# Patient Record
Sex: FEMALE | Race: WHITE | NOT HISPANIC OR LATINO | Employment: OTHER | URBAN - METROPOLITAN AREA
[De-identification: names, ages, dates, MRNs, and addresses within clinical notes are randomized per-mention and may not be internally consistent; named-entity substitution may affect disease eponyms.]

---

## 2017-02-01 ENCOUNTER — TELEPHONE (OUTPATIENT)
Dept: FAMILY MEDICINE | Facility: CLINIC | Age: 72
End: 2017-02-01

## 2017-02-01 NOTE — TELEPHONE ENCOUNTER
----- Message from [employer] sent at 2/1/2017 11:40 AM CST -----  Regarding: Appointment scheduled from [facility]  Contact: [phone]  Appointment For: WARNER BEAL ([id number])  Visit Type: [facility] OFFICE VISIT SHORT (910)    2/21/2017    3:00 PM  15 mins.  Xochitl Hemphill MD  FAMILY PRACTICE    Patient Comments:  Primary Care  Sever pain in left leg.

## 2017-02-07 ENCOUNTER — MYC MEDICAL ADVICE (OUTPATIENT)
Dept: FAMILY MEDICINE | Facility: CLINIC | Age: 72
End: 2017-02-07

## 2017-02-07 NOTE — TELEPHONE ENCOUNTER
See MyCharts below.  Patient has canceled appointment.  Closing this encounter.  Clara Helton RN

## 2017-02-13 NOTE — TELEPHONE ENCOUNTER
Patient has not returned call or MyChart message to clinic, she also cancelled her appointment on 02/21/2017.    Will close encounter.     Isa Fishman RN

## 2017-03-31 ENCOUNTER — MYC REFILL (OUTPATIENT)
Dept: FAMILY MEDICINE | Facility: CLINIC | Age: 72
End: 2017-03-31

## 2017-03-31 DIAGNOSIS — E78.5 HYPERLIPIDEMIA WITH TARGET LDL LESS THAN 130: ICD-10-CM

## 2017-03-31 DIAGNOSIS — M32.9 SYSTEMIC LUPUS ERYTHEMATOSUS (H): ICD-10-CM

## 2017-03-31 NOTE — TELEPHONE ENCOUNTER
Message from [employer]:  Original authorizing provider: Xochitl Hemphill MD    Capri DEANDRE Wiseman would like a refill of the following medications:  hydroxychloroquine (PLAQUENIL) 200 MG tablet [Xochitl Hemphill MD]  simvastatin (ZOCOR) 10 MG tablet [Xochitl Hemphill MD]    Preferred pharmacy: Other - [facility] Help Desk [phone]    Comment:  Please send these Rx renewals to my new provider [facility]. There [facility] number is [phone]. Thank you for your prompt renewal service. Capri

## 2017-03-31 NOTE — TELEPHONE ENCOUNTER
Zocor     Last Written Prescription Date: 10/03/16  Last Fill Quantity: 90, # refills: 3  Last Office Visit with [facility] [place], [facility] or  Health prescribing provider: 06/03/16       Lab Results   Component Value Date    CHOL 160 10/03/2016     Lab Results   Component Value Date    HDL 69 10/03/2016     Lab Results   Component Value Date    LDL 69 10/03/2016     Lab Results   Component Value Date    TRIG 111 10/03/2016     Lab Results   Component Value Date    CHOLHDLRATIO 1.9 06/17/2015     plaquenil      Last Written Prescription Date: 06/03/16  Last Fill Quantity: 90,  # refills: 3   Last Office Visit with [facility] [place], [facility] or [facility] prescribing provider: 06/03/16

## 2017-04-04 RX ORDER — HYDROXYCHLOROQUINE SULFATE 200 MG/1
200 TABLET, FILM COATED ORAL DAILY
Qty: 90 TABLET | Refills: 1 | Status: SHIPPED | OUTPATIENT
Start: 2017-04-04 | End: 2017-07-03

## 2017-04-04 RX ORDER — SIMVASTATIN 10 MG
10 TABLET ORAL AT BEDTIME
Qty: 90 TABLET | Refills: 1 | Status: SHIPPED | OUTPATIENT
Start: 2017-04-04 | End: 2017-08-14

## 2017-04-06 ENCOUNTER — HOSPITAL ENCOUNTER (OUTPATIENT)
Dept: MAMMOGRAPHY | Facility: CLINIC | Age: 72
Discharge: HOME | End: 2017-04-06
Attending: FAMILY MEDICINE | Admitting: FAMILY MEDICINE
Payer: COMMERCIAL

## 2017-04-06 DIAGNOSIS — Z12.31 VISIT FOR SCREENING MAMMOGRAM: ICD-10-CM

## 2017-04-06 PROCEDURE — G0202 SCR MAMMO BI INCL CAD: HCPCS

## 2017-06-07 ENCOUNTER — RADIANT APPOINTMENT (OUTPATIENT)
Dept: GENERAL RADIOLOGY | Facility: CLINIC | Age: 72
End: 2017-06-07
Attending: ORTHOPAEDIC SURGERY
Payer: COMMERCIAL

## 2017-06-07 ENCOUNTER — OFFICE VISIT (OUTPATIENT)
Dept: ORTHOPEDICS | Facility: CLINIC | Age: 72
End: 2017-06-07
Payer: COMMERCIAL

## 2017-06-07 VITALS — TEMPERATURE: 97 F | WEIGHT: 173 LBS | HEIGHT: 60 IN | BODY MASS INDEX: 33.96 KG/M2

## 2017-06-07 DIAGNOSIS — M25.519 SHOULDER PAIN: ICD-10-CM

## 2017-06-07 DIAGNOSIS — M75.42 IMPINGEMENT SYNDROME OF LEFT SHOULDER: Primary | ICD-10-CM

## 2017-06-07 PROCEDURE — 73030 X-RAY EXAM OF SHOULDER: CPT | Mod: TC

## 2017-06-07 PROCEDURE — 99204 OFFICE O/P NEW MOD 45 MIN: CPT | Mod: 25 | Performed by: ORTHOPAEDIC SURGERY

## 2017-06-07 PROCEDURE — 20610 DRAIN/INJ JOINT/BURSA W/O US: CPT | Mod: LT | Performed by: ORTHOPAEDIC SURGERY

## 2017-06-07 ASSESSMENT — PAIN SCALES - GENERAL: PAINLEVEL: MILD PAIN (2)

## 2017-06-07 NOTE — PROGRESS NOTES
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      She is being seen for   Chief Complaints and History of Present Illnesses   Patient presents with     Consult     left shoulder pain per Xochitl Hemphill MD          History of Present Illness:   Capri Wiseman is a 71 year old female who is seen in consultation at the request of Xochitl Hemphill MD.  History of Present illness:  Capri presents for evaluation of:  1.) left shoulder  Onset:  early and March    Symptoms brought on by reaching .   Location:  left shoulder.    Character:  sharp.    Progression of symptoms:  worse.    Previous similar pain: YES- rt shoulder surgery about 3 yrs ago.   Pain Level:  2/10.   Previous treatments:  heat and ice.  Currently on Blood thinners? asa  Diagnosis of Diabetes? no  Was carrying a vacuum canister  Pain getting worse, sleep difficult, reaching painful      Patient's past medical, surgical, social and family histories reviewed.     Past Medical History:   Diagnosis Date     CKD (chronic kidney disease) stage 3, GFR 30-59 ml/min      Dental disorder     loosing top teeth due to lupus     GERD (gastroesophageal reflux disease)      High blood pressure      Mixed hyperlipidaemia      Osteoarthritis      RA (rheumatoid arthritis) (H)     mild, on plaquenil     SLE (systemic lupus erythematosus) (H)      Vision problem     dry eyes from Lupus       Past Surgical History:   Procedure Laterality Date     APPENDECTOMY  1969     C TOTAL KNEE ARTHROPLASTY  3/10    left     COLONOSCOPY  2009    repeat 5 years     COLONOSCOPY N/A 5/20/2015    Procedure: COLONOSCOPY;  Surgeon: Corbin Dexter MD;  Location:  GI     FOOT SURGERY  2010    bone and screws in 2 toes, hammertoe     HYSTERECTOMY TOTAL ABDOMINAL  2010    due to bleeding, benign     LAPAROTOMY EXPLORATORY      scar tissue removal/repair - abd      SHOULDER SURGERY  05/2013    rotator cuff repair, right       Medications:    Current Outpatient Prescriptions on File Prior to Visit:  hydroxychloroquine (PLAQUENIL) 200 MG tablet Take 1 tablet (200 mg) by mouth daily   simvastatin (ZOCOR) 10 MG tablet Take 1 tablet (10 mg) by mouth At Bedtime   metoprolol (TOPROL-XL) 25 MG 24 hr tablet Take 0.5 tablets (12.5 mg) by mouth 2 times daily   lisinopril (PRINIVIL,ZESTRIL) 10 MG tablet TAKE 1 TABLET EVERY DAY   hydrochlorothiazide (HYDRODIURIL) 25 MG tablet Take 0.5 tablets (12.5 mg) by mouth daily   oxyCODONE-acetaminophen (PERCOCET) 5-325 MG per tablet Take 1-2 tablets by mouth every 6 hours as needed   Omeprazole Magnesium (PRILOSEC OTC PO) Take 20 mg by mouth daily   aspirin 325 MG tablet Take 325 mg by mouth daily   Glucosamine-Chondroit-Vit C-Mn (GLUCOSAMINE CHONDR 1500 COMPLX PO) Take 2 tablets by mouth daily   Multiple Vitamins-Minerals (CENTRUM SILVER) per tablet Take 1 tablet by mouth daily   Ascorbic Acid (VITAMIN C PO) Take 1,000 mg by mouth daily   Cyanocobalamin (VITAMIN B 12 PO) Take 1,000 mcg by mouth daily   calcium citrate-vitamin D (CITRACAL) 315-250 MG-UNIT TABS Take 2 tablets by mouth daily    Omega-3 Fatty Acids (OMEGA-3 FISH OIL PO) Take 1,400 mg by mouth daily   artificial saliva, BIOTENE MT, (BIOTENE MT) SOLN Swish and spit 5-10 mLs in mouth as needed for dry mouth   cycloSPORINE (RESTASIS) 0.05 % ophthalmic emulsion Place 1 drop into both eyes 2 times daily     No current facility-administered medications on file prior to visit.     Allergies   Allergen Reactions     Ciprofloxacin GI Disturbance     Was taken with Flagyl, not sure which one bothered her.      Flagyl [Metronidazole] GI Disturbance     Was taken with Cipro, not sure which one bothered her.        Social History     Occupational History     teacher      Retired     Social History Main Topics     Smoking status: Never Smoker     Smokeless tobacco: Never Used     Alcohol use No      Drug use: No     Sexual activity: No       Family History   Problem Relation Age of Onset     Colon Cancer Mother      CEREBROVASCULAR DISEASE Father      Lupus Mother      Neurologic Disorder Sister      ALS       REVIEW OF SYSTEMS  10 point review systems performed otherwise negative as noted as per history of present illness.    Physical Exam:  Vitals: Temp 97  F (36.1  C)  Ht 1.524 m (5')  Wt 78.5 kg (173 lb)  BMI 33.79 kg/m2  BMI= Body mass index is 33.79 kg/(m^2).    Constitutional: healthy, alert and no acute distress   Psychiatric: mentation appears normal and affect normal/bright  NEURO: no focal deficits  RESP: Normal with easy respirations and no use of accessory muscles to breathe, no audible wheezing or retractions  CV: regular pulse  SKIN: No erythema, rashes, excoriation, or breakdown. No evidence of infection.   JOINT/EXTREMITIES:left shoulder - FROM, positive impingement, good strength in rotator cuff  GAIT: non-antalgic  Lymph: no palpable lymph nodes    Diagnostic Modalities:  left shoulder X-ray: Well preserved glenohumeral articular space. No fracture, dislocation and or lesion.   Independent visualization of the images was performed.      Impression: left Shoulder Subacromial Impingement    Plan:  All of the above pertinent physical exam and imaging modalities findings was reviewed with Capri.                                          CONSERVATIVE CARE:  I recommend conservative care for the patient to include NSAIDs, Tylenol, focused self directed physical therapy, steroid injections, activity modifications. Today I provided or dispensed info andf hep.                                        INJECTION PROCEDURE:  The patient was counseled about an  injection, including discussion of risks (including infection), contents of the injection, rationale for performing the injection, and expected benefits of the injection. The skin was prepped with alcohol and betadine and then utilizing sterile  technique an injection of the left shoulder subacromial space from the anterolateral approach  was performed. The injection consisted 1ml of Kenalog (40mg per 1ml) with 8ml 1% lidocaine plain. The patient tolerated the injection well, and there were no complications. The injection site was covered with a Band-Aid. The injection was performed by Vivi Mcneil M.D.                                                FUTURE PLAN:  On their return if they still have symptoms we will consider physical therapy, MRI, injection of steroids.      Return to clinic 6, week(s), or sooner as needed for changes.  Re-x-ray on return: No    Vivi Mcneil M.D.

## 2017-06-07 NOTE — NURSING NOTE
Chief Complaint   Patient presents with     Consult     left shoulder pain per Xochitl Hemphill MD        Initial Temp 97  F (36.1  C)  Ht 1.524 m (5')  Wt 78.5 kg (173 lb)  BMI 33.79 kg/m2 Estimated body mass index is 33.79 kg/(m^2) as calculated from the following:    Height as of this encounter: 1.524 m (5').    Weight as of this encounter: 78.5 kg (173 lb).  Medication Reconciliation: complete    BP completed using cuff size: NA (Not Taken)    Chelsey Fields MA

## 2017-06-07 NOTE — PATIENT INSTRUCTIONS
What Is Impingement Syndrome?  Shoulder pain when raising your arm may mean you have impingement syndrome. This is pinching within your shoulder. The problem may have been caused by repeating an overhead motion. In some cases, you may feel a nagging pain even when you re not using your shoulder.     A forceful action repeated day after day without rest can cause a repetitive motion injury (RMI). Shoulder impingement is often due to an RMI.      Symptoms of impingement  You may feel pain, pinching, or stiffness in your shoulder. Pain often comes with movement. But you may also feel it when you re not using your shoulder. For example, you may feel pain while trying to sleep.    Causes of impingement  Shoulder impingement is often caused by making repeated overhead movements. Constant shoulder use can irritate the tendons and bursa, leading to swelling. Swollen parts of the shoulder take up more room, making the joint space smaller:    Bursitis is inflammation of the bursa, a sac of fluid that cushions shoulder parts as they move. The bursa fills up with too much fluid, filling and squeezing the joint space.    Tendinitis is inflammation of the tendons, fibrous tissues that connect muscle to bone.    Bone problems can make impingement worse. The acromion is part of the shoulder bone. It may be flat or hooked. If your acromion is hooked, the joint space may be smaller than normal. This makes you more prone to shoulder problems. Bone spurs (growths on the bone) can also narrow the joint space.        7873-7697 [employer]. [address] [place] [address], [place] [address]. All rights reserved. This information is not intended as a substitute for professional medical care. Always follow your healthcare professional's instructions.        Nonsurgical Treatment Options for Shoulder Impingement    Rest is key to healing your shoulder. If an activity hurts, don t do it. Otherwise, you may prevent healing and increase  pain. Your shoulder needs active rest. This means avoiding overhead movements and activities that cause pain. But DO NOT stop using your shoulder completely. This can cause it to stiffen or  freeze.  In addition to rest, impingement can be treated a number of ways. Your healthcare provider can help you find which of these is best for you.     Ice  Ice reduces inflammation and relieves pain. Apply an ice pack for about 15 minutes, 3 times a day. You can also use a bag of frozen peas instead of an ice pack. A pillow placed under your arm may help make you more comfortable.  Note: Don t put the cold item directly on your skin. Place it on top of your shirt, or wrap it in a thin towel or washcloth.     Heat  Heat may soothe aching muscles, but it won t reduce inflammation. Use a heating pad or take a warm shower or bath. Do this for 15 minutes at a time.  Note: Avoid heat when pain is constant. Heat is best when used for warming up before an activity. You can also alternate ice and heat.     Medicine  To relieve pain and inflammation, try over-the-counter pain relievers, such as acetaminophen or ibuprofen. Or, your healthcare provider may prescribe medicines. Ask how and when to take your medicine. Be sure to follow all instructions you re given.     Electrical stimulation  Electrical stimulation can help reduce pain and swelling. Your healthcare provider attaches small pads to your shoulder. A mild electric current then flows into your shoulder. You may feel tingling, but you should not feel pain.     Ultrasound  Ultrasound can help reduce pain. First a slick gel or medicated cream is applied to your shoulder. Then your healthcare provider places a small device over the area. The device uses sound waves to loosen shoulder tightness. This treatment should be pain-free.  A physical therapist can also help you with exercises specific for your condition.     Injection therapy  Injection therapy may be used to help diagnose  your problem. It may also be used to reduce pain and inflammation. The injection typically includes two medicines. One is an anesthetic to numb the shoulder. The other is a steroid, such as cortisone, to help reduce painful swelling. It can take from a few hours to a couple of days before the injection helps. Talk to your healthcare provider about the possible risks and benefits of this therapy.    3250-8109 [employer]. [address] [place] [address]. All rights reserved. This information is not intended as a substitute for professional medical care. Always follow your healthcare professional's instructions.        Shoulder Exercises: External Rotation  Strengthening exercises help make your injured shoulder more stable. To warm up, do flexibility (stretching) exercises first. Your healthcare provider will tell you what size hand weights to use for the strengthening exercise below. If you don t have hand weights, try using cans of soup instead:    Lie on your uninjured side with your head supported by a pillow or your arm. Place a small rolled-up towel under your top elbow.    Grasp a hand weight with your top hand and bend that arm to a right angle, resting your forearm against your stomach.    Keeping your elbow against the towel, slowly lift the weight until your forearm is slightly higher than your elbow. Return to the starting position. Repeat.    Work up to 5 to 15 lifts.    9436-1887 [employer]. [address] [place] [address]. All rights reserved. This information is not intended as a substitute for professional medical care. Always follow your healthcare professional's instructions.        Shoulder Exercises: Internal Rotation    Strengthening exercises help make your injured shoulder more stable. To warm up, do flexibility (stretching) exercises first. Your healthcare provider will tell you what size hand weights to use for the strengthening exercise  below. If you don t have hand weights, try using cans of soup instead:    With knees bent, lie on a firm surface. Using the hand on the same side as your injured shoulder, grasp a weight. Bend that arm to a right angle (90 degrees).    Rest your elbow on the floor.    Keeping your elbow next to your side, lower your forearm toward the floor, away from your body. Do not lower your hand all the way to the floor.    Slowly return your forearm to your side. Repeat.    Work up to 5 to 15 lifts.     Note: Support your head and neck with a pillow.     4521-8484 [employer]. [address] [place] [address], [place] [address]. All rights reserved. This information is not intended as a substitute for professional medical care. Always follow your healthcare professional's instructions.

## 2017-06-07 NOTE — LETTER
6/7/2017       RE: Capri Wiseman  [address] [facility] [address]           Dear Colleague,    Thank you for referring your patient, Capri Wiseman, to the [facility]. Please see a copy of my visit note below.    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      She is being seen for   Chief Complaints and History of Present Illnesses   Patient presents with     Consult     left shoulder pain per Xochitl Hemphill MD          History of Present Illness:   Capri Wiseman is a 71 year old female who is seen in consultation at the request of Xochitl Hemphill MD.  History of Present illness:  Capri presents for evaluation of:  1.) left shoulder  Onset:  early and March    Symptoms brought on by reaching .   Location:  left shoulder.    Character:  sharp.    Progression of symptoms:  worse.    Previous similar pain: YES- rt shoulder surgery about 3 yrs ago.   Pain Level:  2/10.   Previous treatments:  heat and ice.  Currently on Blood thinners? asa  Diagnosis of Diabetes? no  Was carrying a vacuum canister  Pain getting worse, sleep difficult, reaching painful      Patient's past medical, surgical, social and family histories reviewed.     Past Medical History:   Diagnosis Date     CKD (chronic kidney disease) stage 3, GFR 30-59 ml/min      Dental disorder     loosing top teeth due to lupus     GERD (gastroesophageal reflux disease)      High blood pressure      Mixed hyperlipidaemia      Osteoarthritis      RA (rheumatoid arthritis) (H)     mild, on plaquenil     SLE (systemic lupus erythematosus) (H)      Vision problem     dry eyes from Lupus       Past Surgical History:   Procedure Laterality Date     APPENDECTOMY  1969     C TOTAL KNEE ARTHROPLASTY  3/10    left     COLONOSCOPY  2009    repeat 5 years     COLONOSCOPY N/A 5/20/2015    Procedure: COLONOSCOPY;   Surgeon: Corbin Dexter MD;  Location:  GI     FOOT SURGERY  2010    bone and screws in 2 toes, hammertoe     HYSTERECTOMY TOTAL ABDOMINAL  2010    due to bleeding, benign     LAPAROTOMY EXPLORATORY      scar tissue removal/repair - abd     SHOULDER SURGERY  05/2013    rotator cuff repair, right       Medications:    Current Outpatient Prescriptions on File Prior to Visit:  hydroxychloroquine (PLAQUENIL) 200 MG tablet Take 1 tablet (200 mg) by mouth daily   simvastatin (ZOCOR) 10 MG tablet Take 1 tablet (10 mg) by mouth At Bedtime   metoprolol (TOPROL-XL) 25 MG 24 hr tablet Take 0.5 tablets (12.5 mg) by mouth 2 times daily   lisinopril (PRINIVIL,ZESTRIL) 10 MG tablet TAKE 1 TABLET EVERY DAY   hydrochlorothiazide (HYDRODIURIL) 25 MG tablet Take 0.5 tablets (12.5 mg) by mouth daily   oxyCODONE-acetaminophen (PERCOCET) 5-325 MG per tablet Take 1-2 tablets by mouth every 6 hours as needed   Omeprazole Magnesium (PRILOSEC OTC PO) Take 20 mg by mouth daily   aspirin 325 MG tablet Take 325 mg by mouth daily   Glucosamine-Chondroit-Vit C-Mn (GLUCOSAMINE CHONDR 1500 COMPLX PO) Take 2 tablets by mouth daily   Multiple Vitamins-Minerals (CENTRUM SILVER) per tablet Take 1 tablet by mouth daily   Ascorbic Acid (VITAMIN C PO) Take 1,000 mg by mouth daily   Cyanocobalamin (VITAMIN B 12 PO) Take 1,000 mcg by mouth daily   calcium citrate-vitamin D (CITRACAL) 315-250 MG-UNIT TABS Take 2 tablets by mouth daily    Omega-3 Fatty Acids (OMEGA-3 FISH OIL PO) Take 1,400 mg by mouth daily   artificial saliva, BIOTENE MT, (BIOTENE MT) SOLN Swish and spit 5-10 mLs in mouth as needed for dry mouth   cycloSPORINE (RESTASIS) 0.05 % ophthalmic emulsion Place 1 drop into both eyes 2 times daily     No current facility-administered medications on file prior to visit.     Allergies   Allergen Reactions     Ciprofloxacin GI Disturbance     Was taken with Flagyl, not sure which one bothered her.      Flagyl [Metronidazole] GI Disturbance      Was taken with Cipro, not sure which one bothered her.        Social History     Occupational History     teacher      Retired     Social History Main Topics     Smoking status: Never Smoker     Smokeless tobacco: Never Used     Alcohol use No     Drug use: No     Sexual activity: No       Family History   Problem Relation Age of Onset     Colon Cancer Mother      CEREBROVASCULAR DISEASE Father      Lupus Mother      Neurologic Disorder Sister      ALS       REVIEW OF SYSTEMS  10 point review systems performed otherwise negative as noted as per history of present illness.    Physical Exam:  Vitals: Temp 97  F (36.1  C)  Ht 1.524 m (5')  Wt 78.5 kg (173 lb)  BMI 33.79 kg/m2  BMI= Body mass index is 33.79 kg/(m^2).    Constitutional: healthy, alert and no acute distress   Psychiatric: mentation appears normal and affect normal/bright  NEURO: no focal deficits  RESP: Normal with easy respirations and no use of accessory muscles to breathe, no audible wheezing or retractions  CV: regular pulse  SKIN: No erythema, rashes, excoriation, or breakdown. No evidence of infection.   JOINT/EXTREMITIES:left shoulder - FROM, positive impingement, good strength in rotator cuff  GAIT: non-antalgic  Lymph: no palpable lymph nodes    Diagnostic Modalities:  left shoulder X-ray: Well preserved glenohumeral articular space. No fracture, dislocation and or lesion.   Independent visualization of the images was performed.      Impression: left Shoulder Subacromial Impingement    Plan:  All of the above pertinent physical exam and imaging modalities findings was reviewed with Capri.                                          CONSERVATIVE CARE:  I recommend conservative care for the patient to include NSAIDs, Tylenol, focused self directed physical therapy, steroid injections, activity modifications. Today I provided or dispensed info andf hep.                                        INJECTION PROCEDURE:  The patient was counseled about  an  injection, including discussion of risks (including infection), contents of the injection, rationale for performing the injection, and expected benefits of the injection. The skin was prepped with alcohol and betadine and then utilizing sterile technique an injection of the left shoulder subacromial space from the anterolateral approach  was performed. The injection consisted 1ml of Kenalog (40mg per 1ml) with 8ml 1% lidocaine plain. The patient tolerated the injection well, and there were no complications. The injection site was covered with a Band-Aid. The injection was performed by Vivi Mcneil M.D.                                                FUTURE PLAN:  On their return if they still have symptoms we will consider physical therapy, MRI, injection of steroids.      Return to clinic 6, week(s), or sooner as needed for changes.  Re-x-ray on return: No    Vivi Mcneil M.D.    Again, thank you for allowing me to participate in the care of your patient.        Sincerely,              Vivi Mcneil MD

## 2017-06-07 NOTE — MR AVS SNAPSHOT
After Visit Summary   6/7/2017    Capri Wiseman    MRN: [id number]           Patient Information     Date Of Birth          1945        Visit Information        Provider Department      6/7/2017 2:10 PM Vivi Mcneil MD [facility]        Today's Diagnoses     Shoulder pain    -  1      Care Instructions      What Is Impingement Syndrome?  Shoulder pain when raising your arm may mean you have impingement syndrome. This is pinching within your shoulder. The problem may have been caused by repeating an overhead motion. In some cases, you may feel a nagging pain even when you re not using your shoulder.     A forceful action repeated day after day without rest can cause a repetitive motion injury (RMI). Shoulder impingement is often due to an RMI.      Symptoms of impingement  You may feel pain, pinching, or stiffness in your shoulder. Pain often comes with movement. But you may also feel it when you re not using your shoulder. For example, you may feel pain while trying to sleep.    Causes of impingement  Shoulder impingement is often caused by making repeated overhead movements. Constant shoulder use can irritate the tendons and bursa, leading to swelling. Swollen parts of the shoulder take up more room, making the joint space smaller:    Bursitis is inflammation of the bursa, a sac of fluid that cushions shoulder parts as they move. The bursa fills up with too much fluid, filling and squeezing the joint space.    Tendinitis is inflammation of the tendons, fibrous tissues that connect muscle to bone.    Bone problems can make impingement worse. The acromion is part of the shoulder bone. It may be flat or hooked. If your acromion is hooked, the joint space may be smaller than normal. This makes you more prone to shoulder problems. Bone spurs (growths on the bone) can also narrow the joint space.        8656-7573 [employer]. [address] [place] [address], [place]  [address]. All rights reserved. This information is not intended as a substitute for professional medical care. Always follow your healthcare professional's instructions.        Nonsurgical Treatment Options for Shoulder Impingement    Rest is key to healing your shoulder. If an activity hurts, don t do it. Otherwise, you may prevent healing and increase pain. Your shoulder needs active rest. This means avoiding overhead movements and activities that cause pain. But DO NOT stop using your shoulder completely. This can cause it to stiffen or  freeze.  In addition to rest, impingement can be treated a number of ways. Your healthcare provider can help you find which of these is best for you.     Ice  Ice reduces inflammation and relieves pain. Apply an ice pack for about 15 minutes, 3 times a day. You can also use a bag of frozen peas instead of an ice pack. A pillow placed under your arm may help make you more comfortable.  Note: Don t put the cold item directly on your skin. Place it on top of your shirt, or wrap it in a thin towel or washcloth.     Heat  Heat may soothe aching muscles, but it won t reduce inflammation. Use a heating pad or take a warm shower or bath. Do this for 15 minutes at a time.  Note: Avoid heat when pain is constant. Heat is best when used for warming up before an activity. You can also alternate ice and heat.     Medicine  To relieve pain and inflammation, try over-the-counter pain relievers, such as acetaminophen or ibuprofen. Or, your healthcare provider may prescribe medicines. Ask how and when to take your medicine. Be sure to follow all instructions you re given.     Electrical stimulation  Electrical stimulation can help reduce pain and swelling. Your healthcare provider attaches small pads to your shoulder. A mild electric current then flows into your shoulder. You may feel tingling, but you should not feel pain.     Ultrasound  Ultrasound can help reduce pain. First a slick gel or  medicated cream is applied to your shoulder. Then your healthcare provider places a small device over the area. The device uses sound waves to loosen shoulder tightness. This treatment should be pain-free.  A physical therapist can also help you with exercises specific for your condition.     Injection therapy  Injection therapy may be used to help diagnose your problem. It may also be used to reduce pain and inflammation. The injection typically includes two medicines. One is an anesthetic to numb the shoulder. The other is a steroid, such as cortisone, to help reduce painful swelling. It can take from a few hours to a couple of days before the injection helps. Talk to your healthcare provider about the possible risks and benefits of this therapy.    6112-9732 [employer]. [address] [place] [address]. All rights reserved. This information is not intended as a substitute for professional medical care. Always follow your healthcare professional's instructions.        Shoulder Exercises: External Rotation  Strengthening exercises help make your injured shoulder more stable. To warm up, do flexibility (stretching) exercises first. Your healthcare provider will tell you what size hand weights to use for the strengthening exercise below. If you don t have hand weights, try using cans of soup instead:    Lie on your uninjured side with your head supported by a pillow or your arm. Place a small rolled-up towel under your top elbow.    Grasp a hand weight with your top hand and bend that arm to a right angle, resting your forearm against your stomach.    Keeping your elbow against the towel, slowly lift the weight until your forearm is slightly higher than your elbow. Return to the starting position. Repeat.    Work up to 5 to 15 lifts.    9950-5826 [employer]. [address] [place] [address]. All rights reserved. This information is not intended as a substitute for  professional medical care. Always follow your healthcare professional's instructions.        Shoulder Exercises: Internal Rotation    Strengthening exercises help make your injured shoulder more stable. To warm up, do flexibility (stretching) exercises first. Your healthcare provider will tell you what size hand weights to use for the strengthening exercise below. If you don t have hand weights, try using cans of soup instead:    With knees bent, lie on a firm surface. Using the hand on the same side as your injured shoulder, grasp a weight. Bend that arm to a right angle (90 degrees).    Rest your elbow on the floor.    Keeping your elbow next to your side, lower your forearm toward the floor, away from your body. Do not lower your hand all the way to the floor.    Slowly return your forearm to your side. Repeat.    Work up to 5 to 15 lifts.     Note: Support your head and neck with a pillow.     3601-3946 [employer]. [address] [place] [address]. All rights reserved. This information is not intended as a substitute for professional medical care. Always follow your healthcare professional's instructions.                Follow-ups after your visit        Your next 10 appointments already scheduled     Jul 03, 2017  8:00 AM CDT   Return Visit with Jose M Loredo MD   [facility]    [address] [place] [address] [phone]            Aug 14, 2017  3:00 PM CDT   MyChart Long with Xochitl Hemphill MD   [facility]    [address] [place] [address] [phone]              Who to contact     If you have questions or need follow up information about today's clinic visit or your schedule please contact [facility] directly at [phone].  Normal or non-critical lab and imaging results will be communicated to you by MyChart, letter or  phone within 4 business days after the clinic has received the results. If you do not hear from us within 7 days, please contact the clinic through [email or web address] or phone. If you have a critical or abnormal lab result, we will notify you by phone as soon as possible.  Submit refill requests through [email or web address] or call your pharmacy and they will forward the refill request to us. Please allow 3 business days for your refill to be completed.          Additional Information About Your Visit        [email or web address] Information     [email or web address] gives you secure access to your electronic health record. If you see a primary care provider, you can also send messages to your care team and make appointments. If you have questions, please call your primary care clinic.  If you do not have a primary care provider, please call [phone] and they will assist you.        Care EveryWhere ID     This is your Care EveryWhere ID. This could be used by other organizations to access your [place] medical records  [id number]        Your Vitals Were     Temperature Height BMI (Body Mass Index)             97  F (36.1  C) 1.524 m (5') 33.79 kg/m2          Blood Pressure from Last 3 Encounters:   07/15/16 120/76   06/03/16 130/74   02/15/16 118/66    Weight from Last 3 Encounters:   06/07/17 78.5 kg (173 lb)   07/15/16 78.2 kg (172 lb 6.4 oz)   06/03/16 78.7 kg (173 lb 6.4 oz)               Primary Care Provider Office Phone # Fax #    Xochitl Suad Hemphill [phone]       [facility] [address] [place] [address]        Thank you!     Thank you for choosing [facility]  for your care. Our goal is always to provide you with excellent care. Hearing back from our patients is one way we can continue to improve our services. Please take a few minutes to complete the written survey that you may receive in the mail after your visit with us. Thank you!             Your Updated Medication List - Protect others  around you: Learn how to safely use, store and throw away your medicines at www.disposemymeds.org.          This list is accurate as of: 6/7/17  2:35 PM.  Always use your most recent med list.                   Brand Name Dispense Instructions for use    artificial saliva Soln solution     44.3 mL    Swish and spit 5-10 mLs in mouth as needed for dry mouth       aspirin 325 MG tablet      Take 325 mg by mouth daily       calcium citrate-vitamin D 315-250 MG-UNIT Tabs per tablet    CITRACAL     Take 2 tablets by mouth daily       CENTRUM SILVER per tablet      Take 1 tablet by mouth daily       cycloSPORINE 0.05 % ophthalmic emulsion    RESTASIS    180 each    Place 1 drop into both eyes 2 times daily       GLUCOSAMINE CHONDR 1500 COMPLX PO      Take 2 tablets by mouth daily       hydrochlorothiazide 25 MG tablet    HYDRODIURIL    45 tablet    Take 0.5 tablets (12.5 mg) by mouth daily       hydroxychloroquine 200 MG tablet    PLAQUENIL    90 tablet    Take 1 tablet (200 mg) by mouth daily       lisinopril 10 MG tablet    PRINIVIL/ZESTRIL    90 tablet    TAKE 1 TABLET EVERY DAY       metoprolol 25 MG 24 hr tablet    TOPROL-XL    90 tablet    Take 0.5 tablets (12.5 mg) by mouth 2 times daily       OMEGA-3 FISH OIL PO      Take 1,400 mg by mouth daily       oxyCODONE-acetaminophen 5-325 MG per tablet    PERCOCET    20 tablet    Take 1-2 tablets by mouth every 6 hours as needed       PRILOSEC OTC PO      Take 20 mg by mouth daily       simvastatin 10 MG tablet    ZOCOR    90 tablet    Take 1 tablet (10 mg) by mouth At Bedtime       VITAMIN B 12 PO      Take 1,000 mcg by mouth daily       VITAMIN C PO      Take 1,000 mg by mouth daily

## 2017-06-09 ENCOUNTER — TELEPHONE (OUTPATIENT)
Dept: ORTHOPEDICS | Facility: CLINIC | Age: 72
End: 2017-06-09

## 2017-06-09 DIAGNOSIS — Z96.619: Primary | ICD-10-CM

## 2017-06-09 RX ORDER — AMOXICILLIN 500 MG/1
2000 CAPSULE ORAL ONCE
Qty: 4 CAPSULE | Refills: 0 | Status: SHIPPED | OUTPATIENT
Start: 2017-06-09 | End: 2017-06-09

## 2017-06-09 NOTE — TELEPHONE ENCOUNTER
I discussed this patient with Dr. 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 When I talked to her on the phone it was unclear that she had a joint replacement. I did make a call back and discuss this in more detail with her. She is having dental work done currently and soon she states she is getting a plate put in. She also moved from [place] and so her primary care doctor is not established as well here. I ordered 2 g amoxicillin to be given one hour prior to dental work. She understands and will  the prescription.

## 2017-06-09 NOTE — TELEPHONE ENCOUNTER
Reason for Call:  Other call back    Detailed comments: Dr. Mcneil patient, had left shoulder surgery - she is having dental procedures, her dentist will not call in an anti-biotic, patient states she was told to call Dr. Mcneil to call it in if her dentist wouldn't. [facility] [employer] -     Phone Number Patient can be reached at: Cell number on file:    Telephone Information:   Mobile [phone]       Best Time: today please     Can we leave a detailed message on this number? YES    Call taken on 6/9/2017 at 9:56 AM by Peggy Fields

## 2017-06-09 NOTE — TELEPHONE ENCOUNTER
I called this patient to discuss her message below.  I was confused because I did not see that she had a total joint. When I called her she was also confused because she thought that she had an infection in her shoulder. I reviewed Dr. Mcneil's note from 2 days ago and there is no mention of infection plus there is no elevated labs that I can see in her chart. I reassured her of this today. She understands, she may have been confused about a tooth infection but today she denied that she had a tooth infection other than that she had some dental surgery recently. I have told her to follow up with her dentist if she needs anything for her teeth but her shoulder is okay.

## 2017-07-03 ENCOUNTER — OFFICE VISIT (OUTPATIENT)
Dept: RHEUMATOLOGY | Facility: CLINIC | Age: 72
End: 2017-07-03
Payer: COMMERCIAL

## 2017-07-03 VITALS
WEIGHT: 171 LBS | DIASTOLIC BLOOD PRESSURE: 82 MMHG | BODY MASS INDEX: 33.4 KG/M2 | SYSTOLIC BLOOD PRESSURE: 130 MMHG | TEMPERATURE: 97.7 F

## 2017-07-03 DIAGNOSIS — M32.19 SYSTEMIC LUPUS ERYTHEMATOSUS WITH OTHER ORGAN INVOLVEMENT, UNSPECIFIED SLE TYPE (H): Primary | ICD-10-CM

## 2017-07-03 LAB
ALBUMIN UR-MCNC: NEGATIVE MG/DL
ALT SERPL W P-5'-P-CCNC: 19 U/L (ref 0–50)
APPEARANCE UR: CLEAR
AST SERPL W P-5'-P-CCNC: 16 U/L (ref 0–45)
BACTERIA #/AREA URNS HPF: ABNORMAL /HPF
BILIRUB UR QL STRIP: NEGATIVE
C3 SERPL-MCNC: 96 MG/DL (ref 76–169)
C4 SERPL-MCNC: 22 MG/DL (ref 15–50)
COLOR UR AUTO: YELLOW
CREAT SERPL-MCNC: 1.08 MG/DL (ref 0.52–1.04)
CRP SERPL-MCNC: 3.4 MG/L (ref 0–8)
ERYTHROCYTE [DISTWIDTH] IN BLOOD BY AUTOMATED COUNT: 13.1 % (ref 10–15)
GFR SERPL CREATININE-BSD FRML MDRD: 50 ML/MIN/1.7M2
GLUCOSE UR STRIP-MCNC: NEGATIVE MG/DL
HCT VFR BLD AUTO: 33.9 % (ref 35–47)
HGB BLD-MCNC: 11.2 G/DL (ref 11.7–15.7)
HGB UR QL STRIP: NEGATIVE
KETONES UR STRIP-MCNC: NEGATIVE MG/DL
LEUKOCYTE ESTERASE UR QL STRIP: ABNORMAL
MCH RBC QN AUTO: 34.4 PG (ref 26.5–33)
MCHC RBC AUTO-ENTMCNC: 33 G/DL (ref 31.5–36.5)
MCV RBC AUTO: 104 FL (ref 78–100)
NITRATE UR QL: NEGATIVE
NON-SQ EPI CELLS #/AREA URNS LPF: ABNORMAL /LPF
PH UR STRIP: 5 PH (ref 5–7)
PLATELET # BLD AUTO: 193 10E9/L (ref 150–450)
RBC # BLD AUTO: 3.26 10E12/L (ref 3.8–5.2)
RBC #/AREA URNS AUTO: ABNORMAL /HPF (ref 0–2)
SP GR UR STRIP: 1.03 (ref 1–1.03)
URN SPEC COLLECT METH UR: ABNORMAL
UROBILINOGEN UR STRIP-MCNC: NORMAL MG/DL (ref 0–2)
WBC # BLD AUTO: 5 10E9/L (ref 4–11)
WBC #/AREA URNS AUTO: ABNORMAL /HPF (ref 0–2)

## 2017-07-03 PROCEDURE — 81001 URINALYSIS AUTO W/SCOPE: CPT | Performed by: INTERNAL MEDICINE

## 2017-07-03 PROCEDURE — 84460 ALANINE AMINO (ALT) (SGPT): CPT | Performed by: INTERNAL MEDICINE

## 2017-07-03 PROCEDURE — 86225 DNA ANTIBODY NATIVE: CPT | Performed by: INTERNAL MEDICINE

## 2017-07-03 PROCEDURE — 99214 OFFICE O/P EST MOD 30 MIN: CPT | Performed by: INTERNAL MEDICINE

## 2017-07-03 PROCEDURE — 82565 ASSAY OF CREATININE: CPT | Performed by: INTERNAL MEDICINE

## 2017-07-03 PROCEDURE — 86160 COMPLEMENT ANTIGEN: CPT | Performed by: INTERNAL MEDICINE

## 2017-07-03 PROCEDURE — 86140 C-REACTIVE PROTEIN: CPT | Performed by: INTERNAL MEDICINE

## 2017-07-03 PROCEDURE — 36415 COLL VENOUS BLD VENIPUNCTURE: CPT | Performed by: INTERNAL MEDICINE

## 2017-07-03 PROCEDURE — 84450 TRANSFERASE (AST) (SGOT): CPT | Performed by: INTERNAL MEDICINE

## 2017-07-03 PROCEDURE — 85027 COMPLETE CBC AUTOMATED: CPT | Performed by: INTERNAL MEDICINE

## 2017-07-03 RX ORDER — HYDROXYCHLOROQUINE SULFATE 200 MG/1
200 TABLET, FILM COATED ORAL DAILY
Qty: 90 TABLET | Refills: 3 | Status: SHIPPED | OUTPATIENT
Start: 2017-07-03 | End: 2018-07-16

## 2017-07-03 ASSESSMENT — PAIN SCALES - GENERAL: PAINLEVEL: NO PAIN (0)

## 2017-07-03 NOTE — MR AVS SNAPSHOT
After Visit Summary   7/3/2017    Capri Wiseman    MRN: [id number]           Patient Information     Date Of Birth          1945        Visit Information        Provider Department      7/3/2017 8:00 AM Jose M Loredo MD [facility]        Today's Diagnoses     Systemic lupus erythematosus with other organ involvement, unspecified SLE type (H)    -  1       Follow-ups after your visit        Follow-up notes from your care team     Return in about 1 year (around 7/3/2018).      Your next 10 appointments already scheduled     Aug 14, 2017  3:00 PM CDT   MyChart Long with Xochitl Hemphill MD   [facility] ([facility])    [address] [id number] [phone]            Jul 02, 2018  8:00 AM CDT   Return Visit with Jose M Loredo MD   [facility] ([facility])    [address] [place] [address] [phone]              Future tests that were ordered for you today     Open Future Orders        Priority Expected Expires Ordered    ALT Routine  7/3/2018 7/3/2017    AST Routine  7/3/2018 7/3/2017    CBC with platelets Routine  7/3/2018 7/3/2017    Complement C3 Routine  7/3/2018 7/3/2017    Complement C4 Routine  7/3/2018 7/3/2017    Creatinine Routine  7/3/2018 7/3/2017    CRP inflammation Routine  7/3/2018 7/3/2017    DNA double stranded antibodies Routine  7/3/2018 7/3/2017    Routine UA with microscopic - No culture ([facility]) Routine  7/3/2018 7/3/2017            Who to contact     If you have questions or need follow up information about today's clinic visit or your schedule please contact [facility] directly at [phone].  Normal or non-critical lab and imaging results will be communicated to you by MyChart, letter or phone within 4 business days after the clinic has received the results. If you do not hear from us within 7 days, please  contact the clinic through [employer] or phone. If you have a critical or abnormal lab result, we will notify you by phone as soon as possible.  Submit refill requests through [employer] or call your pharmacy and they will forward the refill request to us. Please allow 3 business days for your refill to be completed.          Additional Information About Your Visit        [employer] Information     [employer] gives you secure access to your electronic health record. If you see a primary care provider, you can also send messages to your care team and make appointments. If you have questions, please call your primary care clinic.  If you do not have a primary care provider, please call [phone] and they will assist you.      [employer] is an electronic gateway that provides easy, online access to your medical records. With [employer], you can request a clinic appointment, read your test results, renew a prescription or communicate with your care team.     To access your existing account, please contact your [facility] or call [phone] for assistance.        Care EveryWhere ID     This is your Care EveryWhere ID. This could be used by other organizations to access your [place] medical records  [id number]        Your Vitals Were     Temperature BMI (Body Mass Index)                97.7  F (36.5  C) 33.4 kg/m2           Blood Pressure from Last 3 Encounters:   07/03/17 130/82   07/15/16 120/76   06/03/16 130/74    Weight from Last 3 Encounters:   07/03/17 77.6 kg (171 lb)   06/07/17 78.5 kg (173 lb)   07/15/16 78.2 kg (172 lb 6.4 oz)                 Where to get your medicines      Some of these will need a paper prescription and others can be bought over the counter.  Ask your nurse if you have questions.     Bring a paper prescription for each of these medications     hydroxychloroquine 200 MG tablet          Primary Care Provider Office Phone # Fax #    Xochitl Hemphill MD  [phone]       [facility] [address] [place] [address]        Equal Access to Services     CHRYSTAL SCHMIDT : Hadii aad ku hadchandu Owens, wacjda lurajan, kd kamillerda bacilio, laurel crzainab noemi. So [facility] [phone].    ATENCIÓN: Si habla español, tiene a go disposición servicios gratuitos de asistencia lingüística. Llame al [phone].    We comply with applicable federal civil rights laws and [place] laws. We do not discriminate on the basis of race, color, national origin, age, disability sex, sexual orientation or gender identity.            Thank you!     Thank you for choosing [facility]  for your care. Our goal is always to provide you with excellent care. Hearing back from our patients is one way we can continue to improve our services. Please take a few minutes to complete the written survey that you may receive in the mail after your visit with us. Thank you!             Your Updated Medication List - Protect others around you: Learn how to safely use, store and throw away your medicines at www.disposemymeds.org.          This list is accurate as of: 7/3/17  8:26 AM.  Always use your most recent med list.                   Brand Name Dispense Instructions for use Diagnosis    artificial saliva Soln solution     44.3 mL    Swish and spit 5-10 mLs in mouth as needed for dry mouth    Dry mouth, RA (rheumatoid arthritis) (H)       aspirin 325 MG tablet      Take 325 mg by mouth daily        calcium citrate-vitamin D 315-250 MG-UNIT Tabs per tablet    CITRACAL     Take 2 tablets by mouth daily        CENTRUM SILVER per tablet      Take 1 tablet by mouth daily        cycloSPORINE 0.05 % ophthalmic emulsion    RESTASIS    180 each    Place 1 drop into both eyes 2 times daily    CKD (chronic kidney disease) stage 3, GFR 30-59 ml/min       GLUCOSAMINE CHONDR 1500 COMPLX PO      Take 2 tablets by mouth daily         hydrochlorothiazide 25 MG tablet    HYDRODIURIL    45 tablet    Take 0.5 tablets (12.5 mg) by mouth daily    Edema, unspecified type       hydroxychloroquine 200 MG tablet    PLAQUENIL    90 tablet    Take 1 tablet (200 mg) by mouth daily    Systemic lupus erythematosus with other organ involvement, unspecified SLE type (H)       lisinopril 10 MG tablet    PRINIVIL/ZESTRIL    90 tablet    TAKE 1 TABLET EVERY DAY    CKD (chronic kidney disease) stage 3, GFR 30-59 ml/min, Essential hypertension with goal blood pressure less than 140/90       metoprolol 25 MG 24 hr tablet    TOPROL-XL    90 tablet    Take 0.5 tablets (12.5 mg) by mouth 2 times daily    Essential hypertension with goal blood pressure less than 140/90       OMEGA-3 FISH OIL PO      Take 1,400 mg by mouth daily        PRILOSEC OTC PO      Take 20 mg by mouth daily        simvastatin 10 MG tablet    ZOCOR    90 tablet    Take 1 tablet (10 mg) by mouth At Bedtime    Hyperlipidemia with target LDL less than 130       VITAMIN B 12 PO      Take 1,000 mcg by mouth daily        VITAMIN C PO      Take 1,000 mg by mouth daily

## 2017-07-03 NOTE — NURSING NOTE
Capri Wiseman's goals for this visit include:   Chief Complaint   Patient presents with     RECHECK     one year follow up        She requests these members of her care team be copied on today's visit information: yes     PCP: Xochitl Hemphill    Referring Provider:  No referring provider defined for this encounter.    Chief Complaint   Patient presents with     RECHECK     one year follow up        Initial /82  Temp 97.7  F (36.5  C)  Wt 77.6 kg (171 lb)  BMI 33.4 kg/m2 Estimated body mass index is 33.4 kg/(m^2) as calculated from the following:    Height as of 6/7/17: 1.524 m (5').    Weight as of this encounter: 77.6 kg (171 lb).  Medication Reconciliation: complete    Do you need any medication refills at today's visit?

## 2017-07-03 NOTE — LETTER
7/3/2017      RE: Capri Wiseman  [address] [facility] [address]       Rheumatology Visit     Capri Wiseman MRN# [id number]   YOB: 1945 Age: 71 year old     Date of Visit: July 3, 2017  Primary care provider: Xochitl Hemphill          Assessment and Plan:   71-year-old woman with a 12-year history of SLE characterized by arthralgias, sicca symptoms, alopecia, and GERD. +AMY in 2013. Has been in remission for 6+ years, on maintenance therapy with  mg qd. Possible history of RA; likely a component of secondary Sjogren's impacting dental health currently being addressed at [facility]     2.  Other medical problems as per EPIC     PLAN: Discussed in detail with the patient     1. Continue Plaquenil - Rx renewed. Eye exam yearly - she is overdue and has referral to have done   2. For her dental issues due to sicca symptoms, continue with [facility]  3. Lab today and write  4. Return in 1 year  5. Flu shot this fall.     Jose M Loredo MD          History of Present Illness:   71-year-old woman who presents to [facility] to followup fo diagnosis of SLE. I saw her in March 2015 after she recently moved to [place] from [place], and last in July 2016. EPIC reviewed.     HISTORY CARRIED FORWARD:     She first began experiencing symptoms SLE about 12 years ago, when she was hospitalized for ACS-like chest pain. Cardiovascular workup was negative for coronary disease. About one year later, she was again hospitalized for chest pain and underwent a cardiac catheterization, which showed clean coronaries. Her chest pain was ultimately diagnosed as GERD and additional blood work (specifics unknown) suggested this was secondary to SLE. She had no other symptoms of SLE at the time.     Over the years she has experienced additional symptoms, including arthralgias (primarily in knees, ankles, hands, wrists), alopecia, sicca symptoms, and Raynaud s. She was  started on prednisone sometime early in her disease course but became very  swollen  and was never given corticosteroids again after this, per her recollection. Currently taking  mg qd and has been on this dose for years. Last eye exam was in 10/2013. Any additional treatment history is unknown.     Believes she also has RA but history of diagnosis is entirely unknown. Over the past 2-3 years she has been losing her upper row of teeth 2/2 dry mouth and has been told she needs dental surgery to support her palate. Has never used Evoxac. Used Restasis for dry eyes in the past but now too expensive and she uses OTC eye drops instead.      In July 2014, while she was still living in [place], she had an abnormal UA that suggested possible kidney disease 2/2 SLE. There were plans to obtain kidney ultrasound and/or biopsy but this was not done as Ms. Wiseman was in the process of relocating to [place]. She has already established care with a new PCP here, with plans to pursue kidney US. She does already carry a diagnosis of CKDIII though the history of this is quite unclear.     INTERVAL HISTORY:     Today, she overall feels well and states her SLE has now been in remission for about 6 years. No exacerbations since seen. No arthralgias.  She and her  are doing apple vinegar in AM and lemon in hot water in PM and feel great. They have lost weight.  She no longer has leg aches.     She remains on Plaquenil one daily without side effects.      She is on Restasis and artificial saliva. She has continued to have some dental deterioration and is having full upper mouth extractions at the  Dental Clinic.     She had interval FU regarding kidney issue with cysts seen on imaging that will be followed.      Complement levels and dsDNA normal in March 2015.   She has not had recent lab so will perform today.     Other ongoing issues include mild alopecia and Raynaud s, both stable. GERD well controlled on Prilosec. Has  never had any major infections       Review of Systems:   Review Of Systems  Skin: negative  Eyes: negative  Ears/Nose/Throat: see HPI  Respiratory: No shortness of breath, dyspnea on exertion, cough, or hemoptysis  Cardiovascular: negative  Gastrointestinal: negative  Genitourinary: see HPI  Musculoskeletal: negative  Neurologic: negative  Psychiatric: negative  Hematologic/Lymphatic/Immunologic: negative  Endocrine: negative          Past Medical History:     Past Medical History:   Diagnosis Date     CKD (chronic kidney disease) stage 3, GFR 30-59 ml/min      Dental disorder     loosing top teeth due to lupus     GERD (gastroesophageal reflux disease)      High blood pressure      Mixed hyperlipidaemia      Osteoarthritis      RA (rheumatoid arthritis) (H)     mild, on plaquenil     SLE (systemic lupus erythematosus) (H)      Vision problem     dry eyes from Lupus       Patient Active Problem List    Diagnosis Date Noted     Systemic lupus erythematosus (H) 08/31/2015     Priority: Medium     CKD (chronic kidney disease) stage 3, GFR 30-59 ml/min 03/03/2015     Priority: Medium     Hypertension 03/03/2015     Priority: Medium     Esophageal reflux 03/03/2015     Priority: Medium     Osteoarthritis 03/03/2015     Priority: Medium     Hyperlipidemia with target LDL less than 130 03/03/2015     Priority: Medium     Diagnosis updated by automated process. Provider to review and confirm.       RA (rheumatoid arthritis) (H) 03/03/2015     Priority: Medium             Past Surgical History:     Past Surgical History:   Procedure Laterality Date     APPENDECTOMY  1969     C TOTAL KNEE ARTHROPLASTY  3/10    left     COLONOSCOPY  2009    repeat 5 years     COLONOSCOPY N/A 5/20/2015    Procedure: COLONOSCOPY;  Surgeon: Corbin Dexter MD;  Location:  GI     FOOT SURGERY  2010    bone and screws in 2 toes, hammertoe     HYSTERECTOMY TOTAL ABDOMINAL  2010    due to bleeding, benign     LAPAROTOMY EXPLORATORY       scar tissue removal/repair - abd     SHOULDER SURGERY  05/2013    rotator cuff repair, right             Social History:     Social History   Substance Use Topics     Smoking status: Never Smoker     Smokeless tobacco: Never Used     Alcohol use No             Family History:     Family History   Problem Relation Age of Onset     Colon Cancer Mother      CEREBROVASCULAR DISEASE Father      Lupus Mother      Neurologic Disorder Sister      ALS            Allergies:     Allergies   Allergen Reactions     Ciprofloxacin GI Disturbance     Was taken with Flagyl, not sure which one bothered her.      Flagyl [Metronidazole] GI Disturbance     Was taken with Cipro, not sure which one bothered her.              Medications:     Current Outpatient Prescriptions   Medication Sig Dispense Refill     hydroxychloroquine (PLAQUENIL) 200 MG tablet Take 1 tablet (200 mg) by mouth daily 90 tablet 1     simvastatin (ZOCOR) 10 MG tablet Take 1 tablet (10 mg) by mouth At Bedtime 90 tablet 1     metoprolol (TOPROL-XL) 25 MG 24 hr tablet Take 0.5 tablets (12.5 mg) by mouth 2 times daily 90 tablet 3     lisinopril (PRINIVIL,ZESTRIL) 10 MG tablet TAKE 1 TABLET EVERY DAY 90 tablet 3     hydrochlorothiazide (HYDRODIURIL) 25 MG tablet Take 0.5 tablets (12.5 mg) by mouth daily 45 tablet 3     Omeprazole Magnesium (PRILOSEC OTC PO) Take 20 mg by mouth daily       aspirin 325 MG tablet Take 325 mg by mouth daily       Glucosamine-Chondroit-Vit C-Mn (GLUCOSAMINE CHONDR 1500 COMPLX PO) Take 2 tablets by mouth daily       Ascorbic Acid (VITAMIN C PO) Take 1,000 mg by mouth daily       Cyanocobalamin (VITAMIN B 12 PO) Take 1,000 mcg by mouth daily       calcium citrate-vitamin D (CITRACAL) 315-250 MG-UNIT TABS Take 2 tablets by mouth daily        artificial saliva, BIOTENE MT, (BIOTENE MT) SOLN Swish and spit 5-10 mLs in mouth as needed for dry mouth 44.3 mL 3     cycloSPORINE (RESTASIS) 0.05 % ophthalmic emulsion Place 1 drop into both eyes 2 times  daily 180 each 3     Multiple Vitamins-Minerals (CENTRUM SILVER) per tablet Take 1 tablet by mouth daily       Omega-3 Fatty Acids (OMEGA-3 FISH OIL PO) Take 1,400 mg by mouth daily              Physical Exam:   Blood pressure 130/82, temperature 97.7  F (36.5  C), weight 77.6 kg (171 lb), not currently breastfeeding.  Constitutional: WD-WN-WG cooperative  Eyes: nl EOM, conjunctiva, sclera  ENT: nl external ears, nose, lips. In process of upper extractions   No mucous membrane lesions  GI: no ABD mass or tenderness, no HSM  Lymph: no cervical or supraclavicular nodes  MS: All TMJ, neck, shoulder, elbow, wrist, MCP/PIP/DIP, spine, hip, knee, ankle, and foot MTP/IP joints were examined. No active synovitis. Full ROM. Normal  strength. No dactylitis, tenosynovitis, enthesopathy. Minimal hallux valgus and prominence of 1st MTP in R foot.  Skin: no nail pitting, alopecia, rash, nodules or lesions  Neuro: intact CN, strength  Psych: nl affect         Data:     Lab Results   Component Value Date    WBC 6.2 06/03/2016    WBC 9.4 07/31/2015    WBC 9.4 07/15/2015    HGB 11.9 06/03/2016    HGB 11.2 (L) 07/31/2015    HGB 12.2 07/15/2015    HCT 37.1 06/03/2016    HCT 33.6 (L) 07/31/2015    HCT 35.5 07/15/2015     (H) 06/03/2016     (H) 07/31/2015     07/15/2015     06/03/2016     07/31/2015     07/15/2015     Lab Results   Component Value Date    BUN 34 (H) 10/03/2016    BUN 37 (H) 06/03/2016    BUN 29 01/26/2016     No components found for: SEDRATE  Lab Results   Component Value Date    TSH 1.600 07/08/2013     Lab Results   Component Value Date    AST 8 07/31/2015    AST 10 07/15/2015    AST 15 03/03/2015    ALT 19 06/03/2016    ALT 15 07/31/2015    ALT 20 07/15/2015    ALKPHOS 45 07/31/2015    ALKPHOS 42 07/15/2015    ALKPHOS 41 03/03/2015     Reviewed Rheumatology lab flowsheet    Jose M Loredo MD

## 2017-07-03 NOTE — PROGRESS NOTES
Rheumatology Visit     Capri Wiseman MRN# [id number]   YOB: 1945 Age: 71 year old     Date of Visit: July 3, 2017  Primary care provider: Xochitl Hemphill          Assessment and Plan:   71-year-old woman with a 12-year history of SLE characterized by arthralgias, sicca symptoms, alopecia, and GERD. +AMY in 2013. Has been in remission for 6+ years, on maintenance therapy with  mg qd. Possible history of RA; likely a component of secondary Sjogren's impacting dental health currently being addressed at [facility]     2.  Other medical problems as per EPIC     PLAN: Discussed in detail with the patient     1. Continue Plaquenil - Rx renewed. Eye exam yearly - she is overdue and has referral to have done   2. For her dental issues due to sicca symptoms, continue with [facility]  3. Lab today and write  4. Return in 1 year  5. Flu shot this fall.     Jose M Loredo MD          History of Present Illness:   71-year-old woman who presents to [facility] to followup fo diagnosis of SLE. I saw her in March 2015 after she recently moved to [place] from [place], and last in July 2016. [facility] reviewed.     HISTORY CARRIED FORWARD:     She first began experiencing symptoms SLE about 12 years ago, when she was hospitalized for ACS-like chest pain. Cardiovascular workup was negative for coronary disease. About one year later, she was again hospitalized for chest pain and underwent a cardiac catheterization, which showed clean coronaries. Her chest pain was ultimately diagnosed as GERD and additional blood work (specifics unknown) suggested this was secondary to SLE. She had no other symptoms of SLE at the time.     Over the years she has experienced additional symptoms, including arthralgias (primarily in knees, ankles, hands, wrists), alopecia, sicca symptoms, and Raynaud s. She was started on prednisone sometime early in her disease course but became very  swollen   and was never given corticosteroids again after this, per her recollection. Currently taking  mg qd and has been on this dose for years. Last eye exam was in 10/2013. Any additional treatment history is unknown.     Believes she also has RA but history of diagnosis is entirely unknown. Over the past 2-3 years she has been losing her upper row of teeth 2/2 dry mouth and has been told she needs dental surgery to support her palate. Has never used Evoxac. Used Restasis for dry eyes in the past but now too expensive and she uses OTC eye drops instead.      In July 2014, while she was still living in [place], she had an abnormal UA that suggested possible kidney disease 2/2 SLE. There were plans to obtain kidney ultrasound and/or biopsy but this was not done as Ms. Wiseman was in the process of relocating to [place]. She has already established care with a new PCP here, with plans to pursue kidney US. She does already carry a diagnosis of CKDIII though the history of this is quite unclear.     INTERVAL HISTORY:     Today, she overall feels well and states her SLE has now been in remission for about 6 years. No exacerbations since seen. No arthralgias.  She and her  are doing apple vinegar in AM and lemon in hot water in PM and feel great. They have lost weight.  She no longer has leg aches.     She remains on Plaquenil one daily without side effects.      She is on Restasis and artificial saliva. She has continued to have some dental deterioration and is having full upper mouth extractions at the  Dental Clinic.     She had interval FU regarding kidney issue with cysts seen on imaging that will be followed.      Complement levels and dsDNA normal in March 2015.  She has not had recent lab so will perform today.     Other ongoing issues include mild alopecia and Raynaud s, both stable. GERD well controlled on Prilosec. Has never had any major infections       Review of Systems:   Review Of Systems  Skin:  negative  Eyes: negative  Ears/Nose/Throat: see HPI  Respiratory: No shortness of breath, dyspnea on exertion, cough, or hemoptysis  Cardiovascular: negative  Gastrointestinal: negative  Genitourinary: see HPI  Musculoskeletal: negative  Neurologic: negative  Psychiatric: negative  Hematologic/Lymphatic/Immunologic: negative  Endocrine: negative          Past Medical History:     Past Medical History:   Diagnosis Date     CKD (chronic kidney disease) stage 3, GFR 30-59 ml/min      Dental disorder     loosing top teeth due to lupus     GERD (gastroesophageal reflux disease)      High blood pressure      Mixed hyperlipidaemia      Osteoarthritis      RA (rheumatoid arthritis) (H)     mild, on plaquenil     SLE (systemic lupus erythematosus) (H)      Vision problem     dry eyes from Lupus       Patient Active Problem List    Diagnosis Date Noted     Systemic lupus erythematosus (H) 08/31/2015     Priority: Medium     CKD (chronic kidney disease) stage 3, GFR 30-59 ml/min 03/03/2015     Priority: Medium     Hypertension 03/03/2015     Priority: Medium     Esophageal reflux 03/03/2015     Priority: Medium     Osteoarthritis 03/03/2015     Priority: Medium     Hyperlipidemia with target LDL less than 130 03/03/2015     Priority: Medium     Diagnosis updated by automated process. Provider to review and confirm.       RA (rheumatoid arthritis) (H) 03/03/2015     Priority: Medium             Past Surgical History:     Past Surgical History:   Procedure Laterality Date     APPENDECTOMY  1969     C TOTAL KNEE ARTHROPLASTY  3/10    left     COLONOSCOPY  2009    repeat 5 years     COLONOSCOPY N/A 5/20/2015    Procedure: COLONOSCOPY;  Surgeon: Corbin Dexter MD;  Location:  GI     FOOT SURGERY  2010    bone and screws in 2 toes, hammertoe     HYSTERECTOMY TOTAL ABDOMINAL  2010    due to bleeding, benign     LAPAROTOMY EXPLORATORY      scar tissue removal/repair - abd     SHOULDER SURGERY  05/2013    rotator cuff  repair, right             Social History:     Social History   Substance Use Topics     Smoking status: Never Smoker     Smokeless tobacco: Never Used     Alcohol use No             Family History:     Family History   Problem Relation Age of Onset     Colon Cancer Mother      CEREBROVASCULAR DISEASE Father      Lupus Mother      Neurologic Disorder Sister      ALS            Allergies:     Allergies   Allergen Reactions     Ciprofloxacin GI Disturbance     Was taken with Flagyl, not sure which one bothered her.      Flagyl [Metronidazole] GI Disturbance     Was taken with Cipro, not sure which one bothered her.              Medications:     Current Outpatient Prescriptions   Medication Sig Dispense Refill     hydroxychloroquine (PLAQUENIL) 200 MG tablet Take 1 tablet (200 mg) by mouth daily 90 tablet 1     simvastatin (ZOCOR) 10 MG tablet Take 1 tablet (10 mg) by mouth At Bedtime 90 tablet 1     metoprolol (TOPROL-XL) 25 MG 24 hr tablet Take 0.5 tablets (12.5 mg) by mouth 2 times daily 90 tablet 3     lisinopril (PRINIVIL,ZESTRIL) 10 MG tablet TAKE 1 TABLET EVERY DAY 90 tablet 3     hydrochlorothiazide (HYDRODIURIL) 25 MG tablet Take 0.5 tablets (12.5 mg) by mouth daily 45 tablet 3     Omeprazole Magnesium (PRILOSEC OTC PO) Take 20 mg by mouth daily       aspirin 325 MG tablet Take 325 mg by mouth daily       Glucosamine-Chondroit-Vit C-Mn (GLUCOSAMINE CHONDR 1500 COMPLX PO) Take 2 tablets by mouth daily       Ascorbic Acid (VITAMIN C PO) Take 1,000 mg by mouth daily       Cyanocobalamin (VITAMIN B 12 PO) Take 1,000 mcg by mouth daily       calcium citrate-vitamin D (CITRACAL) 315-250 MG-UNIT TABS Take 2 tablets by mouth daily        artificial saliva, BIOTENE MT, (BIOTENE MT) SOLN Swish and spit 5-10 mLs in mouth as needed for dry mouth 44.3 mL 3     cycloSPORINE (RESTASIS) 0.05 % ophthalmic emulsion Place 1 drop into both eyes 2 times daily 180 each 3     Multiple Vitamins-Minerals (CENTRUM SILVER) per tablet  Take 1 tablet by mouth daily       Omega-3 Fatty Acids (OMEGA-3 FISH OIL PO) Take 1,400 mg by mouth daily              Physical Exam:   Blood pressure 130/82, temperature 97.7  F (36.5  C), weight 77.6 kg (171 lb), not currently breastfeeding.  Constitutional: WD-WN-WG cooperative  Eyes: nl EOM, conjunctiva, sclera  ENT: nl external ears, nose, lips. In process of upper extractions   No mucous membrane lesions  GI: no ABD mass or tenderness, no HSM  Lymph: no cervical or supraclavicular nodes  MS: All TMJ, neck, shoulder, elbow, wrist, MCP/PIP/DIP, spine, hip, knee, ankle, and foot MTP/IP joints were examined. No active synovitis. Full ROM. Normal  strength. No dactylitis, tenosynovitis, enthesopathy. Minimal hallux valgus and prominence of 1st MTP in R foot.  Skin: no nail pitting, alopecia, rash, nodules or lesions  Neuro: intact CN, strength  Psych: nl affect         Data:     Lab Results   Component Value Date    WBC 6.2 06/03/2016    WBC 9.4 07/31/2015    WBC 9.4 07/15/2015    HGB 11.9 06/03/2016    HGB 11.2 (L) 07/31/2015    HGB 12.2 07/15/2015    HCT 37.1 06/03/2016    HCT 33.6 (L) 07/31/2015    HCT 35.5 07/15/2015     (H) 06/03/2016     (H) 07/31/2015     07/15/2015     06/03/2016     07/31/2015     07/15/2015     Lab Results   Component Value Date    BUN 34 (H) 10/03/2016    BUN 37 (H) 06/03/2016    BUN 29 01/26/2016     No components found for: SEDRATE  Lab Results   Component Value Date    TSH 1.600 07/08/2013     Lab Results   Component Value Date    AST 8 07/31/2015    AST 10 07/15/2015    AST 15 03/03/2015    ALT 19 06/03/2016    ALT 15 07/31/2015    ALT 20 07/15/2015    ALKPHOS 45 07/31/2015    ALKPHOS 42 07/15/2015    ALKPHOS 41 03/03/2015     Reviewed Rheumatology lab flowsheet    Jose M Loredo

## 2017-07-03 NOTE — NURSING NOTE
Faxed Rx for Hydroxychloroquine to [facility] [phone] and received confirmation that fax was sent successfully.     Dhaval Yeager,CMA

## 2017-07-07 LAB — DSDNA AB SER-ACNC: NORMAL IU/ML

## 2017-08-07 ENCOUNTER — OFFICE VISIT (OUTPATIENT)
Dept: ORTHOPEDICS | Facility: CLINIC | Age: 72
End: 2017-08-07
Payer: COMMERCIAL

## 2017-08-07 VITALS — HEIGHT: 60 IN | WEIGHT: 171 LBS | BODY MASS INDEX: 33.57 KG/M2 | TEMPERATURE: 97 F

## 2017-08-07 DIAGNOSIS — M75.42 SUBACROMIAL IMPINGEMENT, LEFT: Primary | ICD-10-CM

## 2017-08-07 DIAGNOSIS — M75.22 BICEPS TENDINITIS OF LEFT SHOULDER: ICD-10-CM

## 2017-08-07 PROCEDURE — 20610 DRAIN/INJ JOINT/BURSA W/O US: CPT | Mod: LT | Performed by: ORTHOPAEDIC SURGERY

## 2017-08-07 ASSESSMENT — PAIN SCALES - GENERAL: PAINLEVEL: EXTREME PAIN (8)

## 2017-08-07 NOTE — LETTER
8/7/2017         RE: Capri Wiseman  [address] [facility] [address]        Dear Colleague,    Thank you for referring your patient, Capri Wiseman, to the [facility]. Please see a copy of my visit note below.    Office Visit-Follow up    Chief Complaint: Capri Wiseman is a 71 year old female who is being seen for No chief complaint on file.      History of Present Illness:   Patient is seen for recheck of left shoulder pain.  She had a steroid injection at her last office visit on 6/7/2017, which has provided relief up until recently.  Pain feels similar to previous visit.  Pain is located superior shoulder.  Pain is increased with forward elevation and better with rest.  The pain is described as sharp.  She likes to sew and quilt; the pain interferes with these activities.  She uses Tylenol occasionally for the pain.  She has also tried heat and ice.  She does not use anti-inflammatory medications due to chronic kidney disease.      REVIEW OF SYSTEMS  General: negative for, night sweats, dizziness, fatigue  Resp: No shortness of breath and no cough  CV: negative for chest pain, syncope or near-syncope  GI: negative for nausea, vomiting and diarrhea  : negative for dysuria and hematuria  Musculoskeletal: as above  Neurologic: negative for syncope   Hematologic: negative for bleeding disorder    Physical Exam:  Vitals: Temp 97  F (36.1  C)  Ht 1.524 m (5')  Wt 77.6 kg (171 lb)  BMI 33.4 kg/m2  BMI= Body mass index is 33.4 kg/(m^2).  Constitutional: healthy, alert and no acute distress   Psychiatric: mentation appears normal and affect normal/bright  NEURO: no focal deficits  SKIN: no excoriation or erythema. No signs of infection.  JOINT/EXTREMITIES:left   SHOULDER Exam-Left   Inspection: no swelling, no bruising, no discoloration, no obvious deformity, no asymmetry, no glenohumeral joint anterior bulge, no distal clavicle elevation, no muscle atrophy, no scapular winging    Tenderness of: SC joint- no, clavicle(prox-mid)- no, clavicle-(mid-distal)- no, AC joint- yes, acromion- no, anterior capsule- yes, prox bicep tendon- yes, greater tuberosity- yes, prox humerus- no, supraspinatous- no, infraspinatous- no, superior trapezious- no, rhomboids- no   Range of Motion: Active- forward flexion- 165 degrees, abduction- 160 degrees, external rotation- normal, internal rotation- L1  Range of Motion: Passive- similar to active due to pain   Strength: forward flexion- 5/5, abduction- 5/5, internal rotation- 5/5, external rotation- 5/5 and bicep- full   Special tests: Neers- POSITIVE, Meneses(supraspinatous)- POSITIVE, cross arm adduction- POSITIVE, Speeds- negative and Yergasons- POSITIVE   Distal neurovascular intact.        GAIT:not tested         Diagnostic Modalities:  None today.  Independent visualization of the images was performed.      Impression: left shoulder subacromial impingement  Left shoulder bicep tendonitis  Left shoulder AC joint pain    Plan:  All of the above pertinent physical exam and imaging modalities findings was reviewed with Capri.    She had excellent relief with the subacromial steroid injection at previous office visit up until recently.  She would like a repeat injection today.  We discussed she may have up to 3-4 injections per year.                                          INJECTION PROCEDURE:  The patient was counseled about an  injection, including discussion of risks (including infection), contents of the injection, rationale for performing the injection, and expected benefits of the injection. The skin was prepped with alcohol and betadine and then utilizing sterile technique an injection of the left shoulder subacromial space from the posterolateral approach  was performed. The injection consisted 1ml of Kenalog (40mg per 1ml) with 8ml 1% lidocaine plain. The patient tolerated the injection well, and there were no complications. The injection site was  covered with a Band-Aid. The injection was performed by ISMAEL Gonzalez.     Return to clinic 6, week(s), or sooner as needed for changes.  Re-x-ray on return: No    Scribed by  Flavia Cheek PA-C  8/7/2017  3:04 PM    I attest I have seen and evaluated the patient. I agree with above impression and plan.    Joanne Mcneil MD              Again, thank you for allowing me to participate in the care of your patient.        Sincerely,        Vivi Mcneil MD

## 2017-08-07 NOTE — PROGRESS NOTES
Office Visit-Follow up    Chief Complaint: Capri Wiseman is a 71 year old female who is being seen for No chief complaint on file.      History of Present Illness:   Patient is seen for recheck of left shoulder pain.  She had a steroid injection at her last office visit on 6/7/2017, which has provided relief up until recently.  Pain feels similar to previous visit.  Pain is located superior shoulder.  Pain is increased with forward elevation and better with rest.  The pain is described as sharp.  She likes to sew and quilt; the pain interferes with these activities.  She uses Tylenol occasionally for the pain.  She has also tried heat and ice.  She does not use anti-inflammatory medications due to chronic kidney disease.      REVIEW OF SYSTEMS  General: negative for, night sweats, dizziness, fatigue  Resp: No shortness of breath and no cough  CV: negative for chest pain, syncope or near-syncope  GI: negative for nausea, vomiting and diarrhea  : negative for dysuria and hematuria  Musculoskeletal: as above  Neurologic: negative for syncope   Hematologic: negative for bleeding disorder    Physical Exam:  Vitals: Temp 97  F (36.1  C)  Ht 1.524 m (5')  Wt 77.6 kg (171 lb)  BMI 33.4 kg/m2  BMI= Body mass index is 33.4 kg/(m^2).  Constitutional: healthy, alert and no acute distress   Psychiatric: mentation appears normal and affect normal/bright  NEURO: no focal deficits  SKIN: no excoriation or erythema. No signs of infection.  JOINT/EXTREMITIES:left   SHOULDER Exam-Left   Inspection: no swelling, no bruising, no discoloration, no obvious deformity, no asymmetry, no glenohumeral joint anterior bulge, no distal clavicle elevation, no muscle atrophy, no scapular winging   Tenderness of: SC joint- no, clavicle(prox-mid)- no, clavicle-(mid-distal)- no, AC joint- yes, acromion- no, anterior capsule- yes, prox bicep tendon- yes, greater tuberosity- yes, prox humerus- no, supraspinatous- no, infraspinatous- no,  superior trapezious- no, rhomboids- no   Range of Motion: Active- forward flexion- 165 degrees, abduction- 160 degrees, external rotation- normal, internal rotation- L1  Range of Motion: Passive- similar to active due to pain   Strength: forward flexion- 5/5, abduction- 5/5, internal rotation- 5/5, external rotation- 5/5 and bicep- full   Special tests: Neers- POSITIVE, Meneses(supraspinatous)- POSITIVE, cross arm adduction- POSITIVE, Speeds- negative and Yergasons- POSITIVE   Distal neurovascular intact.        GAIT:not tested         Diagnostic Modalities:  None today.  Independent visualization of the images was performed.      Impression: left shoulder subacromial impingement  Left shoulder bicep tendonitis  Left shoulder AC joint pain    Plan:  All of the above pertinent physical exam and imaging modalities findings was reviewed with Capri.    She had excellent relief with the subacromial steroid injection at previous office visit up until recently.  She would like a repeat injection today.  We discussed she may have up to 3-4 injections per year.                                          INJECTION PROCEDURE:  The patient was counseled about an  injection, including discussion of risks (including infection), contents of the injection, rationale for performing the injection, and expected benefits of the injection. The skin was prepped with alcohol and betadine and then utilizing sterile technique an injection of the left shoulder subacromial space from the posterolateral approach  was performed. The injection consisted 1ml of Kenalog (40mg per 1ml) with 8ml 1% lidocaine plain. The patient tolerated the injection well, and there were no complications. The injection site was covered with a Band-Aid. The injection was performed by ISMAEL Gonzalez.     Return to clinic 6, week(s), or sooner as needed for changes.  Re-x-ray on return: No    Scribed by  Flavia Cheek PA-C  8/7/2017  3:04 PM    I attest I have  seen and evaluated the patient. I agree with above impression and plan.    Joanne Mcneil MD

## 2017-08-07 NOTE — MR AVS SNAPSHOT
After Visit Summary   8/7/2017    Capri Wiseman    MRN: [id number]           Patient Information     Date Of Birth          1945        Visit Information        Provider Department      8/7/2017 2:50 PM Vivi Mcneil MD [facility]        Today's Diagnoses     Subacromial impingement, left    -  1    Biceps tendinitis of left shoulder           Follow-ups after your visit        Your next 10 appointments already scheduled     Aug 14, 2017  3:00 PM CDT   MyChart Long with Xochitl Hemphill MD   [facility] ([facility])    [address] [place] [id number]   [phone]            Sep 18, 2017  1:00 PM CDT   Return Visit with Vivi Mcneil MD   [facility] ([facility])    [address] [place] [id number] [phone]            Jul 02, 2018  8:00 AM CDT   Return Visit with Jose M Loredo MD   [facility] ([facility])    [address] [place] [id number] [phone]              Who to contact     If you have questions or need follow up information about today's clinic visit or your schedule please contact [facility] directly at [phone].  Normal or non-critical lab and imaging results will be communicated to you by MyChart, letter or phone within 4 business days after the clinic has received the results. If you do not hear from us within 7 days, please contact the clinic through [email or web address] or phone. If you have a critical or abnormal lab result, we will notify you by phone as soon as possible.  Submit refill requests through [facility] or call your pharmacy and they will forward the refill request to us. Please allow 3 business days for your refill to be completed.          Additional Information About Your Visit        [email or web address] Information     [employer] gives you secure access to your electronic health  record. If you see a primary care provider, you can also send messages to your care team and make appointments. If you have questions, please call your primary care clinic.  If you do not have a primary care provider, please call [phone] and they will assist you.        Care EveryWhere ID     This is your Care EveryWhere ID. This could be used by other organizations to access your [facility] medical records  [id number]        Your Vitals Were     Temperature Height BMI (Body Mass Index)             97  F (36.1  C) 1.524 m (5') 33.4 kg/m2          Blood Pressure from Last 3 Encounters:   07/03/17 130/82   07/15/16 120/76   06/03/16 130/74    Weight from Last 3 Encounters:   08/07/17 77.6 kg (171 lb)   07/03/17 77.6 kg (171 lb)   06/07/17 78.5 kg (173 lb)              We Performed the Following     Kenalog 40 MG  []     Large Joint/Bursa injection and/or drainage (Shoulder, Knee)        Primary Care Provider Office Phone # Fax #    Xochitl Suad Hemphill [phone]       [facility] [address] [place] [address]        Equal Access to Services     CHRYSTAL SCHMIDT AH: Hadii aad ku hadasho Soomaali, waaxda luqadaha, qaybta kaalmada adeegyada, waxay prafulin hayjewelln precious franklin. So [facility] [phone].    ATENCIÓN: Si habla español, tiene a go disposición servicios gratuitos de asistencia lingüística. Llame al [phone].    We comply with applicable federal civil rights laws and [place] laws. We do not discriminate on the basis of race, color, national origin, age, disability sex, sexual orientation or gender identity.            Thank you!     Thank you for choosing [facility]  for your care. Our goal is always to provide you with excellent care. Hearing back from our patients is one way we can continue to improve our services. Please take a few minutes to complete the written survey that you may receive in the mail after your visit with us. Thank  you!             Your Updated Medication List - Protect others around you: Learn how to safely use, store and throw away your medicines at www.disposemymeds.org.          This list is accurate as of: 8/7/17  3:42 PM.  Always use your most recent med list.                   Brand Name Dispense Instructions for use Diagnosis    artificial saliva Soln solution     44.3 mL    Swish and spit 5-10 mLs in mouth as needed for dry mouth    Dry mouth, RA (rheumatoid arthritis) (H)       aspirin 325 MG tablet      Take 325 mg by mouth daily        calcium citrate-vitamin D 315-250 MG-UNIT Tabs per tablet    CITRACAL     Take 2 tablets by mouth daily        CENTRUM SILVER per tablet      Take 1 tablet by mouth daily        cycloSPORINE 0.05 % ophthalmic emulsion    RESTASIS    180 each    Place 1 drop into both eyes 2 times daily    CKD (chronic kidney disease) stage 3, GFR 30-59 ml/min       GLUCOSAMINE CHONDR 1500 COMPLX PO      Take 2 tablets by mouth daily        hydrochlorothiazide 25 MG tablet    HYDRODIURIL    45 tablet    Take 0.5 tablets (12.5 mg) by mouth daily    Edema, unspecified type       hydroxychloroquine 200 MG tablet    PLAQUENIL    90 tablet    Take 1 tablet (200 mg) by mouth daily    Systemic lupus erythematosus with other organ involvement, unspecified SLE type (H)       lisinopril 10 MG tablet    PRINIVIL/ZESTRIL    90 tablet    TAKE 1 TABLET EVERY DAY    CKD (chronic kidney disease) stage 3, GFR 30-59 ml/min, Essential hypertension with goal blood pressure less than 140/90       metoprolol 25 MG 24 hr tablet    TOPROL-XL    90 tablet    Take 0.5 tablets (12.5 mg) by mouth 2 times daily    Essential hypertension with goal blood pressure less than 140/90       OMEGA-3 FISH OIL PO      Take 1,400 mg by mouth daily        PRILOSEC OTC PO      Take 20 mg by mouth daily        simvastatin 10 MG tablet    ZOCOR    90 tablet    Take 1 tablet (10 mg) by mouth At Bedtime    Hyperlipidemia with target LDL less than  130       VITAMIN B 12 PO      Take 1,000 mcg by mouth daily        VITAMIN C PO      Take 1,000 mg by mouth daily

## 2017-08-07 NOTE — NURSING NOTE
No chief complaint on file.      Initial Temp 97  F (36.1  C)  Ht 1.524 m (5')  Wt 77.6 kg (171 lb)  BMI 33.4 kg/m2 Estimated body mass index is 33.4 kg/(m^2) as calculated from the following:    Height as of this encounter: 1.524 m (5').    Weight as of this encounter: 77.6 kg (171 lb).  Medication Reconciliation: complete    BP completed using cuff size: NA (Not Taken)    Chelsey Fields MA

## 2017-08-14 ENCOUNTER — OFFICE VISIT (OUTPATIENT)
Dept: FAMILY MEDICINE | Facility: CLINIC | Age: 72
End: 2017-08-14
Payer: COMMERCIAL

## 2017-08-14 VITALS
SYSTOLIC BLOOD PRESSURE: 116 MMHG | WEIGHT: 171.8 LBS | HEART RATE: 88 BPM | BODY MASS INDEX: 33.55 KG/M2 | DIASTOLIC BLOOD PRESSURE: 62 MMHG | TEMPERATURE: 97 F | OXYGEN SATURATION: 98 %

## 2017-08-14 DIAGNOSIS — N18.30 CKD (CHRONIC KIDNEY DISEASE) STAGE 3, GFR 30-59 ML/MIN (H): Primary | ICD-10-CM

## 2017-08-14 DIAGNOSIS — M32.9 SYSTEMIC LUPUS ERYTHEMATOSUS, UNSPECIFIED SLE TYPE, UNSPECIFIED ORGAN INVOLVEMENT STATUS (H): ICD-10-CM

## 2017-08-14 DIAGNOSIS — I10 ESSENTIAL HYPERTENSION WITH GOAL BLOOD PRESSURE LESS THAN 140/90: ICD-10-CM

## 2017-08-14 DIAGNOSIS — N18.30 ANEMIA IN STAGE 3 CHRONIC KIDNEY DISEASE (H): ICD-10-CM

## 2017-08-14 DIAGNOSIS — D63.1 ANEMIA IN STAGE 3 CHRONIC KIDNEY DISEASE (H): ICD-10-CM

## 2017-08-14 DIAGNOSIS — R60.9 EDEMA, UNSPECIFIED TYPE: ICD-10-CM

## 2017-08-14 DIAGNOSIS — E78.5 HYPERLIPIDEMIA WITH TARGET LDL LESS THAN 130: ICD-10-CM

## 2017-08-14 PROCEDURE — 99214 OFFICE O/P EST MOD 30 MIN: CPT | Performed by: FAMILY MEDICINE

## 2017-08-14 RX ORDER — METOPROLOL SUCCINATE 25 MG/1
12.5 TABLET, EXTENDED RELEASE ORAL 2 TIMES DAILY
Qty: 90 TABLET | Refills: 3 | Status: SHIPPED | OUTPATIENT
Start: 2017-08-14 | End: 2017-08-29

## 2017-08-14 RX ORDER — LISINOPRIL 10 MG/1
TABLET ORAL
Qty: 90 TABLET | Refills: 3 | Status: SHIPPED | OUTPATIENT
Start: 2017-08-14 | End: 2017-08-29

## 2017-08-14 RX ORDER — HYDROCHLOROTHIAZIDE 25 MG/1
12.5 TABLET ORAL DAILY
Qty: 45 TABLET | Refills: 3 | Status: SHIPPED | OUTPATIENT
Start: 2017-08-14 | End: 2017-08-29

## 2017-08-14 RX ORDER — SIMVASTATIN 10 MG
10 TABLET ORAL AT BEDTIME
Qty: 90 TABLET | Refills: 0 | Status: SHIPPED | OUTPATIENT
Start: 2017-08-14 | End: 2017-08-29

## 2017-08-14 ASSESSMENT — PAIN SCALES - GENERAL: PAINLEVEL: NO PAIN (0)

## 2017-08-14 NOTE — NURSING NOTE
Chief Complaint   Patient presents with     Recheck Medication     Results     discuss recent results       Initial /62  Pulse 88  Temp 97  F (36.1  C) (Temporal)  Wt 171 lb 12.8 oz (77.9 kg)  SpO2 98%  BMI 33.55 kg/m2 Estimated body mass index is 33.55 kg/(m^2) as calculated from the following:    Height as of 8/7/17: 5' (1.524 m).    Weight as of this encounter: 171 lb 12.8 oz (77.9 kg).  Medication Reconciliation: complete   Shakira Reece, CMA

## 2017-08-14 NOTE — PROGRESS NOTES
SUBJECTIVE:                                                    Capri Wiseman is a 72 year old female who presents to clinic today to review some recent labs. She was recently seen by the rheumatologist for her SLE, and had several labs drawn. When she asked for an explanation of results, they told her to follow up with me.  She is concerned about the following issues:    (N18.3) CKD (chronic kidney disease) stage 3, GFR 30-59 ml/min    Comment: Patient has a history of CKD Stage III. She had kidney labs collected on 7/3, at which time her Creatinine was elevated at 1.08 and her GFR estimate was 50. She says that she has been drinking enough water.  Looking back in her chart, she has had fluctuations in her creatinine as high as 1.32 over the years, so relatively stable.     (M32.9) Systemic lupus erythematosus, unspecified SLE type, unspecified organ involvement status (H)  Comment: Patient has a history of SLE and possible RA treated with Plaquenil 200 mg qd. She follows this with her Rheumatologist, Dr. Loredo. She last followed up with Dr. Loredo on 7/3 as noted above, at which time she had labs done including LFTs, CBC, Complement C3 and C4, Creatinine, CRP, DNA Double Stranded, and UA. She would like to review these labs today. Patient was continued on Plaquenil without change at that time. Patient says that she is consuming raw Apple Cider Vinegar and lemon juice every morning, which she feels has given her an improvement. Patient mentions that she has been experiencing ankle pain, but she wears a brace with any repetitive activity such as long walking.     (N18.3,  D63.1) Anemia in stage 3 chronic kidney disease  Comment: Patient has a history of Anemia. Her last HGB was low at 11.2, though looking back through her chart this is relatively stable. Patient denies any shortness of breath, fatigue, or other associated symptoms. She has no obvious blood loss. Her MCV is elevated. Last colonoscopy was in  "2015 and normal.     (E78.5) Hyperlipidemia with target LDL less than 130  Comment: Patient has a history of Hyperlipidemia treated with Simvastatin 10 mg qd. She has been following a fair low fat/ low cholesterol diet. Patient does not have any concerns at this time.     (I10) Essential hypertension with goal blood pressure less than 140/90  Comment: Patient has a history of Hypertension treated with Lisinopril 10 mg qd, Metoprolol 12.5 mg qd, and HCTZ 25 mg qd. She has been monitoring her blood pressure and it has been mostly within normal limits. It was elevated at 150 systolic after walking 9 flights of stairs at her last appt. The patient has been following a low salt/ no added salt diet.     (R60.9) Edema, unspecified type  Comment: Patient denies any recent edema. See \"(I10) Essential hypertension with goal blood pressure less than 140/90\" comment above. The rheumatologist told her that her left ankle will swell at time from her SLE if she is too active, which is why she wears the brace.       Problem list and histories reviewed & adjusted, as indicated.  Additional history: as documented    Patient Active Problem List   Diagnosis     CKD (chronic kidney disease) stage 3, GFR 30-59 ml/min     Hypertension     Esophageal reflux     Osteoarthritis     Hyperlipidemia with target LDL less than 130     RA (rheumatoid arthritis) (H)     Systemic lupus erythematosus, unspecified SLE type, unspecified organ involvement status (H)     Past Surgical History:   Procedure Laterality Date     APPENDECTOMY  1969     C TOTAL KNEE ARTHROPLASTY  3/10    left     COLONOSCOPY  2009    repeat 5 years     COLONOSCOPY N/A 5/20/2015    Procedure: COLONOSCOPY;  Surgeon: Corbin Dexter MD;  Location:  GI     FOOT SURGERY  2010    bone and screws in 2 toes, hammertoe     HYSTERECTOMY TOTAL ABDOMINAL  2010    due to bleeding, benign     LAPAROTOMY EXPLORATORY      scar tissue removal/repair - abd     SHOULDER SURGERY  " 05/2013    rotator cuff repair, right       Social History   Substance Use Topics     Smoking status: Never Smoker     Smokeless tobacco: Never Used     Alcohol use No     Family History   Problem Relation Age of Onset     Colon Cancer Mother      CEREBROVASCULAR DISEASE Father      Lupus Mother      Neurologic Disorder Sister      ALS         Current Outpatient Prescriptions   Medication Sig Dispense Refill     simvastatin (ZOCOR) 10 MG tablet Take 1 tablet (10 mg) by mouth At Bedtime 90 tablet 0     metoprolol (TOPROL-XL) 25 MG 24 hr tablet Take 0.5 tablets (12.5 mg) by mouth 2 times daily 90 tablet 3     lisinopril (PRINIVIL/ZESTRIL) 10 MG tablet TAKE 1 TABLET EVERY DAY 90 tablet 3     hydrochlorothiazide (HYDRODIURIL) 25 MG tablet Take 0.5 tablets (12.5 mg) by mouth daily 45 tablet 3     hydroxychloroquine (PLAQUENIL) 200 MG tablet Take 1 tablet (200 mg) by mouth daily 90 tablet 3     Omeprazole Magnesium (PRILOSEC OTC PO) Take 20 mg by mouth daily       aspirin 325 MG tablet Take 325 mg by mouth daily       Glucosamine-Chondroit-Vit C-Mn (GLUCOSAMINE CHONDR 1500 COMPLX PO) Take 2 tablets by mouth daily       Multiple Vitamins-Minerals (CENTRUM SILVER) per tablet Take 1 tablet by mouth daily       Ascorbic Acid (VITAMIN C PO) Take 1,000 mg by mouth daily       Cyanocobalamin (VITAMIN B 12 PO) Take 1,000 mcg by mouth daily       calcium citrate-vitamin D (CITRACAL) 315-250 MG-UNIT TABS Take 2 tablets by mouth daily        Omega-3 Fatty Acids (OMEGA-3 FISH OIL PO) Take 1,400 mg by mouth daily       artificial saliva, BIOTENE MT, (BIOTENE MT) SOLN Swish and spit 5-10 mLs in mouth as needed for dry mouth 44.3 mL 3     cycloSPORINE (RESTASIS) 0.05 % ophthalmic emulsion Place 1 drop into both eyes 2 times daily 180 each 3     [DISCONTINUED] simvastatin (ZOCOR) 10 MG tablet Take 1 tablet (10 mg) by mouth At Bedtime 90 tablet 1     [DISCONTINUED] metoprolol (TOPROL-XL) 25 MG 24 hr tablet Take 0.5 tablets (12.5 mg) by  mouth 2 times daily 90 tablet 3     [DISCONTINUED] lisinopril (PRINIVIL,ZESTRIL) 10 MG tablet TAKE 1 TABLET EVERY DAY 90 tablet 3     [DISCONTINUED] hydrochlorothiazide (HYDRODIURIL) 25 MG tablet Take 0.5 tablets (12.5 mg) by mouth daily 45 tablet 3     Allergies   Allergen Reactions     Ciprofloxacin GI Disturbance     Was taken with Flagyl, not sure which one bothered her.      Flagyl [Metronidazole] GI Disturbance     Was taken with Cipro, not sure which one bothered her.      BP Readings from Last 3 Encounters:   08/14/17 116/62   07/03/17 130/82   07/15/16 120/76    Wt Readings from Last 3 Encounters:   08/14/17 77.9 kg (171 lb 12.8 oz)   08/07/17 77.6 kg (171 lb)   07/03/17 77.6 kg (171 lb)               Reviewed and updated as needed this visit by clinical staffTobacco  Allergies  Meds  Med Hx  Surg Hx  Fam Hx  Soc Hx      Reviewed and updated as needed this visit by Provider         ROS:  All other ROS reviewed and are negative or non-contributory except as stated in HPI.      OBJECTIVE:     /62  Pulse 88  Temp 97  F (36.1  C) (Temporal)  Wt 77.9 kg (171 lb 12.8 oz)  SpO2 98%  BMI 33.55 kg/m2  Body mass index is 33.55 kg/(m^2).   Vitals noted.  Patient alert, oriented, and in no acute distress.  CV:  RRR without murmur.  Respiratory:  Lungs clear to auscultation bilaterally.  Extremities warm and dry without cyanosis, clubbing or edema.     Diagnostic Test Results:  Orders Placed This Encounter   Procedures     Lipid panel reflex to direct LDL         ASSESSMENT:       ICD-10-CM    1. CKD (chronic kidney disease) stage 3, GFR 30-59 ml/min N18.3 lisinopril (PRINIVIL/ZESTRIL) 10 MG tablet   2. Systemic lupus erythematosus, unspecified SLE type, unspecified organ involvement status (H) M32.9    3. Anemia in stage 3 chronic kidney disease N18.3     D63.1    4. Hyperlipidemia with target LDL less than 130 E78.5 Lipid panel reflex to direct LDL     simvastatin (ZOCOR) 10 MG tablet   5. Essential  hypertension with goal blood pressure less than 140/90 I10 metoprolol (TOPROL-XL) 25 MG 24 hr tablet     lisinopril (PRINIVIL/ZESTRIL) 10 MG tablet   6. Edema, unspecified type R60.9 hydrochlorothiazide (HYDRODIURIL) 25 MG tablet         PLAN:       (N18.3) CKD (chronic kidney disease) stage 3, GFR 30-59 ml/min   Plan: Discussed patient's kidney labs. Her kidney levels including Creatinine and GFR had been fluctuating for several years. Discussed that I am not too concerned about this, as overall things are stable. Encouraged her to work on staying hydrated, discussed risk factor reduction including BP control, which at this time is good. Will continue to check renal function annually at minimum.      lisinopril (PRINIVIL/ZESTRIL) 10 MG tablet    (M32.9) Systemic lupus erythematosus, unspecified SLE type, unspecified organ involvement status (H)  Plan: I reviewed patient's Rheumatology labs with her in detail. Discussed that she still appears to be in remission from her labs and from his notes. Reassured her that her labs look good. Encouraged her to continue wearing a brace with activity. Encouraged her to continue following up with her eye doctor yearly at minimum. Repeat her labs and follow up with rheumatology as instructed.     (N18.3,  D63.1) Anemia in stage 3 chronic kidney disease  Plan: Discussed patient's Anemia. Despite her HGB being low, her breathing and energy levels are good. She does not need anything to treat her anemia at this time, but will follow prn. Encouraged her to continue following up for routine labs as well.     (E78.5) Hyperlipidemia with target LDL less than 130  Plan: The patient will continue on her Simvastatin and following a low fat/ low cholesterol diet. Encouraged her to return in October for a lipid panel.      Lipid panel reflex to direct LDL,     simvastatin (ZOCOR) 10 MG tablet    (I10) Essential hypertension with goal blood pressure less than 140/90  Plan: Patient's blood  "pressure is at goal. Her labs are up to date as above.  Refilled her Metoprolol, Lisinopril, and HCTZ. Encouraged the patient to continue following a low salt/ no added salt diet, staying hydrated, and monitor her blood pressure periodically.     metoprolol (TOPROL-XL) 25 MG 24 hr tablet,     lisinopril (PRINIVIL/ZESTRIL) 10 MG tablet    (R60.9) Edema, unspecified type  Plan: Refilled HCTZ, see orders. See \"(I10) Essential hypertension with goal blood pressure less than 140/90\" plan above.     hydrochlorothiazide (HYDRODIURIL) 25 MG tablet      This document serves as a record of services personally performed by Xochitl Hemphill MD. It was created on their behalf by Marisol Mcgarry, a trained medical scribe. The creation of this record is based on the provider's personal observations and the statements of the patient. This document has been checked and approved by the attending provider.    Xochitl Hemphill MD  [facility]  "

## 2017-08-14 NOTE — MR AVS SNAPSHOT
After Visit Summary   8/14/2017    Capri Wiseman    MRN: [id number]           Patient Information     Date Of Birth          1945        Visit Information        Provider Department      8/14/2017 3:00 PM Xochitl Hemphill MD [facility]        Today's Diagnoses     CKD (chronic kidney disease) stage 3, GFR 30-59 ml/min    -  1    Systemic lupus erythematosus, unspecified SLE type, unspecified organ involvement status (H)        Anemia in stage 3 chronic kidney disease        Hyperlipidemia with target LDL less than 130        Essential hypertension with goal blood pressure less than 140/90        Edema, unspecified type           Follow-ups after your visit        Your next 10 appointments already scheduled     Sep 18, 2017  1:00 PM CDT   Return Visit with Vivi Mcneil MD   [facility] ([facility])    [address] [place] [phone] [phone]            Jul 02, 2018  8:00 AM CDT   Return Visit with Jose M Loredo MD   [facility] ([facility])    [address] [place] [id number] [phone]              Future tests that were ordered for you today     Open Future Orders        Priority Expected Expires Ordered    Lipid panel reflex to direct LDL Routine 10/14/2017 12/14/2017 8/14/2017            Who to contact     If you have questions or need follow up information about today's clinic visit or your schedule please contact [facility] directly at [phone].  Normal or non-critical lab and imaging results will be communicated to you by MyChart, letter or phone within 4 business days after the clinic has received the results. If you do not hear from us within 7 days, please contact the clinic through MyChart or phone. If you have a critical or abnormal lab result, we will notify you by phone as soon as possible.  Submit refill requests through  [employer] or call your pharmacy and they will forward the refill request to us. Please allow 3 business days for your refill to be completed.          Additional Information About Your Visit        [employer] Information     [employer] gives you secure access to your electronic health record. If you see a primary care provider, you can also send messages to your care team and make appointments. If you have questions, please call your primary care clinic.  If you do not have a primary care provider, please call [phone] and they will assist you.        Care EveryWhere ID     This is your Care EveryWhere ID. This could be used by other organizations to access your [place] medical records  [id number]        Your Vitals Were     Pulse Temperature Pulse Oximetry BMI (Body Mass Index)          88 97  F (36.1  C) (Temporal) 98% 33.55 kg/m2         Blood Pressure from Last 3 Encounters:   08/14/17 116/62   07/03/17 130/82   07/15/16 120/76    Weight from Last 3 Encounters:   08/14/17 171 lb 12.8 oz (77.9 kg)   08/07/17 171 lb (77.6 kg)   07/03/17 171 lb (77.6 kg)                 Where to get your medicines      Some of these will need a paper prescription and others can be bought over the counter.  Ask your nurse if you have questions.     Bring a paper prescription for each of these medications     hydrochlorothiazide 25 MG tablet    lisinopril 10 MG tablet    metoprolol 25 MG 24 hr tablet    simvastatin 10 MG tablet          Primary Care Provider Office Phone # Fax #    Xochitl Suad Hemphill [phone]       [address] [place] [address]        Equal Access to Services     [facility]: Hadii jb brantley hadchandu Sokan, waaxda luqadaha, qaybta kaalmalaurel bustamante . So [facility] [phone].    ATENCIÓN: Si habla español, tiene a go disposición servicios gratuitos de asistencia lingüística. Llame al [phone].    We comply with applicable federal civil  rights laws and [place] laws. We do not discriminate on the basis of race, color, national origin, age, disability sex, sexual orientation or gender identity.            Thank you!     Thank you for choosing [facility]  for your care. Our goal is always to provide you with excellent care. Hearing back from our patients is one way we can continue to improve our services. Please take a few minutes to complete the written survey that you may receive in the mail after your visit with us. Thank you!             Your Updated Medication List - Protect others around you: Learn how to safely use, store and throw away your medicines at www.disposemymeds.org.          This list is accurate as of: 8/14/17  4:09 PM.  Always use your most recent med list.                   Brand Name Dispense Instructions for use Diagnosis    artificial saliva Soln solution     44.3 mL    Swish and spit 5-10 mLs in mouth as needed for dry mouth    Dry mouth, RA (rheumatoid arthritis) (H)       aspirin 325 MG tablet      Take 325 mg by mouth daily        calcium citrate-vitamin D 315-250 MG-UNIT Tabs per tablet    CITRACAL     Take 2 tablets by mouth daily        CENTRUM SILVER per tablet      Take 1 tablet by mouth daily        cycloSPORINE 0.05 % ophthalmic emulsion    RESTASIS    180 each    Place 1 drop into both eyes 2 times daily    CKD (chronic kidney disease) stage 3, GFR 30-59 ml/min       GLUCOSAMINE CHONDR 1500 COMPLX PO      Take 2 tablets by mouth daily        hydrochlorothiazide 25 MG tablet    HYDRODIURIL    45 tablet    Take 0.5 tablets (12.5 mg) by mouth daily    Edema, unspecified type       hydroxychloroquine 200 MG tablet    PLAQUENIL    90 tablet    Take 1 tablet (200 mg) by mouth daily    Systemic lupus erythematosus with other organ involvement, unspecified SLE type (H)       lisinopril 10 MG tablet    PRINIVIL/ZESTRIL    90 tablet    TAKE 1 TABLET EVERY DAY    CKD (chronic kidney disease) stage 3, GFR  30-59 ml/min, Essential hypertension with goal blood pressure less than 140/90       metoprolol 25 MG 24 hr tablet    TOPROL-XL    90 tablet    Take 0.5 tablets (12.5 mg) by mouth 2 times daily    Essential hypertension with goal blood pressure less than 140/90       OMEGA-3 FISH OIL PO      Take 1,400 mg by mouth daily        PRILOSEC OTC PO      Take 20 mg by mouth daily        simvastatin 10 MG tablet    ZOCOR    90 tablet    Take 1 tablet (10 mg) by mouth At Bedtime    Hyperlipidemia with target LDL less than 130       VITAMIN B 12 PO      Take 1,000 mcg by mouth daily        VITAMIN C PO      Take 1,000 mg by mouth daily

## 2017-08-20 ENCOUNTER — MYC REFILL (OUTPATIENT)
Dept: FAMILY MEDICINE | Facility: CLINIC | Age: 72
End: 2017-08-20

## 2017-08-20 DIAGNOSIS — R60.9 EDEMA, UNSPECIFIED TYPE: ICD-10-CM

## 2017-08-20 DIAGNOSIS — I10 ESSENTIAL HYPERTENSION WITH GOAL BLOOD PRESSURE LESS THAN 140/90: ICD-10-CM

## 2017-08-20 RX ORDER — HYDROCHLOROTHIAZIDE 25 MG/1
12.5 TABLET ORAL DAILY
Qty: 45 TABLET | Refills: 3 | Status: CANCELLED | OUTPATIENT
Start: 2017-08-20

## 2017-08-20 RX ORDER — METOPROLOL SUCCINATE 25 MG/1
12.5 TABLET, EXTENDED RELEASE ORAL 2 TIMES DAILY
Qty: 90 TABLET | Refills: 3 | Status: CANCELLED | OUTPATIENT
Start: 2017-08-20

## 2017-08-29 DIAGNOSIS — E78.5 HYPERLIPIDEMIA WITH TARGET LDL LESS THAN 130: ICD-10-CM

## 2017-08-29 DIAGNOSIS — N18.30 CKD (CHRONIC KIDNEY DISEASE) STAGE 3, GFR 30-59 ML/MIN (H): ICD-10-CM

## 2017-08-29 DIAGNOSIS — I10 ESSENTIAL HYPERTENSION WITH GOAL BLOOD PRESSURE LESS THAN 140/90: ICD-10-CM

## 2017-08-29 DIAGNOSIS — R60.9 EDEMA, UNSPECIFIED TYPE: ICD-10-CM

## 2017-08-29 RX ORDER — METOPROLOL SUCCINATE 25 MG/1
12.5 TABLET, EXTENDED RELEASE ORAL 2 TIMES DAILY
Qty: 90 TABLET | Refills: 3 | Status: SHIPPED | OUTPATIENT
Start: 2017-08-29 | End: 2018-09-16

## 2017-08-29 RX ORDER — HYDROCHLOROTHIAZIDE 25 MG/1
12.5 TABLET ORAL DAILY
Qty: 45 TABLET | Refills: 3 | Status: SHIPPED | OUTPATIENT
Start: 2017-08-29 | End: 2018-10-21

## 2017-08-29 RX ORDER — LISINOPRIL 10 MG/1
TABLET ORAL
Qty: 90 TABLET | Refills: 3 | Status: SHIPPED | OUTPATIENT
Start: 2017-08-29 | End: 2018-09-16

## 2017-08-29 RX ORDER — SIMVASTATIN 10 MG
10 TABLET ORAL AT BEDTIME
Qty: 90 TABLET | Refills: 0 | Status: SHIPPED | OUTPATIENT
Start: 2017-08-29 | End: 2017-11-22

## 2017-08-29 NOTE — TELEPHONE ENCOUNTER
The original scripts were brought to  pharmacy and not faxed to mail order.  We need to send new script for mail order for patient     Anna MIDDLETON

## 2017-09-18 ENCOUNTER — OFFICE VISIT (OUTPATIENT)
Dept: ORTHOPEDICS | Facility: CLINIC | Age: 72
End: 2017-09-18
Payer: COMMERCIAL

## 2017-09-18 VITALS — WEIGHT: 172 LBS | TEMPERATURE: 96 F | BODY MASS INDEX: 33.77 KG/M2 | HEIGHT: 60 IN

## 2017-09-18 DIAGNOSIS — E78.5 HYPERLIPIDEMIA WITH TARGET LDL LESS THAN 130: ICD-10-CM

## 2017-09-18 DIAGNOSIS — M75.42 IMPINGEMENT SYNDROME OF LEFT SHOULDER: Primary | ICD-10-CM

## 2017-09-18 LAB
CHOLEST SERPL-MCNC: 150 MG/DL
HDLC SERPL-MCNC: 75 MG/DL
LDLC SERPL CALC-MCNC: 54 MG/DL
NONHDLC SERPL-MCNC: 75 MG/DL
TRIGL SERPL-MCNC: 107 MG/DL

## 2017-09-18 PROCEDURE — 80061 LIPID PANEL: CPT | Performed by: FAMILY MEDICINE

## 2017-09-18 PROCEDURE — 20610 DRAIN/INJ JOINT/BURSA W/O US: CPT | Mod: LT | Performed by: ORTHOPAEDIC SURGERY

## 2017-09-18 PROCEDURE — 36415 COLL VENOUS BLD VENIPUNCTURE: CPT | Performed by: FAMILY MEDICINE

## 2017-09-18 ASSESSMENT — PAIN SCALES - GENERAL: PAINLEVEL: EXTREME PAIN (9)

## 2017-09-18 NOTE — MR AVS SNAPSHOT
After Visit Summary   9/18/2017    Capri Wiseman    MRN: [id number]           Patient Information     Date Of Birth          1945        Visit Information        Provider Department      9/18/2017 9:50 AM Vivi Mcneil MD [facility]        Today's Diagnoses     Impingement syndrome of left shoulder    -  1      Care Instructions    Encounter Diagnosis   Name Primary?     Impingement syndrome of left shoulder Yes     Rest, ice and elevate above heart level as needed for pain control  Its too bad that the last cortisone injection on 8/7 did not work as well for you.  The 6/7 injection worked great.  We decided to do another today.  We will do a total of 4 in a years time.  We would give you a prescription but we know your kidneys are not so good.  Continue your exercises.  Follow up with Vivi Mcneil MD and/or Hank Monson PA-C in 2 months.  We will do a cortisone injection if needed then.  Cortisone Instructions:     1. You received an injection of cortisone into your L shoulder today.  2. The joint(s) may be more painful for the first 1-2 days.  3. We ask you to continue to rest the joint(s) for a few more days before resuming regular activities.  4. Pain Medications you can take (as long as your primary care provider allows these meds and you do not have kidney or liver conditions):  Tylenol  Take 1000 mg by mouth every 6 hours as needed; maximum dose 4000 mg a day  Ibuprofen  600 mg every 6 hours as needed; maximum 2400 mg a day  (OK to take tylenol and ibuprofen at the same time)  5. Rest, ice and elevate as needed for pain control  6. Watch for these signs of infection: redness, swelling, drainage, warmth to touch, increased pain, or fever. Call the clinic or make an appointment to be seen if you think you have an infection.  7. If you are diabetic, make sure you keep a close eye on your blood sugars, they can get elevated with cortisone injections.   8.  Sometimes it can take 1-2 weeks for it to reach its full effect.    Cortisone Injections  Cortisone is a type of steroid. It can greatly reduce swelling, redness, and irritation (inflammation) and pain. Being injected with cortisone is simple and doesn t take long. Your doctor may ask you questions about your health. Certain health conditions, such as diabetes, can be affected by cortisone.     Your pain may be relieved by a cortisone injection.   Why have a cortisone injection?  Injecting cortisone can relieve pain for anything from a sports injury to arthritis. Your doctor may suggest an injection if rest, splints, or oral medicine doesn t relieve your pain. Injecting cortisone is simpler than having surgery. And cortisone may provide the lasting pain relief that can help you get out and enjoy life again.  Getting the injection  Your doctor will start by cleaning and occasionally numbing your skin at the injection site. Next you ll be injected with local anesthetics (for short-term pain relief) and cortisone. The injection may last a few moments. A small bandage will be put over the injection site. You ll then be ready to go home.  After your injection  After being injected, make sure you don t injure the treated region. But stay active. Enjoy a walk or some other mild activity. Just be careful not to strain the region that gave you trouble.  The next day  Some people feel more pain after being injected. This is normal, and it will go away soon. Applying ice for 20 minutes at a time to your injury may reduce the increased pain. Rest for the first day or two. You don t need to stay in bed. But avoid tasks that may strain the injured region.  If you have diabetes  Cortisone injections can cause blood sugar to be increased for several days after the injection. If you have diabetes, you should follow your blood  sugar closely during this time. Follow your regular plan for what to do when your blood sugar is elevated.      2676-7637 [employer]. [address] [place] [address], [place] [address]. All rights reserved. This information is not intended as a substitute for professional medical care. Always follow your healthcare professional's instructions.     [facility] and [facility] may offer reliable information regarding your diagnosis and treatment plan.    THANK YOU for coming in today. If you receive a survey via [employer] or mail please let us know if there was anything you especially appreciated today or if there is any way we can improve our clinic. We appreciate your input.    GENERAL INFORMATION:  Our hours are:  Monday :     Clinic 7:30 AM-430 PM ([facility])  Tuesday:      Operating Room All Day ([facility])  Wednesday: Clinic 7:30 AM - 11:15 AM ([facility])             Clinic 1:00 PM - 4:00PM ([facility])  Thursday:     Administrative Day  Friday:          Clinic 7:30 AM - 11:15 AM ([facility])            Clinic 1:00 PM - 4:00 PM ([facility])      Bone and Joint Service Line for any issues or concerns: [phone]      We are not in the office Thursdays. Therefore non- urgent calls and medical messages received on Thursday will be addressed when we are back in the office on Wednesday. Urgent matters will be reviewed and addressed by one of our partners in the office as needed.    If lab work was done today as part of your evaluation you will generally be contacted via [employer], mail, or phone with the results within 1-5 days. If there is an alarming result we will contact you by phone. Lab results come back at varying times, I generally wait until all labs are resulted before making comments on results. Please note labs are automatically released to [employer] (if you have signed up for it) once available-at times you may see these prior to my having a chance to review them as well.    If  you need refills please contact your pharmacist. They will send a refill request to me to review. Please allow 3 business days for us to process all refill requests. All narcotic refills should be handled in the clinic at the time of your visit.           Follow-ups after your visit        Your next 10 appointments already scheduled     Jul 02, 2018  8:00 AM CDT   Return Visit with Jose M Loredo MD   [facility] ([facility])    [address] [place] [id number] [phone]              Who to contact     If you have questions or need follow up information about today's clinic visit or your schedule please contact [facility] directly at [phone].  Normal or non-critical lab and imaging results will be communicated to you by MyChart, letter or phone within 4 business days after the clinic has received the results. If you do not hear from us within 7 days, please contact the clinic through MyChart or phone. If you have a critical or abnormal lab result, we will notify you by phone as soon as possible.  Submit refill requests through "[employer] or call your pharmacy and they will forward the refill request to us. Please allow 3 business days for your refill to be completed.          Additional Information About Your Visit        The [employer] Information     "[employer] gives you secure access to your electronic health record. If you see a primary care provider, you can also send messages to your care team and make appointments. If you have questions, please call your primary care clinic.  If you do not have a primary care provider, please call [phone] and they will assist you.        Care EveryWhere ID     This is your Care EveryWhere ID. This could be used by other organizations to access your [place] medical records  [id number]        Your Vitals Were     Temperature Height BMI (Body Mass Index)             96  F (35.6  C) 1.524 m (5') 33.59 kg/m2           Blood Pressure from Last 3 Encounters:   08/14/17 116/62   07/03/17 130/82   07/15/16 120/76    Weight from Last 3 Encounters:   09/18/17 78 kg (172 lb)   08/14/17 77.9 kg (171 lb 12.8 oz)   08/07/17 77.6 kg (171 lb)              Today, you had the following     No orders found for display       Primary Care Provider Office Phone # Fax #    Xochitl Suad Hemphill [phone] [address] [place] [address]        Equal Access to Services     [facility]: Hadii bj brantley hadasho Soomaali, waaxda luqadaha, qaybta kaalmada adedarielyatash, laurel wright . So [facility] [phone].    ATENCIÓN: Si habla español, tiene a go disposición servicios gratuitos de asistencia lingüística. [facility] [phone].    We comply with applicable federal civil rights laws and [place] laws. We do not discriminate on the basis of race, color, national origin, age, disability sex, sexual orientation or gender identity.            Thank you!     Thank you for choosing [facility]  for your care. Our goal is always to provide you with excellent care. Hearing back from our patients is one way we can continue to improve our services. Please take a few minutes to complete the written survey that you may receive in the mail after your visit with us. Thank you!             Your Updated Medication List - Protect others around you: Learn how to safely use, store and throw away your medicines at www.disposemymeds.org.          This list is accurate as of: 9/18/17 10:20 AM.  Always use your most recent med list.                   Brand Name Dispense Instructions for use Diagnosis    artificial saliva Soln solution     44.3 mL    Swish and spit 5-10 mLs in mouth as needed for dry mouth    Dry mouth, RA (rheumatoid arthritis) (H)       aspirin 325 MG tablet      Take 325 mg by mouth daily        calcium citrate-vitamin D 315-250 MG-UNIT Tabs per tablet    CITRACAL     Take 2  tablets by mouth daily        CENTRUM SILVER per tablet      Take 1 tablet by mouth daily        cycloSPORINE 0.05 % ophthalmic emulsion    RESTASIS    180 each    Place 1 drop into both eyes 2 times daily    CKD (chronic kidney disease) stage 3, GFR 30-59 ml/min       GLUCOSAMINE CHONDR 1500 COMPLX PO      Take 2 tablets by mouth daily        hydrochlorothiazide 25 MG tablet    HYDRODIURIL    45 tablet    Take 0.5 tablets (12.5 mg) by mouth daily    Edema, unspecified type       hydroxychloroquine 200 MG tablet    PLAQUENIL    90 tablet    Take 1 tablet (200 mg) by mouth daily    Systemic lupus erythematosus with other organ involvement, unspecified SLE type (H)       lisinopril 10 MG tablet    PRINIVIL/ZESTRIL    90 tablet    TAKE 1 TABLET EVERY DAY    CKD (chronic kidney disease) stage 3, GFR 30-59 ml/min, Essential hypertension with goal blood pressure less than 140/90       metoprolol 25 MG 24 hr tablet    TOPROL-XL    90 tablet    Take 0.5 tablets (12.5 mg) by mouth 2 times daily    Essential hypertension with goal blood pressure less than 140/90       OMEGA-3 FISH OIL PO      Take 1,400 mg by mouth daily        PRILOSEC OTC PO      Take 20 mg by mouth daily        simvastatin 10 MG tablet    ZOCOR    90 tablet    Take 1 tablet (10 mg) by mouth At Bedtime    Hyperlipidemia with target LDL less than 130       VITAMIN B 12 PO      Take 1,000 mcg by mouth daily        VITAMIN C PO      Take 1,000 mg by mouth daily

## 2017-09-18 NOTE — PATIENT INSTRUCTIONS
Encounter Diagnosis   Name Primary?     Impingement syndrome of left shoulder Yes     Rest, ice and elevate above heart level as needed for pain control  Its too bad that the last cortisone injection on 8/7 did not work as well for you.  The 6/7 injection worked great.  We decided to do another today.  We will do a total of 4 in a years time.  We would give you a prescription but we know your kidneys are not so good.  Continue your exercises.  Follow up with Vivi Mcneil MD and/or Hank Monson PA-C in 2 months.  We will do a cortisone injection if needed then.  Cortisone Instructions:     1. You received an injection of cortisone into your L shoulder today.  2. The joint(s) may be more painful for the first 1-2 days.  3. We ask you to continue to rest the joint(s) for a few more days before resuming regular activities.  4. Pain Medications you can take (as long as your primary care provider allows these meds and you do not have kidney or liver conditions):  Tylenol  Take 1000 mg by mouth every 6 hours as needed; maximum dose 4000 mg a day  Ibuprofen  600 mg every 6 hours as needed; maximum 2400 mg a day  (OK to take tylenol and ibuprofen at the same time)  5. Rest, ice and elevate as needed for pain control  6. Watch for these signs of infection: redness, swelling, drainage, warmth to touch, increased pain, or fever. Call the clinic or make an appointment to be seen if you think you have an infection.  7. If you are diabetic, make sure you keep a close eye on your blood sugars, they can get elevated with cortisone injections.   8. Sometimes it can take 1-2 weeks for it to reach its full effect.    Cortisone Injections  Cortisone is a type of steroid. It can greatly reduce swelling, redness, and irritation (inflammation) and pain. Being injected with cortisone is simple and doesn t take long. Your doctor may ask you questions about your health. Certain health conditions, such as diabetes, can be affected by  cortisone.     Your pain may be relieved by a cortisone injection.   Why have a cortisone injection?  Injecting cortisone can relieve pain for anything from a sports injury to arthritis. Your doctor may suggest an injection if rest, splints, or oral medicine doesn t relieve your pain. Injecting cortisone is simpler than having surgery. And cortisone may provide the lasting pain relief that can help you get out and enjoy life again.  Getting the injection  Your doctor will start by cleaning and occasionally numbing your skin at the injection site. Next you ll be injected with local anesthetics (for short-term pain relief) and cortisone. The injection may last a few moments. A small bandage will be put over the injection site. You ll then be ready to go home.  After your injection  After being injected, make sure you don t injure the treated region. But stay active. Enjoy a walk or some other mild activity. Just be careful not to strain the region that gave you trouble.  The next day  Some people feel more pain after being injected. This is normal, and it will go away soon. Applying ice for 20 minutes at a time to your injury may reduce the increased pain. Rest for the first day or two. You don t need to stay in bed. But avoid tasks that may strain the injured region.  If you have diabetes  Cortisone injections can cause blood sugar to be increased for several days after the injection. If you have diabetes, you should follow your blood  sugar closely during this time. Follow your regular plan for what to do when your blood sugar is elevated.     4278-0427 [employer]. [address] [place] [address], [place] [id number]. All rights reserved. This information is not intended as a substitute for professional medical care. Always follow your healthcare professional's instructions.     [employer] and [employer] may offer reliable information regarding your diagnosis and treatment plan.    THANK YOU for coming in today.  If you receive a survey via [employer] or mail please let us know if there was anything you especially appreciated today or if there is any way we can improve our clinic. We appreciate your input.    GENERAL INFORMATION:  Our hours are:  Monday :     Clinic 7:30 AM-430 PM ([facility])  Tuesday:      Operating Room All Day ([facility])  Wednesday: Clinic 7:30 AM - 11:15 AM ([facility])             Clinic 1:00 PM - 4:00PM ([facility])  Thursday:     Administrative Day  Friday:          Clinic 7:30 AM - 11:15 AM ([facility])            Clinic 1:00 PM - 4:00 PM ([facility])      Bone and Joint Service Line for any issues or concerns: [phone]      We are not in the office Thursdays. Therefore non- urgent calls and medical messages received on Thursday will be addressed when we are back in the office on Wednesday. Urgent matters will be reviewed and addressed by one of our partners in the office as needed.    If lab work was done today as part of your evaluation you will generally be contacted via [employer], mail, or phone with the results within 1-5 days. If there is an alarming result we will contact you by phone. Lab results come back at varying times, I generally wait until all labs are resulted before making comments on results. Please note labs are automatically released to [employer] (if you have signed up for it) once available-at times you may see these prior to my having a chance to review them as well.    If you need refills please contact your pharmacist. They will send a refill request to me to review. Please allow 3 business days for us to process all refill requests. All narcotic refills should be handled in the clinic at the time of your visit.

## 2017-09-18 NOTE — LETTER
9/18/2017         RE: Capri Wiseman  [address] [facility] [address]        Dear Colleague,    Thank you for referring your patient, Capri Wiseman, to the [facility]. Please see a copy of my visit note below.    Office Visit-Follow up    Chief Complaint: Capri Wiseman is a 72 year old female who is being seen for   Chief Complaint   Patient presents with     RECHECK     left shoulder pain lov 8/7/17 injection given        History of Present Illness:   Mechanism of Injury: no specific recent injury  Location: left shoulder superior and anterior  Rating of Pain: 9 out of 10 today  Pain Quality: ache  Pain is better with: rest  Pain is worse with: activity and when weather changes.  Treatment so far consists of: cortisone injection into the subacromial space done on 6/7/2017 and also 8/7/2017. The 1st injection worked very well in the 2nd injection did not help at all. She takes Tylenol which does not help much in her home exercises given on 6/7/2017 have helped.   Associated Features: none  Pain is Limiting: some activity  Here to: discussed possibly getting another injection  Additional History: denies numbness or tingling or any recent injury    REVIEW OF SYSTEMS  General: negative for, night sweats, dizziness, fatigue  Resp: No shortness of breath and no cough  CV: negative for chest pain, syncope or near-syncope  GI: negative for nausea, vomiting and diarrhea  : negative for dysuria and hematuria  Musculoskeletal: as above  Neurologic: negative for syncope   Hematologic: negative for bleeding disorder    Physical Exam:  Vitals: Temp 96  F (35.6  C)  Ht 1.524 m (5')  Wt 78 kg (172 lb)  BMI 33.59 kg/m2  BMI= Body mass index is 33.59 kg/(m^2).  Constitutional: healthy, alert and no acute distress   Psychiatric: mentation appears normal and affect normal/bright  NEURO: no focal deficits, CMS intact left upper extremity  RESP: Normal with easy respirations and no use of  accessory muscles to breathe, no audible wheezing or retractions  CV: +2 radial pulse and her hand is warm to plapation.   SKIN: No erythema, rashes, excoriation, or breakdown. No evidence of infection.   MUSCULOSKELETAL:    INSPECTION of left shoulder: No gross deformities, erythema, edema, ecchymosis, atrophy or fasciculations.     PALPATION: No tenderness to palpation of the AC joint, proximal biceps tendon, clavicle, lateral shoulder, posterior shoulder, trapezius area. No increased warmth noted.     ROM: Forward flexion to approximately 180 , abduction to approximately 180 , external rotation to approximately 90 , internal rotation to lumbar spine.  All range of motion is without catching, locking or pain.      STRENGTH: 5 out of 5 , deltoid as well as internal and external rotation     SPECIAL TEST: Patient has a negative Neer test, negative Meneses sign, negative cross over test, negative belly press and negative liftoff test, negative empty can and full can test, negative external rotator lag sign, negative drop test   GAIT: non-antalgic  Lymph: no palpable lymph nodes      Diagnostic Modalities:  None today.        Impression: 1. Left shoulder impingement syndrome    Plan:  All of the above pertinent physical exam and imaging modalities findings was reviewed with Capri.                                          INJECTION PROCEDURE:  The patient was counseled about an  injection, including discussion of risks (including infection), contents of the injection, rationale for performing the injection, and expected benefits of the injection. The skin was prepped with alcohol and betadine and then utilizing sterile technique an injection of the left shoulder subacromial space from the posterolateral approach  was performed. I used allen chloride spray prior to doing the injection. The injection consisted 1ml of Kenalog (40mg per 1ml) with 8ml 1% lidocaine plain. The patient tolerated the injection well, and  there were no complications. The injection site was covered with a Band-Aid. The injection was performed by Pritesh Monson PA-C     Patient Instructions:   Its too bad that the last cortisone injection on 8/7 did not work as well for you.  The 6/7 injection worked great.  We decided to do another today.  We will do a total of 4 in a years time.  We would give you a prescription but we know your kidneys are not so good.  Continue your exercises.  Follow up with Vivi Mcneil MD and/or Hank Monson PA-C in 2 months.  We will do a cortisone injection if needed then.  Re-x-ray on return: No    Scribed by Hank Monson PA-C on 9/18/2017 at 10:25 AM, based on Dr. Vivi Mcneil's statements to me.    This note was dictated with [employer].    ELZA Foster MD          Again, thank you for allowing me to participate in the care of your patient.        Sincerely,        Vivi Mcneil MD

## 2017-09-18 NOTE — NURSING NOTE
Chief Complaint   Patient presents with     RECHECK     left shoulder pain lov 8/7/17 injection given        Initial Temp 96  F (35.6  C)  Ht 1.524 m (5')  Wt 78 kg (172 lb)  BMI 33.59 kg/m2 Estimated body mass index is 33.59 kg/(m^2) as calculated from the following:    Height as of this encounter: 1.524 m (5').    Weight as of this encounter: 78 kg (172 lb).  Medication Reconciliation: complete    BP completed using cuff size: NA (Not Taken)    Chelsey Fields MA

## 2017-09-18 NOTE — PROGRESS NOTES
Office Visit-Follow up    Chief Complaint: Capri Wiseman is a 72 year old female who is being seen for   Chief Complaint   Patient presents with     RECHECK     left shoulder pain lov 8/7/17 injection given        History of Present Illness:   Mechanism of Injury: no specific recent injury  Location: left shoulder superior and anterior  Rating of Pain: 9 out of 10 today  Pain Quality: ache  Pain is better with: rest  Pain is worse with: activity and when weather changes.  Treatment so far consists of: cortisone injection into the subacromial space done on 6/7/2017 and also 8/7/2017. The 1st injection worked very well in the 2nd injection did not help at all. She takes Tylenol which does not help much in her home exercises given on 6/7/2017 have helped.   Associated Features: none  Pain is Limiting: some activity  Here to: discussed possibly getting another injection  Additional History: denies numbness or tingling or any recent injury    REVIEW OF SYSTEMS  General: negative for, night sweats, dizziness, fatigue  Resp: No shortness of breath and no cough  CV: negative for chest pain, syncope or near-syncope  GI: negative for nausea, vomiting and diarrhea  : negative for dysuria and hematuria  Musculoskeletal: as above  Neurologic: negative for syncope   Hematologic: negative for bleeding disorder    Physical Exam:  Vitals: Temp 96  F (35.6  C)  Ht 1.524 m (5')  Wt 78 kg (172 lb)  BMI 33.59 kg/m2  BMI= Body mass index is 33.59 kg/(m^2).  Constitutional: healthy, alert and no acute distress   Psychiatric: mentation appears normal and affect normal/bright  NEURO: no focal deficits, CMS intact left upper extremity  RESP: Normal with easy respirations and no use of accessory muscles to breathe, no audible wheezing or retractions  CV: +2 radial pulse and her hand is warm to plapation.   SKIN: No erythema, rashes, excoriation, or breakdown. No evidence of infection.   MUSCULOSKELETAL:    INSPECTION of left  shoulder: No gross deformities, erythema, edema, ecchymosis, atrophy or fasciculations.     PALPATION: No tenderness to palpation of the AC joint, proximal biceps tendon, clavicle, lateral shoulder, posterior shoulder, trapezius area. No increased warmth noted.     ROM: Forward flexion to approximately 180 , abduction to approximately 180 , external rotation to approximately 90 , internal rotation to lumbar spine.  All range of motion is without catching, locking or pain.      STRENGTH: 5 out of 5 , deltoid as well as internal and external rotation     SPECIAL TEST: Patient has a negative Neer test, negative Meneses sign, negative cross over test, negative belly press and negative liftoff test, negative empty can and full can test, negative external rotator lag sign, negative drop test   GAIT: non-antalgic  Lymph: no palpable lymph nodes      Diagnostic Modalities:  None today.        Impression: 1. Left shoulder impingement syndrome    Plan:  All of the above pertinent physical exam and imaging modalities findings was reviewed with Capri.                                          INJECTION PROCEDURE:  The patient was counseled about an  injection, including discussion of risks (including infection), contents of the injection, rationale for performing the injection, and expected benefits of the injection. The skin was prepped with alcohol and betadine and then utilizing sterile technique an injection of the left shoulder subacromial space from the posterolateral approach  was performed. I used allen chloride spray prior to doing the injection. The injection consisted 1ml of Kenalog (40mg per 1ml) with 8ml 1% lidocaine plain. The patient tolerated the injection well, and there were no complications. The injection site was covered with a Band-Aid. The injection was performed by Pritesh Monson PA-C     Patient Instructions:   Its too bad that the last cortisone injection on 8/7 did not work as well for you.  The 6/7  injection worked great.  We decided to do another today.  We will do a total of 4 in a years time.  We would give you a prescription but we know your kidneys are not so good.  Continue your exercises.  Follow up with Vivi Mcneil MD and/or Hank Monson PA-C in 2 months.  We will do a cortisone injection if needed then.  Re-x-ray on return: No    Scribed by Hank Monson PA-C on 9/18/2017 at 10:25 AM, based on Dr. Vivi Mcneil's statements to me.    This note was dictated with [employer].    ELZA Foster MD

## 2017-09-25 ENCOUNTER — MYC MEDICAL ADVICE (OUTPATIENT)
Dept: FAMILY MEDICINE | Facility: CLINIC | Age: 72
End: 2017-09-25

## 2017-11-22 DIAGNOSIS — E78.5 HYPERLIPIDEMIA WITH TARGET LDL LESS THAN 130: ICD-10-CM

## 2017-11-24 RX ORDER — SIMVASTATIN 10 MG
10 TABLET ORAL AT BEDTIME
Qty: 90 TABLET | Refills: 2 | Status: SHIPPED | OUTPATIENT
Start: 2017-11-24 | End: 2018-10-21

## 2017-11-24 NOTE — TELEPHONE ENCOUNTER
Prescription approved per [facility] Refill Protocol.    Isa Fishman RN     Requested Prescriptions   Pending Prescriptions Disp Refills     simvastatin (ZOCOR) 10 MG tablet [Pharmacy Med Name: SIMVASTATIN TABS 10MG] 90 tablet 0     Sig: TAKE 1 TABLET AT BEDTIME    Statins Protocol Passed    11/22/2017 10:54 AM       Passed - LDL on file in past 12 months    Recent Labs   Lab Test  09/18/17   0925   LDL  54            Passed - No abnormal creatine kinase in past 12 months    No lab results found.         Passed - Recent or future visit with authorizing provider    Patient had office visit in the last year or has a visit in the next 30 days with authorizing provider.  See chart review.              Passed - Patient is age 18 or older       Passed - No active pregnancy on record       Passed - No positive pregnancy test in past 12 months

## 2018-02-19 ENCOUNTER — OFFICE VISIT (OUTPATIENT)
Dept: ORTHOPEDICS | Facility: CLINIC | Age: 73
End: 2018-02-19
Payer: COMMERCIAL

## 2018-02-19 VITALS — WEIGHT: 168 LBS | TEMPERATURE: 97 F | HEIGHT: 60 IN | BODY MASS INDEX: 32.98 KG/M2

## 2018-02-19 DIAGNOSIS — M75.42 IMPINGEMENT SYNDROME OF LEFT SHOULDER: Primary | ICD-10-CM

## 2018-02-19 PROCEDURE — 20610 DRAIN/INJ JOINT/BURSA W/O US: CPT | Mod: LT | Performed by: ORTHOPAEDIC SURGERY

## 2018-02-19 ASSESSMENT — PAIN SCALES - GENERAL: PAINLEVEL: WORST PAIN (10)

## 2018-02-19 NOTE — PROGRESS NOTES
Office Visit-Follow up    Chief Complaint: Capri Wiseman is a 72 year old female who is being seen for   Chief Complaint   Patient presents with     RECHECK     left shoulder pain lov 9/18/17 injection given       History of Present Illness:   Injection in 9/2017 helped a lot   undergoing chemo and very weak, has been helping him get up causing a lot of pain in her shoulder      REVIEW OF SYSTEMS  General: negative for, night sweats, dizziness, fatigue  Resp: No shortness of breath and no cough  CV: negative for chest pain, syncope or near-syncope  GI: negative for nausea, vomiting and diarrhea  : negative for dysuria and hematuria  Musculoskeletal: as above  Neurologic: negative for syncope   Hematologic: negative for bleeding disorder    Physical Exam:  Vitals: Temp 97  F (36.1  C)  Ht 1.524 m (5')  Wt 76.2 kg (168 lb)  BMI 32.81 kg/m2  BMI= Body mass index is 32.81 kg/(m^2).  Constitutional: healthy, alert and no acute distress   Psychiatric: mentation appears normal and affect normal/bright  NEURO: no focal deficits  RESP: Normal with easy respirations and no use of accessory muscles to breathe, no audible wheezing or retractions  CV: No peripheral edema  SKIN: No erythema, rashes, excoriation, or breakdown. No evidence of infection.   JOINT/EXTREMITIES:left shoulder - FROM, positive impingement, good strength in rotator cuff  GAIT: non-antalgic            Diagnostic Modalities:  None today.        Impression: left Shoulder Subacromial Impingement    Plan:  All of the above pertinent physical exam and imaging modalities findings was reviewed with Capri.                                          CONSERVATIVE CARE:  I recommend conservative care for the patient to include NSAIDs, Tylenol, focused self directed physical therapy, steroid injections, activity modifications.                                         INJECTION PROCEDURE:  The patient was counseled about an  injection, including discussion  of risks (including infection), contents of the injection, rationale for performing the injection, and expected benefits of the injection. The skin was prepped with alcohol and betadine and then utilizing sterile technique an injection of the left shoulder subacromial space from the anterolateral approach  was performed. The injection consisted 1ml of Kenalog (40mg per 1ml) with 8ml 1% lidocaine plain. The patient tolerated the injection well, and there were no complications. The injection site was covered with a Band-Aid. The injection was performed by Vivi Mcneil M.D.                                                FUTURE PLAN:  On their return if they still have symptoms we will consider physical therapy, NSAID's, injection of steroids.      Return to clinic 6, week(s), PRN, or sooner as needed for changes.  Re-x-ray on return: No    Vivi Mcneil M.D.

## 2018-02-19 NOTE — NURSING NOTE
Chief Complaint   Patient presents with     RECHECK     left shoulder pain lov 9/18/17 injection given       Initial Temp 97  F (36.1  C)  Ht 1.524 m (5')  Wt 76.2 kg (168 lb)  BMI 32.81 kg/m2 Estimated body mass index is 32.81 kg/(m^2) as calculated from the following:    Height as of this encounter: 1.524 m (5').    Weight as of this encounter: 76.2 kg (168 lb).  Medication Reconciliation: complete    BP completed using cuff size: NA (Not Taken)    Chelsey Fields MA

## 2018-02-19 NOTE — MR AVS SNAPSHOT
After Visit Summary   2/19/2018    Capri Wiseman    MRN: [id number]           Patient Information     Date Of Birth          1945        Visit Information        Provider Department      2/19/2018 11:00 AM Vivi Mcneil MD [facility]        Today's Diagnoses     Impingement syndrome of left shoulder    -  1       Follow-ups after your visit        Your next 10 appointments already scheduled     Jul 16, 2018  8:00 AM CDT   Return Visit with Jose M Loredo MD   [facility]    [address] [place] [id number] [phone]              Who to contact     If you have questions or need follow up information about today's clinic visit or your schedule please contact [facility] directly at [phone].  Normal or non-critical lab and imaging results will be communicated to you by MyChart, letter or phone within 4 business days after the clinic has received the results. If you do not hear from us within 7 days, please contact the clinic through MyChart or phone. If you have a critical or abnormal lab result, we will notify you by phone as soon as possible.  Submit refill requests through [email or web address] or call your pharmacy and they will forward the refill request to us. Please allow 3 business days for your refill to be completed.          Additional Information About Your Visit        MyChart Information     [email or web address] gives you secure access to your electronic health record. If you see a primary care provider, you can also send messages to your care team and make appointments. If you have questions, please call your primary care clinic.  If you do not have a primary care provider, please call [phone] and they will assist you.        Care EveryWhere ID     This is your Care EveryWhere ID. This could be used by other organizations to access your [place] medical records  [id number]        Your Vitals  Were     Temperature Height BMI (Body Mass Index)             97  F (36.1  C) 1.524 m (5') 32.81 kg/m2          Blood Pressure from Last 3 Encounters:   08/14/17 116/62   07/03/17 130/82   07/15/16 120/76    Weight from Last 3 Encounters:   02/19/18 76.2 kg (168 lb)   09/18/17 78 kg (172 lb)   08/14/17 77.9 kg (171 lb 12.8 oz)              We Performed the Following     Kenalog 40 MG  []     Large Joint/Bursa injection and/or drainage (Shoulder, Knee)        Primary Care Provider Office Phone # Fax #    Xochitl Suad Hemphill [phone] [address] [place] [address]        Equal Access to Services     [facility] : Hadii jb brantley hadasho Soomaali, waaxda luqadaha, qaybta kaalmada adeegyada, laurel mckeon haytereso wright . So [facility] [phone].    ATENCIÓN: Si habla español, tiene a go disposición servicios gratuitos de asistencia lingüística. Llame al [phone].    We comply with applicable federal civil rights laws and [place] laws. We do not discriminate on the basis of race, color, national origin, age, disability, sex, sexual orientation, or gender identity.            Thank you!     Thank you for choosing [facility]  for your care. Our goal is always to provide you with excellent care. Hearing back from our patients is one way we can continue to improve our services. Please take a few minutes to complete the written survey that you may receive in the mail after your visit with us. Thank you!             Your Updated Medication List - Protect others around you: Learn how to safely use, store and throw away your medicines at www.disposemymeds.org.          This list is accurate as of 2/19/18  3:33 PM.  Always use your most recent med list.                   Brand Name Dispense Instructions for use Diagnosis    artificial saliva Soln solution     44.3 mL    Swish and spit 5-10 mLs in mouth as needed for dry mouth    Dry mouth, RA (rheumatoid  arthritis) (H)       aspirin 325 MG tablet      Take 325 mg by mouth daily        calcium citrate-vitamin D 315-250 MG-UNIT Tabs per tablet    CITRACAL     Take 2 tablets by mouth daily        CENTRUM SILVER per tablet      Take 1 tablet by mouth daily        cycloSPORINE 0.05 % ophthalmic emulsion    RESTASIS    180 each    Place 1 drop into both eyes 2 times daily    CKD (chronic kidney disease) stage 3, GFR 30-59 ml/min       GLUCOSAMINE CHONDR 1500 COMPLX PO      Take 2 tablets by mouth daily        hydrochlorothiazide 25 MG tablet    HYDRODIURIL    45 tablet    Take 0.5 tablets (12.5 mg) by mouth daily    Edema, unspecified type       hydroxychloroquine 200 MG tablet    PLAQUENIL    90 tablet    Take 1 tablet (200 mg) by mouth daily    Systemic lupus erythematosus with other organ involvement, unspecified SLE type (H)       lisinopril 10 MG tablet    PRINIVIL/ZESTRIL    90 tablet    TAKE 1 TABLET EVERY DAY    CKD (chronic kidney disease) stage 3, GFR 30-59 ml/min, Essential hypertension with goal blood pressure less than 140/90       metoprolol succinate 25 MG 24 hr tablet    TOPROL-XL    90 tablet    Take 0.5 tablets (12.5 mg) by mouth 2 times daily    Essential hypertension with goal blood pressure less than 140/90       OMEGA-3 FISH OIL PO      Take 1,400 mg by mouth daily        PRILOSEC OTC PO      Take 20 mg by mouth daily        simvastatin 10 MG tablet    ZOCOR    90 tablet    Take 1 tablet (10 mg) by mouth At Bedtime    Hyperlipidemia with target LDL less than 130       VITAMIN B 12 PO      Take 1,000 mcg by mouth daily        VITAMIN C PO      Take 1,000 mg by mouth daily

## 2018-02-19 NOTE — LETTER
2/19/2018         RE: Capri Wiseman  [address] [facility] [address]        Dear Colleague,    Thank you for referring your patient, Capri Wiseman, to the [facility]. Please see a copy of my visit note below.    Office Visit-Follow up    Chief Complaint: Capri Wiseman is a 72 year old female who is being seen for   Chief Complaint   Patient presents with     RECHECK     left shoulder pain lov 9/18/17 injection given       History of Present Illness:   Injection in 9/2017 helped a lot   undergoing chemo and very weak, has been helping him get up causing a lot of pain in her shoulder      REVIEW OF SYSTEMS  General: negative for, night sweats, dizziness, fatigue  Resp: No shortness of breath and no cough  CV: negative for chest pain, syncope or near-syncope  GI: negative for nausea, vomiting and diarrhea  : negative for dysuria and hematuria  Musculoskeletal: as above  Neurologic: negative for syncope   Hematologic: negative for bleeding disorder    Physical Exam:  Vitals: Temp 97  F (36.1  C)  Ht 1.524 m (5')  Wt 76.2 kg (168 lb)  BMI 32.81 kg/m2  BMI= Body mass index is 32.81 kg/(m^2).  Constitutional: healthy, alert and no acute distress   Psychiatric: mentation appears normal and affect normal/bright  NEURO: no focal deficits  RESP: Normal with easy respirations and no use of accessory muscles to breathe, no audible wheezing or retractions  CV: No peripheral edema  SKIN: No erythema, rashes, excoriation, or breakdown. No evidence of infection.   JOINT/EXTREMITIES:left shoulder - FROM, positive impingement, good strength in rotator cuff  GAIT: non-antalgic            Diagnostic Modalities:  None today.        Impression: left Shoulder Subacromial Impingement    Plan:  All of the above pertinent physical exam and imaging modalities findings was reviewed with Capri.                                          CONSERVATIVE CARE:  I recommend conservative care for the  patient to include NSAIDs, Tylenol, focused self directed physical therapy, steroid injections, activity modifications.                                         INJECTION PROCEDURE:  The patient was counseled about an  injection, including discussion of risks (including infection), contents of the injection, rationale for performing the injection, and expected benefits of the injection. The skin was prepped with alcohol and betadine and then utilizing sterile technique an injection of the left shoulder subacromial space from the anterolateral approach  was performed. The injection consisted 1ml of Kenalog (40mg per 1ml) with 8ml 1% lidocaine plain. The patient tolerated the injection well, and there were no complications. The injection site was covered with a Band-Aid. The injection was performed by Vivi Mcneil M.D.                                                FUTURE PLAN:  On their return if they still have symptoms we will consider physical therapy, NSAID's, injection of steroids.      Return to clinic 6, week(s), PRN, or sooner as needed for changes.  Re-x-ray on return: No    Vivi Mcneil M.D.          Again, thank you for allowing me to participate in the care of your patient.        Sincerely,        Vivi Mcneil MD

## 2018-04-17 ENCOUNTER — TRANSFERRED RECORDS (OUTPATIENT)
Dept: HEALTH INFORMATION MANAGEMENT | Facility: CLINIC | Age: 73
End: 2018-04-17

## 2018-07-16 ENCOUNTER — OFFICE VISIT (OUTPATIENT)
Dept: RHEUMATOLOGY | Facility: CLINIC | Age: 73
End: 2018-07-16
Payer: COMMERCIAL

## 2018-07-16 VITALS
DIASTOLIC BLOOD PRESSURE: 78 MMHG | HEART RATE: 76 BPM | WEIGHT: 158.5 LBS | BODY MASS INDEX: 31.12 KG/M2 | SYSTOLIC BLOOD PRESSURE: 125 MMHG | OXYGEN SATURATION: 98 % | HEIGHT: 60 IN

## 2018-07-16 DIAGNOSIS — M32.19 SYSTEMIC LUPUS ERYTHEMATOSUS WITH OTHER ORGAN INVOLVEMENT, UNSPECIFIED SLE TYPE (H): Primary | ICD-10-CM

## 2018-07-16 DIAGNOSIS — Z79.899 LONG-TERM USE OF PLAQUENIL: ICD-10-CM

## 2018-07-16 PROCEDURE — 99214 OFFICE O/P EST MOD 30 MIN: CPT | Performed by: INTERNAL MEDICINE

## 2018-07-16 RX ORDER — HYDROXYCHLOROQUINE SULFATE 200 MG/1
200 TABLET, FILM COATED ORAL DAILY
Qty: 90 TABLET | Refills: 3 | Status: SHIPPED | OUTPATIENT
Start: 2018-07-16 | End: 2019-07-10

## 2018-07-16 ASSESSMENT — PAIN SCALES - GENERAL: PAINLEVEL: NO PAIN (0)

## 2018-07-16 NOTE — PROGRESS NOTES
Rheumatology Visit     Capri Wiseman MRN# [id number]   YOB: 1945 Age: 72 year old     Date of Visit: July 16, 2018  Primary care provider: Xochitl Hemphill          Assessment and Plan:   72-year-old woman with a 13-year history of SLE characterized by arthralgias, sicca symptoms, alopecia, and GERD. +AMY in 2013. Has been in remission for 7+ years, on maintenance therapy with  mg qd. Possible history of RA; likely a component of secondary Sjogren's impacting dental health currently being followed at [facility]      2.  Other medical problems as per EPIC      PLAN: Discussed in detail with the patient      1. Continue Plaquenil - Rx renewed. Eye exam yearly  2. For her dental issues due to sicca symptoms, continue with [facility]  3. Lab is current  4. Return in 1 year  5. Flu shot this fall.      Jsoe M Loredo MD                History of Present Illness:   72-year-old woman who presents to [facility] to followup fo diagnosis of SLE. I saw her in March 2015 after she recently moved to [place] from [place], and last in July 2017. [facility] reviewed.      HISTORY CARRIED FORWARD:      She first began experiencing symptoms SLE about 12 years ago, when she was hospitalized for ACS-like chest pain. Cardiovascular workup was negative for coronary disease. About one year later, she was again hospitalized for chest pain and underwent a cardiac catheterization, which showed clean coronaries. Her chest pain was ultimately diagnosed as GERD and additional blood work (specifics unknown) suggested this was secondary to SLE. She had no other symptoms of SLE at the time.      Over the years she has experienced additional symptoms, including arthralgias (primarily in knees, ankles, hands, wrists), alopecia, sicca symptoms, and Raynaud s. She was started on prednisone sometime early in her disease course but became very  swollen  and was never given corticosteroids  again after this, per her recollection. Currently taking  mg qd and has been on this dose for years. Last eye exam was in 10/2013. Any additional treatment history is unknown.      Believes she also has RA but history of diagnosis is entirely unknown. Over the past 2-3 years she has been losing her upper row of teeth 2/2 dry mouth and has been told she needs dental surgery to support her palate. Has never used Evoxac. Used Restasis for dry eyes in the past but now too expensive and she uses OTC eye drops instead.       In July 2014, while she was still living in [place], she had an abnormal UA that suggested possible kidney disease 2/2 SLE. There were plans to obtain kidney ultrasound and/or biopsy but this was not done as Ms. Wiseman was in the process of relocating to [place]. She has already established care with a new PCP here, with plans to pursue kidney US. She does already carry a diagnosis of CKDIII though the history of this is quite unclear.      INTERVAL HISTORY:      Today, she overall feels well and states her SLE has now been in remission for about 7 years. No exacerbations since she was last seen. No arthralgias.  She continues on apple vinegar in AM and lemon in hot water in PM and feels great. She has [facility] with this and [facility].  She no longer has leg aches.      She remains on Plaquenil one daily without side effects.  She saw her eye doctor last month and exam stable.      She is on Restasis and artificial saliva. She had continued to have some dental deterioration and had full upper mouth extractions at the  [facility] Clinic and a bridge and this is stable.      Complement levels and dsDNA normal in July 2017.    Unfortunately her  has terminal [facility] and [facility]. We discussed this at length.  She seems to be coping ok.  Her whole family has been visiting.    Other ongoing issues include mild alopecia and Raynaud s, both stable. GERD well controlled on  Prilosec. Has never had any major infections       Review of Systems:   Review Of Systems  Skin: negative  Eyes: see HPI  Ears/Nose/Throat: sicca unchanged  Respiratory: No shortness of breath, dyspnea on exertion, cough, or hemoptysis  Cardiovascular: negative  Gastrointestinal: negative  Genitourinary: negative  Musculoskeletal: see HPI  Neurologic: negative  Psychiatric: negative  Hematologic/Lymphatic/Immunologic: negative  Endocrine: negative          Past Medical History:     Past Medical History:   Diagnosis Date     CKD (chronic kidney disease) stage 3, GFR 30-59 ml/min      Dental disorder     loosing top teeth due to lupus     GERD (gastroesophageal reflux disease)      High blood pressure      Mixed hyperlipidaemia      Osteoarthritis      RA (rheumatoid arthritis) (H)     mild, on plaquenil     SLE (systemic lupus erythematosus) (H)      Vision problem     dry eyes from Lupus       Patient Active Problem List    Diagnosis Date Noted     Systemic lupus erythematosus, unspecified SLE type, unspecified organ involvement status (H) 08/14/2017     Priority: Medium     CKD (chronic kidney disease) stage 3, GFR 30-59 ml/min 03/03/2015     Priority: Medium     Hypertension 03/03/2015     Priority: Medium     Esophageal reflux 03/03/2015     Priority: Medium     Osteoarthritis 03/03/2015     Priority: Medium     Hyperlipidemia with target LDL less than 130 03/03/2015     Priority: Medium     Diagnosis updated by automated process. Provider to review and confirm.       RA (rheumatoid arthritis) (H) 03/03/2015     Priority: Medium             Past Surgical History:     Past Surgical History:   Procedure Laterality Date     APPENDECTOMY  1969     C TOTAL KNEE ARTHROPLASTY  3/10    left     COLONOSCOPY  2009    repeat 5 years     COLONOSCOPY N/A 5/20/2015    Procedure: COLONOSCOPY;  Surgeon: Corbin Dexter MD;  Location:  GI     FOOT SURGERY  2010    bone and screws in 2 toes, hammertoe     HYSTERECTOMY  TOTAL ABDOMINAL  2010    due to bleeding, benign     LAPAROTOMY EXPLORATORY      scar tissue removal/repair - abd     SHOULDER SURGERY  05/2013    rotator cuff repair, right             Social History:     Social History   Substance Use Topics     Smoking status: Never Smoker     Smokeless tobacco: Never Used     Alcohol use No             Family History:     Family History   Problem Relation Age of Onset     Colon Cancer Mother      Cerebrovascular Disease Father      Lupus Mother      Neurologic Disorder Sister      ALS            Allergies:     Allergies   Allergen Reactions     Ciprofloxacin GI Disturbance     Was taken with Flagyl, not sure which one bothered her.      Flagyl [Metronidazole] GI Disturbance     Was taken with Cipro, not sure which one bothered her.              Medications:     Current Outpatient Prescriptions   Medication Sig Dispense Refill     artificial saliva, BIOTENE MT, (BIOTENE MT) SOLN Swish and spit 5-10 mLs in mouth as needed for dry mouth 44.3 mL 3     Ascorbic Acid (VITAMIN C PO) Take 1,000 mg by mouth daily       aspirin 325 MG tablet Take 325 mg by mouth daily       calcium citrate-vitamin D (CITRACAL) 315-250 MG-UNIT TABS Take 2 tablets by mouth daily        Cyanocobalamin (VITAMIN B 12 PO) Take 1,000 mcg by mouth daily       cycloSPORINE (RESTASIS) 0.05 % ophthalmic emulsion Place 1 drop into both eyes 2 times daily 180 each 3     Glucosamine-Chondroit-Vit C-Mn (GLUCOSAMINE CHONDR 1500 COMPLX PO) Take 2 tablets by mouth daily       hydrochlorothiazide (HYDRODIURIL) 25 MG tablet Take 0.5 tablets (12.5 mg) by mouth daily 45 tablet 3     hydroxychloroquine (PLAQUENIL) 200 MG tablet Take 1 tablet (200 mg) by mouth daily 90 tablet 3     lisinopril (PRINIVIL/ZESTRIL) 10 MG tablet TAKE 1 TABLET EVERY DAY 90 tablet 3     metoprolol (TOPROL-XL) 25 MG 24 hr tablet Take 0.5 tablets (12.5 mg) by mouth 2 times daily 90 tablet 3     Multiple Vitamins-Minerals (CENTRUM SILVER) per tablet Take  1 tablet by mouth daily       Omega-3 Fatty Acids (OMEGA-3 FISH OIL PO) Take 1,400 mg by mouth daily       Omeprazole Magnesium (PRILOSEC OTC PO) Take 20 mg by mouth daily       simvastatin (ZOCOR) 10 MG tablet Take 1 tablet (10 mg) by mouth At Bedtime 90 tablet 2            Physical Exam:   Blood pressure 125/78, pulse 76, height 1.524 m (5'), weight 71.9 kg (158 lb 8 oz), SpO2 98 %, not currently breastfeeding.  Constitutional: WD-WN-WG cooperative  Eyes: nl EOM, conjunctiva, sclera  ENT: nl external ears, nose, lips. In process of upper extractions   No mucous membrane lesions  GI: no ABD mass or tenderness, no HSM  Lymph: no cervical or supraclavicular nodes  MS: All TMJ, neck, shoulder, elbow, wrist, MCP/PIP/DIP, spine, hip, knee, ankle, and foot MTP/IP joints were examined. No active synovitis. Full ROM. Normal  strength. No dactylitis, tenosynovitis, enthesopathy. Minimal hallux valgus and prominence of 1st MTP in R foot.  Skin: no nail pitting, alopecia, rash, nodules or lesions  Neuro: intact CN, strength  Psych: nl affect         Data:     Lab Results   Component Value Date    WBC 5.0 07/03/2017    WBC 6.2 06/03/2016    WBC 9.4 07/31/2015    HGB 11.2 (L) 07/03/2017    HGB 11.9 06/03/2016    HGB 11.2 (L) 07/31/2015    HCT 33.9 (L) 07/03/2017    HCT 37.1 06/03/2016    HCT 33.6 (L) 07/31/2015     (H) 07/03/2017     (H) 06/03/2016     (H) 07/31/2015     07/03/2017     06/03/2016     07/31/2015     Lab Results   Component Value Date    BUN 34 (H) 10/03/2016    BUN 37 (H) 06/03/2016    BUN 29 01/26/2016     No components found for: SEDRATE  Lab Results   Component Value Date    TSH 1.600 07/08/2013     Lab Results   Component Value Date    AST 16 07/03/2017    AST 8 07/31/2015    AST 10 07/15/2015    ALT 19 07/03/2017    ALT 19 06/03/2016    ALT 15 07/31/2015    ALKPHOS 45 07/31/2015    ALKPHOS 42 07/15/2015    ALKPHOS 41 03/03/2015     Reviewed Rheumatology lab  flowsheet    Jose M Loredo

## 2018-07-16 NOTE — NURSING NOTE
Capri Wiseman's goals for this visit include:   She requests these members of her care team be copied on today's visit information:     PCP: Xochitl Hemphill    Referring Provider:  No referring provider defined for this encounter.    /78  Pulse 76  Ht 1.524 m (5')  Wt 71.9 kg (158 lb 8 oz)  SpO2 98%  BMI 30.95 kg/m2

## 2018-07-16 NOTE — MR AVS SNAPSHOT
After Visit Summary   7/16/2018    Capri Wiseman    MRN: [id number]           Patient Information     Date Of Birth          1945        Visit Information        Provider Department      7/16/2018 8:00 AM Jose M Loredo MD [facility]        Today's Diagnoses     Systemic lupus erythematosus with other organ involvement, unspecified SLE type (H)    -  1    Long-term use of Plaquenil           Follow-ups after your visit        Follow-up notes from your care team     Return in about 1 year (around 7/16/2019).      Who to contact     If you have questions or need follow up information about today's clinic visit or your schedule please contact [facility] directly at [phone].  Normal or non-critical lab and imaging results will be communicated to you by [employer], letter or phone within 4 business days after the clinic has received the results. If you do not hear from us within 7 days, please contact the clinic through [employer] or phone. If you have a critical or abnormal lab result, we will notify you by phone as soon as possible.  Submit refill requests through [employer] or call your pharmacy and they will forward the refill request to us. Please allow 3 business days for your refill to be completed.          Additional Information About Your Visit        MyChart Information     [employer] gives you secure access to your electronic health record. If you see a primary care provider, you can also send messages to your care team and make appointments. If you have questions, please call your primary care clinic.  If you do not have a primary care provider, please call [phone] and they will assist you.      [employer] is an electronic gateway that provides easy, online access to your medical records. With [employer], you can request a clinic appointment, read your test results, renew a prescription or communicate with your care team.     To access your existing  account, please contact your [facility] or call [phone] for assistance.        Care EveryWhere ID     This is your Care EveryWhere ID. This could be used by other organizations to access your [facility] medical records  [id number]        Your Vitals Were     Pulse Height Pulse Oximetry BMI (Body Mass Index)          76 1.524 m (5') 98% 30.95 kg/m2         Blood Pressure from Last 3 Encounters:   07/16/18 125/78   08/14/17 116/62   07/03/17 130/82    Weight from Last 3 Encounters:   07/16/18 71.9 kg (158 lb 8 oz)   02/19/18 76.2 kg (168 lb)   09/18/17 78 kg (172 lb)              Today, you had the following     No orders found for display         Where to get your medicines      These medications were sent to [facility] - [address] [address]     Phone:  [phone]     hydroxychloroquine 200 MG tablet          Primary Care Provider Office Phone # Fax #    Xochitl Suad Hemphill [phone] [address] [facility] [address] [facility]   [facility] [address]        Equal Access to Services     CHRYSTAL SCHMIDT AH: Hadii jb brantley hadasho Soomaali, waaxda luqadaha, qaybta kaalmada adeegyada, laurel franklin. So [facility] [phone].    ATENCIÓN: Si habla español, tiene a go disposición servicios gratuitos de asistencia lingüística. Jacki al [phone].    We comply with applicable federal civil rights laws and [place] laws. We do not discriminate on the basis of race, color, national origin, age, disability, sex, sexual orientation, or gender identity.            Thank you!     Thank you for choosing [facility]  for your care. Our goal is always to provide you with excellent care. Hearing back from our patients is one way we can continue to improve our services. Please take a few minutes to complete the written survey that you may receive in the mail after your visit  with us. Thank you!             Your Updated Medication List - Protect others around you: Learn how to safely use, store and throw away your medicines at www.disposemymeds.org.          This list is accurate as of 7/16/18  8:28 AM.  Always use your most recent med list.                   Brand Name Dispense Instructions for use Diagnosis    artificial saliva Soln solution     44.3 mL    Swish and spit 5-10 mLs in mouth as needed for dry mouth    Dry mouth, RA (rheumatoid arthritis) (H)       aspirin 325 MG tablet      Take 325 mg by mouth daily        calcium citrate-vitamin D 315-250 MG-UNIT Tabs per tablet    CITRACAL     Take 2 tablets by mouth daily        CENTRUM SILVER per tablet      Take 1 tablet by mouth daily        cycloSPORINE 0.05 % ophthalmic emulsion    RESTASIS    180 each    Place 1 drop into both eyes 2 times daily    CKD (chronic kidney disease) stage 3, GFR 30-59 ml/min       GLUCOSAMINE CHONDR 1500 COMPLX PO      Take 2 tablets by mouth daily        hydrochlorothiazide 25 MG tablet    HYDRODIURIL    45 tablet    Take 0.5 tablets (12.5 mg) by mouth daily    Edema, unspecified type       hydroxychloroquine 200 MG tablet    PLAQUENIL    90 tablet    Take 1 tablet (200 mg) by mouth daily    Systemic lupus erythematosus with other organ involvement, unspecified SLE type (H)       lisinopril 10 MG tablet    PRINIVIL/ZESTRIL    90 tablet    TAKE 1 TABLET EVERY DAY    CKD (chronic kidney disease) stage 3, GFR 30-59 ml/min, Essential hypertension with goal blood pressure less than 140/90       metoprolol succinate 25 MG 24 hr tablet    TOPROL-XL    90 tablet    Take 0.5 tablets (12.5 mg) by mouth 2 times daily    Essential hypertension with goal blood pressure less than 140/90       OMEGA-3 FISH OIL PO      Take 1,400 mg by mouth daily        PRILOSEC OTC PO      Take 20 mg by mouth daily        simvastatin 10 MG tablet    ZOCOR    90 tablet    Take 1 tablet (10 mg) by mouth At Bedtime    Hyperlipidemia  with target LDL less than 130       VITAMIN B 12 PO      Take 1,000 mcg by mouth daily        VITAMIN C PO      Take 1,000 mg by mouth daily

## 2018-09-16 ENCOUNTER — TELEPHONE (OUTPATIENT)
Dept: FAMILY MEDICINE | Facility: CLINIC | Age: 73
End: 2018-09-16

## 2018-09-16 DIAGNOSIS — I10 ESSENTIAL HYPERTENSION WITH GOAL BLOOD PRESSURE LESS THAN 140/90: ICD-10-CM

## 2018-09-16 DIAGNOSIS — N18.30 CKD (CHRONIC KIDNEY DISEASE) STAGE 3, GFR 30-59 ML/MIN (H): ICD-10-CM

## 2018-09-18 RX ORDER — METOPROLOL SUCCINATE 25 MG/1
TABLET, EXTENDED RELEASE ORAL
Qty: 30 TABLET | Refills: 0 | Status: SHIPPED | OUTPATIENT
Start: 2018-09-18 | End: 2018-10-21

## 2018-09-18 RX ORDER — LISINOPRIL 10 MG/1
TABLET ORAL
Qty: 30 TABLET | Refills: 0 | Status: SHIPPED | OUTPATIENT
Start: 2018-09-18 | End: 2018-10-21

## 2018-09-18 NOTE — TELEPHONE ENCOUNTER
Ayleen refill given per RN protocol.   Please contact patient to have them schedule the following: annual exam and labs (due after 8/14/18)    Mindi Arroyo, RN, BSN

## 2018-09-18 NOTE — TELEPHONE ENCOUNTER
"Requested Prescriptions   Pending Prescriptions Disp Refills     lisinopril (PRINIVIL/ZESTRIL) 10 MG tablet [Pharmacy Med Name: LISINOPRIL TABS 10MG] 90 tablet 3     Sig: TAKE 1 TABLET DAILY    ACE Inhibitors (Including Combos) Protocol Failed    9/16/2018  6:31 AM       Failed - Recent (12 mo) or future (30 days) visit within the authorizing provider's specialty    Patient had office visit in the last 12 months or has a visit in the next 30 days with authorizing provider or within the authorizing provider's specialty.  See \"Patient Info\" tab in inbasket, or \"Choose Columns\" in Meds & Orders section of the refill encounter.           Failed - Normal serum creatinine on file in past 12 months    Recent Labs   Lab Test  07/03/17   0829   CR  1.08*            Failed - Normal serum potassium on file in past 12 months    Recent Labs   Lab Test  10/03/16   0806   POTASSIUM  4.4            Passed - Blood pressure under 140/90 in past 12 months    BP Readings from Last 3 Encounters:   07/16/18 125/78   08/14/17 116/62   07/03/17 130/82                Passed - Patient is age 18 or older       Passed - No active pregnancy on record       Passed - No positive pregnancy test in past 12 months   Last Written Prescription Date:  8/29/17  Last Fill Quantity: 90,  # refills: 3   Last office visit: 8/14/2017 with prescribing provider:  Kanchan   Future Office Visit:         metoprolol succinate (TOPROL-XL) 25 MG 24 hr tablet [Pharmacy Med Name: METOPROLOL SUCC ER TAB 25MG] 90 tablet 3     Sig: TAKE ONE-HALF (1/2) TABLET 2 TIMES DAILY    Beta-Blockers Protocol Failed    9/16/2018  6:31 AM       Failed - Recent (12 mo) or future (30 days) visit within the authorizing provider's specialty    Patient had office visit in the last 12 months or has a visit in the next 30 days with authorizing provider or within the authorizing provider's specialty.  See \"Patient Info\" tab in inbasket, or \"Choose Columns\" in Meds & Orders section of the " refill encounter.           Passed - Blood pressure under 140/90 in past 12 months    BP Readings from Last 3 Encounters:   07/16/18 125/78   08/14/17 116/62   07/03/17 130/82                Passed - Patient is age 6 or older      Last Written Prescription Date:  8/29/17  Last Fill Quantity: 90,  # refills: 3   Last office visit: 8/14/2017 with prescribing provider:  Kanchan   Future Office Visit:

## 2018-09-19 NOTE — TELEPHONE ENCOUNTER
Called patient, patient states  just passed away and will call back to schedule an appt when things settle down for her.  Thank you,  Gely Rosario  Patient Representative

## 2018-10-21 DIAGNOSIS — E78.5 HYPERLIPIDEMIA WITH TARGET LDL LESS THAN 130: ICD-10-CM

## 2018-10-21 DIAGNOSIS — R60.9 EDEMA, UNSPECIFIED TYPE: ICD-10-CM

## 2018-10-21 DIAGNOSIS — N18.30 CKD (CHRONIC KIDNEY DISEASE) STAGE 3, GFR 30-59 ML/MIN (H): ICD-10-CM

## 2018-10-21 DIAGNOSIS — I10 ESSENTIAL HYPERTENSION WITH GOAL BLOOD PRESSURE LESS THAN 140/90: ICD-10-CM

## 2018-10-23 RX ORDER — SIMVASTATIN 10 MG
TABLET ORAL
Qty: 93 TABLET | Refills: 0 | Status: SHIPPED | OUTPATIENT
Start: 2018-10-23 | End: 2019-01-13

## 2018-10-23 RX ORDER — LISINOPRIL 10 MG/1
TABLET ORAL
Qty: 93 TABLET | Refills: 0 | Status: SHIPPED | OUTPATIENT
Start: 2018-10-23 | End: 2019-01-13

## 2018-10-23 RX ORDER — HYDROCHLOROTHIAZIDE 25 MG/1
TABLET ORAL
Qty: 45 TABLET | Refills: 0 | Status: SHIPPED | OUTPATIENT
Start: 2018-10-23 | End: 2019-01-13

## 2018-10-23 RX ORDER — METOPROLOL SUCCINATE 25 MG/1
TABLET, EXTENDED RELEASE ORAL
Qty: 93 TABLET | Refills: 0 | Status: SHIPPED | OUTPATIENT
Start: 2018-10-23 | End: 2019-01-13

## 2018-10-23 NOTE — TELEPHONE ENCOUNTER
ZOCOR  Last Written Prescription Date:  11/24/17  Last Fill Quantity: 90,  # refills: 2   Last office visit: 8/14/2017 with prescribing provider:     Future Office Visit:   Next 5 appointments (look out 90 days)     Jan 04, 2019  9:15 AM CST   PHYSICAL with Xochitl Hemphill MD   [facility] ([address] [address]   [phone]                 Lisinopril  Last Written Prescription Date:  9/18/18  Last Fill Quantity: 30,  # refills: 0   Last office visit: 8/14/2017 with prescribing provider:     Future Office Visit:   Next 5 appointments (look out 90 days)     Jan 04, 2019  9:15 AM CST   PHYSICAL with Xochitl Hemphill MD   [facility] ([address] [address]   [phone]               Hydrochlorothiazide  Last Written Prescription Date:  8/29/18  Last Fill Quantity: 45 (1/2 tab daily) ,  # refills:3  Last office visit: 8/14/2017 with prescribing provider:     Future Office Visit:   Next 5 appointments (look out 90 days)     Jan 04, 2019  9:15 AM CST   PHYSICAL with Xochitl Hemphill MD   [facility] ([address] [address]   [phone]                 Metoprolol  Last Written Prescription Date:  9/18/18  Last Fill Quantity: 30,  # refills: 0   Last office visit: 8/14/2017 with prescribing provider:     Future Office Visit:   Next 5 appointments (look out 90 days)     Jan 04, 2019  9:15 AM CST   PHYSICAL with Xochitl Hemphill MD   [facility] ([address] [address]   [phone]                 Requested Prescriptions   Pending Prescriptions Disp Refills     metoprolol succinate (TOPROL-XL) 25 MG 24 hr tablet [Pharmacy Med Name: METOPROLOL SUCC ER TAB 25MG] 30 tablet 0     Sig: TAKE ONE-HALF (1/2)  "TABLET TWICE A DAY (DUE FOR ANNUAL EXAM AND LABS)    Beta-Blockers Protocol Failed    10/21/2018 12:17 PM       Failed - Recent (12 mo) or future (30 days) visit within the authorizing provider's specialty    Patient had office visit in the last 12 months or has a visit in the next 30 days with authorizing provider or within the authorizing provider's specialty.  See \"Patient Info\" tab in inbasket, or \"Choose Columns\" in Meds & Orders section of the refill encounter.             Passed - Blood pressure under 140/90 in past 12 months    BP Readings from Last 3 Encounters:   07/16/18 125/78   08/14/17 116/62   07/03/17 130/82                Passed - Patient is age 6 or older        hydrochlorothiazide (HYDRODIURIL) 25 MG tablet [Pharmacy Med Name: HYDROCHLOROTHIAZIDE TAB 25MG] 45 tablet 3     Sig: TAKE ONE-HALF (1/2) TABLET DAILY    Diuretics (Including Combos) Protocol Failed    10/21/2018 12:17 PM       Failed - Recent (12 mo) or future (30 days) visit within the authorizing provider's specialty    Patient had office visit in the last 12 months or has a visit in the next 30 days with authorizing provider or within the authorizing provider's specialty.  See \"Patient Info\" tab in inbasket, or \"Choose Columns\" in Meds & Orders section of the refill encounter.             Failed - Normal serum creatinine on file in past 12 months    Recent Labs   Lab Test  07/03/17   0829   CR  1.08*             Failed - Normal serum potassium on file in past 12 months    Recent Labs   Lab Test  10/03/16   0806   POTASSIUM  4.4                   Failed - Normal serum sodium on file in past 12 months    Recent Labs   Lab Test  10/03/16   0806   NA  143             Passed - Blood pressure under 140/90 in past 12 months    BP Readings from Last 3 Encounters:   07/16/18 125/78   08/14/17 116/62   07/03/17 130/82                Passed - Patient is age 18 or older       Passed - No active pregancy on record       Passed - No positive " "pregnancy test in past 12 months        lisinopril (PRINIVIL/ZESTRIL) 10 MG tablet [Pharmacy Med Name: LISINOPRIL TABS 10MG] 30 tablet 0     Sig: TAKE 1 TABLET DAILY (DUE FOR ANNUAL EXAM AND LABS)    ACE Inhibitors (Including Combos) Protocol Failed    10/21/2018 12:17 PM       Failed - Recent (12 mo) or future (30 days) visit within the authorizing provider's specialty    Patient had office visit in the last 12 months or has a visit in the next 30 days with authorizing provider or within the authorizing provider's specialty.  See \"Patient Info\" tab in inbasket, or \"Choose Columns\" in Meds & Orders section of the refill encounter.             Failed - Normal serum creatinine on file in past 12 months    Recent Labs   Lab Test  07/03/17   0829   CR  1.08*            Failed - Normal serum potassium on file in past 12 months    Recent Labs   Lab Test  10/03/16   0806   POTASSIUM  4.4            Passed - Blood pressure under 140/90 in past 12 months    BP Readings from Last 3 Encounters:   07/16/18 125/78   08/14/17 116/62   07/03/17 130/82                Passed - Patient is age 18 or older       Passed - No active pregnancy on record       Passed - No positive pregnancy test in past 12 months        simvastatin (ZOCOR) 10 MG tablet [Pharmacy Med Name: SIMVASTATIN TABS 10MG] 90 tablet 2     Sig: TAKE 1 TABLET AT BEDTIME    Statins Protocol Failed    10/21/2018 12:17 PM       Failed - LDL on file in past 12 months    Recent Labs   Lab Test  09/18/17   0925   LDL  54            Failed - Recent (12 mo) or future (30 days) visit within the authorizing provider's specialty    Patient had office visit in the last 12 months or has a visit in the next 30 days with authorizing provider or within the authorizing provider's specialty.  See \"Patient Info\" tab in inbasket, or \"Choose Columns\" in Meds & Orders section of the refill encounter.             Passed - No abnormal creatine kinase in past 12 months    No lab results found. "            Passed - Patient is age 18 or older       Passed - No active pregnancy on record       Passed - No positive pregnancy test in past 12 months        Routing refill request to provider for review/approval because:  Labs out of range:    Component      Latest Ref Rng & Units 7/31/2015 1/26/2016 6/3/2016 10/3/2016   Creatinine      0.52 - 1.04 mg/dL 1.02 1.02 1.06 (H) 0.98   GFR Estimate      >60 mL/min/1.7m2 53 (L) 53 (L) 51 (L) 55 (L)   GFR Estimate If Black      >60 mL/min/1.7m2 64 64 62 67     Component      Latest Ref Rng & Units 7/3/2017   Creatinine      0.52 - 1.04 mg/dL 1.08 (H)   GFR Estimate      >60 mL/min/1.7m2 50 (L)   GFR Estimate If Black      >60 mL/min/1.7m2 60 (L)     Labs not current:    A break in medication  Patient needs to be seen because it has been more than 1 year since last office visit.  MARTHA Larsen

## 2018-11-20 ENCOUNTER — TELEPHONE (OUTPATIENT)
Dept: FAMILY MEDICINE | Facility: CLINIC | Age: 73
End: 2018-11-20

## 2018-11-20 NOTE — TELEPHONE ENCOUNTER
Appt needed to discuss? If so, can pt be worked in? Pt has not seen Dr. Hemphill since 8/14/17. Alecia Jade, CMA

## 2018-11-20 NOTE — TELEPHONE ENCOUNTER
Reason for Call: Request for an order or referral:    Order or referral being requested: small portable oxygen that she can have with her on a airplane     Date needed: as soon as possible    Has the patient been seen by the PCP for this problem?      Additional comments: Will be flying to her Son's for [place] and is requesting portable oxygen that she can have with her on the plane. States she has passed out several times while on a flight. The airline has recommended that she carry  this when she is flying.     Phone number Patient can be reached at:  Home number on file [phone] (home)    Best Time:    Can we leave a detailed message on this number?  YES    Call taken on 11/20/2018 at 9:42 AM by Nina Veronica

## 2018-11-29 ENCOUNTER — TELEPHONE (OUTPATIENT)
Dept: FAMILY MEDICINE | Facility: CLINIC | Age: 73
End: 2018-11-29

## 2018-11-29 DIAGNOSIS — R42 LIGHTHEADEDNESS: Primary | ICD-10-CM

## 2018-11-29 NOTE — TELEPHONE ENCOUNTER
Reason for Call:  Other prescription    Detailed comments: Pt states she is needing an Rx for a small oxygen tank to bring with her on a flight to [place]. She will need another one as well that she can fill at the local hospital for the flight back. Pt will  the hard copy at the . Patient is aware that PCP is not in the office and is ok with waiting for their return before hearing anything back.     Phone Number Patient can be reached at: Home number on file [phone] (home)    Best Time: any    Can we leave a detailed message on this number? YES    Call taken on 11/29/2018 at 1:48 PM by Vivi Haas

## 2018-11-30 ENCOUNTER — HOSPITAL ENCOUNTER (OUTPATIENT)
Dept: MAMMOGRAPHY | Facility: CLINIC | Age: 73
Discharge: HOME | End: 2018-11-30
Attending: FAMILY MEDICINE | Admitting: FAMILY MEDICINE
Payer: COMMERCIAL

## 2018-11-30 DIAGNOSIS — Z12.31 VISIT FOR SCREENING MAMMOGRAM: ICD-10-CM

## 2018-11-30 PROCEDURE — 77063 BREAST TOMOSYNTHESIS BI: CPT

## 2018-11-30 NOTE — TELEPHONE ENCOUNTER
Rx printed and left at  for patient to .  LM for patient to call [phone] to advise.  Shakira Reece, CMA

## 2018-11-30 NOTE — TELEPHONE ENCOUNTER
Spoke with patient in clinic. She states she needs a prescription for oxygen to use on the plane because she faints during flights. The prescription needs to state how much oxygen she should be on during the flight. See pending order.  Shakira Reece, CMA

## 2018-11-30 NOTE — TELEPHONE ENCOUNTER
Patient returned call and advised on notes. She will be here in about 1 hour to .  Shakira Reece CMA

## 2018-12-18 ENCOUNTER — APPOINTMENT (OUTPATIENT)
Dept: SLEEP MEDICINE | Facility: CLINIC | Age: 73
End: 2018-12-18
Payer: COMMERCIAL

## 2019-01-13 DIAGNOSIS — R60.9 EDEMA, UNSPECIFIED TYPE: ICD-10-CM

## 2019-01-13 DIAGNOSIS — I10 ESSENTIAL HYPERTENSION WITH GOAL BLOOD PRESSURE LESS THAN 140/90: ICD-10-CM

## 2019-01-13 DIAGNOSIS — N18.30 CKD (CHRONIC KIDNEY DISEASE) STAGE 3, GFR 30-59 ML/MIN (H): ICD-10-CM

## 2019-01-13 DIAGNOSIS — E78.5 HYPERLIPIDEMIA WITH TARGET LDL LESS THAN 130: ICD-10-CM

## 2019-01-15 RX ORDER — METOPROLOL SUCCINATE 25 MG/1
TABLET, EXTENDED RELEASE ORAL
Qty: 90 TABLET | Refills: 0 | Status: SHIPPED | OUTPATIENT
Start: 2019-01-15 | End: 2019-04-08

## 2019-01-15 RX ORDER — LISINOPRIL 10 MG/1
TABLET ORAL
Qty: 90 TABLET | Refills: 0 | Status: SHIPPED | OUTPATIENT
Start: 2019-01-15 | End: 2019-04-08

## 2019-01-15 RX ORDER — HYDROCHLOROTHIAZIDE 25 MG/1
TABLET ORAL
Qty: 45 TABLET | Refills: 0 | Status: SHIPPED | OUTPATIENT
Start: 2019-01-15 | End: 2019-04-08

## 2019-01-15 RX ORDER — SIMVASTATIN 10 MG
TABLET ORAL
Qty: 90 TABLET | Refills: 0 | Status: SHIPPED | OUTPATIENT
Start: 2019-01-15 | End: 2019-04-08

## 2019-01-15 NOTE — TELEPHONE ENCOUNTER
"Requested Prescriptions   Pending Prescriptions Disp Refills     lisinopril (PRINIVIL/ZESTRIL) 10 MG tablet [Pharmacy Med Name: LISINOPRIL TABS 10MG] 93 tablet 0    Last Written Prescription Date:  10/23/18  Last Fill Quantity: 93,  # refills: 0   Last office visit: 8/14/2017 with prescribing provider:     Future Office Visit:   Next 5 appointments (look out 90 days)    Feb 15, 2019  8:30 AM CST  PHYSICAL with [facility]ochitl [facility]ill [facility]  [facility] [address] [place] [id number] [phone]          Sig: TAKE 1 TABLET DAILY (DUE FOR ANNUAL EXAM AND LABS)    ACE Inhibitors (Including Combos) Protocol Failed - 1/13/2019  7:24 PM       Failed - Recent (12 mo) or future (30 days) visit within the authorizing provider's specialty    Patient had office visit in the last 12 months or has a visit in the next 30 days with authorizing provider or within the authorizing provider's specialty.  See \"Patient Info\" tab in inbasket, or \"Choose Columns\" in Meds & Orders section of the refill encounter.           Failed - Normal serum creatinine on file in past 12 months    Recent Labs   Lab Test 07/03/17  0829   CR 1.08*          Failed - Normal serum potassium on file in past 12 months    Recent Labs   Lab Test 10/03/16  0806   POTASSIUM 4.4            Passed - Blood pressure under 140/90 in past 12 months    BP Readings from Last 3 Encounters:   07/16/18 125/78   08/14/17 116/62   07/03/17 130/82          Passed - Medication is active on med list       Passed - Patient is age 18 or older       Passed - No active pregnancy on record       Passed - No positive pregnancy test within past 12 months   Routing refill request to provider for review/approval because:  Labs out of range:  CR  Labs not current:  Potassium, CR  T'd up 1 month for provider review.  Upcoming office visit           metoprolol succinate ER (TOPROL-XL) 25 MG 24 hr tablet [Pharmacy Med Name: " "METOPROLOL SUCC ER TAB 25MG] 93 tablet 0    Last Written Prescription Date:  10/23/18  Last Fill Quantity: 93,  # refills: 0   Last office visit: 8/14/2017 with prescribing provider:     Future Office Visit:   Next 5 appointments (look out 90 days)    Feb 15, 2019  8:30 AM CST  PHYSICAL with Xochitl Hemphill MD  [address] [id number] [phone]          Sig: TAKE ONE-HALF (1/2) TABLET TWICE A DAY (DUE FOR ANNUAL EXAM AND LABS)    Beta-Blockers Protocol Failed - 1/13/2019  7:24 PM       Failed - Recent (12 mo) or future (30 days) visit within the authorizing provider's specialty    Patient had office visit in the last 12 months or has a visit in the next 30 days with authorizing provider or within the authorizing provider's specialty.  See \"Patient Info\" tab in inbasket, or \"Choose Columns\" in Meds & Orders section of the refill encounter.           Passed - Blood pressure under 140/90 in past 12 months    BP Readings from Last 3 Encounters:   07/16/18 125/78   08/14/17 116/62   07/03/17 130/82          Passed - Patient is age 6 or older       Passed - Medication is active on med list   Routing refill request to provider for review/approval because:  Patient needs to be seen because it has been more than 1 year since last office visit.  T'd up 1 month for provider review.  Upcoming office visit scheduled         simvastatin (ZOCOR) 10 MG tablet [Pharmacy Med Name: SIMVASTATIN TABS 10MG] 93 tablet 0    Last Written Prescription Date:  10/23/18  Last Fill Quantity: 93,  # refills: 0   Last office visit: 8/14/2017 with prescribing provider:     Future Office Visit:   Next 5 appointments (look out 90 days)    Feb 15, 2019  8:30 AM CST  PHYSICAL with Xochitl Hemphill MD  [facility]) [address] [place] [id number] [phone]          Sig: TAKE 1 TABLET AT " "BEDTIME    Statins Protocol Failed - 1/13/2019  7:24 PM       Failed - LDL on file in past 12 months    Recent Labs   Lab Test 09/18/17  0925   LDL 54          Failed - Recent (12 mo) or future (30 days) visit within the authorizing provider's specialty    Patient had office visit in the last 12 months or has a visit in the next 30 days with authorizing provider or within the authorizing provider's specialty.  See \"Patient Info\" tab in inbasket, or \"Choose Columns\" in Meds & Orders section of the refill encounter.           Passed - No abnormal creatine kinase in past 12 months    No lab results found.            Passed - Medication is active on med list       Passed - Patient is age 18 or older       Passed - No active pregnancy on record       Passed - No positive pregnancy test in past 12 months   Routing refill request to provider for review/approval because:  Patient needs to be seen because it has been more than 1 year since last office visit.  T'd up 1 month for provider review.  Upcoming scheduled office visit           hydrochlorothiazide (HYDRODIURIL) 25 MG tablet [Pharmacy Med Name: HYDROCHLOROTHIAZIDE TAB 25MG] 45 tablet 0    Last Written Prescription Date:  10/23/18  Last Fill Quantity: 45,  # refills: 0   Last office visit: 8/14/2017 with prescribing provider:     Future Office Visit:   Next 5 appointments (look out 90 days)    Feb 15, 2019  8:30 AM CST  PHYSICAL with Xochitl Hemphill MD  [facility] ([facility]) [address] [place] [id number] [phone]          Sig: TAKE ONE-HALF (1/2) TABLET DAILY    Diuretics (Including Combos) Protocol Failed - 1/13/2019  7:24 PM       Failed - Recent (12 mo) or future (30 days) visit within the authorizing provider's specialty    Patient had office visit in the last 12 months or has a visit in the next 30 days with authorizing provider or within the authorizing provider's specialty.  See \"Patient Info\" " "tab in inbasket, or \"Choose Columns\" in Meds & Orders section of the refill encounter.           Failed - Normal serum creatinine on file in past 12 months    Recent Labs   Lab Test 07/03/17  0829   CR 1.08*          Failed - Normal serum potassium on file in past 12 months    Recent Labs   Lab Test 10/03/16  0806   POTASSIUM 4.4           Failed - Normal serum sodium on file in past 12 months    Recent Labs   Lab Test 10/03/16  0806              Passed - Blood pressure under 140/90 in past 12 months    BP Readings from Last 3 Encounters:   07/16/18 125/78   08/14/17 116/62   07/03/17 130/82          Passed - Medication is active on med list       Passed - Patient is age 18 or older       Passed - No active pregancy on record       Passed - No positive pregnancy test in past 12 months      Routing refill request to provider for review/approval because:  Labs out of range:  CR  Patient needs to be seen because it has been more than 1 year since last office visit.  T'd up 1 month for provider review.  Upcoming visit scheduled    Jackelyn Priest RN        "

## 2019-02-15 ENCOUNTER — OFFICE VISIT (OUTPATIENT)
Dept: FAMILY MEDICINE | Facility: CLINIC | Age: 74
End: 2019-02-15
Payer: COMMERCIAL

## 2019-02-15 VITALS
HEIGHT: 60 IN | RESPIRATION RATE: 16 BRPM | SYSTOLIC BLOOD PRESSURE: 126 MMHG | WEIGHT: 159.2 LBS | DIASTOLIC BLOOD PRESSURE: 62 MMHG | BODY MASS INDEX: 31.25 KG/M2 | HEART RATE: 98 BPM | TEMPERATURE: 97.5 F | OXYGEN SATURATION: 97 %

## 2019-02-15 DIAGNOSIS — Z00.00 ENCOUNTER FOR WELLNESS EXAMINATION: Primary | ICD-10-CM

## 2019-02-15 DIAGNOSIS — M06.9 RHEUMATOID ARTHRITIS INVOLVING MULTIPLE SITES, UNSPECIFIED RHEUMATOID FACTOR PRESENCE: ICD-10-CM

## 2019-02-15 DIAGNOSIS — M94.9 DISORDER OF BONE AND CARTILAGE: ICD-10-CM

## 2019-02-15 DIAGNOSIS — M32.9 SYSTEMIC LUPUS ERYTHEMATOSUS, UNSPECIFIED SLE TYPE, UNSPECIFIED ORGAN INVOLVEMENT STATUS (H): ICD-10-CM

## 2019-02-15 DIAGNOSIS — Z23 ENCOUNTER FOR IMMUNIZATION: ICD-10-CM

## 2019-02-15 DIAGNOSIS — R42 LIGHTHEADEDNESS: ICD-10-CM

## 2019-02-15 DIAGNOSIS — K21.9 GASTROESOPHAGEAL REFLUX DISEASE, ESOPHAGITIS PRESENCE NOT SPECIFIED: ICD-10-CM

## 2019-02-15 DIAGNOSIS — I10 ESSENTIAL HYPERTENSION: ICD-10-CM

## 2019-02-15 DIAGNOSIS — N18.30 CKD (CHRONIC KIDNEY DISEASE) STAGE 3, GFR 30-59 ML/MIN (H): ICD-10-CM

## 2019-02-15 DIAGNOSIS — M15.0 PRIMARY OSTEOARTHRITIS INVOLVING MULTIPLE JOINTS: ICD-10-CM

## 2019-02-15 DIAGNOSIS — Z79.899 LONG-TERM USE OF PLAQUENIL: ICD-10-CM

## 2019-02-15 DIAGNOSIS — M89.9 DISORDER OF BONE AND CARTILAGE: ICD-10-CM

## 2019-02-15 DIAGNOSIS — E78.5 HYPERLIPIDEMIA WITH TARGET LDL LESS THAN 130: ICD-10-CM

## 2019-02-15 DIAGNOSIS — Z13.820 SCREENING FOR OSTEOPOROSIS: ICD-10-CM

## 2019-02-15 LAB
ALBUMIN SERPL-MCNC: 4.1 G/DL (ref 3.4–5)
ALP SERPL-CCNC: 50 U/L (ref 40–150)
ALT SERPL W P-5'-P-CCNC: 22 U/L (ref 0–50)
ANION GAP SERPL CALCULATED.3IONS-SCNC: 7 MMOL/L (ref 3–14)
AST SERPL W P-5'-P-CCNC: 17 U/L (ref 0–45)
BILIRUB SERPL-MCNC: 0.3 MG/DL (ref 0.2–1.3)
BUN SERPL-MCNC: 25 MG/DL (ref 7–30)
CALCIUM SERPL-MCNC: 8.8 MG/DL (ref 8.5–10.1)
CHLORIDE SERPL-SCNC: 106 MMOL/L (ref 94–109)
CHOLEST SERPL-MCNC: 162 MG/DL
CO2 SERPL-SCNC: 29 MMOL/L (ref 20–32)
CREAT SERPL-MCNC: 0.97 MG/DL (ref 0.52–1.04)
GFR SERPL CREATININE-BSD FRML MDRD: 57 ML/MIN/{1.73_M2}
GLUCOSE SERPL-MCNC: 104 MG/DL (ref 70–99)
HDLC SERPL-MCNC: 79 MG/DL
LDLC SERPL CALC-MCNC: 59 MG/DL
NONHDLC SERPL-MCNC: 83 MG/DL
POTASSIUM SERPL-SCNC: 4.3 MMOL/L (ref 3.4–5.3)
PROT SERPL-MCNC: 7.2 G/DL (ref 6.8–8.8)
SODIUM SERPL-SCNC: 142 MMOL/L (ref 133–144)
TRIGL SERPL-MCNC: 120 MG/DL
VIT B12 SERPL-MCNC: 3084 PG/ML (ref 193–986)

## 2019-02-15 PROCEDURE — 80061 LIPID PANEL: CPT | Performed by: FAMILY MEDICINE

## 2019-02-15 PROCEDURE — G0009 ADMIN PNEUMOCOCCAL VACCINE: HCPCS | Performed by: FAMILY MEDICINE

## 2019-02-15 PROCEDURE — 82607 VITAMIN B-12: CPT | Performed by: FAMILY MEDICINE

## 2019-02-15 PROCEDURE — 90732 PPSV23 VACC 2 YRS+ SUBQ/IM: CPT | Performed by: FAMILY MEDICINE

## 2019-02-15 PROCEDURE — 36415 COLL VENOUS BLD VENIPUNCTURE: CPT | Performed by: FAMILY MEDICINE

## 2019-02-15 PROCEDURE — G0439 PPPS, SUBSEQ VISIT: HCPCS | Performed by: FAMILY MEDICINE

## 2019-02-15 PROCEDURE — 80053 COMPREHEN METABOLIC PANEL: CPT | Performed by: FAMILY MEDICINE

## 2019-02-15 RX ORDER — OMEPRAZOLE 20 MG/1
20 TABLET, DELAYED RELEASE ORAL DAILY PRN
COMMUNITY
Start: 2019-02-15 | End: 2020-02-15

## 2019-02-15 ASSESSMENT — MIFFLIN-ST. JEOR: SCORE: 1139.25

## 2019-02-15 ASSESSMENT — ACTIVITIES OF DAILY LIVING (ADL): CURRENT_FUNCTION: NO ASSISTANCE NEEDED

## 2019-02-15 ASSESSMENT — PAIN SCALES - GENERAL: PAINLEVEL: NO PAIN (0)

## 2019-02-15 NOTE — PATIENT INSTRUCTIONS
Preventive Health Recommendations    See your health care provider every year to    Review health changes.     Discuss preventive care.      Review your medicines if your doctor has prescribed any.      You no longer need a yearly Pap test unless you've had an abnormal Pap test in the past 10 years. If you have vaginal symptoms, such as bleeding or discharge, be sure to talk with your provider about a Pap test.      Every 1 to 2 years, have a mammogram.  If you are over 69, talk with your health care provider about whether or not you want to continue having screening mammograms.      Every 10 years, have a colonoscopy. Or, have a yearly FIT test (stool test). These exams will check for colon cancer.       Have a cholesterol test every 5 years, or more often if your doctor advises it.       Have a diabetes test (fasting glucose) every three years. If you are at risk for diabetes, you should have this test more often.       At age 65, have a bone density scan (DEXA) to check for osteoporosis (brittle bone disease).    Shots:    Get a flu shot each year.    Get a tetanus shot every 10 years.    Talk to your doctor about your pneumonia vaccines. There are now two you should receive - Pneumovax (PPSV 23) and Prevnar (PCV 13).    Talk to your pharmacist about the shingles vaccine.    Talk to your doctor about the hepatitis B vaccine.    Nutrition:     Eat at least 5 servings of fruits and vegetables each day.      Eat whole-grain bread, whole-wheat pasta and brown rice instead of white grains and rice.      Get adequate Calcium and Vitamin D.     Lifestyle    Exercise at least 150 minutes a week (30 minutes a day, 5 days a week). This will help you control your weight and prevent disease.      Limit alcohol to one drink per day.      No smoking.       Wear sunscreen to prevent skin cancer.       See your dentist twice a year for an exam and cleaning.      See your eye doctor every 1 to 2 years to screen for conditions  such as glaucoma, macular degeneration and cataracts.    Personalized Prevention Plan  You are due for the preventive services outlined below.  Your care team is available to assist you in scheduling these services.  If you have already completed any of these items, please share that information with your care team to update in your medical record.  Health Maintenance Due   Topic Date Due     Zoster (Shingles) Vaccine (1 of 2) 08/09/1993     Discuss Advance Directive Planning  08/09/1998     Bone Density Screening (Dexa)  08/09/2008     Pneumococcal Vaccine (1 of 2 - PCV13) 08/09/2008     Eye Exam - yearly  07/28/2017     FALL RISK ASSESSMENT  08/14/2018     Depression Assessment 2 - yearly  08/14/2018     Flu Vaccine (1) 09/01/2018

## 2019-02-15 NOTE — RESULT ENCOUNTER NOTE
Capri, your vitamin B12 level IS on higher than it should be.  I recommend you stop taking your B12 supplements.  You don't need them. Your other results all look ok. We know your kidneys are not working perfectly, but they're better than the last test 1 year ago.  Your sugar is borderline elevated today, so we'll keep a close eye on that, as well. Otherwise everything else looks really good.   Xochitl Hemphill MD

## 2019-02-15 NOTE — PROGRESS NOTES
"SUBJECTIVE:   Capri Wiseman is a 73 year old female who presents for Preventive Visit.    Are you in the first 12 months of your Medicare coverage?  No    Annual Wellness Visit     In general, how would you rate your overall health?  Good    Frequency of exercise:  None (physically active)    Do you usually eat at least 4 servings of fruit and vegetables a day, include whole grains    & fiber and avoid regularly eating high fat or \"junk\" foods?  Yes    Taking medications regularly:  Yes    Medication side effects:  None    Ability to successfully perform activities of daily living:  No assistance needed    Home Safety:  No safety concerns identified    Hearing Impairment:  No hearing concerns    In the past 6 months, have you been bothered by leaking of urine?  No   In general, how would you rate your overall mental or emotional health?  Good    PHQ-2 Total Score:    Additional concerns today:  Yes    Do you feel safe in your environment? Sometimes-lives alone and locks her doors    Do you have a Health Care Directive? No: Advance care planning reviewed with patient; information given to patient to review.      Fall risk  Fallen 2 or more times in the past year?: No  Any fall with injury in the past year?: No    Cognitive Screening   1) Repeat 3 items (Leader, Season, Table)    2) Clock draw: ABNORMAL time incorrect  3) 3 item recall: Recalls 2 objects   Results: ABNORMAL clock, 1-2 items recalled: PROBABLE COGNITIVE IMPAIRMENT, **INFORM PROVIDER**    Mini-CogTM Copyright AMARIS Palafox. Licensed by the author for use in [facility]; reprinted with permission (moris@.[facility]). All rights reserved.      Do you have sleep apnea, excessive snoring or daytime drowsiness?: no    Reviewed and updated as needed this visit by clinical staff  Tobacco  Allergies  Meds  Problems  Med Hx  Surg Hx  Fam Hx  Soc Hx          Reviewed and updated as needed this visit by Provider  Tobacco  Allergies  Meds  Problems "  Med Hx  Surg Hx  Fam Hx        Social History     Tobacco Use     Smoking status: Never Smoker     Smokeless tobacco: Never Used   Substance Use Topics     Alcohol use: No     Alcohol/week: 0.0 oz       No flowsheet data found.No flowsheet data found.      Current providers sharing in care for this patient include:   Patient Care Team:  Xochitl Hemphill MD as PCP - General (Family Practice)  Xochitl Hemphill MD as PCP - Assigned PCP    The following health maintenance items are reviewed in Epic and correct as of today:  Health Maintenance   Topic Date Due     ZOSTER IMMUNIZATION (1 of 2) 08/09/1993     ADVANCE DIRECTIVE PLANNING Q5 YRS  08/09/1998     DEXA SCAN SCREENING (SYSTEM ASSIGNED)  08/09/2008     PNEUMOCOCCAL IMMUNIZATION 65+ LOW/MEDIUM RISK (2 of 2 - PPSV23) 10/30/2016     EYE EXAM Q1 YEAR  07/28/2017     FALL RISK ASSESSMENT  08/14/2018     PHQ-2 Q1 YR  08/14/2018     INFLUENZA VACCINE (1) 09/01/2018     COLONOSCOPY Q5 YR  05/20/2020     MAMMO SCREEN Q2 YR (SYSTEM ASSIGNED)  11/30/2020     DTAP/TDAP/TD IMMUNIZATION (3 - Td) 11/22/2021     LIPID SCREEN Q5 YR FEMALE (SYSTEM ASSIGNED)  09/18/2022     IPV IMMUNIZATION  Aged Out     MENINGITIS IMMUNIZATION  Aged Out       Pneumonia Vaccine:Adults age 65+ who received Pneumovax (PPSV23) at 65 years or older: Should be given PCV13 > 1 year after their most recent PPSV23  Mammogram Screening: Patient over age 75, has elected to continue with mammography screening. just had one in November.     Review of Systems  CONSTITUTIONAL: NEGATIVE for fever, chills, unexplained change in weight  INTEGUMENTARY/SKIN: NEGATIVE for worrisome rashes, moles or lesions  EYES: NEGATIVE for vision changes or irritation  ENT/MOUTH: NEGATIVE for ear, mouth and throat problems  RESP: NEGATIVE for significant cough or SOB but would like to get portable Oxygen tank for travel.  She gets lightheaded on flights and this helps, uses 2 lpm.   BREAST: NEGATIVE  "for masses, tenderness or discharge  CV: NEGATIVE for chest pain, palpitations or peripheral edema, takes fish oil just prn   GI: NEGATIVE for nausea, abdominal pain, or change in bowel habits, does have GERD, takes Omeprazole prn.    : NEGATIVE for frequency, dysuria, or hematuria  MUSCULOSKELETAL: positive for right knee pain, it's getting pretty bad. She is considering surgery. Also follows with rheumatology for her RA and lupus, is on Plaquenil, has follow up appt in July.   NEURO: NEGATIVE for weakness, dizziness or paresthesias  ENDOCRINE: NEGATIVE for temperature intolerance, skin/hair changes  HEME: NEGATIVE for bleeding problems  PSYCHIATRIC: NEGATIVE for changes in mood or affect.  Her  passed away in September.  She is doing well overall. She attends test company study, is very active.     OBJECTIVE:   /62   Pulse 98   Temp 97.5  F (36.4  C) (Temporal)   Resp 16   Ht 1.525 m (5' 0.04\")   Wt 72.2 kg (159 lb 3.2 oz)   LMP  (LMP Unknown)   SpO2 97%   Breastfeeding? No   BMI 31.05 kg/m   Estimated body mass index is 30.95 kg/m  as calculated from the following:    Height as of 7/16/18: 1.524 m (5').    Weight as of 7/16/18: 71.9 kg (158 lb 8 oz).  Physical Exam  GENERAL APPEARANCE: healthy, alert and no distress  EYES: Eyes grossly normal to inspection, PERRL and conjunctivae and sclerae normal  HENT: ear canals and TM's normal, nose and mouth without ulcers or lesions, oropharynx clear and oral mucous membranes moist  NECK: no adenopathy, no asymmetry, masses, or scars and thyroid normal to palpation, no bruits.   RESP: lungs clear to auscultation - no rales, rhonchi or wheezes  BREAST: declined exam today.   CV: regular rate and rhythm, normal S1 S2, no S3 or S4, no murmur, click or rub, no peripheral edema and peripheral pulses strong  ABDOMEN: soft, nontender, no hepatosplenomegaly, no masses and bowel sounds normal  MS: no musculoskeletal defects are noted and gait is age appropriate " without ataxia  Pelvic exam deferred.   SKIN: no suspicious lesions or rashes  NEURO: Normal strength and tone, sensory exam grossly normal, mentation intact and speech normal  PSYCH: mentation appears normal and affect normal/bright    Diagnostic Test Results:  Orders Placed This Encounter   Procedures     DX Hip/Pelvis/Spine     PPSV23, IM/SUBQ (2+ YRS) - [id number]     ADMIN 1st VACCINE     Lipid panel reflex to direct LDL Fasting     Vitamin B12     Comprehensive metabolic panel (BMP + Alb, Alk Phos, ALT, AST, Total. Bili, TP)        ASSESSMENT / PLAN:       ICD-10-CM    1. Encounter for wellness examination Z00.00 Vitamin B12   2. Gastroesophageal reflux disease, esophagitis presence not specified K21.9 omeprazole (PRILOSEC OTC) 20 MG EC tablet     Vitamin B12   3. CKD (chronic kidney disease) stage 3, GFR 30-59 ml/min (H) N18.3 Comprehensive metabolic panel (BMP + Alb, Alk Phos, ALT, AST, Total. Bili, TP)   4. Hyperlipidemia with target LDL less than 130 E78.5 Lipid panel reflex to direct LDL Fasting     Comprehensive metabolic panel (BMP + Alb, Alk Phos, ALT, AST, Total. Bili, TP)   5. Essential hypertension I10 Comprehensive metabolic panel (BMP + Alb, Alk Phos, ALT, AST, Total. Bili, TP)   6. Rheumatoid arthritis involving multiple sites, unspecified rheumatoid factor presence (H) M06.9    7. Systemic lupus erythematosus, unspecified SLE type, unspecified organ involvement status (H) M32.9    8. Long-term use of Plaquenil Z79.899    9. Primary osteoarthritis involving multiple joints M15.0    10. Screening for osteoporosis Z13.820 DX Hip/Pelvis/Spine   11. Disorder of bone and cartilage M89.9 DX Hip/Pelvis/Spine    M94.9    12. Encounter for immunization Z23 ADMIN 1st VACCINE   13. Lightheadedness R42 order for DME     Will notify her with lab results from today.   Will refill her medications or adjust if needed.     End of Life Planning:  Patient currently has an advanced directive: No.  I have verified  the patient's ablity to prepare an advanced directive/make health care decisions.  Literature was provided to assist patient in preparing an advanced directive.    COUNSELING:  Reviewed preventive health counseling, as reflected in patient instructions  Special attention given to:       Regular exercise       Healthy diet/nutrition       Vision screening       Hearing screening       Fall risk prevention       Immunizations    Vaccinated for: Pneumococcal     Discussed Shingrix, will check insurance coverage.          Osteoporosis Prevention/Bone Health - will schedule DEXA scan       Colon cancer screening is up to date       Advanced Planning     BP Readings from Last 1 Encounters:   07/16/18 125/78     Estimated body mass index is 30.95 kg/m  as calculated from the following:    Height as of 7/16/18: 1.524 m (5').    Weight as of 7/16/18: 71.9 kg (158 lb 8 oz).      Weight management plan: Discussed healthy diet and exercise guidelines     reports that  has never smoked. she has never used smokeless tobacco.      Appropriate preventive services were discussed with this patient, including applicable screening as appropriate for cardiovascular disease, diabetes, osteopenia/osteoporosis, and glaucoma.  As appropriate for age/gender, discussed screening for colorectal cancer, prostate cancer, breast cancer, and cervical cancer. Checklist reviewing preventive services available has been given to the patient.    Reviewed patients plan of care and provided an AVS. The Basic Care Plan (routine screening as documented in Health Maintenance) for Capri meets the Care Plan requirement. This Care Plan has been established and reviewed with the Patient.    Counseling Resources:  ATP IV Guidelines  Pooled Cohorts Equation Calculator  Breast Cancer Risk Calculator  FRAX Risk Assessment  ICSI Preventive Guidelines  Dietary Guidelines for Americans, 2010  USDA's MyPlate  ASA Prophylaxis  Lung CA Screening    Xochitl Borjas  MD Kanchan  [facility]

## 2019-02-19 ENCOUNTER — HOSPITAL ENCOUNTER (OUTPATIENT)
Dept: BONE DENSITY | Facility: CLINIC | Age: 74
Discharge: HOME | End: 2019-02-19
Attending: FAMILY MEDICINE | Admitting: FAMILY MEDICINE
Payer: COMMERCIAL

## 2019-02-19 DIAGNOSIS — M94.9 DISORDER OF BONE AND CARTILAGE: ICD-10-CM

## 2019-02-19 DIAGNOSIS — Z13.820 SCREENING FOR OSTEOPOROSIS: ICD-10-CM

## 2019-02-19 DIAGNOSIS — M89.9 DISORDER OF BONE AND CARTILAGE: ICD-10-CM

## 2019-02-19 PROCEDURE — 77080 DXA BONE DENSITY AXIAL: CPT

## 2019-03-18 NOTE — RESULT ENCOUNTER NOTE
Capri, your bone density test was normal. You have NO thinning of your bones.    We can consider repeating this test in 3-5 years if necessary, but very happy with these results at this time.   Xochitl Hemphill MD

## 2019-04-08 DIAGNOSIS — N18.30 CKD (CHRONIC KIDNEY DISEASE) STAGE 3, GFR 30-59 ML/MIN (H): ICD-10-CM

## 2019-04-08 DIAGNOSIS — E78.5 HYPERLIPIDEMIA WITH TARGET LDL LESS THAN 130: ICD-10-CM

## 2019-04-08 DIAGNOSIS — R60.9 EDEMA, UNSPECIFIED TYPE: ICD-10-CM

## 2019-04-08 DIAGNOSIS — I10 ESSENTIAL HYPERTENSION WITH GOAL BLOOD PRESSURE LESS THAN 140/90: ICD-10-CM

## 2019-04-10 RX ORDER — SIMVASTATIN 10 MG
TABLET ORAL
Qty: 90 TABLET | Refills: 3 | Status: SHIPPED | OUTPATIENT
Start: 2019-04-10 | End: 2020-03-26

## 2019-04-10 RX ORDER — METOPROLOL SUCCINATE 25 MG/1
TABLET, EXTENDED RELEASE ORAL
Qty: 90 TABLET | Refills: 3 | Status: SHIPPED | OUTPATIENT
Start: 2019-04-10 | End: 2020-03-26

## 2019-04-10 RX ORDER — LISINOPRIL 10 MG/1
TABLET ORAL
Qty: 90 TABLET | Refills: 3 | Status: SHIPPED | OUTPATIENT
Start: 2019-04-10 | End: 2020-03-26

## 2019-04-10 RX ORDER — HYDROCHLOROTHIAZIDE 25 MG/1
TABLET ORAL
Qty: 45 TABLET | Refills: 3 | Status: SHIPPED | OUTPATIENT
Start: 2019-04-10 | End: 2020-03-26

## 2019-04-10 NOTE — TELEPHONE ENCOUNTER
"Requested Prescriptions   Pending Prescriptions Disp Refills     metoprolol succinate ER (TOPROL-XL) 25 MG 24 hr tablet [Pharmacy Med Name: METOPROLOL SUCC ER TAB 25MG] 90 tablet 0     Sig: TAKE ONE-HALF (1/2) TABLET TWICE A DAY (DUE FOR ANNUAL EXAM AND LABS)   Last Written Prescription Date:  1/15/19  Last Fill Quantity: 90,  # refills: 0   Last office visit: 2/15/2019 with prescribing provider:     Future Office Visit:        Beta-Blockers Protocol Passed - 4/8/2019  5:00 PM        Passed - Blood pressure under 140/90 in past 12 months     BP Readings from Last 3 Encounters:   02/15/19 126/62   07/16/18 125/78   08/14/17 116/62                 Passed - Patient is age 6 or older        Passed - Recent (12 mo) or future (30 days) visit within the authorizing provider's specialty     Patient had office visit in the last 12 months or has a visit in the next 30 days with authorizing provider or within the authorizing provider's specialty.  See \"Patient Info\" tab in inbasket, or \"Choose Columns\" in Meds & Orders section of the refill encounter.              Passed - Medication is active on med list       Rx refilled per RN protocol.         lisinopril (PRINIVIL/ZESTRIL) 10 MG tablet [Pharmacy Med Name: LISINOPRIL TABS 10MG] 90 tablet 0     Sig: TAKE 1 TABLET DAILY (DUE FOR ANNUAL EXAM AND LABS)   Last Written Prescription Date:  1/15/19  Last Fill Quantity: 90,  # refills: 0   Last office visit: 2/15/2019 with prescribing provider:     Future Office Visit:        ACE Inhibitors (Including Combos) Protocol Passed - 4/8/2019  5:00 PM        Passed - Blood pressure under 140/90 in past 12 months     BP Readings from Last 3 Encounters:   02/15/19 126/62   07/16/18 125/78   08/14/17 116/62                 Passed - Recent (12 mo) or future (30 days) visit within the authorizing provider's specialty     Patient had office visit in the last 12 months or has a visit in the next 30 days with authorizing provider or within the " "authorizing provider's specialty.  See \"Patient Info\" tab in inbasket, or \"Choose Columns\" in Meds & Orders section of the refill encounter.              Passed - Medication is active on med list        Passed - Patient is age 18 or older        Passed - No active pregnancy on record        Passed - Normal serum creatinine on file in past 12 months     Recent Labs   Lab Test 02/15/19  0929   CR 0.97             Passed - Normal serum potassium on file in past 12 months     Recent Labs   Lab Test 02/15/19  0929   POTASSIUM 4.3             Passed - No positive pregnancy test within past 12 months       Rx refilled per RN protocol.         hydrochlorothiazide (HYDRODIURIL) 25 MG tablet [Pharmacy Med Name: HYDROCHLOROTHIAZIDE TAB 25MG] 45 tablet 0     Sig: TAKE ONE-HALF (1/2) TABLET DAILY   Last Written Prescription Date:  1/15/19  Last Fill Quantity: 45,  # refills: 0   Last office visit: 2/15/2019 with prescribing provider:     Future Office Visit:        Diuretics (Including Combos) Protocol Passed - 4/8/2019  5:00 PM        Passed - Blood pressure under 140/90 in past 12 months     BP Readings from Last 3 Encounters:   02/15/19 126/62   07/16/18 125/78   08/14/17 116/62                 Passed - Recent (12 mo) or future (30 days) visit within the authorizing provider's specialty     Patient had office visit in the last 12 months or has a visit in the next 30 days with authorizing provider or within the authorizing provider's specialty.  See \"Patient Info\" tab in inbasket, or \"Choose Columns\" in Meds & Orders section of the refill encounter.              Passed - Medication is active on med list        Passed - Patient is age 18 or older        Passed - No active pregancy on record        Passed - Normal serum creatinine on file in past 12 months     Recent Labs   Lab Test 02/15/19  0929   CR 0.97              Passed - Normal serum potassium on file in past 12 months     Recent Labs   Lab Test 02/15/19  0929 " "  POTASSIUM 4.3                    Passed - Normal serum sodium on file in past 12 months     Recent Labs   Lab Test 02/15/19  0929                 Passed - No positive pregnancy test in past 12 months       Rx refilled per RN protocol.           simvastatin (ZOCOR) 10 MG tablet [Pharmacy Med Name: SIMVASTATIN TABS 10MG] 90 tablet 0     Sig: TAKE 1 TABLET AT BEDTIME   Last Written Prescription Date:  1/15/19  Last Fill Quantity: 90,  # refills: 0   Last office visit: 2/15/2019 with prescribing provider:     Future Office Visit:        Statins Protocol Passed - 4/8/2019  5:00 PM        Passed - LDL on file in past 12 months     Recent Labs   Lab Test 02/15/19  0929   LDL 59             Passed - No abnormal creatine kinase in past 12 months     No lab results found.             Passed - Recent (12 mo) or future (30 days) visit within the authorizing provider's specialty     Patient had office visit in the last 12 months or has a visit in the next 30 days with authorizing provider or within the authorizing provider's specialty.  See \"Patient Info\" tab in inbasket, or \"Choose Columns\" in Meds & Orders section of the refill encounter.              Passed - Medication is active on med list        Passed - Patient is age 18 or older        Passed - No active pregnancy on record        Passed - No positive pregnancy test in past 12 months        Rx refilled per RN protocol.  Clara Helton, EUNICEN, RN    "

## 2019-06-10 ENCOUNTER — ANCILLARY PROCEDURE (OUTPATIENT)
Dept: GENERAL RADIOLOGY | Facility: CLINIC | Age: 74
End: 2019-06-10
Attending: PHYSICIAN ASSISTANT
Payer: COMMERCIAL

## 2019-06-10 ENCOUNTER — OFFICE VISIT (OUTPATIENT)
Dept: ORTHOPEDICS | Facility: CLINIC | Age: 74
End: 2019-06-10
Payer: COMMERCIAL

## 2019-06-10 VITALS
WEIGHT: 163 LBS | BODY MASS INDEX: 32 KG/M2 | HEIGHT: 60 IN | DIASTOLIC BLOOD PRESSURE: 76 MMHG | SYSTOLIC BLOOD PRESSURE: 138 MMHG

## 2019-06-10 DIAGNOSIS — M17.11 PRIMARY OSTEOARTHRITIS OF RIGHT KNEE: Primary | ICD-10-CM

## 2019-06-10 DIAGNOSIS — M25.561 KNEE PAIN, RIGHT: ICD-10-CM

## 2019-06-10 PROCEDURE — 99213 OFFICE O/P EST LOW 20 MIN: CPT | Mod: 25 | Performed by: PHYSICIAN ASSISTANT

## 2019-06-10 PROCEDURE — 20610 DRAIN/INJ JOINT/BURSA W/O US: CPT | Mod: RT | Performed by: PHYSICIAN ASSISTANT

## 2019-06-10 PROCEDURE — 73562 X-RAY EXAM OF KNEE 3: CPT | Mod: TC

## 2019-06-10 RX ORDER — TRIAMCINOLONE ACETONIDE 40 MG/ML
40 INJECTION, SUSPENSION INTRA-ARTICULAR; INTRAMUSCULAR ONCE
Status: COMPLETED | OUTPATIENT
Start: 2019-06-10 | End: 2019-06-10

## 2019-06-10 RX ADMIN — TRIAMCINOLONE ACETONIDE 40 MG: 40 INJECTION, SUSPENSION INTRA-ARTICULAR; INTRAMUSCULAR at 14:51

## 2019-06-10 ASSESSMENT — PAIN SCALES - GENERAL: PAINLEVEL: SEVERE PAIN (7)

## 2019-06-10 ASSESSMENT — MIFFLIN-ST. JEOR: SCORE: 1162.21

## 2019-06-10 NOTE — LETTER
6/10/2019         RE: Capri Wiseman  [address] [facility] [address]        Dear Colleague,    Thank you for referring your patient, Capri Wiseman, to the [facility]. Please see a copy of my visit note below.                Prior to injection, verified patient identity using patient's name and date of birth.  Due to injection administration, patient instructed to remain in clinic for 15 minutes  afterwards, and to report any adverse reaction to me immediately.    Joint injection was performed.      Drug Amount Wasted:  None.  Vial/Syringe: Single dose vial  Expiration Date:  2/2021  Lot [id number]  Rt knee.  Hank Monson PA-C             ORTHOPEDIC CONSULT      Chief Complaint: Capri Wiseman is a 73 year old female who is retired but used to work for school.  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      She is being seen for   Chief Complaints and History of Present Illnesses   Patient presents with     RECHECK     rt knee pain          History of Present Illness:   Mechanism of Injury: No new injury fall or trauma.  Location: Right medial knee  Duration of Pain: For the last few weeks  Rating of Pain: Moderate  Pain Quality: Achy  Pain is better with: Cortisone injections in the past.  Patient had a cortisone injection when she was in [place] about 5 years ago and it lasted for 2 years.   Pain is worse with: Activity and ambulation with the knee.  Also worse with stairs.  Treatment so far consists of: None.   Associated Features: Patient denies any major swelling of the knee or any catching or locking of the knee.  She denies any numbness or tingling.  Prior history of related problems: No previous problems with the right knee however the patient did have a left total knee arthroplasty that was done in [facility] about 6 years ago.  Pain is Limiting: Activity with the right knee and ambulation  Here to: Orthopedic consultation  The  Pain Has: Gotten worse  Additional History: Patient has chronic kidney failure stage III so she is unable to take NSAIDs.      Patient's past medical, surgical, social and family histories reviewed.     Past Medical History:   Diagnosis Date     CKD (chronic kidney disease) stage 3, GFR 30-59 ml/min (H)      Dental disorder     loosing top teeth due to lupus     GERD (gastroesophageal reflux disease)      High blood pressure      Mixed hyperlipidaemia      Osteoarthritis      RA (rheumatoid arthritis) (H)     mild, on plaquenil     SLE (systemic lupus erythematosus) (H)      Vision problem     dry eyes from Lupus        Past Surgical History:   Procedure Laterality Date     APPENDECTOMY  1969     C TOTAL KNEE ARTHROPLASTY  3/10    left     COLONOSCOPY  2009    repeat 5 years     COLONOSCOPY N/A 5/20/2015    normal, repeat 5 years due to family history     FOOT SURGERY  2010    bone and screws in 2 toes, hammertoe     HYSTERECTOMY TOTAL ABDOMINAL  2010    due to bleeding, benign     LAPAROTOMY EXPLORATORY      scar tissue removal/repair - abd     SHOULDER SURGERY  05/2013    rotator cuff repair, right       Medications:    Current Outpatient Medications on File Prior to Visit:  artificial saliva, BIOTENE MT, (BIOTENE MT) SOLN Swish and spit 5-10 mLs in mouth as needed for dry mouth   Ascorbic Acid (VITAMIN C PO) Take 1,000 mg by mouth daily   aspirin 325 MG tablet Take 325 mg by mouth daily   calcium citrate-vitamin D (CITRACAL) 315-250 MG-UNIT TABS Take 2 tablets by mouth daily    Cyanocobalamin (VITAMIN B 12 PO) Take 1,000 mcg by mouth daily   cycloSPORINE (RESTASIS) 0.05 % ophthalmic emulsion Place 1 drop into both eyes 2 times daily   Glucosamine-Chondroit-Vit C-Mn (GLUCOSAMINE CHONDR 1500 COMPLX PO) Take 2 tablets by mouth daily   hydrochlorothiazide (HYDRODIURIL) 25 MG tablet TAKE ONE-HALF (1/2) TABLET DAILY   hydroxychloroquine (PLAQUENIL) 200 MG tablet Take 1 tablet (200 mg) by mouth daily   lisinopril  "(PRINIVIL/ZESTRIL) 10 MG tablet TAKE 1 TABLET DAILY (DUE FOR ANNUAL EXAM AND LABS)   metoprolol succinate ER (TOPROL-XL) 25 MG 24 hr tablet TAKE ONE-HALF (1/2) TABLET TWICE A DAY (DUE FOR ANNUAL EXAM AND LABS)   Multiple Vitamins-Minerals (CENTRUM SILVER) per tablet Take 1 tablet by mouth daily   Omega-3 Fatty Acids (OMEGA-3 FISH OIL PO) Take 1,400 mg by mouth daily   omeprazole (PRILOSEC OTC) 20 MG EC tablet Take 1 tablet (20 mg) by mouth daily as needed   order for DME Equipment being ordered: Oxygen, portable for travel, 2 lpm while on flights   simvastatin (ZOCOR) 10 MG tablet TAKE 1 TABLET AT BEDTIME     No current facility-administered medications on file prior to visit.     Allergies   Allergen Reactions     Ciprofloxacin GI Disturbance     Was taken with Flagyl, not sure which one bothered her.      Flagyl [Metronidazole] GI Disturbance     Was taken with Cipro, not sure which one bothered her.        Social History     Occupational History     Occupation: teacher     Comment: Retired   Tobacco Use     Smoking status: Never Smoker     Smokeless tobacco: Never Used   Substance and Sexual Activity     Alcohol use: No     Alcohol/week: 0.0 oz     Drug use: No     Sexual activity: Never       Family History   Problem Relation Age of Onset     Colon Cancer Mother      Lupus Mother      Cerebrovascular Disease Father      Neurologic Disorder Sister         ALS        REVIEW OF SYSTEMS  10 point review systems performed otherwise negative as noted as per history of present illness.    Physical Exam:  Vitals: /76   Ht 1.534 m (5' 0.4\")   Wt 73.9 kg (163 lb)   LMP  (LMP Unknown)   BMI 31.41 kg/m     BMI= Body mass index is 31.41 kg/m .    Constitutional: healthy, alert and no acute distress   Psychiatric: mentation appears normal and affect normal/bright  NEURO: no focal deficits, CMS intact right lower extremity   RESP: Normal with easy respirations and no use of accessory muscles to breathe, no audible " wheezing or retractions  CV: Calf soft and nontender to palpation, leg warm   SKIN: No erythema, rashes, excoriation, or breakdown. No evidence of infection.   MUSCULOSKELETAL:    INSPECTION of right knee: No gross deformities, erythema, edema, ecchymosis, atrophy or fasciculations.     PALPATION: No tenderness on palpation of the medial, lateral, anterior and posterior portion of the knee. No specific joint line tenderness. No increased warmth.  No effusion.     ROM: Extension full, flexion to approximately 125 . All range of motion without catching, locking or pain.       STRENGTH: 5 out of 5 quad and hamstring strength.     SPECIAL TEST: Patient has a negative Lachman's negative drawer sign. Patient's knee is stable to varus and valgus stress at 30  of flexion. Patient has a negative Danny's.   GAIT: Slight right-sided antalgic gait.  Lymph: no palpable lymph nodes    Diagnostic Modalities:  No results found for this or any previous visit (from the past 744 hour(s)).  X-rays that were done today were 3 views of the right knee showing no fracture no dislocation no tumor however we are able to see the left total knee arthroplasty that is well preserved and no signs of loosening or failure.  Good alignment and good sizing.  On the right knee we do see some degenerative joint disease under the kneecap and on the medial side of the right knee.    Independent visualization of the images was performed.    Impression: 1.  Right knee degenerative joint disease medial and patellofemoral's mild.  2.  Approximately 6 years status post left total knee arthroplasty done in [facility]    Plan:  All of the above pertinent physical exam and imaging modalities findings was reviewed with Capri.                                          INJECTION PROCEDURE:  The patient was counseled about an  injection, including discussion of risks (including infection), contents of the injection, rationale for performing the injection,  and expected benefits of the injection. The skin was prepped with alcohol and betadine and then utilizing sterile technique an injection of the right knee joint from the anterolateral approach in the seated position was performed. I used allen chloride spray prior to doing the injection. The injection consisted 1ml of Kenalog (40mg per 1ml) with 8ml 1% lidocaine plain. The patient tolerated the injection well, and there were no complications. The injection site was covered with a Band-Aid. The injection was performed by Pritesh Monson PA-C    Patient Instructions:  1.  X-ray: X-ray that your inside part of your joint has collapse and is bone-on-bone.  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  He also has some arthritis under the kneecap of your right knee but that is not too bad at all.  2.  Exam: On exam I can tell the your knee is doing well but the arthritis seems to be the main problem.  3. Injections: You have had a cortisone injection in the past approximately 5 years ago which lasted about 2 years ago.  The cortisone injection was done in [place].  We decided to do another cortisone injection today.  4. Medications: Because your kidney function is not the best and you have chronic kidney disease stage III and a GFR of 57 we would hold back on doing any Mobic or meloxicam or any NSAIDs.  You will have to continue with just Tylenol or Tylenol arthritis or medication.  5.  gel injections: We did talk about gel injections that could be utilized in the future to help your knee.  We will hold off for now.  6.  Offloading brace: This is something we could also try in the future as your kneecap does not have as much arthritis.  This would push your weight onto the area where you have better cartilage.  We will hold for now.  7. Therapy: I also have a home exercise program with stretching and strengthening exercises.   8.  Information: Below I have a lot of  information about knee arthritis and even some natural remedies.  9.   Stay as strong, mobile and active as possible.   10.  Listen to your body and let the pain guide your activity, as in slow down if you are having a lot of pain, but increase activity gradually as the pain decreases.   11.  Avoid impact activity as much as possible.  Biking elliptical or swimming are the best activities.  12.  We can do 4 cortisone injections in a years time but no more than that. We can do the first 3 injections within 6 weeks of each other but the fourth injection we usually wait 2 months. This helps protect the cartilage in your joint.  13.  We usually want to wait on a total joint replacement until you are not able to enjoy her life and the other treatments are not working.  13. You can followup with Vivi Mcneil MD and Hank Monson PA-C in 6 weeks, if you are having pain at that time we can do a cortisone injection.  You can always cancel the appointment if you are doing really well and follow-up as needed.   Re-x-ray on return: No    BP Readings from Last 1 Encounters:   06/10/19 138/76       BP noted to be well controlled today in office.      Patient does not use Tobacco products.    This note was dictated with [employer].    Hank Monson PA-C          Again, thank you for allowing me to participate in the care of your patient.        Sincerely,        Hank Monson PA-C

## 2019-06-10 NOTE — PROGRESS NOTES
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      She is being seen for   Chief Complaints and History of Present Illnesses   Patient presents with     RECHECK     rt knee pain          History of Present Illness:   Mechanism of Injury: No new injury fall or trauma.  Location: Right medial knee  Duration of Pain: For the last few weeks  Rating of Pain: Moderate  Pain Quality: Achy  Pain is better with: Cortisone injections in the past.  Patient had a cortisone injection when she was in [place] about 5 years ago and it lasted for 2 years.   Pain is worse with: Activity and ambulation with the knee.  Also worse with stairs.  Treatment so far consists of: None.   Associated Features: Patient denies any major swelling of the knee or any catching or locking of the knee.  She denies any numbness or tingling.  Prior history of related problems: No previous problems with the right knee however the patient did have a left total knee arthroplasty that was done in [facility] about 6 years ago.  Pain is Limiting: Activity with the right knee and ambulation  Here to: Orthopedic consultation  The Pain Has: Gotten worse  Additional History: Patient has chronic kidney failure stage III so she is unable to take NSAIDs.      Patient's past medical, surgical, social and family histories reviewed.     Past Medical History:   Diagnosis Date     CKD (chronic kidney disease) stage 3, GFR 30-59 ml/min (H)      Dental disorder     loosing top teeth due to lupus     GERD (gastroesophageal reflux disease)      High blood pressure      Mixed hyperlipidaemia      Osteoarthritis      RA (rheumatoid arthritis) (H)     mild, on plaquenil     SLE (systemic lupus erythematosus) (H)      Vision problem     dry eyes from Lupus        Past Surgical History:   Procedure Laterality Date      APPENDECTOMY  1969     C TOTAL KNEE ARTHROPLASTY  3/10    left     COLONOSCOPY  2009    repeat 5 years     COLONOSCOPY N/A 5/20/2015    normal, repeat 5 years due to family history     FOOT SURGERY  2010    bone and screws in 2 toes, hammertoe     HYSTERECTOMY TOTAL ABDOMINAL  2010    due to bleeding, benign     LAPAROTOMY EXPLORATORY      scar tissue removal/repair - abd     SHOULDER SURGERY  05/2013    rotator cuff repair, right       Medications:    Current Outpatient Medications on File Prior to Visit:  artificial saliva, BIOTENE MT, (BIOTENE MT) SOLN Swish and spit 5-10 mLs in mouth as needed for dry mouth   Ascorbic Acid (VITAMIN C PO) Take 1,000 mg by mouth daily   aspirin 325 MG tablet Take 325 mg by mouth daily   calcium citrate-vitamin D (CITRACAL) 315-250 MG-UNIT TABS Take 2 tablets by mouth daily    Cyanocobalamin (VITAMIN B 12 PO) Take 1,000 mcg by mouth daily   cycloSPORINE (RESTASIS) 0.05 % ophthalmic emulsion Place 1 drop into both eyes 2 times daily   Glucosamine-Chondroit-Vit C-Mn (GLUCOSAMINE CHONDR 1500 COMPLX PO) Take 2 tablets by mouth daily   hydrochlorothiazide (HYDRODIURIL) 25 MG tablet TAKE ONE-HALF (1/2) TABLET DAILY   hydroxychloroquine (PLAQUENIL) 200 MG tablet Take 1 tablet (200 mg) by mouth daily   lisinopril (PRINIVIL/ZESTRIL) 10 MG tablet TAKE 1 TABLET DAILY (DUE FOR ANNUAL EXAM AND LABS)   metoprolol succinate ER (TOPROL-XL) 25 MG 24 hr tablet TAKE ONE-HALF (1/2) TABLET TWICE A DAY (DUE FOR ANNUAL EXAM AND LABS)   Multiple Vitamins-Minerals (CENTRUM SILVER) per tablet Take 1 tablet by mouth daily   Omega-3 Fatty Acids (OMEGA-3 FISH OIL PO) Take 1,400 mg by mouth daily   omeprazole (PRILOSEC OTC) 20 MG EC tablet Take 1 tablet (20 mg) by mouth daily as needed   order for DME Equipment being ordered: Oxygen, portable for travel, 2 lpm while on flights   simvastatin (ZOCOR) 10 MG tablet TAKE 1 TABLET AT BEDTIME     No current facility-administered medications on file prior to visit.  "    Allergies   Allergen Reactions     Ciprofloxacin GI Disturbance     Was taken with Flagyl, not sure which one bothered her.      Flagyl [Metronidazole] GI Disturbance     Was taken with Cipro, not sure which one bothered her.        Social History     Occupational History     Occupation: teacher     Comment: Retired   Tobacco Use     Smoking status: Never Smoker     Smokeless tobacco: Never Used   Substance and Sexual Activity     Alcohol use: No     Alcohol/week: 0.0 oz     Drug use: No     Sexual activity: Never       Family History   Problem Relation Age of Onset     Colon Cancer Mother      Lupus Mother      Cerebrovascular Disease Father      Neurologic Disorder Sister         ALS        REVIEW OF SYSTEMS  10 point review systems performed otherwise negative as noted as per history of present illness.    Physical Exam:  Vitals: /76   Ht 1.534 m (5' 0.4\")   Wt 73.9 kg (163 lb)   LMP  (LMP Unknown)   BMI 31.41 kg/m    BMI= Body mass index is 31.41 kg/m .    Constitutional: healthy, alert and no acute distress   Psychiatric: mentation appears normal and affect normal/bright  NEURO: no focal deficits, CMS intact right lower extremity   RESP: Normal with easy respirations and no use of accessory muscles to breathe, no audible wheezing or retractions  CV: Calf soft and nontender to palpation, leg warm   SKIN: No erythema, rashes, excoriation, or breakdown. No evidence of infection.   MUSCULOSKELETAL:    INSPECTION of right knee: No gross deformities, erythema, edema, ecchymosis, atrophy or fasciculations.     PALPATION: No tenderness on palpation of the medial, lateral, anterior and posterior portion of the knee. No specific joint line tenderness. No increased warmth.  No effusion.     ROM: Extension full, flexion to approximately 125 . All range of motion without catching, locking or pain.       STRENGTH: 5 out of 5 quad and hamstring strength.     SPECIAL TEST: Patient has a negative Lachman's " negative drawer sign. Patient's knee is stable to varus and valgus stress at 30  of flexion. Patient has a negative Danny's.   GAIT: Slight right-sided antalgic gait.  Lymph: no palpable lymph nodes    Diagnostic Modalities:  No results found for this or any previous visit (from the past 744 hour(s)).  X-rays that were done today were 3 views of the right knee showing no fracture no dislocation no tumor however we are able to see the left total knee arthroplasty that is well preserved and no signs of loosening or failure.  Good alignment and good sizing.  On the right knee we do see some degenerative joint disease under the kneecap and on the medial side of the right knee.    Independent visualization of the images was performed.    Impression: 1.  Right knee degenerative joint disease medial and patellofemoral's mild.  2.  Approximately 6 years status post left total knee arthroplasty done in [facility]    Plan:  All of the above pertinent physical exam and imaging modalities findings was reviewed with Capri.                                          INJECTION PROCEDURE:  The patient was counseled about an  injection, including discussion of risks (including infection), contents of the injection, rationale for performing the injection, and expected benefits of the injection. The skin was prepped with alcohol and betadine and then utilizing sterile technique an injection of the right knee joint from the anterolateral approach in the seated position was performed. I used allen chloride spray prior to doing the injection. The injection consisted 1ml of Kenalog (40mg per 1ml) with 8ml 1% lidocaine plain. The patient tolerated the injection well, and there were no complications. The injection site was covered with a Band-Aid. The injection was performed by Pritesh Monson PA-C    Patient Instructions:  1.  X-ray: X-ray that your inside part of your joint has collapse and is bone-on-bone.  We can also see that your  left knee has had a knee replacement in it that is holding up very well.  He also has some arthritis under the kneecap of your right knee but that is not too bad at all.  2.  Exam: On exam I can tell the your knee is doing well but the arthritis seems to be the main problem.  3. Injections: You have had a cortisone injection in the past approximately 5 years ago which lasted about 2 years ago.  The cortisone injection was done in [place].  We decided to do another cortisone injection today.  4. Medications: Because your kidney function is not the best and you have chronic kidney disease stage III and a GFR of 57 we would hold back on doing any Mobic or meloxicam or any NSAIDs.  You will have to continue with just Tylenol or Tylenol arthritis or medication.  5.  gel injections: We did talk about gel injections that could be utilized in the future to help your knee.  We will hold off for now.  6.  Offloading brace: This is something we could also try in the future as your kneecap does not have as much arthritis.  This would push your weight onto the area where you have better cartilage.  We will hold for now.  7. Therapy: I also have a home exercise program with stretching and strengthening exercises.   8.  Information: Below I have a lot of information about knee arthritis and even some natural remedies.  9.   Stay as strong, mobile and active as possible.   10.  Listen to your body and let the pain guide your activity, as in slow down if you are having a lot of pain, but increase activity gradually as the pain decreases.   11.  Avoid impact activity as much as possible.  Biking elliptical or swimming are the best activities.  12.  We can do 4 cortisone injections in a years time but no more than that. We can do the first 3 injections within 6 weeks of each other but the fourth injection we usually wait 2 months. This helps protect the cartilage in your joint.  13.  We usually want to wait on a total joint  replacement until you are not able to enjoy her life and the other treatments are not working.  13. You can followup with Vivi Mcneil MD and Hank Monson PA-C in 6 weeks, if you are having pain at that time we can do a cortisone injection.  You can always cancel the appointment if you are doing really well and follow-up as needed.   Re-x-ray on return: No    BP Readings from Last 1 Encounters:   06/10/19 138/76       BP noted to be well controlled today in office.      Patient does not use Tobacco products.    This note was dictated with [employer].    Hank Monson PA-C

## 2019-06-10 NOTE — PROGRESS NOTES
Prior to injection, verified patient identity using patient's name and date of birth.  Due to injection administration, patient instructed to remain in clinic for 15 minutes  afterwards, and to report any adverse reaction to me immediately.    Joint injection was performed.      Drug Amount Wasted:  None.  Vial/Syringe: Single dose vial  Expiration Date:  2/2021  Lot [id number]  Rt knee.  Hank Monson PA-C

## 2019-06-10 NOTE — PATIENT INSTRUCTIONS
Encounter Diagnosis   Name Primary?     Primary osteoarthritis of right knee Yes     Rest, ice and elevate above heart level as needed for pain control  1.  X-ray: X-ray that your inside part of your joint has collapse and is bone-on-bone.  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  He also has some arthritis under the kneecap of your right knee but that is not too bad at all.  2.  Exam: On exam I can tell the your knee is doing well but the arthritis seems to be the main problem.  3. Injections: You have had a cortisone injection in the past approximately 5 years ago which lasted about 2 years ago.  The cortisone injection was done in [place].  We decided to do another cortisone injection today.  4. Medications: Because your kidney function is not the best and you have chronic kidney disease stage III and a GFR of 57 we would hold back on doing any Mobic or meloxicam or any NSAIDs.  You will have to continue with just Tylenol or Tylenol arthritis or medication.  5.  gel injections: We did talk about gel injections that could be utilized in the future to help your knee.  We will hold off for now.  6.  Offloading brace: This is something we could also try in the future as your kneecap does not have as much arthritis.  This would push your weight onto the area where you have better cartilage.  We will hold for now.  7. Therapy: I also have a home exercise program with stretching and strengthening exercises.   8.  Information: Below I have a lot of information about knee arthritis and even some natural remedies.  9.   Stay as strong, mobile and active as possible.   10.  Listen to your body and let the pain guide your activity, as in slow down if you are having a lot of pain, but increase activity gradually as the pain decreases.   11.  Avoid impact activity as much as possible.  Biking elliptical or swimming are the best activities.  12.  We can do 4 cortisone  injections in a years time but no more than that. We can do the first 3 injections within 6 weeks of each other but the fourth injection we usually wait 2 months. This helps protect the cartilage in your joint.  13.  We usually want to wait on a total joint replacement until you are not able to enjoy her life and the other treatments are not working.  13. You can followup with Vivi Mcneil MD and Hank Monson PA-C in 6 weeks, if you are having pain at that time we can do a cortisone injection.  You can always cancel the appointment if you are doing really well and follow-up as needed.       Understanding Osteoarthritis of the Knee    A joint is a place where two bones meet. The knee is called a hinge joint. This joint is formed where the thighbone (femur) meets the shinbone (tibia). A healthy knee joint bends freely. Knee osteoarthritis is a condition where parts of the knee joint wear out. This can lead to pain, stiffness, and limited movement.   What is osteoarthritis?  Every joint contains a smooth tissue called cartilage. Cartilage cushions the ends of bones and helps bones in a joint glide smoothly against each other. Knee osteoarthritis occurs when cartilage in the knee joint begins to break down and wear away. Bones may become exposed and rub together. The cartilage may become irritated and rough. This prevents smooth movement of the joint and can lead to pain.  Causes of osteoarthritis of the knee  Causes can include:    Wear and tear from normal use over time    Overuse of the knee during sports or work activities    Being overweight. This increases stress on the knee joint.    Misalignment of the knee joint    Injury to the knee  Symptoms of osteoarthritis of the knee  Common symptoms include:    Pain and swelling around the joint. The pain and swelling get worse with activity and better with rest.    Grinding sound when moving the knee    Reduced knee movement    Knee stiffness. This is often  worse first thing in the morning.  Treating osteoarthritis of the knee  Osteoarthritis is a long-term condition. Treatment usually focuses on managing symptoms. Treatment may include:    Over-the-counter or prescription medicines taken by mouth to help relieve pain and swelling    Injections of medicine into the joint to help relieve symptoms for a time    A weight-loss plan for people who are overweight    A plan of physical therapy and exercises to improve the strength and flexibility of the muscles around the knee    Heat or cold therapy to help relieve pain and stiffness    Assistive devices that help with movement, such as a cane or a walker    Assistive devices that make activities of daily life easier, such as raised toilet seats or shower bars  If other treatments don t do enough to relieve symptoms, you may need surgery to replace the joint. During this surgery, the damaged joint is removed. An artificial knee joint is then put into place. This can help relieve pain and stiffness and restore movement of the knee.      When to call your healthcare provider  Call your healthcare provider right away if you have any of these:    Fever of 100.4 F (38 C) or higher, or as directed    Symptoms that don t get better with prescribed medicines or get worse    New symptoms   Date Last Reviewed: 3/10/2016    5701-4477 [employer]. [address] [place] [address]. All rights reserved. This information is not intended as a substitute for professional medical care. Always follow your healthcare professional's instructions.      Osteoarthritis: Natural and Alternative Treatments  The treatment for osteoarthritis includes lifestyle changes like weight loss and exercise. Medications and surgery may also be part of the treatment. There are also many natural and alternative treatments. These treatments may also help relieve pain and stiffness caused by osteoarthritis.  Heat and cold  Using heat and cold  treatments are simple ways to lessen arthritis symptoms.    Heat soothes stiff joints and tired muscles. Heat works well before exercise, for example. Heat treatments include:    A warm shower or baths, or soak (for example, fill the sink with warm water and move your fingers, hands, and wrists around in the water)    A moist heating pad    A warm, moist wash cloth    An electric blanket or throw    Cold treatments help to numb painful areas and decrease swelling. Cold treatments include the followinig wrapped in a thin towel:    An ice pack or bag of ice    A gel-filled cold pack    A bag of frozen vegetables, like peas or corn  Be careful when using heat or cold. You can injure your skin. Each treatment should only last for 10 to 20 minutes. Your health care provider or therapist can give you specific instructions.    Meditation and relaxation  Meditation and relaxation can help you deal with arthritis pain. There are many different methods available including deep breathing exercises, meditation, and yoga. Look for information and programs on the Internet or in your community. Or try this simple deep breathing technique sometimes called belly breathin. Sit in a comfortable chair or lie on your back.   2. Put one hand on your chest and the other hand on your abdomen.  3. Take a breath in through your nose. The hand on your stomach should rise. The hand on your chest should move very little.  4. Breathe out through your mouth, pushing out as much air as you can. The hand on your stomach should move in as you breathe out, but the hand on your chest should move very little.You should feel the muscles of your abdomen tighten.   5. Continue to breathe in through your nose and out through your mouth. You should feel your abdomen rise and fall. Count slowly each time you breathe out.  Acupuncture  Acupuncture is a -year old practice. Practitioners insert thin needles in specific parts of the body. Research shows  that it can help to relieve the pain of arthritis.   For more information or to find a practitioner in your area, contact the American Academy of Medical Acupuncture. Their website is: http://www.medicalacupuncture.org/.  Massage    Therapeutic massage has many benefits. It may:    Help you and your muscles relax    Improve blood flow to muscles and joints    Help joints stay more flexible.  Look for a certified massage therapist. Many are trained to treat sore muscles and joint pain and stiffness.  Vitamins, supplements, and herbs  People with arthritis, or other long-term conditions that cause pain, often look for alternative ways to lessen pain. Vitamins, supplements, and herbs may or may not help you to feel better. Before you try any vitamin, supplement, or herb, make sure you ask your health care provider or pharmacist. Although they can lessen pain and stiffness, they may:    Not actually do what they claim    Have serious side effects    Cause other medical problems to worsen    Interact with other medications     Delay your getting proven medical treatment    Cost a lot of money  Get the facts about supplements on the Arthritis Foundation website. It is: http://www.arthritis.org.  Psychological treatments  Research shows that many psychological therapies or those that deal with thinking and emotions, help people cope with arthritis pain. Therapies include: cognitive-behavioral therapy (CBT), pain coping skills training, biofeedback, stress management, and hypnosis. Ask your health care provider for more information about these therapies.  For more information about many of these methods contact the National Center for Complementary and Alternative Medicine (NCCAM). Website: http://www.nccam.nih.gov.    3963-2090 [employer]. [address] [place] [address], [place] [address]. All rights reserved. This information is not intended as a substitute for professional medical care. Always follow your  healthcare professional's instructions.         Hamstring Stretch (Flexibility)    1. Sit on the floor with your right leg straight in front of you. Bend your left leg so the sole of your foot rests against the inside of your right thigh.  2. Reach toward your ankle. Keep your knee, neck, and back straight. Feel the stretch in the back of your thigh.  3. Hold for 30 to 60 seconds. Repeat 2 times, or as instructed.  4. Switch legs and repeat.  5. Repeat this exercise 3 times per day, or as instructed.      Tip: Don t bounce while you re stretching.   Date Last Reviewed: 3/10/2016    7407-3512 [employer]. [address] [place] [address]. All rights reserved. This information is not intended as a substitute for professional medical care. Always follow your healthcare professional's instructions.       Quadriceps Stretch (Flexibility)    6. Stand up straight and hold onto a wall, sturdy chair, railing, or table with your right hand.  7. Bend your left leg at the knee behind you, and grab your ankle with your left hand. Pull your left heel toward your buttocks. Don t arch your back.  8. Hold for 30 to 60 seconds. Repeat 2 times.  9. Switch legs and repeat.  Date Last Reviewed: 5/1/2016 2000-2017 [employer]. [address] [place] [address]. All rights reserved. This information is not intended as a substitute for professional medical care. Always follow your healthcare professional's instructions.      Wall Squats for ACL Healing    After you regain muscle control, it s time to build strength. This helps you put full weight on your leg. For best results, warm up and stretch before starting. If your injury is recent, wait until swelling and pain decrease before doing this exercise:    Lean against a wall with your feet hip-width apart. Your feet should be about 18 inches from the wall.    Slowly slide down to a near-sitting position. Don t let your knees go past 90  degrees.    Hold for 10 seconds, then slide back up.    Repeat 5 times.     CAUTION: Do this exercise only if your healthcare provider says it s OK.     8041-6058 [employer]. [address] [place] [address]. All rights reserved. This information is not intended as a substitute for professional medical care. Always follow your healthcare professional's instructions.        Leg and Knee Exercises: Leg Raise    This exercise is designed to stretch and strengthen your knee. Before beginning, read through all the instructions. While exercising, breathe normally and use smooth movements. If you feel any pain, stop the exercise. If pain persists, call your healthcare provider.  Caution    Don t arch your back.    Don t hunch your shoulders.     Sit on the floor with your_________ leg straight, the other bent.    Tighten the thigh muscles on the top of your straight leg. You should feel the muscles contract. Raise that leg 6-8 inches. Then lower it slowly and steadily to the floor. Relax.    Repeat ______ times.  Do ______ sets a day.    5668-0576 [employer]. [address] [place] [address]. All rights reserved. This information is not intended as a substitute for professional medical care. Always follow your healthcare professional's instructions.        Quad Set for Leg and Knee    This exercise is designed to stretch and strengthen your knee. Before beginning, read through all the instructions. While exercising, breathe normally and use smooth movements. If you feel any pain, stop the exercise. If pain persists, call your healthcare provider.  1.  Sit on the floor with one leg straight, the other bent.  2.  Flex the foot of your straight leg by pointing your toes toward you. Press the back of your knee into the floor while tightening the muscle on the top of your thigh. Hold for ______ seconds. Then relax.  3.  Repeat ______ times. Do ______ sets a day.  Caution    Don t  arch your back.    Don t hunch your shoulders.    1088-3510 [employer]. [address] [place] [address], [place] [address]. All rights reserved. This information is not intended as a substitute for professional medical care. Always follow your healthcare professional's instructions.      "[employer] and [employer] may offer reliable information regarding your diagnosis and treatment plan.    THANK YOU for coming in today. If you receive a survey via [employer] or mail please let us know if there was anything you especially appreciated today or if there is any way we can improve our clinic. We appreciate your input.    GENERAL INFORMATION:  Our hours are:  Monday :     Clinic 7:30 AM-430 PM ([facility])  Tuesday:      Operating Room All Day ([facility])  Wednesday: Clinic 7:30 AM - 11:15 AM ([facility])             Clinic 1:00 PM - 4:00PM ([facility])  Thursday:     Administrative Day  Friday:          Clinic 7:30 AM - 11:15 AM ([facility])            Clinic 1:00 PM - 4:00 PM ([facility])      [facility] for any issues or concerns: [phone]        We are not in the office Thursdays. Therefore non- urgent calls and medical messages received on Thursday will be addressed when we are back in the office on Wednesday. Urgent matters will be reviewed and addressed by one of our partners in the office as needed.    If lab work was done today as part of your evaluation you will generally be contacted via [employer], mail, or phone with the results within 1-5 days. If there is an alarming result we will contact you by phone. Lab results come back at varying times, I generally wait until all labs are resulted before making comments on results. Please note labs are automatically released to [employer] (if you have signed up for it) once available-at times you may see these prior to my having a chance to  review them as well.    If you need refills please contact your pharmacist. They will send a refill request to me to review. Please allow 3 business days for us to process all refill requests. All narcotic refills should be handled in the clinic at the time of your visit.

## 2019-07-08 ENCOUNTER — TELEPHONE (OUTPATIENT)
Dept: FAMILY MEDICINE | Facility: CLINIC | Age: 74
End: 2019-07-08

## 2019-07-08 NOTE — TELEPHONE ENCOUNTER
Notified patient that Forms can take up too 72 hours, and patient verbalized understanding.   Routing to PCP.   Ilsa Fox CMA ([facility])    OB/GYN

## 2019-07-08 NOTE — TELEPHONE ENCOUNTER
[place] calls stating that Dr Hemphill has a letter already on file in her chart in regards to her oxygen. That Dr Hemphill told her just to let her know if she needs it.    She is going to [place] and will need a copy of this.    She asks for this to be printed, sighed and placed at the registration desk in [place] for her to  around 3:00.

## 2019-07-09 NOTE — TELEPHONE ENCOUNTER
Called and left a message to call clinic back. Letter for her oxygen will be at  for her to .  Vivi Cordero, CMA

## 2019-07-10 DIAGNOSIS — M32.19 SYSTEMIC LUPUS ERYTHEMATOSUS WITH OTHER ORGAN INVOLVEMENT, UNSPECIFIED SLE TYPE (H): ICD-10-CM

## 2019-07-10 NOTE — TELEPHONE ENCOUNTER
hydroxychloroquine (PLAQUENIL) 200 MG tablet    Last Written Prescription Date:  07/16/2018  Last Fill Quantity: 90,   # refills: 3  Last Office Visit:7/16/2018  Future Office visit:    Next 5 appointments (look out 90 days)    Jul 15, 2019  8:40 AM CDT  [facility] Visit with Hank Monson PA-C  [facility] [address] [facility] [id number]  [phone]   Aug 05, 2019  8:00 AM CDT  Return Visit with Jose M Loredo MD  [facility] ([facility]) [address] [place] [id number] [phone]           Last Eye exam: 8/5/16  According to [facility] Rheumatology refill protocol:        Called patient she has not had eye exam done recently. Stated she recently lost her  and she hasn't been able to get it done.     Will route to provider.     Samanta Smiley, BSN, RN   Rheumatology Care Coordinator   [facility]

## 2019-07-11 RX ORDER — HYDROXYCHLOROQUINE SULFATE 200 MG/1
TABLET, FILM COATED ORAL
Qty: 90 TABLET | Refills: 3 | Status: SHIPPED | OUTPATIENT
Start: 2019-07-11 | End: 2019-10-10

## 2019-07-15 ENCOUNTER — OFFICE VISIT (OUTPATIENT)
Dept: ORTHOPEDICS | Facility: CLINIC | Age: 74
End: 2019-07-15
Payer: COMMERCIAL

## 2019-07-15 VITALS
SYSTOLIC BLOOD PRESSURE: 127 MMHG | WEIGHT: 163 LBS | HEIGHT: 60 IN | BODY MASS INDEX: 32 KG/M2 | DIASTOLIC BLOOD PRESSURE: 81 MMHG

## 2019-07-15 DIAGNOSIS — M17.11 PRIMARY OSTEOARTHRITIS OF RIGHT KNEE: Primary | ICD-10-CM

## 2019-07-15 PROCEDURE — 20610 DRAIN/INJ JOINT/BURSA W/O US: CPT | Mod: RT | Performed by: PHYSICIAN ASSISTANT

## 2019-07-15 ASSESSMENT — PAIN SCALES - GENERAL: PAINLEVEL: WORST PAIN (10)

## 2019-07-15 ASSESSMENT — MIFFLIN-ST. JEOR: SCORE: 1162.21

## 2019-07-15 NOTE — PATIENT INSTRUCTIONS
Right knee degenerative joint disease    Rest, ice and elevate above heart level as needed for pain control    1.  Cortisone injection: This worked only for about a day.  We talked about how this most likely is because you had such severe arthritis on the inside part of that knee.    2.  Gel injection: You wanted to try a gel injection however I did pretty warn you that it is possible that since the cortisone injection did not work very well that the gel injection might not.  We are okay trying this to see if that will help.    3.  Offloading brace: We gave you an orthotics referral to get a brace that pushes your leg to put most of the pressure on where you have cartilage.  This will help with your pain hopefully.  We do have to be consistent in wearing it.  It is possible that there might be a charge out-of-pocket for this brace so just call your insurance ahead of time so you are not surprised.    4.  Right total knee replacement: I do believe that you might be heading down this direction but we will try the gel injection in the offloading brace to see if that helps.    5.  Medication: Unfortunately our hands are a bit tight with your medication because you cannot take NSAIDs as her kidney doctor has let you know.  You can take Tylenol though as that is processed in your liver.  Arthritis Tylenol is a good idea.  You have also tried some of the rub medications I would recommend continuing neck and also doing ice.    6. Follow up with Hank Monson PA-C on an as needed basis.  It is possible that we could do a cortisone injection in 2 weeks to help the gel injection worked better however since the cortisone injection did not work before it might not help all that much.  But this is an option if you would like.    Gel Injection Instructions:     1. You received your injection(s) of gel into your right knee today.  2. The joint(s) may be more painful for the first 1-2 days.  3. We ask you to continue to rest the  joint(s) for a few more days before resuming regular activities.  4.  Pain Medications you can take (as long as your primary care provider allows these meds and you do not have kidney or liver conditions):  5. Rest, ice and elevate as needed for pain control  6. Watch for these signs of infection: redness, swelling, drainage, warmth to touch, increased pain, or fever. Call the clinic or make an appointment to be seen if you think you have an infection.  7. Sometimes it can take 6 weeks from the last injection for it to reach its full effect.      Sodium Hyaluronate intra-articular injection  What is this medicine?  SODIUM HYALURONATE (SHANE carmen um jocelyn al yoor ON ate) is used to treat pain in the knee due to osteoarthritis.  How should I use this medicine?  This medicine is for injection into the knee joint. It is given by a health care professional in a hospital or clinic setting.  Talk to your pediatrician regarding the use of this medicine in children. Special care may be needed.  What side effects may I notice from receiving this medicine?  Side effects that you should report to your doctor or health care professional as soon as possible:    allergic reactions like skin rash, itching or hives, swelling of the face, lips, or tongue    dizziness    facial flushing    pain, tingling, numbness in the hands or feet    vision changes if received this medicine during eye surgery  Side effects that usually do not require medical attention (Report these to your doctor or health care professional if they continue or are bothersome.):    back pain    bruising at site where injected    chills    diarrhea    fever    headache    joint pain    joint stiffness    joint swelling    muscle cramps    muscle pain    nausea, vomiting    pain, redness, or irritation at site where injected    weak or tired  What may interact with this medicine?  Interactions are not expected.  What if I miss a dose?  This does not apply.  Where should I  keep my medicine?  This drug is given in a hospital or clinic and will not be stored at home.  What should I tell my health care provider before I take this medicine?  They need to know if you have any of these conditions:    bleeding disorders    glaucoma    infection in the knee joint    skin conditions or sensitivity    skin infection    an unusual allergic reaction to sodium hyaluronate, other medicines, foods, dyes, or preservatives. Different brands of sodium hyaluronate contain different allergens. Some may contain egg. Talk to your doctor about your allergies to make sure that you get the right product.    pregnant or trying to get pregnant    breast-feeding  What should I watch for while using this medicine?  Tell your doctor or healthcare professional if your symptoms do not start to get better or if they get worse.  If receiving this medicine for osteoarthritis, limit your activity after you receive your injection. Avoid physical activity for 48 hours following your injection to keep your knee from swelling. Do not stand on your feet for more than 1 hour at a time during the first 48 hours following your injection. Ask your doctor or healthcare professional about when you can begin major physical activity again.  NOTE:This sheet is a summary. It may not cover all possible information. If you have questions about this medicine, talk to your doctor, pharmacist, or health care provider. Copyright  2017 [employer]       [employer] and [employer] may offer reliable information regarding your diagnosis and treatment plan.    THANK YOU for coming in today. If you receive a survey via [employer] or mail please let us know if there was anything you especially appreciated today or if there is any way we can improve our clinic. We appreciate your input.    GENERAL INFORMATION:  Our hours are:  Monday :     Clinic 7:30 AM-430 PM ([facility])  Tuesday:      Operating Room All Day ([facility])  Wednesday: Clinic 7:30 AM - 11:15 AM ([facility])             Clinic 1:00 PM - 4:00PM ([facility])  Thursday:     Administrative Day  Friday:          Clinic 7:30 AM - 11:15 AM ([facility])            Clinic 1:00 PM - 4:00 PM ([facility])      [facility] for any issues or concerns: [phone]      We are not in the office Thursdays. Therefore non- urgent calls and medical messages received on Thursday will be addressed when we are back in the office on Wednesday. Urgent matters will be reviewed and addressed by one of our partners in the office as needed.    If lab work was done today as part of your evaluation you will generally be contacted via [employer], mail, or phone with the results within 1-5 days. If there is an alarming result we will contact you by phone. Lab results come back at varying times, I generally wait until all labs are resulted before making comments on results. Please note labs are automatically released to [employer] (if you have signed up for it) once available-at times you may see these prior to my having a chance to review them as well.    If you need refills please contact your pharmacist. They will send a refill request to me to review. Please allow 3 business days for us to process all refill requests. All narcotic refills should be handled in the clinic at the time of your visit.

## 2019-07-15 NOTE — LETTER
7/15/2019         RE: Capri Wiseman  [address] [facility] [address]        Dear Colleague,    Thank you for referring your patient, Capri Wiseman, to the [facility]. Please see a copy of my visit note below.      Prior to injection, verified patient identity using patient's name and date of birth.  Due to injection administration, patient instructed to remain in clinic for 15 minutes  afterwards, and to report any adverse reaction to me immediately.    Joint injection was performed.      Drug Amount Wasted:  None.  Vial/Syringe: Single dose vial  : [employer]  EXPIRATION DATE: 5/31/2100    Lot   [id number]    Rt knee   Hank Monson PA-C       Office Visit-Follow up    Chief Complaint: Capri Wiseman is a 73 year old female who is being seen for   Chief Complaint   Patient presents with     RECHECK     rt knee gel injection        History of Present Illness:   Mechanism of Injury: No new injury fall or trauma  Location: Right knee medial side  Rating of Pain: Moderate to severe  Pain Quality: Achy  Pain is better with: Rest and sometimes ice and external rubs and the cortisone injection done on 6/10/2019 did work for a day.  Pain is worse with: Ambulation  Treatment so far consists of: Patient cannot take NSAIDs however she does take Tylenol and arthritis Tylenol and she does exercises and she tries some external rubs she has been trying to  ice and decrease use..   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  Here to: Possibly get another cortisone injection or a gel injection.  Additional History: Patient cannot take NSAIDs because her kidney doctor told her not to.  She is taking Tylenol.  She is interested in an offloading brace to try.    REVIEW OF SYSTEMS  General: negative for, night sweats, dizziness, fatigue  Resp: No shortness of  "breath and no cough  CV: negative for chest pain, syncope or near-syncope  GI: negative for nausea, vomiting and diarrhea  : negative for dysuria and hematuria  Musculoskeletal: as above  Neurologic: negative for syncope   Hematologic: negative for bleeding disorder    Physical Exam:  Vitals: /81   Ht 1.534 m (5' 0.4\")   Wt 73.9 kg (163 lb)   LMP  (LMP Unknown)   BMI 31.41 kg/m     BMI= Body mass index is 31.41 kg/m .  Constitutional: healthy, alert and no acute distress   Psychiatric: mentation appears normal and affect normal/bright  NEURO: no focal deficits, CMS intact right lower extremity  RESP: Normal with easy respirations and no use of accessory muscles to breathe, no audible wheezing or retractions  CV: Calf soft and nontender to palpation, leg warm   SKIN: No erythema, rashes, excoriation, or breakdown. No evidence of infection.   MUSCULOSKELETAL:    INSPECTION of right knee: No gross deformities, erythema, edema, ecchymosis, atrophy or fasciculations.     PALPATION: Medial knee tenderness.  No tenderness on palpation of the lateral, anterior and posterior portion of the knee. No specific joint line tenderness. No increased warmth.  No effusion.     ROM: Extension full, flexion to approximately 125 . All range of motion without catching, locking or pain.      STRENGTH: 5 out of 5 quad and hamstring strength.     SPECIAL TEST: Patient has a negative Lachman's negative drawer sign. Patient's knee is stable to varus and valgus stress at 30  of flexion. Patient has a negative Danny's.   GAIT:  slight antalgic gait on the right, patient is ambulating with a  cane  Lymph: no palpable lymph nodes      Diagnostic Modalities:  No new radiographs necessary today.    I did review the x-rays 6/10/2019.  This shows collapse of the medial side and bone-on-bone arthritis there.  Mild degenerative joint disease in the patellofemoral area.  Also a total knee replacement that was done on the left knee without " any loosening or hardware failure.    Independent visualization of the images was performed.      Impression: 1.  Right knee degenerative joint disease, severe medial.    Plan:  All of the above pertinent physical exam and imaging modalities findings was reviewed with Capri.                                          CONSERVATIVE CARE:    Patient Instructions:   1.  Cortisone injection: This worked only for about a day.  We talked about how this most likely is because you had such severe arthritis on the inside part of that knee.    2.  Gel injection: You wanted to try a gel injection however I did pretty warn you that it is possible that since the cortisone injection did not work very well that the gel injection might not.  We are okay trying this to see if that will help.    3.  Offloading brace: We gave you an orthotics referral to get a brace that pushes your leg to put most of the pressure on where you have cartilage.  This will help with your pain hopefully.  We do have to be consistent in wearing it.  It is possible that there might be a charge out-of-pocket for this brace so just call your insurance ahead of time so you are not surprised.    4.  Right total knee replacement: I do believe that you might be heading down this direction but we will try the gel injection in the offloading brace to see if that helps.    5.  Medication: Unfortunately our hands are a bit tight with your medication because you cannot take NSAIDs as her kidney doctor has let you know.  You can take Tylenol though as that is processed in your liver.  Arthritis Tylenol is a good idea.  You have also tried some of the rub medications I would recommend continuing neck and also doing ice.    6. Follow up with Hank Monson PA-C on an as needed basis.  It is possible that we could do a cortisone injection in 2 weeks to help the gel injection worked better however since the cortisone injection did not work before it might not help all that  much.  But this is an option if you would like.    Re-x-ray on return: No    BP Readings from Last 1 Encounters:   07/15/19 127/81       BP noted to be well controlled today in office.      Patient does not use Tobacco products.    This note was dictated with [employer].    Hank Monson PA-C                Again, thank you for allowing me to participate in the care of your patient.        Sincerely,        Hank Monson PA-C

## 2019-07-15 NOTE — PROGRESS NOTES
"Office Visit-Follow up    Chief Complaint: Capri Wiseman is a 73 year old female who is being seen for   Chief Complaint   Patient presents with     RECHECK     rt knee gel injection        History of Present Illness:   Mechanism of Injury: No new injury fall or trauma  Location: Right knee medial side  Rating of Pain: Moderate to severe  Pain Quality: Achy  Pain is better with: Rest and sometimes ice and external rubs and the cortisone injection done on 6/10/2019 did work for a day.  Pain is worse with: Ambulation  Treatment so far consists of: Patient cannot take NSAIDs however she does take Tylenol and arthritis Tylenol and she does exercises and she tries some external rubs she has been trying to  ice and decrease use..   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  Here to: Possibly get another cortisone injection or a gel injection.  Additional History: Patient cannot take NSAIDs because her kidney doctor told her not to.  She is taking Tylenol.  She is interested in an offloading brace to try.    REVIEW OF SYSTEMS  General: negative for, night sweats, dizziness, fatigue  Resp: No shortness of breath and no cough  CV: negative for chest pain, syncope or near-syncope  GI: negative for nausea, vomiting and diarrhea  : negative for dysuria and hematuria  Musculoskeletal: as above  Neurologic: negative for syncope   Hematologic: negative for bleeding disorder    Physical Exam:  Vitals: /81   Ht 1.534 m (5' 0.4\")   Wt 73.9 kg (163 lb)   LMP  (LMP Unknown)   BMI 31.41 kg/m    BMI= Body mass index is 31.41 kg/m .  Constitutional: healthy, alert and no acute distress   Psychiatric: mentation appears normal and affect normal/bright  NEURO: no focal deficits, CMS intact right lower extremity  RESP: Normal with easy respirations and no use of accessory muscles to breathe, no " audible wheezing or retractions  CV: Calf soft and nontender to palpation, leg warm   SKIN: No erythema, rashes, excoriation, or breakdown. No evidence of infection.   MUSCULOSKELETAL:    INSPECTION of right knee: No gross deformities, erythema, edema, ecchymosis, atrophy or fasciculations.     PALPATION: Medial knee tenderness.  No tenderness on palpation of the lateral, anterior and posterior portion of the knee. No specific joint line tenderness. No increased warmth.  No effusion.     ROM: Extension full, flexion to approximately 125 . All range of motion without catching, locking or pain.      STRENGTH: 5 out of 5 quad and hamstring strength.     SPECIAL TEST: Patient has a negative Lachman's negative drawer sign. Patient's knee is stable to varus and valgus stress at 30  of flexion. Patient has a negative Danny's.   GAIT:  slight antalgic gait on the right, patient is ambulating with a  cane  Lymph: no palpable lymph nodes      Diagnostic Modalities:  No new radiographs necessary today.    I did review the x-rays 6/10/2019.  This shows collapse of the medial side and bone-on-bone arthritis there.  Mild degenerative joint disease in the patellofemoral area.  Also a total knee replacement that was done on the left knee without any loosening or hardware failure.    Independent visualization of the images was performed.      Impression: 1.  Right knee degenerative joint disease, severe medial.                                          INJECTION PROCEDURE:  The patient was counseled about an  injection, including discussion of risks (including infection), contents of the injection, rationale for performing the injection, and expected benefits of the injection. The skin was prepped with alcohol and betadine and then utilizing sterile technique an injection of the right knee joint from the anterolateral approach in the seated position was performed. The injection consisted Synvisc-One (6cc). The patient tolerated  the injection well, and there were no complications. The injection site was covered with a Band-Aid. The injection was performed by Pritesh Monson PA-C     Plan:  All of the above pertinent physical exam and imaging modalities findings was reviewed with Capri.                                          CONSERVATIVE CARE:    Patient Instructions:   1.  Cortisone injection: This worked only for about a day.  We talked about how this most likely is because you had such severe arthritis on the inside part of that knee.    2.  Gel injection: You wanted to try a gel injection however I did pretty warn you that it is possible that since the cortisone injection did not work very well that the gel injection might not.  We are okay trying this to see if that will help.    3.  Offloading brace: We gave you an orthotics referral to get a brace that pushes your leg to put most of the pressure on where you have cartilage.  This will help with your pain hopefully.  We do have to be consistent in wearing it.  It is possible that there might be a charge out-of-pocket for this brace so just call your insurance ahead of time so you are not surprised.    4.  Right total knee replacement: I do believe that you might be heading down this direction but we will try the gel injection in the offloading brace to see if that helps.    5.  Medication: Unfortunately our hands are a bit tight with your medication because you cannot take NSAIDs as her kidney doctor has let you know.  You can take Tylenol though as that is processed in your liver.  Arthritis Tylenol is a good idea.  You have also tried some of the rub medications I would recommend continuing neck and also doing ice.    6. Follow up with Hank Monson PA-C on an as needed basis.  It is possible that we could do a cortisone injection in 2 weeks to help the gel injection worked better however since the cortisone injection did not work before it might not help all that much.  But this is an  option if you would like.    Re-x-ray on return: No    BP Readings from Last 1 Encounters:   07/15/19 127/81       BP noted to be well controlled today in office.      Patient does not use Tobacco products.    This note was dictated with [employer].    Hank Monson PA-C

## 2019-07-15 NOTE — PROGRESS NOTES
Prior to injection, verified patient identity using patient's name and date of birth.  Due to injection administration, patient instructed to remain in clinic for 15 minutes  afterwards, and to report any adverse reaction to me immediately.    Joint injection was performed.      Drug Amount Wasted:  None.  Vial/Syringe: Single dose vial  : [employer]  EXPIRATION DATE: 5/31/2100    Lot   [id number]    Rt knee   Hank Monson PA-C

## 2019-07-22 ENCOUNTER — TRANSFERRED RECORDS (OUTPATIENT)
Dept: HEALTH INFORMATION MANAGEMENT | Facility: CLINIC | Age: 74
End: 2019-07-22

## 2019-07-30 ENCOUNTER — TELEPHONE (OUTPATIENT)
Dept: FAMILY MEDICINE | Facility: CLINIC | Age: 74
End: 2019-07-30

## 2019-07-30 DIAGNOSIS — K57.32 DIVERTICULITIS OF COLON: Primary | ICD-10-CM

## 2019-07-30 RX ORDER — METRONIDAZOLE 500 MG/1
500 TABLET ORAL 2 TIMES DAILY
Qty: 14 TABLET | Refills: 0 | Status: CANCELLED | OUTPATIENT
Start: 2019-07-30 | End: 2019-08-06

## 2019-07-30 NOTE — TELEPHONE ENCOUNTER
Yes I will but she should still get in.  Do any providers have openings?  This IS an acute issue, should be seen in an acute spot.   Xochitl Hemphill MD

## 2019-07-30 NOTE — TELEPHONE ENCOUNTER
An appointment has been scheduled for tomorrow with Dr. Recinos.   Thank you,  Jenny Canas   for [facility]

## 2019-07-30 NOTE — TELEPHONE ENCOUNTER
Reason for Call:  Other prescription    Detailed comments: patient calls to schedule an appointment, there are only acute spots available. So unable to schedule. She is having a flare up of diverticulitis.  Sx's started on Sunday.  Cramping and pain. IS asking if Dr Hemphill will order medication for her. Please advise. She would use [facility] in [place].     Phone Number Patient can be reached at: Home number on file [phone] (home)    Best Time: any time    Can we leave a detailed message on this number? YES    Call taken on 7/30/2019 at 9:11 AM by Sonia Azul

## 2019-07-31 ENCOUNTER — OFFICE VISIT (OUTPATIENT)
Dept: FAMILY MEDICINE | Facility: CLINIC | Age: 74
End: 2019-07-31
Payer: COMMERCIAL

## 2019-07-31 ENCOUNTER — HOSPITAL ENCOUNTER (OUTPATIENT)
Dept: CT IMAGING | Facility: CLINIC | Age: 74
Discharge: HOME | End: 2019-07-31
Attending: FAMILY MEDICINE | Admitting: FAMILY MEDICINE
Payer: COMMERCIAL

## 2019-07-31 VITALS
BODY MASS INDEX: 31.22 KG/M2 | DIASTOLIC BLOOD PRESSURE: 82 MMHG | OXYGEN SATURATION: 99 % | WEIGHT: 162 LBS | HEART RATE: 101 BPM | TEMPERATURE: 96.6 F | RESPIRATION RATE: 18 BRPM | SYSTOLIC BLOOD PRESSURE: 122 MMHG

## 2019-07-31 DIAGNOSIS — K57.32 DIVERTICULITIS OF COLON: Primary | ICD-10-CM

## 2019-07-31 DIAGNOSIS — R10.32 LLQ ABDOMINAL PAIN: ICD-10-CM

## 2019-07-31 LAB
BASOPHILS # BLD AUTO: 0 10E9/L (ref 0–0.2)
BASOPHILS NFR BLD AUTO: 0.3 %
CREAT BLD-MCNC: 1.3 MG/DL (ref 0.52–1.04)
CRP SERPL-MCNC: 46.4 MG/L (ref 0–8)
DIFFERENTIAL METHOD BLD: ABNORMAL
EOSINOPHIL NFR BLD AUTO: 0.7 %
ERYTHROCYTE [DISTWIDTH] IN BLOOD BY AUTOMATED COUNT: 11.7 % (ref 10–15)
GFR SERPL CREATININE-BSD FRML MDRD: 40 ML/MIN/{1.73_M2}
HCT VFR BLD AUTO: 33.8 % (ref 35–47)
HGB BLD-MCNC: 11.3 G/DL (ref 11.7–15.7)
IMM GRANULOCYTES # BLD: 0 10E9/L (ref 0–0.4)
IMM GRANULOCYTES NFR BLD: 0.1 %
LYMPHOCYTES # BLD AUTO: 1.5 10E9/L (ref 0.8–5.3)
LYMPHOCYTES NFR BLD AUTO: 20.7 %
MCH RBC QN AUTO: 35.1 PG (ref 26.5–33)
MCHC RBC AUTO-ENTMCNC: 33.4 G/DL (ref 31.5–36.5)
MCV RBC AUTO: 105 FL (ref 78–100)
MONOCYTES # BLD AUTO: 0.5 10E9/L (ref 0–1.3)
MONOCYTES NFR BLD AUTO: 7 %
NEUTROPHILS # BLD AUTO: 5 10E9/L (ref 1.6–8.3)
NEUTROPHILS NFR BLD AUTO: 71.2 %
NRBC # BLD AUTO: 0 10*3/UL
NRBC BLD AUTO-RTO: 0 /100
PLATELET # BLD AUTO: 240 10E9/L (ref 150–450)
RBC # BLD AUTO: 3.22 10E12/L (ref 3.8–5.2)
WBC # BLD AUTO: 7 10E9/L (ref 4–11)

## 2019-07-31 PROCEDURE — 99214 OFFICE O/P EST MOD 30 MIN: CPT | Performed by: FAMILY MEDICINE

## 2019-07-31 PROCEDURE — 85025 COMPLETE CBC W/AUTO DIFF WBC: CPT | Performed by: FAMILY MEDICINE

## 2019-07-31 PROCEDURE — 36415 COLL VENOUS BLD VENIPUNCTURE: CPT | Performed by: FAMILY MEDICINE

## 2019-07-31 PROCEDURE — 25000125 ZZHC RX 250: Performed by: FAMILY MEDICINE

## 2019-07-31 PROCEDURE — 82565 ASSAY OF CREATININE: CPT

## 2019-07-31 PROCEDURE — 86140 C-REACTIVE PROTEIN: CPT | Performed by: FAMILY MEDICINE

## 2019-07-31 PROCEDURE — 74176 CT ABD & PELVIS W/O CONTRAST: CPT

## 2019-07-31 PROCEDURE — 25000128 H RX IP 250 OP 636: Performed by: FAMILY MEDICINE

## 2019-07-31 RX ORDER — IOPAMIDOL 755 MG/ML
500 INJECTION, SOLUTION INTRAVASCULAR ONCE
Status: DISCONTINUED | OUTPATIENT
Start: 2019-07-31 | End: 2019-08-01 | Stop reason: HOSPADM

## 2019-07-31 RX ORDER — METRONIDAZOLE 500 MG/1
500 TABLET ORAL 3 TIMES DAILY
Qty: 42 TABLET | Refills: 0 | Status: SHIPPED | OUTPATIENT
Start: 2019-07-31 | End: 2019-08-04 | Stop reason: SINTOL

## 2019-07-31 RX ORDER — CIPROFLOXACIN 500 MG/1
500 TABLET, FILM COATED ORAL 2 TIMES DAILY
Qty: 28 TABLET | Refills: 0 | Status: SHIPPED | OUTPATIENT
Start: 2019-07-31 | End: 2019-08-04 | Stop reason: SINTOL

## 2019-07-31 ASSESSMENT — PAIN SCALES - GENERAL: PAINLEVEL: WORST PAIN (10)

## 2019-07-31 NOTE — PROGRESS NOTES
Subjective      Diverticulitis flare up         aCpri is a 73 year old female who comes to clinic to discuss with 5 days of left lower quadrant pain.  This started suddenly and she developed a fever of 101 and nausea with a single episode of vomiting.  Those symptoms and subsided but she is been left with increasing left lower quadrant pain.  Constant.  Feels better when she is in a ball, worse when lying flat.  Worse with more activity.  Feels just like her last attack of diverticulitis which was in 2015.  She had a colonoscopy just prior to that which showed diverticulosis but no masses.    Review of Systems   ROS COMP: Constitutional, HEENT, cardiovascular, pulmonary, gi and gu systems are negative, except as otherwise noted.      Objective    /82   Pulse 101   Temp 96.6  F (35.9  C) (Temporal)   Resp 18   Wt 73.5 kg (162 lb)   LMP  (LMP Unknown)   SpO2 99%   BMI 31.22 kg/m       Wt Readings from Last 2 Encounters:   07/31/19 73.5 kg (162 lb)   07/15/19 73.9 kg (163 lb)       Physical Exam   Well-appearing.  Alert and oriented.  Heart regular.  Lungs clear and unlabored.  Abdomen shows some guarding, no rigidity.  No distention.  Left lower quadrant tenderness greater than right.  Extremities warm well perfused no edema.  Normal perfusion.    CT scan shows mild diverticulitis of the sigmoid colon.  Bowel wall thickening concerning for mass.        Assessment & Plan       ICD-10-CM    1. LLQ abdominal pain R10.32 CBC with platelets and differential     CRP, inflammation     ciprofloxacin (CIPRO) 500 MG tablet     metroNIDAZOLE (FLAGYL) 500 MG tablet     acetaminophen-codeine (TYLENOL #3) 300-30 MG tablet     GASTROENTEROLOGY ADULT REF PROCEDURE ONLY [facility] [phone] ([facility]); [place] Provider ([facility])     CANCELED: CT Abdomen Pelvis w Contrast       Given her reassuring labs and CT scan, can treat with oral antibiotics at this point and placed her on a low residual diet until  symptoms resolve.  If worsening she will call back.  Also set up for follow-up colonoscopy given the CT findings and recurrent nature to make sure there is not an obstructive process.  This should be done in 6 to 8 weeks.    Con Recinos MD  [facility]

## 2019-07-31 NOTE — Clinical Note
Saw Capri today. Mild diverticulitis. I set her up for follow up colonoscopy as well, let me know if you feel otherwise, thanks

## 2019-08-04 ENCOUNTER — HOSPITAL ENCOUNTER (EMERGENCY)
Facility: CLINIC | Age: 74
Discharge: HOME | End: 2019-08-04
Attending: EMERGENCY MEDICINE | Admitting: EMERGENCY MEDICINE
Payer: COMMERCIAL

## 2019-08-04 VITALS
HEART RATE: 88 BPM | DIASTOLIC BLOOD PRESSURE: 71 MMHG | SYSTOLIC BLOOD PRESSURE: 140 MMHG | WEIGHT: 158.5 LBS | OXYGEN SATURATION: 96 % | BODY MASS INDEX: 30.55 KG/M2 | RESPIRATION RATE: 16 BRPM | TEMPERATURE: 97.3 F

## 2019-08-04 DIAGNOSIS — R19.7 NAUSEA VOMITING AND DIARRHEA: ICD-10-CM

## 2019-08-04 DIAGNOSIS — R11.2 NAUSEA VOMITING AND DIARRHEA: ICD-10-CM

## 2019-08-04 DIAGNOSIS — K57.32 DIVERTICULITIS OF COLON: ICD-10-CM

## 2019-08-04 DIAGNOSIS — T88.7XXA MEDICATION SIDE EFFECTS: ICD-10-CM

## 2019-08-04 LAB
ALBUMIN SERPL-MCNC: 4 G/DL (ref 3.4–5)
ALP SERPL-CCNC: 42 U/L (ref 40–150)
ALT SERPL W P-5'-P-CCNC: 13 U/L (ref 0–50)
ANION GAP SERPL CALCULATED.3IONS-SCNC: 11 MMOL/L (ref 3–14)
AST SERPL W P-5'-P-CCNC: 13 U/L (ref 0–45)
BASOPHILS # BLD AUTO: 0 10E9/L (ref 0–0.2)
BASOPHILS NFR BLD AUTO: 0.1 %
BILIRUB SERPL-MCNC: 0.4 MG/DL (ref 0.2–1.3)
BUN SERPL-MCNC: 25 MG/DL (ref 7–30)
CALCIUM SERPL-MCNC: 9.5 MG/DL (ref 8.5–10.1)
CHLORIDE SERPL-SCNC: 99 MMOL/L (ref 94–109)
CO2 SERPL-SCNC: 23 MMOL/L (ref 20–32)
CREAT SERPL-MCNC: 1.12 MG/DL (ref 0.52–1.04)
DIFFERENTIAL METHOD BLD: ABNORMAL
EOSINOPHIL NFR BLD AUTO: 0.3 %
ERYTHROCYTE [DISTWIDTH] IN BLOOD BY AUTOMATED COUNT: 11.2 % (ref 10–15)
GFR SERPL CREATININE-BSD FRML MDRD: 48 ML/MIN/{1.73_M2}
GLUCOSE SERPL-MCNC: 106 MG/DL (ref 70–99)
HCT VFR BLD AUTO: 34.5 % (ref 35–47)
HGB BLD-MCNC: 11.9 G/DL (ref 11.7–15.7)
IMM GRANULOCYTES # BLD: 0 10E9/L (ref 0–0.4)
IMM GRANULOCYTES NFR BLD: 0.3 %
LYMPHOCYTES # BLD AUTO: 1 10E9/L (ref 0.8–5.3)
LYMPHOCYTES NFR BLD AUTO: 13.3 %
MAGNESIUM SERPL-MCNC: 1.7 MG/DL (ref 1.6–2.3)
MCH RBC QN AUTO: 34.8 PG (ref 26.5–33)
MCHC RBC AUTO-ENTMCNC: 34.5 G/DL (ref 31.5–36.5)
MCV RBC AUTO: 101 FL (ref 78–100)
MONOCYTES # BLD AUTO: 0.6 10E9/L (ref 0–1.3)
MONOCYTES NFR BLD AUTO: 7.7 %
NEUTROPHILS # BLD AUTO: 5.6 10E9/L (ref 1.6–8.3)
NEUTROPHILS NFR BLD AUTO: 78.3 %
NRBC # BLD AUTO: 0 10*3/UL
NRBC BLD AUTO-RTO: 0 /100
PLATELET # BLD AUTO: 271 10E9/L (ref 150–450)
POTASSIUM SERPL-SCNC: 3.7 MMOL/L (ref 3.4–5.3)
PROT SERPL-MCNC: 7.5 G/DL (ref 6.8–8.8)
RBC # BLD AUTO: 3.42 10E12/L (ref 3.8–5.2)
SODIUM SERPL-SCNC: 133 MMOL/L (ref 133–144)
WBC # BLD AUTO: 7.2 10E9/L (ref 4–11)

## 2019-08-04 PROCEDURE — 80053 COMPREHEN METABOLIC PANEL: CPT | Performed by: EMERGENCY MEDICINE

## 2019-08-04 PROCEDURE — 25000128 H RX IP 250 OP 636: Performed by: EMERGENCY MEDICINE

## 2019-08-04 PROCEDURE — 99285 EMERGENCY DEPT VISIT HI MDM: CPT | Mod: 25 | Performed by: EMERGENCY MEDICINE

## 2019-08-04 PROCEDURE — 83735 ASSAY OF MAGNESIUM: CPT | Performed by: EMERGENCY MEDICINE

## 2019-08-04 PROCEDURE — 85025 COMPLETE CBC W/AUTO DIFF WBC: CPT | Performed by: EMERGENCY MEDICINE

## 2019-08-04 PROCEDURE — 99285 EMERGENCY DEPT VISIT HI MDM: CPT | Mod: Z6 | Performed by: EMERGENCY MEDICINE

## 2019-08-04 PROCEDURE — 96365 THER/PROPH/DIAG IV INF INIT: CPT | Performed by: EMERGENCY MEDICINE

## 2019-08-04 PROCEDURE — 96375 TX/PRO/DX INJ NEW DRUG ADDON: CPT | Performed by: EMERGENCY MEDICINE

## 2019-08-04 RX ORDER — OXYCODONE AND ACETAMINOPHEN 5; 325 MG/1; MG/1
1 TABLET ORAL EVERY 6 HOURS PRN
Qty: 12 TABLET | Refills: 0 | Status: SHIPPED | OUTPATIENT
Start: 2019-08-04 | End: 2019-08-13

## 2019-08-04 RX ORDER — ONDANSETRON 4 MG/1
4 TABLET, ORALLY DISINTEGRATING ORAL EVERY 6 HOURS PRN
Qty: 10 TABLET | Refills: 0 | Status: SHIPPED | OUTPATIENT
Start: 2019-08-04 | End: 2019-08-08

## 2019-08-04 RX ORDER — ONDANSETRON 2 MG/ML
4 INJECTION INTRAMUSCULAR; INTRAVENOUS EVERY 30 MIN PRN
Status: DISCONTINUED | OUTPATIENT
Start: 2019-08-04 | End: 2019-08-04 | Stop reason: HOSPADM

## 2019-08-04 RX ORDER — HYDROMORPHONE HYDROCHLORIDE 1 MG/ML
0.5 INJECTION, SOLUTION INTRAMUSCULAR; INTRAVENOUS; SUBCUTANEOUS
Status: DISCONTINUED | OUTPATIENT
Start: 2019-08-04 | End: 2019-08-04 | Stop reason: HOSPADM

## 2019-08-04 RX ORDER — SODIUM CHLORIDE 9 MG/ML
1000 INJECTION, SOLUTION INTRAVENOUS CONTINUOUS
Status: DISCONTINUED | OUTPATIENT
Start: 2019-08-04 | End: 2019-08-04 | Stop reason: HOSPADM

## 2019-08-04 RX ADMIN — ONDANSETRON 4 MG: 2 INJECTION INTRAMUSCULAR; INTRAVENOUS at 09:39

## 2019-08-04 RX ADMIN — TAZOBACTAM SODIUM AND PIPERACILLIN SODIUM 3.38 G: 375; 3 INJECTION, SOLUTION INTRAVENOUS at 09:47

## 2019-08-04 RX ADMIN — SODIUM CHLORIDE 1000 ML: 9 INJECTION, SOLUTION INTRAVENOUS at 09:36

## 2019-08-04 RX ADMIN — HYDROMORPHONE HYDROCHLORIDE 0.5 MG: 1 INJECTION, SOLUTION INTRAMUSCULAR; INTRAVENOUS; SUBCUTANEOUS at 09:40

## 2019-08-04 NOTE — ED AVS SNAPSHOT
[facility] Emergency Department  [address] [place] [address]  Phone:  [phone]  Fax:  [phone]                                    Capri Wiseman   MRN: [id number]    Department:  [facility] Emergency Department   Date of Visit:  8/4/2019           After Visit Summary Signature Page    I have received my discharge instructions, and my questions have been answered. I have discussed any challenges I see with this plan with the nurse or doctor.    ..........................................................................................................................................  Patient/Patient Representative Signature      ..........................................................................................................................................  Patient Representative Print Name and Relationship to Patient    ..................................................               ................................................  Date                                   Time    ..........................................................................................................................................  Reviewed by Signature/Title    ...................................................              ..............................................  Date                                               Time          22EPIC Rev 08/18

## 2019-08-04 NOTE — ED TRIAGE NOTES
Pt comes in with complaints of diverticulitis. Pt was seen in the clinic on Wednesday. Pt states she is nauseous and has been vomiting.

## 2019-08-04 NOTE — ED PROVIDER NOTES
History     Chief Complaint   Patient presents with     Abdominal Pain     Vomiting     HPI  History per patient and medical records    This is a 73-year-old female with history of lupus, chronic kidney disease, hypertension, hyperlipidemia, RA presenting with abdominal pain, nausea and vomiting.  Patient was seen in clinic on 7/31 with left lower quadrant pain.  She was diagnosed with likely early diverticulitis via CT scan and started on Cipro and Flagyl.  She noted feeling nausea and upset stomach as soon as she started taking it but over the last day or so, she has had continuous nausea with vomiting and diarrhea.  She has been unable to keep down fluids, foods or her medicines.  She continues to have the left lower quadrant pain.  She does note that she had similar GI issues when she was on Cipro and Flagyl a number of years ago for diverticulitis.  She denies any fevers or chills.  She is feeling weak and dizzy.  No chest pain or shortness of breath.    7/31/2019 CT of the abdomen and pelvis:  IMPRESSION:  1. Thickening of the wall of the sigmoid colon with adjacent  diverticula and mild inflammatory changes are concerning for mild  acute sigmoid diverticulitis but could also represent colitis.  Neoplastic infiltration in the colon is difficult to exclude, and  follow-up fluoroscopy is recommended after resolution of current  symptoms.  2. Nonobstructive left intrarenal calculus is new since the prior  study.  3. Small nodular densities inferior lingula are minimally increased in  size since the prior CT abdomen and pelvis from 2015 are highly likely  benign.  4. Low-attenuation lesion posterior aspect of the body/tail of the  pancreas is slightly increased in size since the prior study but is  still likely benign given the relative lack of change since the prior  study. Contrast-enhanced study may be helpful.  5. Small fat-containing left inguinal hernia is again noted.    Allergies:  Allergies   Allergen  Reactions     Ciprofloxacin GI Disturbance     Was taken with Flagyl, not sure which one bothered her.      Flagyl [Metronidazole] GI Disturbance     Was taken with Cipro, not sure which one bothered her.        Problem List:    Patient Active Problem List    Diagnosis Date Noted     Long-term use of Plaquenil 07/16/2018     Priority: Medium     Systemic lupus erythematosus, unspecified SLE type, unspecified organ involvement status (H) 08/14/2017     Priority: Medium     CKD (chronic kidney disease) stage 3, GFR 30-59 ml/min (H) 03/03/2015     Priority: Medium     Hypertension 03/03/2015     Priority: Medium     Esophageal reflux 03/03/2015     Priority: Medium     Osteoarthritis 03/03/2015     Priority: Medium     Hyperlipidemia with target LDL less than 130 03/03/2015     Priority: Medium     Diagnosis updated by automated process. Provider to review and confirm.       RA (rheumatoid arthritis) (H) 03/03/2015     Priority: Medium        Past Medical History:    Past Medical History:   Diagnosis Date     CKD (chronic kidney disease) stage 3, GFR 30-59 ml/min (H)      Dental disorder      GERD (gastroesophageal reflux disease)      High blood pressure      Mixed hyperlipidaemia      Osteoarthritis      RA (rheumatoid arthritis) (H)      SLE (systemic lupus erythematosus) (H)      Vision problem        Past Surgical History:    Past Surgical History:   Procedure Laterality Date     APPENDECTOMY  1969     C TOTAL KNEE ARTHROPLASTY  3/10    left     COLONOSCOPY  2009    repeat 5 years     COLONOSCOPY N/A 5/20/2015    normal, repeat 5 years due to family history     FOOT SURGERY  2010    bone and screws in 2 toes, hammertoe     HYSTERECTOMY TOTAL ABDOMINAL  2010    due to bleeding, benign     LAPAROTOMY EXPLORATORY      scar tissue removal/repair - abd     SHOULDER SURGERY  05/2013    rotator cuff repair, right       Family History:    Family History   Problem Relation Age of Onset     Colon Cancer Mother      Lupus  Mother      Cerebrovascular Disease Father      Neurologic Disorder Sister         ALS       Social History:  Marital Status:   [5]  Social History     Tobacco Use     Smoking status: Never Smoker     Smokeless tobacco: Never Used   Substance Use Topics     Alcohol use: No     Alcohol/week: 0.0 oz     Drug use: No        Medications:      amoxicillin-clavulanate (AUGMENTIN) 875-125 MG tablet   ondansetron (ZOFRAN ODT) 4 MG ODT tab   oxyCODONE-acetaminophen (PERCOCET) 5-325 MG tablet   artificial saliva, BIOTENE MT, (BIOTENE MT) SOLN   Ascorbic Acid (VITAMIN C PO)   aspirin 325 MG tablet   calcium citrate-vitamin D (CITRACAL) 315-250 MG-UNIT TABS   Cyanocobalamin (VITAMIN B 12 PO)   cycloSPORINE (RESTASIS) 0.05 % ophthalmic emulsion   Glucosamine-Chondroit-Vit C-Mn (GLUCOSAMINE CHONDR 1500 COMPLX PO)   hydrochlorothiazide (HYDRODIURIL) 25 MG tablet   hydroxychloroquine (PLAQUENIL) 200 MG tablet   lisinopril (PRINIVIL/ZESTRIL) 10 MG tablet   metoprolol succinate ER (TOPROL-XL) 25 MG 24 hr tablet   Multiple Vitamins-Minerals (CENTRUM SILVER) per tablet   Omega-3 Fatty Acids (OMEGA-3 FISH OIL PO)   omeprazole (PRILOSEC OTC) 20 MG EC tablet   order for DME   simvastatin (ZOCOR) 10 MG tablet         Review of Systems  All other ROS reviewed and are negative or non-contributory except as stated in HPI.    Physical Exam   BP: (!) 146/83  Pulse: 103  Heart Rate: 100  Temp: 97.3  F (36.3  C)  Resp: 16  Weight: 71.9 kg (158 lb 8 oz)  SpO2: 99 %      Physical Exam   Constitutional: She is oriented to person, place, and time. She appears well-developed and well-nourished. No distress.   Somewhat ill-appearing older female lying in the bed   HENT:   Head: Normocephalic and atraumatic.   Dry mucous membrane   Eyes: Pupils are equal, round, and reactive to light. EOM are normal. No scleral icterus.   Cardiovascular: Regular rhythm and normal heart sounds.   Tachycardia   Pulmonary/Chest: Effort normal and breath sounds  normal.   Abdominal: Normal appearance. She exhibits no mass. Bowel sounds are increased. There is tenderness in the left lower quadrant. There is no rigidity and no rebound.   Neurological: She is alert and oriented to person, place, and time.   Skin: Skin is warm and dry. No pallor.   Psychiatric: She has a normal mood and affect. Her behavior is normal.   Vitals reviewed.      ED Course (with Medical Decision Making)    Pt seen and examined by me.  RN and EPIC notes reviewed.      Patient with recent diagnosis of diverticulitis, now presenting with nausea, vomiting, diarrhea associated with taking her medications.  She is on Cipro and Flagyl and has had similar symptoms in the past on these medications.  She looks dehydrated and is tachycardic.  Plan IV fluids, labs, IV Zosyn, nausea and pain medications.    Patient noted improvement in her symptoms after getting IV fluids, Zofran, Zosyn and Dilaudid.  Since she has normal labs and is afebrile and her previous CT did not show any significant evidence for a complicated diverticulitis we talked about options.  The first would be to admit her observation status for IV antibiotics.  The second would be to change her previous antibiotics to Augmentin and discharge her to home.  She would like to return home.  I am going to give her an Rx for Zofran if needed for nausea.  She was given a small amount of pain medications also.  She knows to return to the ED promptly at anytime for worsening, changes or concerns.        Procedures    Results for orders placed or performed during the hospital encounter of 08/04/19 (from the past 24 hour(s))   CBC with platelets differential   Result Value Ref Range    WBC 7.2 4.0 - 11.0 10e9/L    RBC Count 3.42 (L) 3.8 - 5.2 10e12/L    Hemoglobin 11.9 11.7 - 15.7 g/dL    Hematocrit 34.5 (L) 35.0 - 47.0 %     (H) 78 - 100 fl    MCH 34.8 (H) 26.5 - 33.0 pg    MCHC 34.5 31.5 - 36.5 g/dL    RDW 11.2 10.0 - 15.0 %    Platelet Count 271  150 - 450 10e9/L    Diff Method Automated Method     % Neutrophils 78.3 %    % Lymphocytes 13.3 %    % Monocytes 7.7 %    % Eosinophils 0.3 %    % Basophils 0.1 %    % Immature Granulocytes 0.3 %    Nucleated RBCs 0 0 /100    Absolute Neutrophil 5.6 1.6 - 8.3 10e9/L    Absolute Lymphocytes 1.0 0.8 - 5.3 10e9/L    Absolute Monocytes 0.6 0.0 - 1.3 10e9/L    Absolute Basophils 0.0 0.0 - 0.2 10e9/L    Abs Immature Granulocytes 0.0 0 - 0.4 10e9/L    Absolute Nucleated RBC 0.0    Comprehensive metabolic panel   Result Value Ref Range    Sodium 133 133 - 144 mmol/L    Potassium 3.7 3.4 - 5.3 mmol/L    Chloride 99 94 - 109 mmol/L    Carbon Dioxide 23 20 - 32 mmol/L    Anion Gap 11 3 - 14 mmol/L    Glucose 106 (H) 70 - 99 mg/dL    Urea Nitrogen 25 7 - 30 mg/dL    Creatinine 1.12 (H) 0.52 - 1.04 mg/dL    GFR Estimate 48 (L) >60 mL/min/[1.73_m2]    GFR Estimate If Black 55 (L) >60 mL/min/[1.73_m2]    Calcium 9.5 8.5 - 10.1 mg/dL    Bilirubin Total 0.4 0.2 - 1.3 mg/dL    Albumin 4.0 3.4 - 5.0 g/dL    Protein Total 7.5 6.8 - 8.8 g/dL    Alkaline Phosphatase 42 40 - 150 U/L    ALT 13 0 - 50 U/L    AST 13 0 - 45 U/L   Magnesium   Result Value Ref Range    Magnesium 1.7 1.6 - 2.3 mg/dL       Medications   0.9% sodium chloride BOLUS (0 mLs Intravenous Stopped 8/4/19 1057)   piperacillin-tazobactam (ZOSYN) infusion 3.375 g (0 g Intravenous Stopped 8/4/19 1033)       Assessments & Plan     I have reviewed the findings, diagnosis, plan and need for follow up with the patient.       Medication List      Started    amoxicillin-clavulanate 875-125 MG tablet  Commonly known as:  AUGMENTIN  1 tablet, Oral, 2 TIMES DAILY     ondansetron 4 MG ODT tab  Commonly known as:  ZOFRAN ODT  4 mg, Oral, EVERY 6 HOURS PRN     oxyCODONE-acetaminophen 5-325 MG tablet  Commonly known as:  PERCOCET  1 tablet, Oral, EVERY 6 HOURS PRN        Discontinued    ciprofloxacin 500 MG tablet  Commonly known as:  CIPRO     metroNIDAZOLE 500 MG tablet  Commonly known  as:  FLAGYL        ASK your doctor about these medications    acetaminophen-codeine 300-30 MG tablet  Commonly known as:  TYLENOL #3  1-2 tablets, Oral, EVERY 4 HOURS PRN  Ask about: Should I take this medication?            Final diagnoses:   Nausea vomiting and diarrhea   Diverticulitis of colon   Medication side effects     Disposition: Patient discharged home in stable condition.  Plan as above.  Return for concerns.     Note: Chart documentation done in part with Dragon Voice Recognition software. Although reviewed after completion, some word and grammatical errors may remain.     8/4/2019   [facility] EMERGENCY DEPARTMENT     Astrid Waters MD  08/04/19 6827

## 2019-08-04 NOTE — DISCHARGE INSTRUCTIONS
Start antibiotics as prescribed.  First dose tonight.  If you are able to take probiotics such as Culturelle or eat yogurt while you are on this medication, you may help prevent or decrease diarrhea symptoms.    Zofran if needed for nausea.    Percocet for severe pain.    Follow-up in clinic for recheck in 1 week.  You need to have a follow-up colonoscopy.  Return for worsening, changes or concerns.    I hope you feel much better quickly!

## 2019-08-05 DIAGNOSIS — R10.32 ABDOMINAL PAIN, LEFT LOWER QUADRANT: Primary | ICD-10-CM

## 2019-08-05 NOTE — LETTER
[address] [address]  [phone]        September 23, 2019    Capri Wiseman  [address] [facility] [address]

## 2019-08-05 NOTE — TELEPHONE ENCOUNTER
Left message for patient to return call to schedule colonoscopy or EGD. If Tyra or Melina are unavailable, please transfer to the surgery center.

## 2019-08-05 NOTE — TELEPHONE ENCOUNTER
Patient states she has diverticulitis and needs to get better first.  She would like a call to schedule end of Sept. I will set a reminder for us.

## 2019-08-05 NOTE — LETTER
COLONOSCOPY INSTRUCTIONS   For patients with CHF, cardiomyopathy,   kidney and/or renal disease or over the age of 75    (with GoLytely/NuLytely/CoLyte)    Patient Name   Capri Wiseman   Procedure Date   10/7/19 Arrival Time   11:00am Procedure Time   12:00pm   Physician   Dr. Recinos   [facility]   Other Instructions       Review the preparation schedule below for the five days preceding your procedure.     If you have any questions, please call [phone].  YOU WILL NEED TO PURCHASE   - Bisacodyl/Dulcolax tablets  5 mg (4 ORAL tablets) (No prescription needed)  - Fill your prescription for GoLytely/NuLytely/Colyte  - A packet of non-red or non-purple Crystal Light (Optional--to improve taste of prep solution)   BEFORE THE PROCEDURE   - You will receive a phone call 1 to 2 days prior to your procedure. Information will be collected to pre-admit you, which will assist us in giving you the best care possible or you can call [phone].  - If you are diabetic, consult your physician for additional instruction.  - Check with your insurance carrier about coverage (phone number on the back of your insurance card).  - You will need to make arrangements to have someone drive you home. You will not be able to drive the day of your examination. You can expect to be here approximately 2 hours; your  needs to be at [facility] 2 hours after your arrival time.  - You will not be able to return to work the day of your procedure.  - If using iron supplements, stop taking those five days before your procedure.  - Three days before your procedure, begin a low-fiber diet. Avoid raw fruits, vegetables, whole wheat, nuts, popcorn, Metamucil, Fibercon, bran or bulking agents. Meats and cooked vegetables are fine.  - Two days before your procedure, increase your water intake (8 glasses of water is recommended).   DAY BEFORE PROCEDURE   - IF YOU ARE SCHEDULED TO ARRIVE AT 11 AM OR BEFORE  FOLLOW THE INSTRUCTIONS BELOW  - You will need to be on a clear liquid diet the entire day. No solid foods.  - In the morning, mix the GoLytely/NuLytely/Colyte powder with water until completely dissolved and then refrigerate.  - At 9 am take four tablets of Bisacodyl.  -  At 3 pm start drinking the prep solution. You will need to have access to the bathroom once you start drinking the prep solution. Some people prefer to drink the solution at room temperature. You may take it out of the refrigerator 1-2 hours prior to drinking it. You may also add a packet of non-red or non-purple Crystal Light to improve the taste. It is best to drink an 8 oz glassful every 15 minutes.    - It will take about 4-6 hours to drink the solution.  Continue drinking clear liquids after you have finished the prep solution.   - If you experience bloating, cramping, nausea, or vomiting, take a 15-30 minute break and then start drinking prep solution again.     IF YOU ARE SCHEDULED TO ARRIVE 11:00 AM OR LATER:  DAY BEFORE PROCEDURE   - You will need to be on a clear liquid diet the entire day (SEE BELOW). No solid foods.  - In the morning, mix the GoLytely/NuLytely/Colyte powder with water until completely dissolved and then refrigerate.  - At 9 am take four tablets of Bisacodyl.  - At 3 pm you will start drinking your prep solution. You will only be drinking half of the solution today, it should take you about 2 - 3 hours to drink half. Some people prefer to drink the solution at room temperature. You may take it out of the refrigerator 1-2 hours prior to drinking it. You may also add a packet of non-red or non-purple Crystal Light to improve the taste. It is best to drink an 8 oz glassful every 15 minutes.    -  Put the remainder of the prep solution back in the refrigerator for procedure morning.   - If you experience bloating, cramping, nausea, or vomiting, take a 15-30 minute break and then start drinking the prep solution again.    PROCEDURE DAY    - THIS STEP ONLY FOR THOSE WHO DRANK HALF OF PREP DAY BEFORE  - Start drinking your last half of prep solution at 6 am.  Drink an 8 oz glass every 15 minutes until prep solution is gone.  It will take about 2 to 3 hours to finish solution.   - No eating or drinking 3 hours prior to your procedure.  - If you experience bloating, cramping, nausea, or vomiting take a 15 to 30 minute break and then start drinking prep solution again.   Dress comfortably. Short sleeves are allowed under your patient gown. You may have clear liquids until three hours before your procedure. Please do not wear any perfume or cologne.    Please check with your provider regarding holding any medications. Please bring a list of your current medications with you. If you have any questions regarding these instructions, please call us at [phone].        CLEAR LIQUID DIET  The clear liquid diet are foods that are free of fat and fiber. They will liquefy to clear liquid at room temperature. No red or purple colored juices or Jell-O s, dairy products, or alcoholic beverages.  TYPE OF FOOD USE ONLY THESE FOODS   Beverages Coffee      Tea      Decaffeinated coffee  Carbonated beverages (pop)  Water  Sport drinks (except red or purple)   Desserts Jell-O (except red or purple)  Fruit ice  Popsicle   Fruit and Fruit Juices Citrus juices, strained  Fruit nectars, strained  Apple juice  Fruit drinks (except red or purple)   Soups Broth  Bouillon  Consommé  Meat stock, strained  Vegetable broth, strained   Sweets Hard candy, non-red or non-purple  Sugar   Miscellaneous Flavorings  Salt           Directions to [facility]    From the North:   Take the [address]nd [address] exit off of [address] (on the south side of [place]).  Turn left on [address]  Turn left at the first stoplight (at [facility]).  The hospital and clinic is the third building on the left.     From the South:  Follow [address] north to the  first [place] exit.  Exit on [address] following it to the right.  Turn left at the first stoplight.  The hospital and clinic is the third building on the left.    From the East:     Following [address] west, turn left at the stoplight onto [address] Follow [address] through [place].  Take a right at the light on [address] (at [facility]).  The hospital and clinic is the third building on the left.    From the West:    Following [address] going east, turn south on [address].  Take the [address] exit off of [address] (on the south side of [place]).  Follow [address] through [place] to the stoplight on [address] (at [facility]). Take a left.  The hospital and clinic is the third building on the left.    Colonoscopy  What you should know    What is a colonoscopy?  A colonoscopy lets the doctor look inside your large intestine (colon). The doctor guides a long, flexible, lighted tube through the entire colon. The exam is used to look for early signs of cancer. It is also used to find the cause of a change in bowel habits. The doctor is able to see any inflamed tissue, polyps, ulcers, bleeding or muscle spasms.    How do I prepare for the exam?  See the guidelines for cleaning out your colon. The steps to prepare your colon begin four days before the exam.    Please arrive with someone who can drive you home after the exam. The medicines used in the exam will make you sleepy.  You will not be able to drive. You cannot take a bus or taxi by yourself.  You cannot walk home.    How do I prepare the day of the exam?    *Dress in comfortable, loose clothes.  *Leave your purse, billfold, credit cards, etc. at home.  *Bring your insurance card.  *Bring a list of your medications.  *Plan to arrive on time.  *We do our best to stay on time, but there may be a delay. Please bring something to pass the time, such as a newspaper, book or magazine.    What happens when I arrive?    *You will do some  paper work.  *We will take you to the admission area. Your family may stay with you during this time.  *A nurse will check your records and ask you questions.  *You will change into a hospital gown without underwear.   *You will sign a consent form.  *We will start an IV (intravenous). We will use it to give you medicines during the exam.    What happens during the exam?    *We will take you on a cart to the exam room.   *You can ask questions of the doctor who is doing your exam.  *You will lie on your left side on a table.    *You will receive medicines through your IV to relax you and for pain.    *The doctor will insert a thin, lighted tube (scope) into your rectum. Then the doctor will slowly guide it into your colon. The scope bends, so he or she can move it around the curves of your colon.    *The scope sends an image of the inside of the colon. The image allows the doctor to examine the lining of the colon.    *We may ask you to change positions to help the doctor move the scope.    *The scope also blows air into your colon. This enlarges the colon and helps the doctor see the lining.  *You should feel little pain during the exam.   *You may feel full and have cramps. Breathe slowly and evenly to help your body relax. Tell your doctor or nurse if you have any pain.    If we see a polyp, we will remove a piece of it (polypectomy). That tissue (biopsy) will be tested in the lab. Most polyps are benign (not cancer). We remove most polyps because they can cause bleeding or turn into cancer.     Risks of the exam are bleeding and piercing or tearing the colon. But this is rare.    What happens after the exam?    *We will return you to your room. We will watch you for one hour or until most of the pain medicine has worn off.  *You may feel bloated or have cramping for a short time because of the air injected during the exam. You will pass the rest of the air from your colon in the next couple hours.  *We will  remove the IV.   *Your first meal should be light. Slowly return to normal meals, unless your doctor gives you other orders.

## 2019-08-08 ENCOUNTER — OFFICE VISIT (OUTPATIENT)
Dept: FAMILY MEDICINE | Facility: CLINIC | Age: 74
End: 2019-08-08
Payer: COMMERCIAL

## 2019-08-08 VITALS
RESPIRATION RATE: 18 BRPM | SYSTOLIC BLOOD PRESSURE: 120 MMHG | OXYGEN SATURATION: 95 % | WEIGHT: 162 LBS | HEART RATE: 85 BPM | TEMPERATURE: 96.7 F | DIASTOLIC BLOOD PRESSURE: 78 MMHG | BODY MASS INDEX: 31.22 KG/M2

## 2019-08-08 DIAGNOSIS — K57.32 DIVERTICULITIS OF COLON: Primary | ICD-10-CM

## 2019-08-08 PROCEDURE — 99213 OFFICE O/P EST LOW 20 MIN: CPT | Performed by: FAMILY MEDICINE

## 2019-08-08 RX ORDER — ONDANSETRON 4 MG/1
4 TABLET, ORALLY DISINTEGRATING ORAL EVERY 6 HOURS PRN
Qty: 30 TABLET | Refills: 0 | Status: ON HOLD | OUTPATIENT
Start: 2019-08-08 | End: 2021-01-12

## 2019-08-08 ASSESSMENT — PAIN SCALES - GENERAL: PAINLEVEL: NO PAIN (0)

## 2019-08-08 NOTE — PROGRESS NOTES
Subjective     ED/UC Followup:    Facility:  [facility]  Date of visit: 8/4  Reason for visit: Nausea vomiting and diarrhea and diverticulitis   Current Status: catracho Farooq is a 73 year old female who comes to clinic to recheck diverticulitis.  She feels much improved.  Pain resolved.  Nausea resolved.  Unfortunate had prescribed her Flagyl and Cipro and she return to the ER with nausea vomiting dehydration.  She was changed to Augmentin and that has gone very well for her.  No diarrhea.  No fever.    Review of Systems   ROS COMP: Constitutional, HEENT, cardiovascular, pulmonary, gi and gu systems are negative, except as otherwise noted.      Objective    /78   Pulse 85   Temp 96.7  F (35.9  C) (Temporal)   Resp 18   Wt 73.5 kg (162 lb)   LMP  (LMP Unknown)   SpO2 95%   BMI 31.22 kg/m       Wt Readings from Last 2 Encounters:   08/08/19 73.5 kg (162 lb)   08/04/19 71.9 kg (158 lb 8 oz)       Physical Exam   GENERAL: healthy, alert and no distress  NECK: no adenopathy, no asymmetry, masses, or scars and thyroid normal to palpation  RESP: lungs clear to auscultation - no rales, rhonchi or wheezes  CV: regular rate and rhythm, normal S1 S2, no S3 or S4, no murmur, click or rub, no peripheral edema and peripheral pulses strong  ABDOMEN: soft, nontender, no hepatosplenomegaly, no masses and bowel sounds normal  MS: no gross musculoskeletal defects noted, no edema    Diagnostic Test Results:  Labs reviewed in Epic        Assessment & Plan       ICD-10-CM    1. Diverticulitis of colon K57.32 ondansetron (ZOFRAN ODT) 4 MG ODT tab       Resolving diverticulitis.  Complete antibiotics.  Continue on Zofran as needed for antibiotic induced nausea.  See her back in 6 to 8 weeks for colonoscopy    Con Recinos MD  [facility]

## 2019-08-13 ENCOUNTER — TELEPHONE (OUTPATIENT)
Dept: FAMILY MEDICINE | Facility: CLINIC | Age: 74
End: 2019-08-13

## 2019-08-13 ENCOUNTER — OFFICE VISIT (OUTPATIENT)
Dept: FAMILY MEDICINE | Facility: OTHER | Age: 74
End: 2019-08-13
Payer: COMMERCIAL

## 2019-08-13 VITALS
BODY MASS INDEX: 30.06 KG/M2 | DIASTOLIC BLOOD PRESSURE: 82 MMHG | SYSTOLIC BLOOD PRESSURE: 122 MMHG | HEART RATE: 81 BPM | RESPIRATION RATE: 18 BRPM | OXYGEN SATURATION: 100 % | TEMPERATURE: 97.2 F | WEIGHT: 156 LBS

## 2019-08-13 DIAGNOSIS — R10.31 RLQ ABDOMINAL PAIN: ICD-10-CM

## 2019-08-13 DIAGNOSIS — R10.31 RLQ ABDOMINAL PAIN: Primary | ICD-10-CM

## 2019-08-13 LAB
CRP SERPL-MCNC: 9.4 MG/L (ref 0–8)
ERYTHROCYTE [DISTWIDTH] IN BLOOD BY AUTOMATED COUNT: 11.2 % (ref 10–15)
HCT VFR BLD AUTO: 34.5 % (ref 35–47)
HGB BLD-MCNC: 11.6 G/DL (ref 11.7–15.7)
MCH RBC QN AUTO: 34.3 PG (ref 26.5–33)
MCHC RBC AUTO-ENTMCNC: 33.6 G/DL (ref 31.5–36.5)
MCV RBC AUTO: 102 FL (ref 78–100)
PLATELET # BLD AUTO: 276 10E9/L (ref 150–450)
RBC # BLD AUTO: 3.38 10E12/L (ref 3.8–5.2)
WBC # BLD AUTO: 7.7 10E9/L (ref 4–11)

## 2019-08-13 PROCEDURE — 86140 C-REACTIVE PROTEIN: CPT | Performed by: FAMILY MEDICINE

## 2019-08-13 PROCEDURE — 36415 COLL VENOUS BLD VENIPUNCTURE: CPT | Performed by: FAMILY MEDICINE

## 2019-08-13 PROCEDURE — 99213 OFFICE O/P EST LOW 20 MIN: CPT | Performed by: FAMILY MEDICINE

## 2019-08-13 PROCEDURE — 85027 COMPLETE CBC AUTOMATED: CPT | Performed by: FAMILY MEDICINE

## 2019-08-13 ASSESSMENT — PAIN SCALES - GENERAL: PAINLEVEL: MODERATE PAIN (5)

## 2019-08-13 NOTE — TELEPHONE ENCOUNTER
Reason for Call:  Same Day Appointment, Requested Provider:  Xochitl Hemphill M.D.    PCP: Xocihtl Hemphill    Reason for visit: Diverticulitis flare up. She would like to be seen today    Duration of symptoms: 4 weeks - finished her antibiotic and it is getting worse instead of better. She is having a lot of pain with this.     Have you been treated for this in the past? Yes    Additional comments: If she is not able to get worked in today, she is asking for a prescription for something for the pain. She uses [facility] pharmacy    Can we leave a detailed message on this number? YES    Phone number patient can be reached at: Home number on file [phone] (home)    Best Time: any    Call taken on 8/13/2019 at 7:38 AM by Laura Canas

## 2019-08-13 NOTE — TELEPHONE ENCOUNTER
Patient will need more time then that. Is there another time that would work?  Thank you,  Jenny Canas   for [facility]

## 2019-08-13 NOTE — TELEPHONE ENCOUNTER
Please advise if you would like to see patient again or other recommendations you have for the patient.   Sara Dickinson on 8/13/2019 at 8:17 AM

## 2019-08-13 NOTE — TELEPHONE ENCOUNTER
Second attempt to contact pt and got an answering machine. Left message to call us back and ask to speak with an RN..........................ChavaRN

## 2019-08-13 NOTE — TELEPHONE ENCOUNTER
I called pt and informed her that we will not do any meds right now since the pain is on the other side and that we need to see her today. appt made at 3:20 in [place]

## 2019-08-13 NOTE — TELEPHONE ENCOUNTER
Dr. Recinos does not have any other times today that will work, she may need to see her PCP  Clara Ramires MA

## 2019-08-13 NOTE — PROGRESS NOTES
"Subjective     Recheck pain       Capri is a 74 year old female who comes to clinic with resolving diverticulitis but no new onset right lower quadrant pain.  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  Years ago she had a surgery which showed \"scar tissue\" in that area.  She is wondering if it is the same.  She has sharp sudden pains in her right lower quadrant that are 5 out of 10 and then resolve in the next few minutes.    Review of Systems   ROS COMP: Constitutional, HEENT, cardiovascular, pulmonary, gi and gu systems are negative, except as otherwise noted.      Objective    /82   Pulse 81   Temp 97.2  F (36.2  C) (Temporal)   Resp 18   Wt 70.8 kg (156 lb)   LMP  (LMP Unknown)   SpO2 100%   BMI 30.06 kg/m       Wt Readings from Last 2 Encounters:   08/13/19 70.8 kg (156 lb)   08/08/19 73.5 kg (162 lb)       Physical Exam   GENERAL: healthy, alert and no distress  NECK: no adenopathy, no asymmetry, masses, or scars and thyroid normal to palpation  RESP: lungs clear to auscultation - no rales, rhonchi or wheezes  CV: regular rate and rhythm, normal S1 S2, no S3 or S4, no murmur, click or rub, no peripheral edema and peripheral pulses strong  ABDOMEN: soft, nontender, no hepatosplenomegaly, no masses and bowel sounds normal  MS: no gross musculoskeletal defects noted, no edema    Diagnostic Test Results:  Labs reviewed in Epic  CBC shows a normal white blood cell count, CRP pending    Assessment & Plan       ICD-10-CM    1. RLQ abdominal pain R10.31 CRP inflammation     CBC with platelets       I had a return to the office to check this new right lower quadrant pain in the setting of having recent diverticulitis.  Exam is reassuring.  This could be in the muscle layers, or intra-abdominal, but certainly nothing that suggests any urgency.  I asked her to continue on the low residue diet and call if things worsen this week.    Con Recinos MD  [facility]    "

## 2019-08-13 NOTE — TELEPHONE ENCOUNTER
Please triage. If she needs to be seen we can in [place] at 10;20 would be the only time we could see her with Dr. Recinos unless she want's to be worked in with her PCP.  Clara Ramires MA

## 2019-08-13 NOTE — TELEPHONE ENCOUNTER
Rn tried reaching pt to set up an appt with Dr. Recinos in [place] at 10:20 AM. But NA. Left message to call back ASAP.............................MARTHA Larsen

## 2019-09-23 RX ORDER — BISACODYL 5 MG/1
TABLET, DELAYED RELEASE ORAL
Qty: 4 TABLET | Refills: 0 | Status: CANCELLED | OUTPATIENT
Start: 2019-09-23

## 2019-09-23 NOTE — TELEPHONE ENCOUNTER
Date of colonoscopy/EGD: 10/7/19  Surgeon: Dr. Recinos  Prep:GoLytely, meds ordered, routed to provider  Packet:Colonoscopy/EGD instructions mailed to patient's home address.   Date: 9/23/2019      Surgery Scheduler

## 2019-09-23 NOTE — TELEPHONE ENCOUNTER
Left message for patient to return call to schedule EGD/colonoscopy. If Melina or Tyra are not available, please transfer to same day surgery

## 2019-10-02 DIAGNOSIS — Z12.11 SPECIAL SCREENING FOR MALIGNANT NEOPLASMS, COLON: ICD-10-CM

## 2019-10-02 DIAGNOSIS — Z78.9: Primary | ICD-10-CM

## 2019-10-02 DIAGNOSIS — Z12.5 SCREENING FOR PROSTATE CANCER: ICD-10-CM

## 2019-10-02 RX ORDER — BISACODYL 5 MG/1
20 TABLET, DELAYED RELEASE ORAL ONCE
Qty: 4 TABLET | Refills: 0 | Status: SHIPPED | OUTPATIENT
Start: 2019-10-02 | End: 2020-01-15

## 2019-10-02 NOTE — TELEPHONE ENCOUNTER
Reason for Call:  Other appointment    Detailed comments: Patient has colonoscopy on Monday 10/07 and she needs the golytely prep called into to [facility] in [place].    Phone Number Patient can be reached at: Home number on file [phone] (home)    Best Time: any    Can we leave a detailed message on this number? YES    Call taken on 10/2/2019 at 2:24 PM by Clara Bowen

## 2019-10-02 NOTE — TELEPHONE ENCOUNTER
Meds needed for colonoscopy prep are pending for provider to sign. Please advise once signed. Maryjo Leo LPN

## 2019-10-07 ENCOUNTER — ANESTHESIA (OUTPATIENT)
Dept: GASTROENTEROLOGY | Facility: CLINIC | Age: 74
End: 2019-10-07
Payer: COMMERCIAL

## 2019-10-07 ENCOUNTER — ANESTHESIA EVENT (OUTPATIENT)
Dept: GASTROENTEROLOGY | Facility: CLINIC | Age: 74
End: 2019-10-07
Payer: COMMERCIAL

## 2019-10-07 ENCOUNTER — HOSPITAL ENCOUNTER (OUTPATIENT)
Facility: CLINIC | Age: 74
Discharge: HOME | End: 2019-10-07
Attending: FAMILY MEDICINE | Admitting: FAMILY MEDICINE
Payer: COMMERCIAL

## 2019-10-07 ENCOUNTER — SURGERY (OUTPATIENT)
Age: 74
End: 2019-10-07
Payer: COMMERCIAL

## 2019-10-07 VITALS
OXYGEN SATURATION: 99 % | RESPIRATION RATE: 16 BRPM | SYSTOLIC BLOOD PRESSURE: 141 MMHG | HEART RATE: 73 BPM | DIASTOLIC BLOOD PRESSURE: 80 MMHG

## 2019-10-07 LAB — COLONOSCOPY: NORMAL

## 2019-10-07 PROCEDURE — 25000125 ZZHC RX 250: Performed by: NURSE ANESTHETIST, CERTIFIED REGISTERED

## 2019-10-07 PROCEDURE — 37000009 ZZH ANESTHESIA TECHNICAL FEE, EACH ADDTL 15 MIN: Performed by: FAMILY MEDICINE

## 2019-10-07 PROCEDURE — G0121 COLON CA SCRN NOT HI RSK IND: HCPCS | Performed by: FAMILY MEDICINE

## 2019-10-07 PROCEDURE — 45378 DIAGNOSTIC COLONOSCOPY: CPT | Performed by: FAMILY MEDICINE

## 2019-10-07 PROCEDURE — 37000008 ZZH ANESTHESIA TECHNICAL FEE, 1ST 30 MIN: Performed by: FAMILY MEDICINE

## 2019-10-07 PROCEDURE — 40000296 ZZH STATISTIC ENDO RECOVERY CLASS 1:2 FIRST HOUR: Performed by: FAMILY MEDICINE

## 2019-10-07 PROCEDURE — 25000128 H RX IP 250 OP 636: Performed by: NURSE ANESTHETIST, CERTIFIED REGISTERED

## 2019-10-07 RX ORDER — ONDANSETRON 2 MG/ML
4 INJECTION INTRAMUSCULAR; INTRAVENOUS EVERY 6 HOURS PRN
Status: DISCONTINUED | OUTPATIENT
Start: 2019-10-07 | End: 2019-10-07 | Stop reason: HOSPADM

## 2019-10-07 RX ORDER — PROPOFOL 10 MG/ML
INJECTION, EMULSION INTRAVENOUS CONTINUOUS PRN
Status: DISCONTINUED | OUTPATIENT
Start: 2019-10-07 | End: 2019-10-07

## 2019-10-07 RX ORDER — PROPOFOL 10 MG/ML
INJECTION, EMULSION INTRAVENOUS PRN
Status: DISCONTINUED | OUTPATIENT
Start: 2019-10-07 | End: 2019-10-07

## 2019-10-07 RX ORDER — LIDOCAINE HYDROCHLORIDE 20 MG/ML
INJECTION, SOLUTION INFILTRATION; PERINEURAL PRN
Status: DISCONTINUED | OUTPATIENT
Start: 2019-10-07 | End: 2019-10-07

## 2019-10-07 RX ORDER — SODIUM CHLORIDE, SODIUM LACTATE, POTASSIUM CHLORIDE, CALCIUM CHLORIDE 600; 310; 30; 20 MG/100ML; MG/100ML; MG/100ML; MG/100ML
INJECTION, SOLUTION INTRAVENOUS CONTINUOUS
Status: DISCONTINUED | OUTPATIENT
Start: 2019-10-07 | End: 2019-10-07 | Stop reason: HOSPADM

## 2019-10-07 RX ORDER — ONDANSETRON 4 MG/1
4 TABLET, ORALLY DISINTEGRATING ORAL EVERY 6 HOURS PRN
Status: DISCONTINUED | OUTPATIENT
Start: 2019-10-07 | End: 2019-10-07 | Stop reason: HOSPADM

## 2019-10-07 RX ORDER — ONDANSETRON 2 MG/ML
4 INJECTION INTRAMUSCULAR; INTRAVENOUS
Status: DISCONTINUED | OUTPATIENT
Start: 2019-10-07 | End: 2019-10-07 | Stop reason: HOSPADM

## 2019-10-07 RX ORDER — LIDOCAINE 40 MG/G
CREAM TOPICAL
Status: DISCONTINUED | OUTPATIENT
Start: 2019-10-07 | End: 2019-10-07 | Stop reason: HOSPADM

## 2019-10-07 RX ORDER — NALOXONE HYDROCHLORIDE 0.4 MG/ML
.1-.4 INJECTION, SOLUTION INTRAMUSCULAR; INTRAVENOUS; SUBCUTANEOUS
Status: DISCONTINUED | OUTPATIENT
Start: 2019-10-07 | End: 2019-10-07 | Stop reason: HOSPADM

## 2019-10-07 RX ORDER — FLUMAZENIL 0.1 MG/ML
0.2 INJECTION, SOLUTION INTRAVENOUS
Status: DISCONTINUED | OUTPATIENT
Start: 2019-10-07 | End: 2019-10-07 | Stop reason: HOSPADM

## 2019-10-07 RX ADMIN — PROPOFOL 40 MG: 10 INJECTION, EMULSION INTRAVENOUS at 11:35

## 2019-10-07 RX ADMIN — LIDOCAINE HYDROCHLORIDE 30 MG: 20 INJECTION, SOLUTION INFILTRATION; PERINEURAL at 11:34

## 2019-10-07 RX ADMIN — PROPOFOL 150 MCG/KG/MIN: 10 INJECTION, EMULSION INTRAVENOUS at 11:35

## 2019-10-07 NOTE — ANESTHESIA POSTPROCEDURE EVALUATION
Patient: Capri Wiseman    Procedure(s):  COLONOSCOPY    Diagnosis:LLQ abdominal pain [R10.32]  Diagnosis Additional Information: No value filed.    Anesthesia Type:  MAC    Note:  Anesthesia Post Evaluation    Patient location during evaluation: Phase 2  Level of consciousness: awake and alert  Pain management: adequate  Airway patency: patent  Cardiovascular status: blood pressure returned to baseline  Respiratory status: spontaneous ventilation and room air  Hydration status: acceptable  PONV: none     Anesthetic complications: None    Comments: Patient was very pleased with her anesthetic today. No anesthesia concerns.         Last vitals:  Vitals:    10/07/19 1223 10/07/19 1230   BP: (!) 136/119 129/65   Pulse:  75   Resp: 16 16   SpO2: 96% 100%         Electronically Signed By: ANTONIO Mohan CRNA  October 7, 2019  12:36 PM

## 2019-10-07 NOTE — DISCHARGE INSTRUCTIONS
[facility]    Home Care Following Endoscopy    Dr. Recinos      Activity:    You have just undergone an endoscopic procedure usually performed with conscious sedation.  Do not work or operate machinery (including a car) for at least 12 hours.      I encourage you to walk and attempt to pass this air as soon as possible.    Diet:    Return to the diet you were on before your procedure but eat lightly for the first 12-24 hours.    Drink plenty of water.    Resume any regular medications unless otherwise advised by your physician.  Please begin any new medication prescribed as a result of your procedure as directed by your physician.     If you had any biopsy or polyp removed please refrain from aspirin or aspirin products for 2 days.  If on Coumadin please restart as instructed by your physician.   Pain:    You may take Tylenol as needed for pain.  Expected Recovery:    You can expect some mild abdominal fullness and/or discomfort due to the air used to inflate your intestinal tract. It is also normal to have a mild sore throat after upper endoscopy.    Call Your Physician if You Have:    After Colonoscopy:  o Worsening persisting abdominal pain which is worse with activity.  o Fevers (>101 degrees F), chills or shakes.  o Passage of continued blood with bowel movements.   Any questions or concerns about your recovery, please call [phone] or after hours [phone] Nurse Advice Line.    Follow-up Care:    You should receive a call or letter with your results within 1 week. Please call if you have not received a notification of your results.  If asked to return to clinic please make an appointment 1 week after your procedure.  Call [phone].

## 2019-10-07 NOTE — ANESTHESIA PREPROCEDURE EVALUATION
Anesthesia Pre-Procedure Evaluation    Patient: Capri Wiseman   MRN: [id number] : 1945          Preoperative Diagnosis: LLQ abdominal pain [R10.32]    Procedure(s):  COLONOSCOPY    Past Medical History:   Diagnosis Date     CKD (chronic kidney disease) stage 3, GFR 30-59 ml/min (H)      Dental disorder     loosing top teeth due to lupus     GERD (gastroesophageal reflux disease)      High blood pressure      Mixed hyperlipidaemia      Osteoarthritis      RA (rheumatoid arthritis) (H)     mild, on plaquenil     SLE (systemic lupus erythematosus) (H)      Vision problem     dry eyes from Lupus     Past Surgical History:   Procedure Laterality Date     APPENDECTOMY       C TOTAL KNEE ARTHROPLASTY  3/10    left     COLONOSCOPY  2009    repeat 5 years     COLONOSCOPY N/A 2015    normal, repeat 5 years due to family history     FOOT SURGERY      bone and screws in 2 toes, hammertoe     HYSTERECTOMY TOTAL ABDOMINAL      due to bleeding, benign     LAPAROTOMY EXPLORATORY      scar tissue removal/repair - abd     SHOULDER SURGERY  2013    rotator cuff repair, right       Anesthesia Evaluation     . Pt has had prior anesthetic. Type: MAC and General           ROS/MED HX    ENT/Pulmonary:  - neg pulmonary ROS     Neurologic:  - neg neurologic ROS     Cardiovascular:     (+) Dyslipidemia, hypertension----. : . . . :. . Previous cardiac testing Echodate:12results:EF 60-65%date: results:ECG reviewed date:13 results:SR date: results:          METS/Exercise Tolerance:     Hematologic:         Musculoskeletal: Comment: Long term use of plaquenil   (+) arthritis,  other musculoskeletal- rheumatoid and otsto arthritis       GI/Hepatic:     (+) GERD Asymptomatic on medication,       Renal/Genitourinary:     (+) chronic renal disease, type: CRI, Pt does not require dialysis, Pt has no history of transplant,       Endo: Comment: Systemic lupus erythematosus, unspecified organ involvement.        "  Psychiatric:  - neg psychiatric ROS       Infectious Disease:  - neg infectious disease ROS       Malignancy:      - no malignancy   Other:    (+) No chance of pregnancy C-spine cleared: N/A, H/O Chronic Pain,                        Physical Exam  Normal systems: cardiovascular, pulmonary and dental    Airway   Mallampati: II  TM distance: >3 FB  Neck ROM: full    Dental     Cardiovascular   Rhythm and rate: regular and normal      Pulmonary    breath sounds clear to auscultation            Lab Results   Component Value Date    WBC 7.7 08/13/2019    HGB 11.6 (L) 08/13/2019    HCT 34.5 (L) 08/13/2019     08/13/2019    CRP 9.4 (H) 08/13/2019     08/04/2019    POTASSIUM 3.7 08/04/2019    CHLORIDE 99 08/04/2019    CO2 23 08/04/2019    BUN 25 08/04/2019    CR 1.12 (H) 08/04/2019     (H) 08/04/2019    SEBASTIEN 9.5 08/04/2019    MAG 1.7 08/04/2019    ALBUMIN 4.0 08/04/2019    PROTTOTAL 7.5 08/04/2019    ALT 13 08/04/2019    AST 13 08/04/2019    ALKPHOS 42 08/04/2019    BILITOTAL 0.4 08/04/2019    LIPASE 141 07/31/2015    TSH 1.600 07/08/2013       Preop Vitals  BP Readings from Last 3 Encounters:   08/13/19 122/82   08/08/19 120/78   08/04/19 (!) 140/71    Pulse Readings from Last 3 Encounters:   08/13/19 81   08/08/19 85   08/04/19 88      Resp Readings from Last 3 Encounters:   08/13/19 18   08/08/19 18   08/04/19 16    SpO2 Readings from Last 3 Encounters:   08/13/19 100%   08/08/19 95%   08/04/19 96%      Temp Readings from Last 1 Encounters:   08/13/19 97.2  F (36.2  C) (Temporal)    Ht Readings from Last 1 Encounters:   07/15/19 1.534 m (5' 0.4\")      Wt Readings from Last 1 Encounters:   08/13/19 70.8 kg (156 lb)    Estimated body mass index is 30.06 kg/m  as calculated from the following:    Height as of 7/15/19: 1.534 m (5' 0.4\").    Weight as of 8/13/19: 70.8 kg (156 lb).       Anesthesia Plan      History & Physical Review  History and physical reviewed and following examination; no interval " change.    ASA Status:  3 .    NPO Status:  > 8 hours    Plan for MAC with Intravenous and Propofol induction. Maintenance will be TIVA.  Reason for MAC:  Deep or markedly invasive procedure (G8)         Postoperative Care  Postoperative pain management:  Oral pain medications.      Consents  Anesthetic plan, risks, benefits and alternatives discussed with:  Patient..                 ANTONIO Mohan CRNA

## 2019-10-07 NOTE — ANESTHESIA CARE TRANSFER NOTE
Patient: Capri Wiseman    Procedure(s):  COLONOSCOPY    Diagnosis: LLQ abdominal pain [R10.32]  Diagnosis Additional Information: No value filed.    Anesthesia Type:   MAC     Note:  Airway :Room Air  Patient transferred to:Phase II  Handoff Report: Identifed the Patient, Identified the Reponsible Provider, Reviewed the pertinent medical history, Discussed the surgical course, Reviewed Intra-OP anesthesia mangement and issues during anesthesia, Set expectations for post-procedure period and Allowed opportunity for questions and acknowledgement of understanding      Vitals: (Last set prior to Anesthesia Care Transfer)    CRNA VITALS  10/7/2019 1151 - 10/7/2019 1225      10/7/2019             Resp Rate (observed):  17                Electronically Signed By: ANTONIO Mohan CRNA  October 7, 2019  12:25 PM

## 2019-10-08 ENCOUNTER — TELEPHONE (OUTPATIENT)
Dept: RHEUMATOLOGY | Facility: CLINIC | Age: 74
End: 2019-10-08

## 2019-10-08 NOTE — TELEPHONE ENCOUNTER
"[facility]    Phone Message    May a detailed message be left on voicemail: yes    Reason for Call: Other: Pt called to schedule follow up appt. Pt was Dr. Loredo's pt. Writer unsure if pt needed to be on a \"NP\" time slot since she'd be a new patient to [facility]. Please call pt to schedule.     Action Taken: Message routed to:  Adult Clinics: Rheumatology p [phone]  "

## 2019-10-09 ENCOUNTER — DOCUMENTATION ONLY (OUTPATIENT)
Dept: LAB | Facility: CLINIC | Age: 74
End: 2019-10-09

## 2019-10-09 DIAGNOSIS — Z79.899 LONG-TERM USE OF PLAQUENIL: Primary | ICD-10-CM

## 2019-10-10 ENCOUNTER — OFFICE VISIT (OUTPATIENT)
Dept: RHEUMATOLOGY | Facility: CLINIC | Age: 74
End: 2019-10-10
Payer: COMMERCIAL

## 2019-10-10 VITALS
HEART RATE: 70 BPM | SYSTOLIC BLOOD PRESSURE: 151 MMHG | DIASTOLIC BLOOD PRESSURE: 77 MMHG | BODY MASS INDEX: 31.67 KG/M2 | HEIGHT: 60 IN | OXYGEN SATURATION: 100 % | WEIGHT: 161.3 LBS

## 2019-10-10 DIAGNOSIS — M32.19 SYSTEMIC LUPUS ERYTHEMATOSUS WITH OTHER ORGAN INVOLVEMENT, UNSPECIFIED SLE TYPE (H): ICD-10-CM

## 2019-10-10 DIAGNOSIS — Z79.899 LONG-TERM USE OF PLAQUENIL: ICD-10-CM

## 2019-10-10 DIAGNOSIS — M32.19 SYSTEMIC LUPUS ERYTHEMATOSUS WITH OTHER ORGAN INVOLVEMENT, UNSPECIFIED SLE TYPE (H): Primary | ICD-10-CM

## 2019-10-10 LAB
ALBUMIN SERPL-MCNC: 3.9 G/DL (ref 3.4–5)
ALBUMIN UR-MCNC: 10 MG/DL
ALT SERPL W P-5'-P-CCNC: 29 U/L (ref 0–50)
APPEARANCE UR: CLEAR
AST SERPL W P-5'-P-CCNC: 19 U/L (ref 0–45)
BACTERIA #/AREA URNS HPF: ABNORMAL /HPF
BILIRUB UR QL STRIP: NEGATIVE
COLOR UR AUTO: YELLOW
CREAT SERPL-MCNC: 1.09 MG/DL (ref 0.52–1.04)
ERYTHROCYTE [DISTWIDTH] IN BLOOD BY AUTOMATED COUNT: 12.1 % (ref 10–15)
ERYTHROCYTE [SEDIMENTATION RATE] IN BLOOD BY WESTERGREN METHOD: 21 MM/H (ref 0–30)
GFR SERPL CREATININE-BSD FRML MDRD: 49 ML/MIN/{1.73_M2}
GLUCOSE UR STRIP-MCNC: NEGATIVE MG/DL
HCT VFR BLD AUTO: 34.4 % (ref 35–47)
HGB BLD-MCNC: 11.4 G/DL (ref 11.7–15.7)
HGB UR QL STRIP: NEGATIVE
KETONES UR STRIP-MCNC: NEGATIVE MG/DL
LEUKOCYTE ESTERASE UR QL STRIP: ABNORMAL
MCH RBC QN AUTO: 33.9 PG (ref 26.5–33)
MCHC RBC AUTO-ENTMCNC: 33.1 G/DL (ref 31.5–36.5)
MCV RBC AUTO: 102 FL (ref 78–100)
NITRATE UR QL: NEGATIVE
NON-SQ EPI CELLS #/AREA URNS LPF: ABNORMAL /LPF
PH UR STRIP: 5.5 PH (ref 5–7)
PLATELET # BLD AUTO: 208 10E9/L (ref 150–450)
RBC # BLD AUTO: 3.36 10E12/L (ref 3.8–5.2)
RBC #/AREA URNS AUTO: ABNORMAL /HPF
SOURCE: ABNORMAL
SP GR UR STRIP: 1.03 (ref 1–1.03)
UROBILINOGEN UR STRIP-MCNC: NORMAL MG/DL (ref 0–2)
WBC # BLD AUTO: 6.6 10E9/L (ref 4–11)
WBC #/AREA URNS AUTO: ABNORMAL /HPF

## 2019-10-10 PROCEDURE — 36415 COLL VENOUS BLD VENIPUNCTURE: CPT | Performed by: STUDENT IN AN ORGANIZED HEALTH CARE EDUCATION/TRAINING PROGRAM

## 2019-10-10 PROCEDURE — 99213 OFFICE O/P EST LOW 20 MIN: CPT | Performed by: STUDENT IN AN ORGANIZED HEALTH CARE EDUCATION/TRAINING PROGRAM

## 2019-10-10 PROCEDURE — 85652 RBC SED RATE AUTOMATED: CPT | Performed by: STUDENT IN AN ORGANIZED HEALTH CARE EDUCATION/TRAINING PROGRAM

## 2019-10-10 PROCEDURE — 84450 TRANSFERASE (AST) (SGOT): CPT | Performed by: STUDENT IN AN ORGANIZED HEALTH CARE EDUCATION/TRAINING PROGRAM

## 2019-10-10 PROCEDURE — 85027 COMPLETE CBC AUTOMATED: CPT | Performed by: STUDENT IN AN ORGANIZED HEALTH CARE EDUCATION/TRAINING PROGRAM

## 2019-10-10 PROCEDURE — 82040 ASSAY OF SERUM ALBUMIN: CPT | Performed by: STUDENT IN AN ORGANIZED HEALTH CARE EDUCATION/TRAINING PROGRAM

## 2019-10-10 PROCEDURE — 82565 ASSAY OF CREATININE: CPT | Performed by: STUDENT IN AN ORGANIZED HEALTH CARE EDUCATION/TRAINING PROGRAM

## 2019-10-10 PROCEDURE — 84460 ALANINE AMINO (ALT) (SGPT): CPT | Performed by: STUDENT IN AN ORGANIZED HEALTH CARE EDUCATION/TRAINING PROGRAM

## 2019-10-10 PROCEDURE — 86140 C-REACTIVE PROTEIN: CPT | Performed by: STUDENT IN AN ORGANIZED HEALTH CARE EDUCATION/TRAINING PROGRAM

## 2019-10-10 PROCEDURE — 81001 URINALYSIS AUTO W/SCOPE: CPT | Performed by: STUDENT IN AN ORGANIZED HEALTH CARE EDUCATION/TRAINING PROGRAM

## 2019-10-10 RX ORDER — HYDROXYCHLOROQUINE SULFATE 200 MG/1
200 TABLET, FILM COATED ORAL DAILY
Qty: 90 TABLET | Refills: 3 | Status: SHIPPED | OUTPATIENT
Start: 2019-10-10 | End: 2020-11-11

## 2019-10-10 ASSESSMENT — ROUTINE ASSESSMENT OF PATIENT INDEX DATA (RAPID3)
TOTAL RAPID3 SCORE: 0
RAPID3 INTERPRETATION: REMISSION

## 2019-10-10 ASSESSMENT — MIFFLIN-ST. JEOR: SCORE: 1143.15

## 2019-10-10 ASSESSMENT — PAIN SCALES - GENERAL: PAINLEVEL: NO PAIN (0)

## 2019-10-10 NOTE — PROGRESS NOTES
Rheumatology Visit     Capri Wiseman MRN# [id number]   YOB: 1945 Age: 72 year old     Date of Visit: Oct 10, 2019  Primary care provider: Xochitl Hemphill          Assessment and Plan:   74-year-old woman with a 13-year history of SLE characterized by arthralgias, sicca symptoms, alopecia, and GERD. +AMY in 2013. Has been in remission for 7+ years, on maintenance therapy with  mg qd. Possible history of RA; likely a component of secondary Sjogren's impacting dental health currently being followed at [facility]      2.  Other medical problems as per EPIC      PLAN: Discussed in detail with the patient      1. Continue Plaquenil - Rx renewed. Eye exam yearly  2. For her dental issues due to sicca symptoms, continue with [facility]  3. Lab is current  4. Return in 1 year  5. Flu shot this fall.                    History of Present Illness:   72-year-old woman who presents to [facility] to followup fo diagnosis of SLE. She was seen by Dr Loredo in March 2015 after she moved to [place] from [place]. EPIC reviewed.      HISTORY CARRIED FORWARD:      She first began experiencing symptoms SLE about 12 years ago, when she was hospitalized for ACS-like chest pain. Cardiovascular workup was negative for coronary disease. About one year later, she was again hospitalized for chest pain and underwent a cardiac catheterization, which showed clean coronaries. Her chest pain was ultimately diagnosed as GERD and additional blood work (specifics unknown) suggested this was secondary to SLE. She had no other symptoms of SLE at the time.      Over the years she has experienced additional symptoms, including arthralgias (primarily in knees, ankles, hands, wrists), alopecia, sicca symptoms, and Raynaud s. She was started on prednisone sometime early in her disease course but became very  swollen  and was never given corticosteroids again after this, per her recollection.  Currently taking  mg qd and has been on this dose for years. Last eye exam was in 10/2013. Any additional treatment history is unknown.      Believes she also has RA but history of diagnosis is entirely unknown. Over the past 2-3 years she has been losing her upper row of teeth 2/2 dry mouth and has been told she needs dental surgery to support her palate. Has never used Evoxac. Used Restasis for dry eyes in the past but now too expensive and she uses OTC eye drops instead.       In July 2014, while she was still living in [place], she had an abnormal UA that suggested possible kidney disease 2/2 SLE. There were plans to obtain kidney ultrasound and/or biopsy but this was not done as Ms. Wiseman was in the process of relocating to [place]. She has already established care with a new PCP here, with plans to pursue kidney US. She does already carry a diagnosis of CKDIII though the history of this is quite unclear.      INTERVAL HISTORY:      Today, she overall feels well and states her SLE has now been in remission for about 7 years. No exacerbations since she was last seen. No arthralgias.  She continues on apple vinegar in AM and lemon in hot water in PM and feels great. She has [facility] with this and [facility].  She no longer has leg aches.      She remains on Plaquenil one daily without side effects.  She saw her eye doctor last month and exam stable.      She is on Restasis and artificial saliva. She had continued to have some dental deterioration and had full upper mouth extractions at the  [facility] and a bridge and this is stable.      Complement levels and dsDNA normal in July 2017.    Unfortunately her  has terminal [facility] and [facility]. We discussed this at length.  She seems to be coping ok.  Her whole family has been visiting.    Other ongoing issues include mild alopecia and Raynaud s, both stable. GERD well controlled on Prilosec. Has never had any major  infections    October 10, 2019 - She is using Biotene products and has noticed improvement in her sicca symptoms. She is doing better, her knees are better. She is getting Synvisc injection in her knees. Her Plaquenil eye exam was on 7/22/19 and was normal. Denies any joint pain, skin rash. She is on Plaquenil for 11 years. When she was first diagnosed with SLE she was very fatigued. It is better now.            Review of Systems:   Review Of Systems  Skin: negative  Eyes: see HPI  Ears/Nose/Throat: sicca unchanged  Respiratory: No shortness of breath, dyspnea on exertion, cough, or hemoptysis  Cardiovascular: negative  Gastrointestinal: negative  Genitourinary: negative  Musculoskeletal: see HPI  Neurologic: negative  Psychiatric: negative  Hematologic/Lymphatic/Immunologic: negative  Endocrine: negative          Past Medical History:     Past Medical History:   Diagnosis Date     CKD (chronic kidney disease) stage 3, GFR 30-59 ml/min (H)      Dental disorder     loosing top teeth due to lupus     GERD (gastroesophageal reflux disease)      High blood pressure      Mixed hyperlipidaemia      Osteoarthritis      RA (rheumatoid arthritis) (H)     mild, on plaquenil     SLE (systemic lupus erythematosus) (H)      Vision problem     dry eyes from Lupus       Patient Active Problem List    Diagnosis Date Noted     Long-term use of Plaquenil 07/16/2018     Priority: Medium     Systemic lupus erythematosus, unspecified SLE type, unspecified organ involvement status (H) 08/14/2017     Priority: Medium     CKD (chronic kidney disease) stage 3, GFR 30-59 ml/min (H) 03/03/2015     Priority: Medium     Hypertension 03/03/2015     Priority: Medium     Esophageal reflux 03/03/2015     Priority: Medium     Osteoarthritis 03/03/2015     Priority: Medium     Hyperlipidemia with target LDL less than 130 03/03/2015     Priority: Medium     Diagnosis updated by automated process. Provider to review and confirm.       RA (rheumatoid  arthritis) (H) 03/03/2015     Priority: Medium             Past Surgical History:     Past Surgical History:   Procedure Laterality Date     APPENDECTOMY  1969     C TOTAL KNEE ARTHROPLASTY  3/10    left     COLONOSCOPY  2009    repeat 5 years     COLONOSCOPY N/A 5/20/2015    normal, repeat 5 years due to family history     COLONOSCOPY N/A 10/7/2019    Procedure: COLONOSCOPY;  Surgeon: Con Recinos MD;  Location:  GI     FOOT SURGERY  2010    bone and screws in 2 toes, hammertoe     HYSTERECTOMY TOTAL ABDOMINAL  2010    due to bleeding, benign     LAPAROTOMY EXPLORATORY      scar tissue removal/repair - abd     SHOULDER SURGERY  05/2013    rotator cuff repair, right             Social History:     Social History     Tobacco Use     Smoking status: Never Smoker     Smokeless tobacco: Never Used   Substance Use Topics     Alcohol use: No     Alcohol/week: 0.0 standard drinks             Family History:     Family History   Problem Relation Age of Onset     Colon Cancer Mother      Lupus Mother      Cerebrovascular Disease Father      Neurologic Disorder Sister         ALS            Allergies:     No Known Allergies          Medications:     Current Outpatient Medications   Medication Sig Dispense Refill     artificial saliva, BIOTENE MT, (BIOTENE MT) SOLN Swish and spit 5-10 mLs in mouth as needed for dry mouth 44.3 mL 3     Ascorbic Acid (VITAMIN C PO) Take 1,000 mg by mouth daily       aspirin 325 MG tablet Take 325 mg by mouth daily       calcium citrate-vitamin D (CITRACAL) 315-250 MG-UNIT TABS Take 2 tablets by mouth daily        Cyanocobalamin (VITAMIN B 12 PO) Take 1,000 mcg by mouth daily       cycloSPORINE (RESTASIS) 0.05 % ophthalmic emulsion Place 1 drop into both eyes 2 times daily 180 each 3     Glucosamine-Chondroit-Vit C-Mn (GLUCOSAMINE CHONDR 1500 COMPLX PO) Take 2 tablets by mouth daily       hydrochlorothiazide (HYDRODIURIL) 25 MG tablet TAKE ONE-HALF (1/2) TABLET DAILY 45 tablet 3      hydroxychloroquine (PLAQUENIL) 200 MG tablet TAKE 1 TABLET DAILY 90 tablet 3     lisinopril (PRINIVIL/ZESTRIL) 10 MG tablet TAKE 1 TABLET DAILY (DUE FOR ANNUAL EXAM AND LABS) 90 tablet 3     metoprolol succinate ER (TOPROL-XL) 25 MG 24 hr tablet TAKE ONE-HALF (1/2) TABLET TWICE A DAY (DUE FOR ANNUAL EXAM AND LABS) 90 tablet 3     Multiple Vitamins-Minerals (CENTRUM SILVER) per tablet Take 1 tablet by mouth daily       Omega-3 Fatty Acids (OMEGA-3 FISH OIL PO) Take 1,400 mg by mouth daily       omeprazole (PRILOSEC OTC) 20 MG EC tablet Take 1 tablet (20 mg) by mouth daily as needed       ondansetron (ZOFRAN ODT) 4 MG ODT tab Take 1 tablet (4 mg) by mouth every 6 hours as needed for nausea 30 tablet 0     order for DME Equipment being ordered: Oxygen, portable for travel, 2 lpm while on flights 1 Device 0     simvastatin (ZOCOR) 10 MG tablet TAKE 1 TABLET AT BEDTIME 90 tablet 3            Physical Exam:   Blood pressure (!) 151/77, pulse 70, height 1.524 m (5'), weight 73.2 kg (161 lb 4.8 oz), SpO2 100 %, not currently breastfeeding.  Constitutional: WD-WN-[id number] cooperative  Eyes: nl EOM, conjunctiva, sclera  ENT: nl external ears, nose, lips. In process of upper extractions   No mucous membrane lesions  GI: no ABD mass or tenderness, no HSM  Lymph: no cervical or supraclavicular nodes  MS: All TMJ, neck, shoulder, elbow, wrist, MCP/PIP/DIP, spine, hip, knee, ankle, and foot MTP/IP joints were examined. No active synovitis. Heberdon nodes over DIP joints.  Full ROM. Normal  strength. No dactylitis, tenosynovitis, enthesopathy. Minimal hallux valgus and prominence of 1st MTP in R foot.  Skin: no nail pitting, alopecia, rash, nodules or lesions  Neuro: intact CN, strength  Psych: nl affect         Data:     Lab Results   Component Value Date    WBC 6.6 10/10/2019    WBC 7.7 08/13/2019    WBC 7.2 08/04/2019    HGB 11.4 (L) 10/10/2019    HGB 11.6 (L) 08/13/2019    HGB 11.9 08/04/2019    HCT 34.4 (L) 10/10/2019    HCT  34.5 (L) 08/13/2019    HCT 34.5 (L) 08/04/2019     (H) 10/10/2019     (H) 08/13/2019     (H) 08/04/2019     10/10/2019     08/13/2019     08/04/2019     Lab Results   Component Value Date    BUN 25 08/04/2019    BUN 25 02/15/2019    BUN 34 (H) 10/03/2016     No components found for: SEDRATE  Lab Results   Component Value Date    TSH 1.600 07/08/2013     Lab Results   Component Value Date    AST 19 10/10/2019    AST 13 08/04/2019    AST 17 02/15/2019    ALT 29 10/10/2019    ALT 13 08/04/2019    ALT 22 02/15/2019    ALKPHOS 42 08/04/2019    ALKPHOS 50 02/15/2019    ALKPHOS 45 07/31/2015     Reviewed Rheumatology lab flowsheet    Dione Sykes MD  [facility]  Department of Rheumatology & Autoimmune Disorders  [employer] [place]: [phone]  Pager - [phone]

## 2019-10-10 NOTE — NURSING NOTE
Capri Wiseman's goals for this visit include:   She requests these members of her care team be copied on today's visit information:     PCP: Xochitl Hemphill    Referring Provider:  No referring provider defined for this encounter.    BP (!) 151/77   Pulse 70   Ht 1.524 m (5')   Wt 73.2 kg (161 lb 4.8 oz)   LMP  (LMP Unknown)   SpO2 100%   BMI 31.50 kg/m

## 2019-10-10 NOTE — PATIENT INSTRUCTIONS
-- She is doing fine, will see her once a year     -- Continue on Plaquenil 200 mg daily.     -- RTC in a year

## 2019-10-10 NOTE — TELEPHONE ENCOUNTER
Labs ordered.     Samanta Smiley, BSN, RN   Rheumatology Care Coordinator   [facility]

## 2019-10-11 LAB — CRP SERPL-MCNC: 17.1 MG/L (ref 0–8)

## 2019-10-15 ENCOUNTER — TELEPHONE (OUTPATIENT)
Dept: FAMILY MEDICINE | Facility: CLINIC | Age: 74
End: 2019-10-15

## 2019-10-15 NOTE — TELEPHONE ENCOUNTER
Reason for Call:  Other call back    Detailed comments: Pt calling and states she needs to talk to Dr Hemphill or her nurse to discuss getting information for Dr Lucia her surgeon at [facility]. She is going to have surgery on her colon. Please advise.     Phone Number Patient can be reached at: Home number on file [phone] (home)    Best Time:     Can we leave a detailed message on this number? YES    Call taken on 10/15/2019 at 12:38 PM by Marisol Borrero

## 2019-10-15 NOTE — TELEPHONE ENCOUNTER
Spoke with patient to clarify what is needed. Pt states Dr. Lucia at [facility] wanted to review 4yrs of records from when pt was in [place] due to multiple bouts of diverticulitis. Pt contacted clinic in [place] and was told her records have been destroyed. Pt is wondering if Dr. Hemphill can either call Dr. Lucia to discuss those records as they are scanned into her [facility] chart or write a summary of her problem list from [facility] to send to Dr. Lucia. Pt has appt with him 10/29/19 @1pm. Contact number for Dr. Lucia @[facility] is [phone]. Please advise since we can not send our scanned records from [place]. Alecia Jade, CMA

## 2019-10-16 NOTE — TELEPHONE ENCOUNTER
Spoke with Deya to get clarification on this matter and patient will need to contact release of information to see if they can release information [phone]. LM for patient to return call to relay this information and give patient number that is needed to release of information.  Lorenza Tovar MA

## 2019-10-18 NOTE — H&P
Pre-Endoscopy History and Physical     Capri Wiseman MRN# [id number]   YOB: 1945 Age: 74 year old     Date of Procedure: 10/7/2019  Primary care provider: Xochitl Hemphill  Type of Endoscopy: colonoscopy  Type of Anesthesia Anticipated: MAC     HPI:    Capri is a 74 year old female who will be undergoing screening or surveillance colonoscopy.    Capri is feeling well today. Can get up a single flight of stairs without dyspnea. Estimated METS > 4.     Patient Active Problem List   Diagnosis     CKD (chronic kidney disease) stage 3, GFR 30-59 ml/min (H)     Hypertension     Esophageal reflux     Osteoarthritis     Hyperlipidemia with target LDL less than 130     RA (rheumatoid arthritis) (H)     Systemic lupus erythematosus, unspecified SLE type, unspecified organ involvement status (H)     Long-term use of Plaquenil          REVIEW OF SYSTEMS:     5 point ROS negative except as noted above in HPI, including Gen., Resp., CV, GI &  system review.      PHYSICAL EXAM:   BP (!) 141/80   Pulse 73   Resp 16   LMP  (LMP Unknown)   SpO2 99%    Estimated body mass index is 31.5 kg/m  as calculated from the following:    Height as of 10/10/19: 1.524 m (5').    Weight as of 10/10/19: 73.2 kg (161 lb 4.8 oz).    GENERAL APPEARANCE: alert and no distress  RESP: lungs clear and unlabored  CV: regular  ABD: soft, nt/nd    DIAGNOSTICS:    Not indicated      IMPRESSION   ASA Class 3 - Severe systemic disease, but not incapacitating        PLAN:     Plan for colonoscopy. No medical contraindications to proceed, or further work up needed. The risks and benefits of the procedure and the sedation options and risks were discussed with the patient. These include infection, bleeding, and small risk of colon perforation (1/1000 to 1/11829 depending on patient characteristics and type of procedure). Capri was also explained to alternatives for colo-rectal screening. All questions were answered and informed  consent was obtained.      The above has been forwarded to the consulting provider.      Signed Electronically by: Con Recinos MD  October 18, 2019

## 2019-10-23 ENCOUNTER — OFFICE VISIT (OUTPATIENT)
Dept: ORTHOPEDICS | Facility: CLINIC | Age: 74
End: 2019-10-23
Payer: COMMERCIAL

## 2019-10-23 VITALS
BODY MASS INDEX: 31.64 KG/M2 | HEART RATE: 80 BPM | DIASTOLIC BLOOD PRESSURE: 79 MMHG | WEIGHT: 162 LBS | SYSTOLIC BLOOD PRESSURE: 130 MMHG

## 2019-10-23 DIAGNOSIS — M17.11 PRIMARY OSTEOARTHRITIS OF RIGHT KNEE: Primary | ICD-10-CM

## 2019-10-23 PROCEDURE — 20610 DRAIN/INJ JOINT/BURSA W/O US: CPT | Mod: RT | Performed by: PHYSICIAN ASSISTANT

## 2019-10-23 RX ORDER — TRIAMCINOLONE ACETONIDE 40 MG/ML
40 INJECTION, SUSPENSION INTRA-ARTICULAR; INTRAMUSCULAR ONCE
Status: COMPLETED | OUTPATIENT
Start: 2019-10-23 | End: 2019-10-23

## 2019-10-23 RX ADMIN — TRIAMCINOLONE ACETONIDE 40 MG: 40 INJECTION, SUSPENSION INTRA-ARTICULAR; INTRAMUSCULAR at 09:23

## 2019-10-23 NOTE — PROGRESS NOTES
Clinic Administered Medication Documentation    MEDICATION LIST:   Injection Documentation     Injection was administered by provider (please see MAR for given by information). Please see MAR and medication order for additional information.     Site: Joint injection   Medication Used: Kenolog    Exp:     The following medication was given by Hank Monson PA-C:     MEDICATION: Kenalog 40mg/1ml  ROUTE: Joint Injection  SITE: right knee  DOSE: 1 mL  LOT #: [id number]  : [employer]  EXPIRATION DATE:    NDC: [address]-4

## 2019-10-23 NOTE — LETTER
10/23/2019         RE: Capri Wiseman  [address]  [place] [address]        Dear Colleague,    Thank you for referring your patient, Capri Wiseman, to the [facility]. Please see a copy of my visit note below.    Clinic Administered Medication Documentation    MEDICATION LIST:   Injection Documentation     Injection was administered by provider (please see MAR for given by information). Please see MAR and medication order for additional information.     Site: Joint injection   Medication Used: Kenolog    Exp:     The following medication was given by Hank Monson PA-C:     MEDICATION: Kenalog 40mg/1ml  ROUTE: Joint Injection  SITE: right knee  DOSE: 1 mL  LOT #: [id number]  : Cell[employer]  EXPIRATION DATE:    NDC: [address]-4                        Office Visit-Follow up    Chief Complaint: Capri Wiseman is a 74 year old female who is being seen for   Chief Complaint   Patient presents with     RECHECK     rt knee request inj       History of Present Illness:   Patient is here in follow-up, on the last visit she did a Synvisc 1 injection on 7/15/2019.  This worked very well for her and in fact is still working.  She stated it wore off a little bit and would like to get another Synvisc 1 injection and said she talked to her insurance and a would be covered however I am skeptical of this as it has not been 6 months.  We decided to do a cortisone injection instead to be safe financially.  She can come back on 115 2020 can get the Synvisc 1 injection again if she would like to.    Physical Exam:  Vitals: /79   Pulse 80   Wt 73.5 kg (162 lb)   LMP  (LMP Unknown)   BMI 31.64 kg/m     BMI= Body mass index is 31.64 kg/m .  Constitutional: healthy, alert and no acute distress   Psychiatric: mentation appears normal and affect normal/bright  NEURO: no focal deficits, CMS intact right lower extremity   RESP: Normal with easy respirations and no  use of accessory muscles to breathe, no audible wheezing or retractions  CV: Calf soft and nontender to palpation, leg warm   SKIN: No erythema, rashes, excoriation, or breakdown. No evidence of infection.   MUSCULOSKELETAL:    INSPECTION of right knee: No gross deformities, erythema, edema, ecchymosis, atrophy or fasciculations.     PALPATION: No tenderness on palpation of the medial, lateral, anterior and posterior portion of the knee. No specific joint line tenderness. No increased warmth.  No effusion.     ROM: Able to flex and extend without any catching or locking or pain.    STRENGTH: Able to get on the exam table and off without any problems and able to ambulate without any problems.    SPECIAL TEST: None today.   GAIT: Slight-antalgic right side, ambulating with a cane.  Lymph: no palpable lymph nodes    Impression: 1.  Right knee degenerative joint disease.    Plan:                                          INJECTION PROCEDURE:  The patient was counseled about an  injection, including discussion of risks (including infection), contents of the injection, rationale for performing the injection, and expected benefits of the injection. The skin was prepped with alcohol and betadine and then utilizing sterile technique an injection of the right knee joint from the anterolateral approach in the seated position was performed. I used allen chloride spray prior to doing the injection. The injection consisted 1ml of Kenalog (40mg per 1ml) with 8ml 1% lidocaine plain. The patient tolerated the injection well, and there were no complications. The injection site was covered with a Band-Aid. The injection was performed by Pritesh Monson PA-C     Patient Instructions:   1.  Gel injection: You had a Synvisc 1 injection on the last visit which was 7/15/2019.  This injection worked excellent for you and in fact still seems to be working.  You mentioned that it does seem to be wearing off a little.  So he wanted to get another  one.  My concern with this is that it has not been 6 months so most likely you end up getting charged a large bill for the injection.  It sounds like you might of talk to your insurance company but I still have concerns that you would get a big bill.  2.  Cortisone injection: Your last cortisone injection was on 6/10/2019.  We could do a cortisone injection to help the gel injection continue to work until you get to January 15, 2020 because that is when you could have another gel injection that would be covered.  We decided to proceed with this today.  3.  Medication: Your kidney function is not the greatest we can do any NSAID medication.  4. Follow up with Hank Monson PA-C on an as needed basis.   Re-x-ray on return: No    BP Readings from Last 1 Encounters:   10/23/19 130/79       BP noted to be well controlled today in office.      Patient does not use Tobacco products.      This note was dictated with [employer].    Hank Monson PA-C     Again, thank you for allowing me to participate in the care of your patient.        Sincerely,        Hank Monson PA-C

## 2019-10-23 NOTE — PROGRESS NOTES
Office Visit-Follow up    Chief Complaint: Capri Wiseman is a 74 year old female who is being seen for   Chief Complaint   Patient presents with     RECHECK     rt knee request inj       History of Present Illness:   Patient is here in follow-up, on the last visit she did a Synvisc 1 injection on 7/15/2019.  This worked very well for her and in fact is still working.  She stated it wore off a little bit and would like to get another Synvisc 1 injection and said she talked to her insurance and a would be covered however I am skeptical of this as it has not been 6 months.  We decided to do a cortisone injection instead to be safe financially.  She can come back on 115 2020 can get the Synvisc 1 injection again if she would like to.    Physical Exam:  Vitals: /79   Pulse 80   Wt 73.5 kg (162 lb)   LMP  (LMP Unknown)   BMI 31.64 kg/m    BMI= Body mass index is 31.64 kg/m .  Constitutional: healthy, alert and no acute distress   Psychiatric: mentation appears normal and affect normal/bright  NEURO: no focal deficits, CMS intact right lower extremity   RESP: Normal with easy respirations and no use of accessory muscles to breathe, no audible wheezing or retractions  CV: Calf soft and nontender to palpation, leg warm   SKIN: No erythema, rashes, excoriation, or breakdown. No evidence of infection.   MUSCULOSKELETAL:    INSPECTION of right knee: No gross deformities, erythema, edema, ecchymosis, atrophy or fasciculations.     PALPATION: No tenderness on palpation of the medial, lateral, anterior and posterior portion of the knee. No specific joint line tenderness. No increased warmth.  No effusion.     ROM: Able to flex and extend without any catching or locking or pain.    STRENGTH: Able to get on the exam table and off without any problems and able to ambulate without any problems.    SPECIAL TEST: None today.   GAIT: Slight-antalgic right side, ambulating with a cane.  Lymph: no palpable lymph  nodes    Impression: 1.  Right knee degenerative joint disease.    Plan:                                          INJECTION PROCEDURE:  The patient was counseled about an  injection, including discussion of risks (including infection), contents of the injection, rationale for performing the injection, and expected benefits of the injection. The skin was prepped with alcohol and betadine and then utilizing sterile technique an injection of the right knee joint from the anterolateral approach in the seated position was performed. I used allen chloride spray prior to doing the injection. The injection consisted 1ml of Kenalog (40mg per 1ml) with 8ml 1% lidocaine plain. The patient tolerated the injection well, and there were no complications. The injection site was covered with a Band-Aid. The injection was performed by Pritesh Monson PA-C     Patient Instructions:   1.  Gel injection: You had a Synvisc 1 injection on the last visit which was 7/15/2019.  This injection worked excellent for you and in fact still seems to be working.  You mentioned that it does seem to be wearing off a little.  So he wanted to get another one.  My concern with this is that it has not been 6 months so most likely you end up getting charged a large bill for the injection.  It sounds like you might of talk to your insurance company but I still have concerns that you would get a big bill.  2.  Cortisone injection: Your last cortisone injection was on 6/10/2019.  We could do a cortisone injection to help the gel injection continue to work until you get to January 15, 2020 because that is when you could have another gel injection that would be covered.  We decided to proceed with this today.  3.  Medication: Your kidney function is not the greatest we can do any NSAID medication.  4. Follow up with Hank Monson PA-C on an as needed basis.   Re-x-ray on return: No    BP Readings from Last 1 Encounters:   10/23/19 130/79       BP noted to be well  controlled today in office.      Patient does not use Tobacco products.      This note was dictated with [employer].    Hank Monson PA-C

## 2019-10-23 NOTE — PATIENT INSTRUCTIONS
Encounter Diagnosis   Name Primary?     Primary osteoarthritis of right knee Yes     Rest, ice and elevate above heart level as needed for pain control  1.  Gel injection: You had a Synvisc 1 injection on the last visit which was 7/15/2019.  This injection worked excellent for you and in fact still seems to be working.  You mentioned that it does seem to be wearing off a little.  So he wanted to get another one.  My concern with this is that it has not been 6 months so most likely you end up getting charged a large bill for the injection.  It sounds like you might of talk to your insurance company but I still have concerns that you would get a big bill.  2.  Cortisone injection: Your last cortisone injection was on 6/10/2019.  We could do a cortisone injection to help the gel injection continue to work until you get to January 15, 2020 because that is when you could have another gel injection that would be covered.  We decided to proceed with this today.  3.  Medication: Your kidney function is not the greatest we can do any NSAID medication.  4. Follow up with Hank Monson PA-C on an as needed basis.   Cortisone Instructions:     1. You received an injection of cortisone into your right knee today.  2. The joint(s) may be more painful for the first 1-2 days.  3. We ask you to continue to rest the joint(s) for a few more days before resuming regular activities.  4. Pain Medications you can take (as long as your primary care provider allows these meds and you do not have kidney or liver conditions):  Tylenol  Take 1000 mg by mouth every 6 hours as needed; maximum dose 4000 mg a day  Ibuprofen  600 mg every 6 hours as needed; maximum 2400 mg a day  (OK to take tylenol and ibuprofen at the same time)  5. Rest, ice and elevate as needed for pain control  6. Watch for these signs of infection: redness, swelling, drainage, warmth to touch, increased pain, or fever. Call the clinic or make an appointment to be seen if you  think you have an infection.  7. If you are diabetic, make sure you keep a close eye on your blood sugars, they can get elevated with cortisone injections.   8. Sometimes it can take 1-2 weeks for it to reach its full effect.    Cortisone Injections  Cortisone is a type of steroid. It can greatly reduce swelling, redness, and irritation (inflammation) and pain. Being injected with cortisone is simple and doesn t take long. Your doctor may ask you questions about your health. Certain health conditions, such as diabetes, can be affected by cortisone.     Your pain may be relieved by a cortisone injection.   Why have a cortisone injection?  Injecting cortisone can relieve pain for anything from a sports injury to arthritis. Your doctor may suggest an injection if rest, splints, or oral medicine doesn t relieve your pain. Injecting cortisone is simpler than having surgery. And cortisone may provide the lasting pain relief that can help you get out and enjoy life again.  Getting the injection  Your doctor will start by cleaning and occasionally numbing your skin at the injection site. Next you ll be injected with local anesthetics (for short-term pain relief) and cortisone. The injection may last a few moments. A small bandage will be put over the injection site. You ll then be ready to go home.  After your injection  After being injected, make sure you don t injure the treated region. But stay active. Enjoy a walk or some other mild activity. Just be careful not to strain the region that gave you trouble.  The next day  Some people feel more pain after being injected. This is normal, and it will go away soon. Applying ice for 20 minutes at a time to your injury may reduce the increased pain. Rest for the first day or two. You don t need to stay in bed. But avoid tasks that may strain the injured region.  If you have diabetes  Cortisone injections can cause blood sugar to be increased for several days after the  injection. If you have diabetes, you should follow your blood  sugar closely during this time. Follow your regular plan for what to do when your blood sugar is elevated.     9536-5324 [employer]. [address] [place] [address], [place] [address]. All rights reserved. This information is not intended as a substitute for professional medical care. Always follow your healthcare professional's instructions.     [employer] may offer reliable information regarding your diagnosis and treatment plan.    THANK YOU for coming in today. If you receive a survey via [employer] or mail please let us know if there was anything you especially appreciated today or if there is any way we can improve our clinic. We appreciate your input.    GENERAL INFORMATION:  Our hours are:  (Pending)    [facility] for any issues or concerns: [phone]      We are not in the office Thursdays. Therefore non- urgent calls and medical messages received on Thursday will be addressed when we are back in the office on Wednesday. Urgent matters will be reviewed and addressed by one of our partners in the office as needed.    If lab work was done today as part of your evaluation you will generally be contacted via [employer], mail, or phone with the results within 1-5 days. If there is an alarming result we will contact you by phone. Lab results come back at varying times, I generally wait until all labs are resulted before making comments on results. Please note labs are automatically released to [employer] (if you have signed up for it) once available-at times you may see these prior to my having a chance to review them as well.    If you need refills please contact your pharmacist. They will send a refill request to me to review. Please allow 3 business days for us to process all refill requests. All narcotic refills should be handled in the clinic at the time of your visit.

## 2019-10-28 ENCOUNTER — TELEPHONE (OUTPATIENT)
Dept: ORTHOPEDICS | Facility: OTHER | Age: 74
End: 2019-10-28

## 2019-10-28 NOTE — TELEPHONE ENCOUNTER
Left message per okay from initial message that total injection consisted of 9 mL. Told to call back with any additional questions.     Ayala MCPHERSON, Lead RN, BSN. . .  10/28/2019, 2:52 PM

## 2019-10-28 NOTE — TELEPHONE ENCOUNTER
Patient called back. Writer printed a condensed version of yesterday's OV note and placed in envelop on  specialty center for .  Clara Gama RN on 10/28/2019 at 2:54 PM

## 2019-10-28 NOTE — TELEPHONE ENCOUNTER
"Returned call to patient. Unable to reach. The phone number doesn't ring but went to  after 1 minute of waiting. Strange. LM for return call.     \"The injection consisted 1ml of Kenalog (40mg per 1ml) with 8ml 1% lidocaine plain.\"    Clara Gama RN on 10/28/2019 at 10:23 AM  "

## 2019-10-28 NOTE — TELEPHONE ENCOUNTER
Reason for Call:  Other call back    Detailed comments: Pt stated that she needs to know how much ml was inj into her for her colon DR. She needs this for tomorrows apt in a paper form. Please call her when this is complete.     Phone Number Patient can be reached at: Home number on file [phone] (home)    Best Time: NA    Can we leave a detailed message on this number? YES    Call taken on 10/28/2019 at 10:07 AM by Yvette Fields

## 2019-10-29 ENCOUNTER — TRANSFERRED RECORDS (OUTPATIENT)
Dept: HEALTH INFORMATION MANAGEMENT | Facility: CLINIC | Age: 74
End: 2019-10-29

## 2020-01-15 ENCOUNTER — OFFICE VISIT (OUTPATIENT)
Dept: FAMILY MEDICINE | Facility: CLINIC | Age: 75
End: 2020-01-15
Payer: COMMERCIAL

## 2020-01-15 VITALS
OXYGEN SATURATION: 98 % | WEIGHT: 165 LBS | HEIGHT: 60 IN | BODY MASS INDEX: 32.39 KG/M2 | RESPIRATION RATE: 18 BRPM | DIASTOLIC BLOOD PRESSURE: 80 MMHG | SYSTOLIC BLOOD PRESSURE: 122 MMHG | TEMPERATURE: 96.8 F | HEART RATE: 97 BPM

## 2020-01-15 DIAGNOSIS — M06.9 RHEUMATOID ARTHRITIS INVOLVING MULTIPLE SITES, UNSPECIFIED RHEUMATOID FACTOR PRESENCE: ICD-10-CM

## 2020-01-15 DIAGNOSIS — M32.9 SYSTEMIC LUPUS ERYTHEMATOSUS, UNSPECIFIED SLE TYPE, UNSPECIFIED ORGAN INVOLVEMENT STATUS (H): ICD-10-CM

## 2020-01-15 DIAGNOSIS — D63.1 ANEMIA DUE TO STAGE 3 CHRONIC KIDNEY DISEASE (H): ICD-10-CM

## 2020-01-15 DIAGNOSIS — K57.90 DIVERTICULOSIS: ICD-10-CM

## 2020-01-15 DIAGNOSIS — N18.30 ANEMIA DUE TO STAGE 3 CHRONIC KIDNEY DISEASE (H): ICD-10-CM

## 2020-01-15 DIAGNOSIS — Z01.818 PREOP GENERAL PHYSICAL EXAM: Primary | ICD-10-CM

## 2020-01-15 DIAGNOSIS — N18.30 CKD (CHRONIC KIDNEY DISEASE) STAGE 3, GFR 30-59 ML/MIN (H): ICD-10-CM

## 2020-01-15 LAB
ANION GAP SERPL CALCULATED.3IONS-SCNC: 4 MMOL/L (ref 3–14)
BUN SERPL-MCNC: 40 MG/DL (ref 7–30)
CALCIUM SERPL-MCNC: 9.1 MG/DL (ref 8.5–10.1)
CHLORIDE SERPL-SCNC: 106 MMOL/L (ref 94–109)
CO2 SERPL-SCNC: 29 MMOL/L (ref 20–32)
CREAT SERPL-MCNC: 1.1 MG/DL (ref 0.52–1.04)
ERYTHROCYTE [DISTWIDTH] IN BLOOD BY AUTOMATED COUNT: 12.7 % (ref 10–15)
GFR SERPL CREATININE-BSD FRML MDRD: 49 ML/MIN/{1.73_M2}
GLUCOSE SERPL-MCNC: 92 MG/DL (ref 70–99)
HCT VFR BLD AUTO: 36.1 % (ref 35–47)
HGB BLD-MCNC: 11.9 G/DL (ref 11.7–15.7)
MCH RBC QN AUTO: 34.6 PG (ref 26.5–33)
MCHC RBC AUTO-ENTMCNC: 33 G/DL (ref 31.5–36.5)
MCV RBC AUTO: 105 FL (ref 78–100)
PLATELET # BLD AUTO: 215 10E9/L (ref 150–450)
POTASSIUM SERPL-SCNC: 4.2 MMOL/L (ref 3.4–5.3)
RBC # BLD AUTO: 3.44 10E12/L (ref 3.8–5.2)
SODIUM SERPL-SCNC: 139 MMOL/L (ref 133–144)
WBC # BLD AUTO: 7.1 10E9/L (ref 4–11)

## 2020-01-15 PROCEDURE — 93000 ELECTROCARDIOGRAM COMPLETE: CPT | Performed by: FAMILY MEDICINE

## 2020-01-15 PROCEDURE — 99214 OFFICE O/P EST MOD 30 MIN: CPT | Performed by: FAMILY MEDICINE

## 2020-01-15 PROCEDURE — 36415 COLL VENOUS BLD VENIPUNCTURE: CPT | Performed by: FAMILY MEDICINE

## 2020-01-15 PROCEDURE — 85027 COMPLETE CBC AUTOMATED: CPT | Performed by: FAMILY MEDICINE

## 2020-01-15 PROCEDURE — 80048 BASIC METABOLIC PNL TOTAL CA: CPT | Performed by: FAMILY MEDICINE

## 2020-01-15 ASSESSMENT — MIFFLIN-ST. JEOR: SCORE: 1164.7

## 2020-01-15 ASSESSMENT — PAIN SCALES - GENERAL: PAINLEVEL: NO PAIN (0)

## 2020-01-15 NOTE — PROGRESS NOTES
[address] [address]  [phone]  Dept: [phone]    PRE-OP EVALUATION:  Today's date: 1/15/2020    Capri Wiseman (: 1945) presents for pre-operative evaluation assessment as requested by Dr. Nuñez.  She requires evaluation and anesthesia risk assessment prior to undergoing surgery/procedure for treatment of   .    Proposed Surgery/ Procedure:   Date of Surgery/ Procedure: 20  Time of Surgery/ Procedure: 8  Hospital/Surgical Facility: [facility]  Fax number for surgical facility:   Primary Physician: Xochitl Hemphill  Type of Anesthesia Anticipated: to be determined    Patient has a Health Care Directive or Living Will:  NO    1. NO - Do you have a history of heart attack, stroke, stent, bypass or surgery on an artery in the head, neck, heart or legs?  2. NO - Do you ever have any pain or discomfort in your chest?  3. NO - Do you have a history of  Heart Failure?  4. NO - Are you troubled by shortness of breath when: walking on the level, up a slight hill or at night?  5. NO - Do you currently have a cold, bronchitis or other respiratory infection?  6. NO - Do you have a cough, shortness of breath or wheezing?  7. yes - Do you sometimes get pains in the calves of your legs when you walk?  8. NO - Do you or anyone in your family have previous history of blood clots?  9. NO - Do you or does anyone in your family have a serious bleeding problem such as prolonged bleeding following surgeries or cuts?  10. NO - Have you ever had problems with anemia or been told to take iron pills?  11. NO - Have you had any abnormal blood loss such as black, tarry or bloody stools, or abnormal vaginal bleeding?  12. NO - Have you ever had a blood transfusion?  13. NO - Have you or any of your relatives ever had problems with anesthesia?  14. NO - Do you have sleep apnea, excessive snoring or daytime drowsiness?  15. NO - Do you have any prosthetic heart  valves?  16. Left knee  - Do you have prosthetic joints?  17. NO - Is there any chance that you may be pregnant?      HPI:     HPI related to upcoming procedure:   Slated for sigmoidectomy due to recurrent diverticulitis      See problem list for active medical problems.  Problems all longstanding and stable, except as noted/documented.  See ROS for pertinent symptoms related to these conditions.      MEDICAL HISTORY:     Patient Active Problem List    Diagnosis Date Noted     Long-term use of Plaquenil 07/16/2018     Priority: Medium     Systemic lupus erythematosus, unspecified SLE type, unspecified organ involvement status (H) 08/14/2017     Priority: Medium     CKD (chronic kidney disease) stage 3, GFR 30-59 ml/min (H) 03/03/2015     Priority: Medium     Hypertension 03/03/2015     Priority: Medium     Esophageal reflux 03/03/2015     Priority: Medium     Osteoarthritis 03/03/2015     Priority: Medium     Hyperlipidemia with target LDL less than 130 03/03/2015     Priority: Medium     Diagnosis updated by automated process. Provider to review and confirm.       RA (rheumatoid arthritis) (H) 03/03/2015     Priority: Medium      Past Medical History:   Diagnosis Date     CKD (chronic kidney disease) stage 3, GFR 30-59 ml/min (H)      Dental disorder     loosing top teeth due to lupus     GERD (gastroesophageal reflux disease)      High blood pressure      Mixed hyperlipidaemia      Osteoarthritis      RA (rheumatoid arthritis) (H)     mild, on plaquenil     SLE (systemic lupus erythematosus) (H)      Vision problem     dry eyes from Lupus     Past Surgical History:   Procedure Laterality Date     APPENDECTOMY  1969     C TOTAL KNEE ARTHROPLASTY  3/10    left     COLONOSCOPY  2009    repeat 5 years     COLONOSCOPY N/A 5/20/2015    normal, repeat 5 years due to family history     COLONOSCOPY N/A 10/7/2019    Procedure: COLONOSCOPY;  Surgeon: Con Recinos MD;  Location:  GI     FOOT SURGERY  2010    bone  and screws in 2 toes, hammertoe     HYSTERECTOMY TOTAL ABDOMINAL  2010    due to bleeding, benign     LAPAROTOMY EXPLORATORY      scar tissue removal/repair - abd     SHOULDER SURGERY  05/2013    rotator cuff repair, right     Current Outpatient Medications   Medication Sig Dispense Refill     artificial saliva, BIOTENE MT, (BIOTENE MT) SOLN Swish and spit 5-10 mLs in mouth as needed for dry mouth 44.3 mL 3     Ascorbic Acid (VITAMIN C PO) Take 1,000 mg by mouth daily       aspirin 325 MG tablet Take 325 mg by mouth daily       calcium citrate-vitamin D (CITRACAL) 315-250 MG-UNIT TABS Take 2 tablets by mouth daily        Cyanocobalamin (VITAMIN B 12 PO) Take 1,000 mcg by mouth daily       cycloSPORINE (RESTASIS) 0.05 % ophthalmic emulsion Place 1 drop into both eyes 2 times daily 180 each 3     Glucosamine-Chondroit-Vit C-Mn (GLUCOSAMINE CHONDR 1500 COMPLX PO) Take 2 tablets by mouth daily       hydrochlorothiazide (HYDRODIURIL) 25 MG tablet TAKE ONE-HALF (1/2) TABLET DAILY 45 tablet 3     hydroxychloroquine (PLAQUENIL) 200 MG tablet Take 1 tablet (200 mg) by mouth daily 90 tablet 3     lisinopril (PRINIVIL/ZESTRIL) 10 MG tablet TAKE 1 TABLET DAILY (DUE FOR ANNUAL EXAM AND LABS) 90 tablet 3     metoprolol succinate ER (TOPROL-XL) 25 MG 24 hr tablet TAKE ONE-HALF (1/2) TABLET TWICE A DAY (DUE FOR ANNUAL EXAM AND LABS) 90 tablet 3     Multiple Vitamins-Minerals (CENTRUM SILVER) per tablet Take 1 tablet by mouth daily       Omega-3 Fatty Acids (OMEGA-3 FISH OIL PO) Take 1,400 mg by mouth daily       omeprazole (PRILOSEC OTC) 20 MG EC tablet Take 1 tablet (20 mg) by mouth daily as needed       ondansetron (ZOFRAN ODT) 4 MG ODT tab Take 1 tablet (4 mg) by mouth every 6 hours as needed for nausea 30 tablet 0     order for DME Equipment being ordered: Oxygen, portable for travel, 2 lpm while on flights 1 Device 0     simvastatin (ZOCOR) 10 MG tablet TAKE 1 TABLET AT BEDTIME 90 tablet 3     OTC products: None, except as  "noted above    No Known Allergies   Latex Allergy: NO    Social History     Tobacco Use     Smoking status: Never Smoker     Smokeless tobacco: Never Used   Substance Use Topics     Alcohol use: No     Alcohol/week: 0.0 standard drinks     History   Drug Use No       REVIEW OF SYSTEMS:   CONSTITUTIONAL: NEGATIVE for fever, chills, change in weight  ENT/MOUTH: NEGATIVE for ear, mouth and throat problems  RESP: NEGATIVE for significant cough or SOB  CV: NEGATIVE for chest pain, palpitations or peripheral edema    EXAM:   /80   Pulse 97   Temp 96.8  F (36  C) (Temporal)   Resp 18   Ht 1.532 m (5' 0.3\")   Wt 74.8 kg (165 lb)   LMP  (LMP Unknown)   SpO2 98%   BMI 31.90 kg/m    GENERAL APPEARANCE: healthy, alert and no distress  HENT: ear canals and TM's normal and nose and mouth without ulcers or lesions  RESP: lungs clear to auscultation - no rales, rhonchi or wheezes  CV: regular rate and rhythm, normal S1 S2, no S3 or S4 and no murmur, click or rub   ABDOMEN: soft, nontender, no HSM or masses and bowel sounds normal  NEURO: Normal strength and tone, sensory exam grossly normal, mentation intact and speech normal    DIAGNOSTICS:   EKG shows normal sinus rhythm with a rate of 90, normal intervals, normal ST segments, normal axis    Recent Labs   Lab Test 10/10/19  1245 08/13/19  1457 08/04/19  0935  02/15/19  0929   HGB 11.4* 11.6* 11.9   < >  --     276 271   < >  --    NA  --   --  133  --  142   POTASSIUM  --   --  3.7  --  4.3   CR 1.09*  --  1.12*  --  0.97    < > = values in this interval not displayed.        IMPRESSION:       The proposed surgical procedure is considered INTERMEDIATE risk.    REVISED CARDIAC RISK INDEX  The patient has the following serious cardiovascular risks for perioperative complications such as (MI, PE, VFib and 3  AV Block):  No serious cardiac risks  INTERPRETATION: 0 risks: Class I (very low risk - 0.4% complication rate)    The patient has the following additional " risks for perioperative complications:  No identified additional risks      ICD-10-CM    1. Preop general physical exam Z01.818 EKG 12-lead complete w/read - Clinics   2. CKD (chronic kidney disease) stage 3, GFR 30-59 ml/min (H) N18.3 Basic metabolic panel     CBC with platelets   3. Systemic lupus erythematosus, unspecified SLE type, unspecified organ involvement status (H) M32.9    4. Rheumatoid arthritis involving multiple sites, unspecified rheumatoid factor presence (H) M06.9    5. Anemia due to stage 3 chronic kidney disease (H) N18.3     D63.1    6. Diverticulosis K57.90        RECOMMENDATIONS:     Hold the many supplements for 24 hours  Take beta-blocker morning of procedure    ACE Inhibitor or Angiotensin Receptor Blocker (ARB) Use  Ace inhibitor or Angiotensin Receptor Blocker (ARB) and should HOLD this medication for the 24 hours prior to surgery.      APPROVAL GIVEN to proceed with proposed procedure, without further diagnostic evaluation       Signed Electronically by: Con Recinos MD    Copy of this evaluation report is provided to requesting physician.    [facility] Preop Guidelines    Revised Cardiac Risk Index

## 2020-01-16 ENCOUNTER — TELEPHONE (OUTPATIENT)
Dept: FAMILY MEDICINE | Facility: CLINIC | Age: 75
End: 2020-01-16

## 2020-01-16 NOTE — TELEPHONE ENCOUNTER
Reason for Call:  Other     Detailed comments:  Asking for pre op notes asap.  Please fax to [phone]    Phone Number Patient can be reached at: [phone]      Best Time: any    Can we leave a detailed message on this number? YES    Call taken on 1/16/2020 at 11:04 AM by Anahi Staton

## 2020-01-17 ENCOUNTER — TELEPHONE (OUTPATIENT)
Dept: FAMILY MEDICINE | Facility: CLINIC | Age: 75
End: 2020-01-17

## 2020-02-17 ENCOUNTER — TRANSFERRED RECORDS (OUTPATIENT)
Dept: HEALTH INFORMATION MANAGEMENT | Facility: CLINIC | Age: 75
End: 2020-02-17

## 2020-03-25 DIAGNOSIS — R60.9 EDEMA, UNSPECIFIED TYPE: ICD-10-CM

## 2020-03-25 DIAGNOSIS — N18.30 CKD (CHRONIC KIDNEY DISEASE) STAGE 3, GFR 30-59 ML/MIN (H): ICD-10-CM

## 2020-03-25 DIAGNOSIS — I10 ESSENTIAL HYPERTENSION WITH GOAL BLOOD PRESSURE LESS THAN 140/90: ICD-10-CM

## 2020-03-25 DIAGNOSIS — E78.5 HYPERLIPIDEMIA WITH TARGET LDL LESS THAN 130: ICD-10-CM

## 2020-03-25 NOTE — TELEPHONE ENCOUNTER
"Requested Prescriptions   Pending Prescriptions Disp Refills     simvastatin (ZOCOR) 10 MG tablet [Pharmacy Med Name: SIMVASTATIN TABS 10MG] 90 tablet 3     Sig: TAKE 1 TABLET AT BEDTIME   Last Written Prescription Date:  4/10/19  Last Fill Quantity: 90,  # refills: 3   Last office visit: 2/15/19 [facility]  Future Office Visit:        Statins Protocol Failed - 3/25/2020  6:47 PM        Failed - LDL on file in past 12 months     Recent Labs   Lab Test 02/15/19  0929   LDL 59           Passed - No abnormal creatine kinase in past 12 months     No lab results found.           Passed - Recent (12 mo) or future (30 days) visit within the authorizing provider's specialty     Patient has had an office visit with the authorizing provider or a provider within the authorizing providers department within the previous 12 mos or has a future within next 30 days. See \"Patient Info\" tab in inbasket, or \"Choose Columns\" in Meds & Orders section of the refill encounter.              Passed - Medication is active on med list        Passed - Patient is age 18 or older        Passed - No active pregnancy on record        Passed - No positive pregnancy test in past 12 months           hydrochlorothiazide (HYDRODIURIL) 25 MG tablet [Pharmacy Med Name: HYDROCHLOROTHIAZIDE TAB 25MG] 45 tablet 3     Sig: TAKE ONE-HALF (1/2) TABLET DAILY   Last Written Prescription Date:  4/10/19  Last Fill Quantity: 45,  # refills: 3   Last office visit: 2/15/19 [facility]  Future Office Visit:        Diuretics (Including Combos) Protocol Failed - 3/25/2020  6:47 PM        Failed - Normal serum creatinine on file in past 12 months     Recent Labs   Lab Test 01/15/20  0855   CR 1.10*            Passed - Blood pressure under 140/90 in past 12 months     BP Readings from Last 3 Encounters:   01/15/20 122/80   10/23/19 130/79   10/10/19 (!) 151/77           Passed - Recent (12 mo) or future (30 days) visit within the authorizing provider's specialty     " "Patient has had an office visit with the authorizing provider or a provider within the authorizing providers department within the previous 12 mos or has a future within next 30 days. See \"Patient Info\" tab in inbasket, or \"Choose Columns\" in Meds & Orders section of the refill encounter.              Passed - Medication is active on med list        Passed - Patient is age 18 or older        Passed - No active pregancy on record        Passed - Normal serum potassium on file in past 12 months     Recent Labs   Lab Test 01/15/20  0855   POTASSIUM 4.2            Passed - Normal serum sodium on file in past 12 months     Recent Labs   Lab Test 01/15/20  0855               Passed - No positive pregnancy test in past 12 months           lisinopril (ZESTRIL) 10 MG tablet [Pharmacy Med Name: LISINOPRIL TABS 10MG] 90 tablet 3     Sig: TAKE 1 TABLET DAILY (DUE FOR ANNUAL EXAM AND LABS)   Last Written Prescription Date:  4/10/19  Last Fill Quantity: 90,  # refills: 3   Last office visit: 2/15/19 [facility]  Future Office Visit:        ACE Inhibitors (Including Combos) Protocol Failed - 3/25/2020  6:47 PM        Failed - Normal serum creatinine on file in past 12 months     Recent Labs   Lab Test 01/15/20  0855  07/31/19  1455   CR 1.10*   < >  --    CREAT  --   --  1.3*    < > = values in this interval not displayed.       Ok to refill medication if creatinine is low          Passed - Blood pressure under 140/90 in past 12 months     BP Readings from Last 3 Encounters:   01/15/20 122/80   10/23/19 130/79   10/10/19 (!) 151/77           Passed - Recent (12 mo) or future (30 days) visit within the authorizing provider's specialty     Patient has had an office visit with the authorizing provider or a provider within the authorizing providers department within the previous 12 mos or has a future within next 30 days. See \"Patient Info\" tab in inbasket, or \"Choose Columns\" in Meds & Orders section of the refill encounter.  " "            Passed - Medication is active on med list        Passed - Patient is age 18 or older        Passed - No active pregnancy on record        Passed - Normal serum potassium on file in past 12 months     Recent Labs   Lab Test 01/15/20  0855   POTASSIUM 4.2           Passed - No positive pregnancy test within past 12 months           metoprolol succinate ER (TOPROL-XL) 25 MG 24 hr tablet [Pharmacy Med Name: METOPROLOL SUCCINATE ER TABS 25MG] 90 tablet 3     Sig: TAKE ONE-HALF (1/2) TABLET TWICE A DAY (DUE FOR ANNUAL EXAM AND LABS)   Last Written Prescription Date:  4/10/19  Last Fill Quantity: 90,  # refills: 3   Last office visit: 2/15/19 [facility]  Future Office Visit:        Beta-Blockers Protocol Passed - 3/25/2020  6:47 PM        Passed - Blood pressure under 140/90 in past 12 months     BP Readings from Last 3 Encounters:   01/15/20 122/80   10/23/19 130/79   10/10/19 (!) 151/77             Passed - Patient is age 6 or older        Passed - Recent (12 mo) or future (30 days) visit within the authorizing provider's specialty     Patient has had an office visit with the authorizing provider or a provider within the authorizing providers department within the previous 12 mos or has a future within next 30 days. See \"Patient Info\" tab in inbasket, or \"Choose Columns\" in Meds & Orders section of the refill encounter.              Passed - Medication is active on med list             "

## 2020-03-25 NOTE — TELEPHONE ENCOUNTER
Routing refill request to provider for review/approval because:  Patient needs to be seen because it has been more than 1 year since last office visit.    Jo-Ann Kimball RN on 3/25/2020 at 6:56 PM

## 2020-03-26 RX ORDER — METOPROLOL SUCCINATE 25 MG/1
TABLET, EXTENDED RELEASE ORAL
Qty: 90 TABLET | Refills: 1 | Status: SHIPPED | OUTPATIENT
Start: 2020-03-26 | End: 2020-09-18

## 2020-03-26 RX ORDER — LISINOPRIL 10 MG/1
TABLET ORAL
Qty: 90 TABLET | Refills: 1 | Status: SHIPPED | OUTPATIENT
Start: 2020-03-26 | End: 2020-09-18

## 2020-03-26 RX ORDER — SIMVASTATIN 10 MG
TABLET ORAL
Qty: 90 TABLET | Refills: 1 | Status: SHIPPED | OUTPATIENT
Start: 2020-03-26 | End: 2020-09-18

## 2020-03-26 RX ORDER — HYDROCHLOROTHIAZIDE 25 MG/1
TABLET ORAL
Qty: 45 TABLET | Refills: 1 | Status: SHIPPED | OUTPATIENT
Start: 2020-03-26 | End: 2020-09-18

## 2020-03-26 NOTE — TELEPHONE ENCOUNTER
I am approving refills for 6 months due to current COVID-19 outbreak related delay in availability of care.  She is due for fasting lipid panel.  Her other labs are up-to-date and she gets these done frequently.  If she can add her fasting lipids onto another lab appointment that would be fine with me.  Then I like to see her later this year.  Xochitl Hemphill MD

## 2020-09-02 ENCOUNTER — TELEPHONE (OUTPATIENT)
Dept: FAMILY MEDICINE | Facility: CLINIC | Age: 75
End: 2020-09-02

## 2020-09-02 DIAGNOSIS — M32.9 SYSTEMIC LUPUS ERYTHEMATOSUS, UNSPECIFIED SLE TYPE, UNSPECIFIED ORGAN INVOLVEMENT STATUS (H): Primary | ICD-10-CM

## 2020-09-02 NOTE — TELEPHONE ENCOUNTER
Reason for Call: Request for an order or referral:    Order or referral being requested: Order labs to check Lupus    Date needed: as soon as possible    Has the patient been seen by the PCP for this problem? YES    Additional comments: Pt would like labwork done to check Lupus. Please call pt when these are in,     Phone number Patient can be reached at:  Home number on file [phone] (home)    Best Time:     Can we leave a detailed message on this number?  YES    Call taken on 9/2/2020 at 12:03 PM by Marisol Borrero

## 2020-09-04 NOTE — TELEPHONE ENCOUNTER
Patient needs an appointment for this.  Please schedule an appointment for labs to be ordered.  EUNICE DamonN, RN

## 2020-09-08 NOTE — TELEPHONE ENCOUNTER
The patient called back and information has been given. She is gong to [facility]  and see if they would be willing to put orders in for her to have the lab work done.   Thank you,  Jenny Canas   for [facility]

## 2020-09-10 NOTE — TELEPHONE ENCOUNTER
Pt calling and states she could not get through to the [facility] office and asking if Dr Hemphill can just put orders in for her to check for lupus. Please advise.

## 2020-09-16 NOTE — TELEPHONE ENCOUNTER
Patient calling, back wasn't told that her provider is out of the office this week. Patient is almost out of her medications and needs the labs done before she can get the refill. Patient would like another provider to write the lab order. Please call to advise

## 2020-09-17 DIAGNOSIS — R60.9 EDEMA, UNSPECIFIED TYPE: ICD-10-CM

## 2020-09-17 DIAGNOSIS — E78.5 HYPERLIPIDEMIA WITH TARGET LDL LESS THAN 130: ICD-10-CM

## 2020-09-17 DIAGNOSIS — N18.30 CKD (CHRONIC KIDNEY DISEASE) STAGE 3, GFR 30-59 ML/MIN (H): ICD-10-CM

## 2020-09-17 DIAGNOSIS — I10 ESSENTIAL HYPERTENSION WITH GOAL BLOOD PRESSURE LESS THAN 140/90: ICD-10-CM

## 2020-09-17 NOTE — TELEPHONE ENCOUNTER
Please call the patient to inform her I have placed lab orders for her to have done. However, please emphasize that she still needs to make an appointment with her rheumatologist as it has been 1 year since the last time she was seen by them.     Cyrus Tang PA-C on 9/17/2020 at 7:20 AM

## 2020-09-18 RX ORDER — METOPROLOL SUCCINATE 25 MG/1
TABLET, EXTENDED RELEASE ORAL
Qty: 90 TABLET | Refills: 0 | Status: SHIPPED | OUTPATIENT
Start: 2020-09-18 | End: 2020-11-23

## 2020-09-18 RX ORDER — SIMVASTATIN 10 MG
10 TABLET ORAL AT BEDTIME
Qty: 90 TABLET | Refills: 0 | Status: SHIPPED | OUTPATIENT
Start: 2020-09-18 | End: 2020-12-01

## 2020-09-18 NOTE — TELEPHONE ENCOUNTER
Routing to schedulers to set up F2F appointment for patient for yearly.    hydrochlorothiazide and Lisinopril:  Routing refill request to provider for review/approval because:  Labs out of range:  Cr  Creatinine   Date Value Ref Range Status   01/15/2020 1.10 (H) 0.52 - 1.04 mg/dL Final   Last Written Prescription Date:  3/26/2020  Last Fill Quantity: 3 months,  # refills: 1   Last office visit: 1/15/2020 with prescribing provider:     Future Office Visit:   Next 5 appointments (look out 90 days)    Oct 08, 2020  2:00 PM CDT  Return Visit with Dione Sykes MD  [facility] ([facility] [address] [place] [id number] [phone]           EUNICE DamonN, RN

## 2020-09-18 NOTE — TELEPHONE ENCOUNTER
Zocor and Metoprolol:  Routing to schedulers to set up F2F appointment for patient for yearly.  Patient has been given #90 to get to appointment per triage protocol.

## 2020-09-21 RX ORDER — HYDROCHLOROTHIAZIDE 25 MG/1
12.5 TABLET ORAL DAILY
Qty: 45 TABLET | Refills: 0 | Status: SHIPPED | OUTPATIENT
Start: 2020-09-21 | End: 2020-12-01

## 2020-09-21 RX ORDER — LISINOPRIL 10 MG/1
10 TABLET ORAL DAILY
Qty: 90 TABLET | Refills: 0 | Status: SHIPPED | OUTPATIENT
Start: 2020-09-21 | End: 2020-12-01

## 2020-09-21 NOTE — TELEPHONE ENCOUNTER
Patient was informed that lab orders are placed and that she needs to schedule an appointment with Rheumatology.    Patient was informed that she is scheduled for 10/8/2020 at 2:00 with Dione Leslie per [facility] but she should call to confirm.    Juan Ramon Angel, [facility]

## 2020-10-02 ENCOUNTER — OFFICE VISIT (OUTPATIENT)
Dept: ORTHOPEDICS | Facility: CLINIC | Age: 75
End: 2020-10-02
Payer: COMMERCIAL

## 2020-10-02 VITALS
SYSTOLIC BLOOD PRESSURE: 108 MMHG | WEIGHT: 171 LBS | HEIGHT: 60 IN | BODY MASS INDEX: 33.57 KG/M2 | DIASTOLIC BLOOD PRESSURE: 70 MMHG

## 2020-10-02 DIAGNOSIS — M17.11 PRIMARY OSTEOARTHRITIS OF RIGHT KNEE: Primary | ICD-10-CM

## 2020-10-02 PROCEDURE — 99213 OFFICE O/P EST LOW 20 MIN: CPT | Performed by: PHYSICIAN ASSISTANT

## 2020-10-02 ASSESSMENT — PAIN SCALES - GENERAL: PAINLEVEL: EXTREME PAIN (9)

## 2020-10-02 ASSESSMENT — MIFFLIN-ST. JEOR: SCORE: 1182.15

## 2020-10-02 NOTE — LETTER
10/2/2020         RE: Capri Wiseman  [address]  [facility] [id number]        Dear Colleague,    Thank you for referring your patient, Capri Wiseman, to the [facility]. Please see a copy of my visit note below.    Office Visit-Follow up    Chief Complaint: Capri Wiseman is a 75 year old female who is being seen for   Chief Complaint   Patient presents with     RECHECK     rt knee  f/u request inj lov with inj 10/23/19 with synvisc       History of Present Illness:   Mechanism of Injury: No injury fall or trauma.  Location: Right knee  Duration of Pain: Several years but worse over the last few months.  Rating of Pain: 9 out of 10  Pain Quality: Achy  Pain is better with: Rest and cortisone injections  Pain is worse with: Ambulation  Treatment so far consists of: Patient has tried Tylenol, NSAIDs she cannot take secondary to kidney function, she has tried home exercise program, chiropractic care, assistive devices like cane, bracing, offloading brace ordered but insurance would not cover it, cortisone injections and Synvisc 1 injection.  Or cortisone injections with us were on 6/10/2019 and 10/23/2019 and her Synvisc 1 injection was 7/15/2019.  Associated Features: Right knee pain that continues mostly on the medial side.  Pain is Limiting: Ambulation  Here to: Orthopedic consultation possibly get cortisone injections possible type of needle placement.  Additional History: Patient states that she wants to get the knee replaced at this point.  She wants to get it fixed.  She does have a surgeon she is in contact with at [facility] but he is booking out into April.    REVIEW OF SYSTEMS  General: negative for, night sweats, dizziness, fatigue  Resp: No shortness of breath and no cough  CV: negative for chest pain, syncope or near-syncope  GI: negative for nausea, vomiting and diarrhea  : negative for dysuria and hematuria  Musculoskeletal: as above  Neurologic:  negative for syncope   Hematologic: negative for bleeding disorder    Physical Exam:  Vitals: /70   Ht 1.524 m (5')   Wt 77.6 kg (171 lb)   LMP  (LMP Unknown)   BMI 33.40 kg/m    BMI= Body mass index is 33.4 kg/m .  Constitutional: healthy, alert and no acute distress   Psychiatric: mentation appears normal and affect normal/bright  NEURO: no focal deficits, CMS intact right lower extremity  RESP: Normal with easy respirations and no use of accessory muscles to breathe, no audible wheezing or retractions  CV: Calf soft and nontender to palpation, leg warm   SKIN: No erythema, rashes, excoriation, or breakdown. No evidence of infection.   MUSCULOSKELETAL:    INSPECTION of right knee: No gross deformities, erythema, edema, ecchymosis, atrophy or fasciculations.     PALPATION: No tenderness medial, lateral, anterior and posterior portion of the knee. No specific joint line tenderness. No increased warmth.  No effusion.     ROM: Extension full, flexion to 125 . All range of motion without catching, locking or pain.       STRENGTH: Able to get on and off the exam table without any problems able to ambulate without any problems other than limping.    SPECIAL TEST: None today  GAIT: Right antalgic gait.  Using a cane.  Lymph: no palpable lymph nodes      Diagnostic Modalities:  No new radiographs necessary today.    I did review the x-rays that she had done on 6/10/2019 which was bilateral AP and sunrise as well as lateral of the knee.  This shows severe degenerative joint disease in the medial side and moderate patellofemoral degenerative joint disease.  Patient has a left total knee arthroplasty that is showing no failure and no loosening.    Independent visualization of the images was performed.      Impression: 1.  Right knee degenerative joint disease, medial severe, patellofemoral moderate.    Plan:  All of the above pertinent physical exam and imaging modalities findings was reviewed with Katya LAWRENCE  PLAN:   Patient wanting to get knee surgery at this point.  She did inquire about a cortisone injection but I did educate her that this would cause her to not be able to have a knee replacement for 3 months.  She would like to have her surgery after Z[place]raa so this would not work for her.  I did order her some Voltaren external medication.  Her kidney function does not allow for NSAIDs.  Described and discussed total knee arthroplasty.  She is going to make an appointment with Dr. Fermin to get scheduled for knee replacement surgery.    Re-x-ray on return: Yes,  get x-rays bilateral AP, sunrise view and Otto as well as lateral right knee when patient comes in to see Dr. Fermin.    BP Readings from Last 1 Encounters:   10/02/20 108/70       BP noted to be well controlled today in office.      Patient does not use Tobacco products.    This note was dictated with [employer].    Hank Monson PA-C           Again, thank you for allowing me to participate in the care of your patient.        Sincerely,        Hank Monson PA-C

## 2020-10-02 NOTE — PROGRESS NOTES
Office Visit-Follow up    Chief Complaint: Capri Wiseman is a 75 year old female who is being seen for   Chief Complaint   Patient presents with     RECHECK     rt knee  f/u request inj lov with inj 10/23/19 with synvisc       History of Present Illness:   Mechanism of Injury: No injury fall or trauma.  Location: Right knee  Duration of Pain: Several years but worse over the last few months.  Rating of Pain: 9 out of 10  Pain Quality: Achy  Pain is better with: Rest and cortisone injections  Pain is worse with: Ambulation  Treatment so far consists of: Patient has tried Tylenol, NSAIDs she cannot take secondary to kidney function, she has tried home exercise program, chiropractic care, assistive devices like cane, bracing, offloading brace ordered but insurance would not cover it, cortisone injections and Synvisc 1 injection.  Or cortisone injections with us were on 6/10/2019 and 10/23/2019 and her Synvisc 1 injection was 7/15/2019.  Associated Features: Right knee pain that continues mostly on the medial side.  Pain is Limiting: Ambulation  Here to: Orthopedic consultation possibly get cortisone injections possible type of needle placement.  Additional History: Patient states that she wants to get the knee replaced at this point.  She wants to get it fixed.  She does have a surgeon she is in contact with at [facility] but he is booking out into April.    REVIEW OF SYSTEMS  General: negative for, night sweats, dizziness, fatigue  Resp: No shortness of breath and no cough  CV: negative for chest pain, syncope or near-syncope  GI: negative for nausea, vomiting and diarrhea  : negative for dysuria and hematuria  Musculoskeletal: as above  Neurologic: negative for syncope   Hematologic: negative for bleeding disorder    Physical Exam:  Vitals: /70   Ht 1.524 m (5')   Wt 77.6 kg (171 lb)   LMP  (LMP Unknown)   BMI 33.40 kg/m    BMI= Body mass index is 33.4 kg/m .  Constitutional: healthy, alert and no  acute distress   Psychiatric: mentation appears normal and affect normal/bright  NEURO: no focal deficits, CMS intact right lower extremity  RESP: Normal with easy respirations and no use of accessory muscles to breathe, no audible wheezing or retractions  CV: Calf soft and nontender to palpation, leg warm   SKIN: No erythema, rashes, excoriation, or breakdown. No evidence of infection.   MUSCULOSKELETAL:    INSPECTION of right knee: No gross deformities, erythema, edema, ecchymosis, atrophy or fasciculations.     PALPATION: No tenderness medial, lateral, anterior and posterior portion of the knee. No specific joint line tenderness. No increased warmth.  No effusion.     ROM: Extension full, flexion to 125 . All range of motion without catching, locking or pain.       STRENGTH: Able to get on and off the exam table without any problems able to ambulate without any problems other than limping.    SPECIAL TEST: None today  GAIT: Right antalgic gait.  Using a cane.  Lymph: no palpable lymph nodes      Diagnostic Modalities:  No new radiographs necessary today.    I did review the x-rays that she had done on 6/10/2019 which was bilateral AP and sunrise as well as lateral of the knee.  This shows severe degenerative joint disease in the medial side and moderate patellofemoral degenerative joint disease.  Patient has a left total knee arthroplasty that is showing no failure and no loosening.    Independent visualization of the images was performed.      Impression: 1.  Right knee degenerative joint disease, medial severe, patellofemoral moderate.    Plan:  All of the above pertinent physical exam and imaging modalities findings was reviewed with Capri.    FOCUSED PLAN:   Patient wanting to get knee surgery at this point.  She did inquire about a cortisone injection but I did educate her that this would cause her to not be able to have a knee replacement for 3 months.  She would like to have her surgery after Christmas so  this would not work for her.  I did order her some Voltaren external medication.  Her kidney function does not allow for NSAIDs.  Described and discussed total knee arthroplasty.  She is going to make an appointment with Dr. Fermin to get scheduled for knee replacement surgery.    Re-x-ray on return: Yes,  get x-rays bilateral AP, sunrise view and Otto as well as lateral right knee when patient comes in to see Dr. Fermin.    BP Readings from Last 1 Encounters:   10/02/20 108/70       BP noted to be well controlled today in office.      Patient does not use Tobacco products.    This note was dictated with [employer].    Hank Monson PA-C

## 2020-10-06 ENCOUNTER — TELEPHONE (OUTPATIENT)
Dept: RHEUMATOLOGY | Facility: CLINIC | Age: 75
End: 2020-10-06

## 2020-10-06 NOTE — TELEPHONE ENCOUNTER
[facility] Call Center    Phone Message    May a detailed message be left on voicemail: yes     Reason for Call: Patient has an appointment scheduled for 10/08/2020. Someone left her a voicemail in regards to changing it to a virtual visit. Patient would like a call back to see why the appointment is being changed. Please advise.     Action Taken: Message routed to:  Adult Clinics: Rheumatology p [id number]    Travel Screening: Not Applicable

## 2020-10-07 NOTE — TELEPHONE ENCOUNTER
Patient stated that she didn't have any further questions just that she didn't want to do the appointment by video or phone. I offered to look for the next available appointment with either Dr. Sykes or Dr. Ingram but patient said that she will be going to see Dr. Lara and just cancelled her appointments. Patient was not upset and thanked me for calling her back. Carol Oliveros CMA

## 2020-10-21 ENCOUNTER — ANCILLARY PROCEDURE (OUTPATIENT)
Dept: GENERAL RADIOLOGY | Facility: CLINIC | Age: 75
End: 2020-10-21
Attending: ORTHOPAEDIC SURGERY
Payer: COMMERCIAL

## 2020-10-21 ENCOUNTER — HOSPITAL ENCOUNTER (OUTPATIENT)
Facility: CLINIC | Age: 75
End: 2020-10-21
Attending: ORTHOPAEDIC SURGERY | Admitting: ORTHOPAEDIC SURGERY
Payer: COMMERCIAL

## 2020-10-21 ENCOUNTER — OFFICE VISIT (OUTPATIENT)
Dept: ORTHOPEDICS | Facility: CLINIC | Age: 75
End: 2020-10-21
Payer: COMMERCIAL

## 2020-10-21 VITALS
WEIGHT: 169 LBS | BODY MASS INDEX: 33.18 KG/M2 | HEIGHT: 60 IN | DIASTOLIC BLOOD PRESSURE: 70 MMHG | SYSTOLIC BLOOD PRESSURE: 142 MMHG

## 2020-10-21 DIAGNOSIS — M17.11 PRIMARY OSTEOARTHRITIS OF RIGHT KNEE: Primary | ICD-10-CM

## 2020-10-21 DIAGNOSIS — M17.11 PRIMARY OSTEOARTHRITIS OF RIGHT KNEE: ICD-10-CM

## 2020-10-21 PROCEDURE — 99214 OFFICE O/P EST MOD 30 MIN: CPT | Performed by: ORTHOPAEDIC SURGERY

## 2020-10-21 PROCEDURE — 73564 X-RAY EXAM KNEE 4 OR MORE: CPT | Mod: TC | Performed by: RADIOLOGY

## 2020-10-21 ASSESSMENT — MIFFLIN-ST. JEOR: SCORE: 1173.08

## 2020-10-21 NOTE — LETTER
10/21/2020         RE: Capri Wiseman  [address] [facility] [address]        Dear Colleague,    Thank you for referring your patient, Capri Wiseman, to the [facility]. Please see a copy of my visit note below.    ORTHOPEDIC CONSULT      Chief Complaint: Capri Wiseman is a 75 year old female who is being seen for   Chief Complaint   Patient presents with     RECHECK     Right knee degenerative joint disease, medial severe, patellofemoral moderate       History of Present Illness:     Previously had been seen by Pritesh Monson and treated for right knee arthritis.  Per Pritesh's notes she is attempted Tylenol, use of a cane, chiropractic care, home exercise program, intra-articular steroid injections, viscosupplementation injections.  She avoids anti-inflammatories due to kidney issues.    She presents with least 1 year worth of right knee pain.  Pain is located on the medial side of her knee.  Rates the pain is moderate to severe much worse with weightbearing and simple activities such as being up in the kitchen.  She will have pain at rest.  Pain that causes her limp.  She is using a cane because of the pain.  Pain is nonradiating.  She would like to discuss knee replacement surgery.    She had her contralateral knee replaced about 5 years ago and has been very happy.      Patient's past medical, surgical, social and family histories reviewed.     Past Medical History:   Diagnosis Date     CKD (chronic kidney disease) stage 3, GFR 30-59 ml/min      Dental disorder     loosing top teeth due to lupus     GERD (gastroesophageal reflux disease)      High blood pressure      Mixed hyperlipidaemia      Osteoarthritis      RA (rheumatoid arthritis) (H)     mild, on plaquenil     SLE (systemic lupus erythematosus) (H)      Vision problem     dry eyes from Lupus       Past Surgical History:   Procedure Laterality Date     APPENDECTOMY  1969     C TOTAL KNEE ARTHROPLASTY  3/10    left      COLONOSCOPY  2009    repeat 5 years     COLONOSCOPY N/A 5/20/2015    normal, repeat 5 years due to family history     COLONOSCOPY N/A 10/7/2019    Procedure: COLONOSCOPY;  Surgeon: Con Recinos MD;  Location:  GI     FOOT SURGERY  2010    bone and screws in 2 toes, hammertoe     HYSTERECTOMY TOTAL ABDOMINAL  2010    due to bleeding, benign     LAPAROTOMY EXPLORATORY      scar tissue removal/repair - abd     SHOULDER SURGERY  05/2013    rotator cuff repair, right       Medications:       artificial saliva, BIOTENE MT, (BIOTENE MT) SOLN, Swish and spit 5-10 mLs in mouth as needed for dry mouth       Ascorbic Acid (VITAMIN C PO), Take 1,000 mg by mouth daily       aspirin 325 MG tablet, Take 325 mg by mouth daily       calcium citrate-vitamin D (CITRACAL) 315-250 MG-UNIT TABS, Take 2 tablets by mouth daily        Cyanocobalamin (VITAMIN B 12 PO), Take 1,000 mcg by mouth daily       cycloSPORINE (RESTASIS) 0.05 % ophthalmic emulsion, Place 1 drop into both eyes 2 times daily       diclofenac (VOLTAREN) 1 % topical gel, Place 4 g onto the skin 4 times daily       Glucosamine-Chondroit-Vit C-Mn (GLUCOSAMINE CHONDR 1500 COMPLX PO), Take 2 tablets by mouth daily       hydrochlorothiazide (HYDRODIURIL) 25 MG tablet, Take 0.5 tablets (12.5 mg) by mouth daily Appointment needed for additional refills.       hydroxychloroquine (PLAQUENIL) 200 MG tablet, Take 1 tablet (200 mg) by mouth daily       lisinopril (ZESTRIL) 10 MG tablet, Take 1 tablet (10 mg) by mouth daily Appointment needed for additional refills.       metoprolol succinate ER (TOPROL-XL) 25 MG 24 hr tablet, TAKE ONE-HALF (1/2) TABLET TWICE A DAY (DUE FOR ANNUAL EXAM AND LABS)       Multiple Vitamins-Minerals (CENTRUM SILVER) per tablet, Take 1 tablet by mouth daily       Omega-3 Fatty Acids (OMEGA-3 FISH OIL PO), Take 1,400 mg by mouth daily       ondansetron (ZOFRAN ODT) 4 MG ODT tab, Take 1 tablet (4 mg) by mouth every 6 hours as needed for nausea  (Patient not taking: Reported on 10/21/2020)       order for DME, Equipment being ordered: Oxygen, portable for travel, 2 lpm while on flights       simvastatin (ZOCOR) 10 MG tablet, Take 1 tablet (10 mg) by mouth At Bedtime Appointment needed for additional refills.    No current facility-administered medications on file prior to visit.       No Known Allergies    Social History     Occupational History     Occupation: teacher     Comment: Retired   Tobacco Use     Smoking status: Never Smoker     Smokeless tobacco: Never Used   Substance and Sexual Activity     Alcohol use: No     Alcohol/week: 0.0 standard drinks     Drug use: No     Sexual activity: Never       Family History   Problem Relation Age of Onset     Colon Cancer Mother      Lupus Mother      Cerebrovascular Disease Father      Neurologic Disorder Sister         ALS       REVIEW OF SYSTEMS  10 point review systems performed otherwise negative as noted as per history of present illness.    Physical Exam:  Vitals: BP (!) 142/70   Ht 1.524 m (5')   Wt 76.7 kg (169 lb)   LMP  (LMP Unknown)   BMI 33.01 kg/m    BMI= Body mass index is 33.01 kg/m .  Constitutional: healthy, alert and no acute distress   Psychiatric: mentation appears normal and affect normal/bright  NEURO: no focal deficits  RESP: Normal with easy respirations and no use of accessory muscles to breathe, no audible wheezing or retractions  CV: RLE: no edema         Regular rate and rhythm by palpation  SKIN: No erythema, rashes, excoriation, or breakdown. No evidence of infection.   JOINT/EXTREMITIES:right knee: Varus alignment.  Small to moderate effusion.  Exquisite tenderness along the medial joint line.  Knee does correct to neutral with valgus stressing.  Collateral ligaments are intact.  Active motion 0-120 degrees   Lymph: No peripheral lymphedema  GAIT: antalgic and with assistive device    Diagnostic Modalities:  right knee X-ray: No fractures or dislocations.  Bone-on-bone  arthritis medial compartment with some rimming osteophytes.  Lateral compartment shows some chondrocalcinosis but overall fairly well-preserved joint.  Patellofemoral shows some rimming osteophytes with some joint space irregularity along the lateral patellar facet.  Independent visualization of the images was performed.      Impression: right knee primary osteoarthritis    Plan:  All of the above pertinent physical exam and imaging modalities findings was reviewed with Capri.    The patient has attempted conservative treatments that include Tylenol, focused self directed physical therapy, viscosupplementation injections, steroid injections, activity modifications, rest yet still continues to have significant issues. These issues include pain at rest, increased pain with activity, pain that wakes at night, gait disturbances, needing the use of assistive devices for ambulation. Secondary to these reasons I have offered surgery in the form of right total knee replacement.    Risks, benefits, complications, alternatives, limitations, and post operative course were discussed. Risks including infection (requiring long-term antibiotics and repeat surgeries), implant loosening (requiring revision surgery), heart attacks, strokes, bleeding (possibly requiring blood transfusion), scars, instability, numbness around the knee, stiffness (requiring manipulations or repeat surgeries), instability and dislocations, fractures, blood clots the legs and lungs, nerve injury (possibly leading to foot drop).  Postoperative course was discussed as far as limitations and expected recovery times. It was also discussed that after surgery there is a need for blood thinners to prevent blood clots, but even when being treated it is possible to develop a blood clot. Anticipate being hospitalized 1 days and subsequently either discharged home or to a rehabilitation facility. Determinations of this will be based on progress with physical therapy  while in the hospital. The importance of post-operative physical therapy was discussed. If discharge home from the hospital expect [place] home physical therapy for about 2 weeks and then transition to going to a physical therapy location for more aggressive therapy. Without physical therapy, outcomes may not be optimal. All questions were answered. The patient agrees to proceed with surgery.  Past medical and surgical history was reviewed. It is positive for hypertension, lupus, hyperlipidemia, chronic kidney disease. Ms. Wiseman will need a pre-operative medical evaluation and clearance by a primary care provider. We will obtain a CBC as well as the appropriate radiographs prior to surgery. I will leave it to the discretion of the primary care provider for additional pre-operative testing.     Anticipate next day discharge.  Will use Xarelto or Eliquis for DVT prophylaxis given kidney history.  We will plan to see her 1 week prior to surgery.    Return to clinic 14 days post-operatively. , or sooner as needed for changes.  Re-x-ray on return: Yes.    Frankie Fermin D.O.      Again, thank you for allowing me to participate in the care of your patient.        Sincerely,        Thad Fermin, DO

## 2020-10-21 NOTE — PROGRESS NOTES
ORTHOPEDIC CONSULT      Chief Complaint: Capri Wiseman is a 75 year old female who is being seen for   Chief Complaint   Patient presents with     RECHECK     Right knee degenerative joint disease, medial severe, patellofemoral moderate       History of Present Illness:     Previously had been seen by Pritesh Monson and treated for right knee arthritis.  Per Pritesh's notes she is attempted Tylenol, use of a cane, chiropractic care, home exercise program, intra-articular steroid injections, viscosupplementation injections.  She avoids anti-inflammatories due to kidney issues.    She presents with least 1 year worth of right knee pain.  Pain is located on the medial side of her knee.  Rates the pain is moderate to severe much worse with weightbearing and simple activities such as being up in the kitchen.  She will have pain at rest.  Pain that causes her limp.  She is using a cane because of the pain.  Pain is nonradiating.  She would like to discuss knee replacement surgery.    She had her contralateral knee replaced about 5 years ago and has been very happy.      Patient's past medical, surgical, social and family histories reviewed.     Past Medical History:   Diagnosis Date     CKD (chronic kidney disease) stage 3, GFR 30-59 ml/min      Dental disorder     loosing top teeth due to lupus     GERD (gastroesophageal reflux disease)      High blood pressure      Mixed hyperlipidaemia      Osteoarthritis      RA (rheumatoid arthritis) (H)     mild, on plaquenil     SLE (systemic lupus erythematosus) (H)      Vision problem     dry eyes from Lupus       Past Surgical History:   Procedure Laterality Date     APPENDECTOMY  1969     C TOTAL KNEE ARTHROPLASTY  3/10    left     COLONOSCOPY  2009    repeat 5 years     COLONOSCOPY N/A 5/20/2015    normal, repeat 5 years due to family history     COLONOSCOPY N/A 10/7/2019    Procedure: COLONOSCOPY;  Surgeon: Con Recinos MD;  Location:  GI     FOOT SURGERY  2010     bone and screws in 2 toes, mandyertogerardo     HYSTERECTOMY TOTAL ABDOMINAL  2010    due to bleeding, benign     LAPAROTOMY EXPLORATORY      scar tissue removal/repair - abd     SHOULDER SURGERY  05/2013    rotator cuff repair, right       Medications:       artificial saliva, BIOTENE MT, (BIOTENE MT) SOLN, Swish and spit 5-10 mLs in mouth as needed for dry mouth       Ascorbic Acid (VITAMIN C PO), Take 1,000 mg by mouth daily       aspirin 325 MG tablet, Take 325 mg by mouth daily       calcium citrate-vitamin D (CITRACAL) 315-250 MG-UNIT TABS, Take 2 tablets by mouth daily        Cyanocobalamin (VITAMIN B 12 PO), Take 1,000 mcg by mouth daily       cycloSPORINE (RESTASIS) 0.05 % ophthalmic emulsion, Place 1 drop into both eyes 2 times daily       diclofenac (VOLTAREN) 1 % topical gel, Place 4 g onto the skin 4 times daily       Glucosamine-Chondroit-Vit C-Mn (GLUCOSAMINE CHONDR 1500 COMPLX PO), Take 2 tablets by mouth daily       hydrochlorothiazide (HYDRODIURIL) 25 MG tablet, Take 0.5 tablets (12.5 mg) by mouth daily Appointment needed for additional refills.       hydroxychloroquine (PLAQUENIL) 200 MG tablet, Take 1 tablet (200 mg) by mouth daily       lisinopril (ZESTRIL) 10 MG tablet, Take 1 tablet (10 mg) by mouth daily Appointment needed for additional refills.       metoprolol succinate ER (TOPROL-XL) 25 MG 24 hr tablet, TAKE ONE-HALF (1/2) TABLET TWICE A DAY (DUE FOR ANNUAL EXAM AND LABS)       Multiple Vitamins-Minerals (CENTRUM SILVER) per tablet, Take 1 tablet by mouth daily       Omega-3 Fatty Acids (OMEGA-3 FISH OIL PO), Take 1,400 mg by mouth daily       ondansetron (ZOFRAN ODT) 4 MG ODT tab, Take 1 tablet (4 mg) by mouth every 6 hours as needed for nausea (Patient not taking: Reported on 10/21/2020)       order for DME, Equipment being ordered: Oxygen, portable for travel, 2 lpm while on flights       simvastatin (ZOCOR) 10 MG tablet, Take 1 tablet (10 mg) by mouth At Bedtime Appointment needed for  additional refills.    No current facility-administered medications on file prior to visit.       No Known Allergies    Social History     Occupational History     Occupation: teacher     Comment: Retired   Tobacco Use     Smoking status: Never Smoker     Smokeless tobacco: Never Used   Substance and Sexual Activity     Alcohol use: No     Alcohol/week: 0.0 standard drinks     Drug use: No     Sexual activity: Never       Family History   Problem Relation Age of Onset     Colon Cancer Mother      Lupus Mother      Cerebrovascular Disease Father      Neurologic Disorder Sister         ALS       REVIEW OF SYSTEMS  10 point review systems performed otherwise negative as noted as per history of present illness.    Physical Exam:  Vitals: BP (!) 142/70   Ht 1.524 m (5')   Wt 76.7 kg (169 lb)   LMP  (LMP Unknown)   BMI 33.01 kg/m    BMI= Body mass index is 33.01 kg/m .  Constitutional: healthy, alert and no acute distress   Psychiatric: mentation appears normal and affect normal/bright  NEURO: no focal deficits  RESP: Normal with easy respirations and no use of accessory muscles to breathe, no audible wheezing or retractions  CV: RLE: no edema         Regular rate and rhythm by palpation  SKIN: No erythema, rashes, excoriation, or breakdown. No evidence of infection.   JOINT/EXTREMITIES:right knee: Varus alignment.  Small to moderate effusion.  Exquisite tenderness along the medial joint line.  Knee does correct to neutral with valgus stressing.  Collateral ligaments are intact.  Active motion 0-120 degrees   Lymph: No peripheral lymphedema  GAIT: antalgic and with assistive device    Diagnostic Modalities:  right knee X-ray: No fractures or dislocations.  Bone-on-bone arthritis medial compartment with some rimming osteophytes.  Lateral compartment shows some chondrocalcinosis but overall fairly well-preserved joint.  Patellofemoral shows some rimming osteophytes with some joint space irregularity along the lateral  patellar facet.  Independent visualization of the images was performed.      Impression: right knee primary osteoarthritis    Plan:  All of the above pertinent physical exam and imaging modalities findings was reviewed with Capri.    The patient has attempted conservative treatments that include Tylenol, focused self directed physical therapy, viscosupplementation injections, steroid injections, activity modifications, rest yet still continues to have significant issues. These issues include pain at rest, increased pain with activity, pain that wakes at night, gait disturbances, needing the use of assistive devices for ambulation. Secondary to these reasons I have offered surgery in the form of right total knee replacement.    Risks, benefits, complications, alternatives, limitations, and post operative course were discussed. Risks including infection (requiring long-term antibiotics and repeat surgeries), implant loosening (requiring revision surgery), heart attacks, strokes, bleeding (possibly requiring blood transfusion), scars, instability, numbness around the knee, stiffness (requiring manipulations or repeat surgeries), instability and dislocations, fractures, blood clots the legs and lungs, nerve injury (possibly leading to foot drop).  Postoperative course was discussed as far as limitations and expected recovery times. It was also discussed that after surgery there is a need for blood thinners to prevent blood clots, but even when being treated it is possible to develop a blood clot. Anticipate being hospitalized 1 days and subsequently either discharged home or to a rehabilitation facility. Determinations of this will be based on progress with physical therapy while in the hospital. The importance of post-operative physical therapy was discussed. If discharge home from the hospital expect [place] home physical therapy for about 2 weeks and then transition to going to a physical therapy location for more  aggressive therapy. Without physical therapy, outcomes may not be optimal. All questions were answered. The patient agrees to proceed with surgery.  Past medical and surgical history was reviewed. It is positive for hypertension, lupus, hyperlipidemia, chronic kidney disease. Ms. Wiseman will need a pre-operative medical evaluation and clearance by a primary care provider. We will obtain a CBC as well as the appropriate radiographs prior to surgery. I will leave it to the discretion of the primary care provider for additional pre-operative testing.     Anticipate next day discharge.  Will use Xarelto or Eliquis for DVT prophylaxis given kidney history.  We will plan to see her 1 week prior to surgery.    Return to clinic 14 days post-operatively. , or sooner as needed for changes.  Re-x-ray on return: Yes.    Frankie Fermin D.O.

## 2020-10-22 ENCOUNTER — TELEPHONE (OUTPATIENT)
Dept: ORTHOPEDICS | Facility: CLINIC | Age: 75
End: 2020-10-22

## 2020-10-22 DIAGNOSIS — Z01.818 PREOPERATIVE EXAMINATION: Primary | ICD-10-CM

## 2020-10-22 DIAGNOSIS — Z01.818 PRE-OP EXAM: ICD-10-CM

## 2020-10-22 NOTE — TELEPHONE ENCOUNTER
Type of surgery: right total knee replacement  Location of surgery: [facility]   Date of surgery: 11/17/20  Surgeon: Dr. Fermin  Pre-Op Appt Date: message sent to PCP  Post-Op Appt Date: 12/2/20   Packet sent out: Surgery packet mailed to patient's home address.   Pre-cert/Authorization completed: NA  Date: 10/22/2020    Tyra Dixon  Surgery Scheduler

## 2020-10-23 DIAGNOSIS — Z11.59 ENCOUNTER FOR SCREENING FOR OTHER VIRAL DISEASES: Primary | ICD-10-CM

## 2020-11-02 RX ORDER — CEFAZOLIN SODIUM 1 G/3ML
1 INJECTION, POWDER, FOR SOLUTION INTRAMUSCULAR; INTRAVENOUS SEE ADMIN INSTRUCTIONS
Status: CANCELLED | OUTPATIENT
Start: 2020-11-02

## 2020-11-02 RX ORDER — TRANEXAMIC ACID 650 MG/1
1950 TABLET ORAL ONCE
Status: CANCELLED | OUTPATIENT
Start: 2020-11-02 | End: 2020-11-02

## 2020-11-02 RX ORDER — CEFAZOLIN SODIUM 2 G/100ML
2 INJECTION, SOLUTION INTRAVENOUS
Status: CANCELLED | OUTPATIENT
Start: 2020-11-02

## 2020-11-04 ENCOUNTER — OFFICE VISIT (OUTPATIENT)
Dept: FAMILY MEDICINE | Facility: CLINIC | Age: 75
End: 2020-11-04
Payer: COMMERCIAL

## 2020-11-04 VITALS
WEIGHT: 169.2 LBS | BODY MASS INDEX: 33.04 KG/M2 | RESPIRATION RATE: 20 BRPM | TEMPERATURE: 97.8 F | HEART RATE: 98 BPM | SYSTOLIC BLOOD PRESSURE: 132 MMHG | OXYGEN SATURATION: 96 % | DIASTOLIC BLOOD PRESSURE: 78 MMHG

## 2020-11-04 DIAGNOSIS — M17.11 PRIMARY OSTEOARTHRITIS OF RIGHT KNEE: ICD-10-CM

## 2020-11-04 DIAGNOSIS — M32.9 SYSTEMIC LUPUS ERYTHEMATOSUS, UNSPECIFIED SLE TYPE, UNSPECIFIED ORGAN INVOLVEMENT STATUS (H): ICD-10-CM

## 2020-11-04 DIAGNOSIS — Z79.899 LONG-TERM USE OF PLAQUENIL: ICD-10-CM

## 2020-11-04 DIAGNOSIS — M06.9 RHEUMATOID ARTHRITIS INVOLVING MULTIPLE SITES, UNSPECIFIED WHETHER RHEUMATOID FACTOR PRESENT (H): ICD-10-CM

## 2020-11-04 DIAGNOSIS — Z01.818 PREOP GENERAL PHYSICAL EXAM: Primary | ICD-10-CM

## 2020-11-04 DIAGNOSIS — I10 ESSENTIAL HYPERTENSION: ICD-10-CM

## 2020-11-04 DIAGNOSIS — N18.31 STAGE 3A CHRONIC KIDNEY DISEASE (H): ICD-10-CM

## 2020-11-04 LAB
ANION GAP SERPL CALCULATED.3IONS-SCNC: 7 MMOL/L (ref 3–14)
BASOPHILS # BLD AUTO: 0 10E9/L (ref 0–0.2)
BASOPHILS NFR BLD AUTO: 0.4 %
BUN SERPL-MCNC: 44 MG/DL (ref 7–30)
CALCIUM SERPL-MCNC: 10.2 MG/DL (ref 8.5–10.1)
CHLORIDE SERPL-SCNC: 106 MMOL/L (ref 94–109)
CO2 SERPL-SCNC: 27 MMOL/L (ref 20–32)
CREAT SERPL-MCNC: 1.24 MG/DL (ref 0.52–1.04)
DIFFERENTIAL METHOD BLD: ABNORMAL
EOSINOPHIL NFR BLD AUTO: 1.3 %
ERYTHROCYTE [DISTWIDTH] IN BLOOD BY AUTOMATED COUNT: 12.2 % (ref 10–15)
GFR SERPL CREATININE-BSD FRML MDRD: 42 ML/MIN/{1.73_M2}
GLUCOSE SERPL-MCNC: 101 MG/DL (ref 70–99)
HCT VFR BLD AUTO: 36.4 % (ref 35–47)
HGB BLD-MCNC: 12.1 G/DL (ref 11.7–15.7)
IMM GRANULOCYTES # BLD: 0 10E9/L (ref 0–0.4)
IMM GRANULOCYTES NFR BLD: 0.2 %
LYMPHOCYTES # BLD AUTO: 1.3 10E9/L (ref 0.8–5.3)
LYMPHOCYTES NFR BLD AUTO: 23.5 %
MCH RBC QN AUTO: 34.3 PG (ref 26.5–33)
MCHC RBC AUTO-ENTMCNC: 33.2 G/DL (ref 31.5–36.5)
MCV RBC AUTO: 103 FL (ref 78–100)
MONOCYTES # BLD AUTO: 0.4 10E9/L (ref 0–1.3)
MONOCYTES NFR BLD AUTO: 7 %
NEUTROPHILS # BLD AUTO: 3.8 10E9/L (ref 1.6–8.3)
NEUTROPHILS NFR BLD AUTO: 67.6 %
NRBC # BLD AUTO: 0 10*3/UL
NRBC BLD AUTO-RTO: 0 /100
PLATELET # BLD AUTO: 211 10E9/L (ref 150–450)
POTASSIUM SERPL-SCNC: 4.7 MMOL/L (ref 3.4–5.3)
RBC # BLD AUTO: 3.53 10E12/L (ref 3.8–5.2)
SODIUM SERPL-SCNC: 140 MMOL/L (ref 133–144)
WBC # BLD AUTO: 5.5 10E9/L (ref 4–11)

## 2020-11-04 PROCEDURE — 99215 OFFICE O/P EST HI 40 MIN: CPT | Performed by: FAMILY MEDICINE

## 2020-11-04 PROCEDURE — 36415 COLL VENOUS BLD VENIPUNCTURE: CPT | Performed by: FAMILY MEDICINE

## 2020-11-04 PROCEDURE — 80048 BASIC METABOLIC PNL TOTAL CA: CPT | Performed by: FAMILY MEDICINE

## 2020-11-04 PROCEDURE — 85025 COMPLETE CBC W/AUTO DIFF WBC: CPT | Performed by: FAMILY MEDICINE

## 2020-11-04 ASSESSMENT — PAIN SCALES - GENERAL: PAINLEVEL: NO PAIN (0)

## 2020-11-04 NOTE — PROGRESS NOTES
[address] [address]  [phone]  Dept: [phone]    PRE-OP EVALUATION:  Today's date: 2020    Capri Wiseman (: 1945) presents for pre-operative evaluation assessment as requested by Dr. Fermin.  She requires evaluation and anesthesia risk assessment prior to undergoing surgery/procedure for treatment of Osteoarthritis of the knee .    Proposed Surgery/ Procedure: Total right knee replacement  Date of Surgery/ Procedure: 2020  Time of Surgery/ Procedure: 10:15  Hospital/Surgical Facility: [facility]   Surgery Fax Number: Note does not need to be faxed, will be available electronically in Epic.  Primary Physician: Xochitl Hemphill  Type of Anesthesia Anticipated: General    Preoperative Questionnaire:   No - Have you ever had a heart attack or stroke?  No - Have you ever had surgery on your heart or blood vessels, such as a stent, coronary (heart) bypass, or surgery on an artery in the head, neck, heart, or legs?  No - Do you have chest pain when you are physically active?  No - Do you have a history of heart failure?  No - Do you currently have a cold, bronchitis, or symptoms of other respiratory (head and chest) infections?  No - Do you have a cough, shortness of breath, or wheezing?  No - Do you or anyone in your family have a history of blood clots?  YES - DO YOU OR ANYONE IN YOUR FAMILY HAVE A HISTORY OF BLOOD CLOTS? Mother - legs swelling with ulcers and was on blood thinner.  No - Have you ever had anemia or been told to take iron pills?  No - Have you had any abnormal blood loss such as black, tarry or bloody stools, or abnormal vaginal bleeding?  YES - HAVE YOU EVER HAD A BLOOD TRANSFUSION? During last knee replacement  Yes - Are you willing to have a blood transfusion if it is medically needed before, during, or after your surgery?  No - Have you or anyone in your family ever had problems with  anesthesia (sedation for surgery)?  No - Do you have sleep apnea, excessive snoring, or daytime drowsiness?   No - Do you have any artifical heart valves or other implanted medical devices, such as a pacemaker, defibrillator, or continuous glucose monitor?  YES - DO YOU HAVE ANY ARTIFICIAL JOINTS? Left knee  No - Are you allergic to latex?  No - Is there any chance that you may be pregnant?    Patient has a Health Care Directive or Living Will:  NO    HPI:     HPI related to upcoming procedure:     Capri is here today for pre-op physical. She is scheduled for right knee total replacement on 11/17/2020 with Dr. Fermin at [facility].  She has chronic knee pain secondary to arthritis.  She had a left knee replacement about 10 years ago and it has been working well.  It is planned for inpatient recovery and general anesthesia. No personal or family history of anesthesia complication or pre-matured CAD or MI.  Has not been on steroid orally in the last 6 months.  She takes aspirin 325 mg daily but not other form of blood thinner.  Does not take NSAID.   Stated that she was no complication with the last knee replacement surgery.    Generally she is pretty health with stable chronic medical conditions, including high blood pressure, lupus, rheumatoid arthritis and chronic kidney disease.  Her blood pressure has been controlled with HCTZ, lisinopril and metoprolol.   Both of her rheumatoid arthritis and lupus are in remission with Plaquenil daily.    She generally is doing well and has no concern today. No headache or dizziness. No URI symptoms include running nose, nasal congestion, ST, coughing, fever or chill.  No chest pain or SOB.  No N/V/D/C or urinary symptoms.  She never smokes and denied of having breathing problem.  No history of asthma or COPD.      MEDICAL HISTORY:     Patient Active Problem List    Diagnosis Date Noted     Primary osteoarthritis of right knee 10/21/2020     Priority: Medium     Added  automatically from request for surgery [id number]       Long-term use of Plaquenil 07/16/2018     Priority: Medium     Systemic lupus erythematosus, unspecified SLE type, unspecified organ involvement status (H) 08/14/2017     Priority: Medium     CKD (chronic kidney disease) stage 3, GFR 30-59 ml/min 03/03/2015     Priority: Medium     Hypertension 03/03/2015     Priority: Medium     Esophageal reflux 03/03/2015     Priority: Medium     Hyperlipidemia with target LDL less than 130 03/03/2015     Priority: Medium     Diagnosis updated by automated process. Provider to review and confirm.       RA (rheumatoid arthritis) (H) 03/03/2015     Priority: Medium      Past Medical History:   Diagnosis Date     CKD (chronic kidney disease) stage 3, GFR 30-59 ml/min      Dental disorder     loosing top teeth due to lupus     GERD (gastroesophageal reflux disease)      High blood pressure      Mixed hyperlipidaemia      Osteoarthritis      RA (rheumatoid arthritis) (H)     mild, on plaquenil     SLE (systemic lupus erythematosus) (H)      Vision problem     dry eyes from Lupus     Past Surgical History:   Procedure Laterality Date     APPENDECTOMY  1969     C TOTAL KNEE ARTHROPLASTY  3/10    left     COLONOSCOPY  2009    repeat 5 years     COLONOSCOPY N/A 5/20/2015    normal, repeat 5 years due to family history     COLONOSCOPY N/A 10/7/2019    Procedure: COLONOSCOPY;  Surgeon: Con Recinos MD;  Location:  GI     FOOT SURGERY  2010    bone and screws in 2 toes, hammertoe     HYSTERECTOMY TOTAL ABDOMINAL  2010    due to bleeding, benign     LAPAROTOMY EXPLORATORY      scar tissue removal/repair - abd     SHOULDER SURGERY  05/2013    rotator cuff repair, right     Current Outpatient Medications   Medication Sig Dispense Refill     artificial saliva, BIOTENE MT, (BIOTENE MT) SOLN Swish and spit 5-10 mLs in mouth as needed for dry mouth 44.3 mL 3     Ascorbic Acid (VITAMIN C PO) Take 1,000 mg by mouth daily       aspirin  325 MG tablet Take 325 mg by mouth daily       calcium citrate-vitamin D (CITRACAL) 315-250 MG-UNIT TABS Take 2 tablets by mouth daily        Cyanocobalamin (VITAMIN B 12 PO) Take 1,000 mcg by mouth daily       cycloSPORINE (RESTASIS) 0.05 % ophthalmic emulsion Place 1 drop into both eyes 2 times daily 180 each 3     diclofenac (VOLTAREN) 1 % topical gel Place 4 g onto the skin 4 times daily 1 Tube 0     Glucosamine-Chondroit-Vit C-Mn (GLUCOSAMINE CHONDR 1500 COMPLX PO) Take 2 tablets by mouth daily       hydrochlorothiazide (HYDRODIURIL) 25 MG tablet Take 0.5 tablets (12.5 mg) by mouth daily Appointment needed for additional refills. 45 tablet 0     hydroxychloroquine (PLAQUENIL) 200 MG tablet Take 1 tablet (200 mg) by mouth daily 90 tablet 3     lisinopril (ZESTRIL) 10 MG tablet Take 1 tablet (10 mg) by mouth daily Appointment needed for additional refills. 90 tablet 0     metoprolol succinate ER (TOPROL-XL) 25 MG 24 hr tablet TAKE ONE-HALF (1/2) TABLET TWICE A DAY (DUE FOR ANNUAL EXAM AND LABS) 90 tablet 0     Multiple Vitamins-Minerals (CENTRUM SILVER) per tablet Take 1 tablet by mouth daily       Omega-3 Fatty Acids (OMEGA-3 FISH OIL PO) Take 1,400 mg by mouth daily       ondansetron (ZOFRAN ODT) 4 MG ODT tab Take 1 tablet (4 mg) by mouth every 6 hours as needed for nausea 30 tablet 0     order for DME Equipment being ordered: Oxygen, portable for travel, 2 lpm while on flights 1 Device 0     simvastatin (ZOCOR) 10 MG tablet Take 1 tablet (10 mg) by mouth At Bedtime Appointment needed for additional refills. 90 tablet 0     OTC products: None, except as noted above    No Known Allergies   Latex Allergy: NO    Social History     Tobacco Use     Smoking status: Never Smoker     Smokeless tobacco: Never Used   Substance Use Topics     Alcohol use: No     Alcohol/week: 0.0 standard drinks     History   Drug Use No       REVIEW OF SYSTEMS:   Constitutional, neuro, ENT, endocrine, pulmonary, cardiac,  gastrointestinal, genitourinary, musculoskeletal, integument and psychiatric systems are negative, except as otherwise noted.    EXAM:   /78   Pulse 98   Temp 97.8  F (36.6  C) (Temporal)   Resp 20   Wt 76.7 kg (169 lb 3.2 oz)   LMP  (LMP Unknown)   SpO2 96%   BMI 33.04 kg/m        GENERAL APPEARANCE: healthy, alert and no distress     EYES: EOMI,- PERRL     HENT: ear canals and TM's normal. Nares are non-congested. Oropharynx is pink and moist, no ulcers or lesions. No tender with palpation to the sinuses.     NECK: no adenopathy, no asymmetry.  No tender with palpation to the cervical spine and its para-spinous muscle bilaterally.     RESP: lungs clear to auscultation - no rales, rhonchi or wheezes     CV: regular rates and rhythm and no murmur.     ABDOMEN:  soft, nontender, nondistended with no palpable masses and bowel sounds normal     MS: extremities normal- no gross deformities noted.  No edema.  All 4 extremities are equally in strength.     NEURO: Normal strength and tone,  mentation intact and speech normal.  No focal neurological deficit     PSYCH: mentation appears normal. and affect normal/bright           DIAGNOSTICS:     EKG: Not indicated due to non-vascular surgery and low risk of event (age <65 and without cardiac risk factors) EKG on 1/2020 showed NSR    Labs Resulted Today:   Results for orders placed or performed in visit on 11/04/20   CBC with platelets differential     Status: Abnormal   Result Value Ref Range    WBC 5.5 4.0 - 11.0 10e9/L    RBC Count 3.53 (L) 3.8 - 5.2 10e12/L    Hemoglobin 12.1 11.7 - 15.7 g/dL    Hematocrit 36.4 35.0 - 47.0 %     (H) 78 - 100 fl    MCH 34.3 (H) 26.5 - 33.0 pg    MCHC 33.2 31.5 - 36.5 g/dL    RDW 12.2 10.0 - 15.0 %    Platelet Count 211 150 - 450 10e9/L    Diff Method Automated Method     % Neutrophils 67.6 %    % Lymphocytes 23.5 %    % Monocytes 7.0 %    % Eosinophils 1.3 %    % Basophils 0.4 %    % Immature Granulocytes 0.2 %     Nucleated RBCs 0 0 /100    Absolute Neutrophil 3.8 1.6 - 8.3 10e9/L    Absolute Lymphocytes 1.3 0.8 - 5.3 10e9/L    Absolute Monocytes 0.4 0.0 - 1.3 10e9/L    Absolute Basophils 0.0 0.0 - 0.2 10e9/L    Abs Immature Granulocytes 0.0 0 - 0.4 10e9/L    Absolute Nucleated RBC 0.0    Basic metabolic panel  (Ca, Cl, CO2, Creat, Gluc, K, Na, BUN)     Status: Abnormal   Result Value Ref Range    Sodium 140 133 - 144 mmol/L    Potassium 4.7 3.4 - 5.3 mmol/L    Chloride 106 94 - 109 mmol/L    Carbon Dioxide 27 20 - 32 mmol/L    Anion Gap 7 3 - 14 mmol/L    Glucose 101 (H) 70 - 99 mg/dL    Urea Nitrogen 44 (H) 7 - 30 mg/dL    Creatinine 1.24 (H) 0.52 - 1.04 mg/dL    GFR Estimate 42 (L) >60 mL/min/[1.73_m2]    GFR Estimate If Black 48 (L) >60 mL/min/[1.73_m2]    Calcium 10.2 (H) 8.5 - 10.1 mg/dL       Recent Labs   Lab Test 01/15/20  0855 10/10/19  1245 08/04/19  0935 08/04/19  0935   HGB 11.9 11.4*   < > 11.9    208   < > 271     --   --  133   POTASSIUM 4.2  --   --  3.7   CR 1.10* 1.09*  --  1.12*    < > = values in this interval not displayed.        IMPRESSION:   Reason for surgery/procedure: Chronic right knee pain due to arthritis  Diagnosis/reason for consult: pre-op physical to evaluate for anesthesia and its bear-operative risks.    The proposed surgical procedure is considered INTERMEDIATE risk.    REVISED CARDIAC RISK INDEX  The patient has the following serious cardiovascular risks for perioperative complications such as (MI, PE, VFib and 3  AV Block):  No serious cardiac risks  INTERPRETATION: 0 risks: Class I (very low risk - 0.4% complication rate)    The patient has the following additional risks for perioperative complications:  No identified additional risks      ICD-10-CM    1. Preop general physical exam  Z01.818 CBC with platelets differential     Basic metabolic panel  (Ca, Cl, CO2, Creat, Gluc, K, Na, BUN)   2. Primary osteoarthritis of right knee  M17.11    3. Essential hypertension  I10     4. Systemic lupus erythematosus, unspecified SLE type, unspecified organ involvement status (H)  M32.9    5. Rheumatoid arthritis involving multiple sites, unspecified whether rheumatoid factor present (H)  M06.9    6. Stage 3a chronic kidney disease  N18.31    7. Long-term use of Plaquenil  Z79.899      Her high blood pressure is controlled with the medications.  Her rheumatoid arthritis and lupus are also controlled with Plaquenil.  Her kidney function is slightly worsen as compared to 6 months ago, today's creatinine of 1.2.  Will need to monitor as an outpatient with her primary care provider.  She is also known to have macrocytosis without anemia which also will need follow-up with her primary care provider in the near future for further evaluation.  She was recommended to follow-up with her primary provider in the next 1-2 months for annual Medicare physical.      RECOMMENDATIONS:     --Consider to consult with hospital rounder / IM to assist post-op medical management - HTN if BP becomes unstable otherwise resume her outpatient meds.    --No history of DVT or PE history, DVT/PE prophylaxis per surgeon's recommendation/desire.  Recommend to consider leg pumps during and after the procedure until she fully ambulatory.    Cardiovascular Risk  No cardiovascular risks identified.  No history of CAD, MI, CVA, or diabetes.  Her blood pressure is well controlled.  No further cardiovascular work-up is needed for this procedure.      Pulmonary Risk  No pulmonary risk identified.  She never smokes and has no history of COPD/asthma.      Obstructive Sleep Apnea (or suspected sleep apnea)  Hospital staff are advised to monitor for sleep related oxygen desaturations due to suspicion of JACINTO due to obesity with a BMI of 33.      Anemia  No history of anemia transfusion will likely recommend she is okay to be transfused if necessary.    Prophylactics antibiotic per surgeon's desire/recommendation.  She was instructed to  take a shower with the provided antimicrobial shampoo the day before procedure.  Covid screening test ordered by the surgeon and recommend to get it done before the procedure.      --Patient is to take all scheduled medications on the day of surgery EXCEPT for modifications listed below.  Please take your medications as prescribed except.  Hold aspirin for 7 days before the procedure.    No NSAID (Ibuprofen, Aleve, Motrin, Naproxen, ect.) for 7 days before the procedure.    Also hold your herbal and mineral supplements for 5 days before the procedure  Hold lisinopril for 24 hours before the procedure.    Take other meds as usual on am of procedure with small sips of water - resume them after the surgery      APPROVAL GIVEN to proceed with proposed procedure, without further diagnostic evaluation       Signed Electronically by: Pily Taylor Mai, MD    Copy of this evaluation report is provided to requesting physician.    [facility] Preop Guidelines    Revised Cardiac Risk Index

## 2020-11-04 NOTE — PATIENT INSTRUCTIONS

## 2020-11-07 DIAGNOSIS — M32.19 SYSTEMIC LUPUS ERYTHEMATOSUS WITH OTHER ORGAN INVOLVEMENT, UNSPECIFIED SLE TYPE (H): ICD-10-CM

## 2020-11-11 RX ORDER — HYDROXYCHLOROQUINE SULFATE 200 MG/1
TABLET, FILM COATED ORAL
Qty: 90 TABLET | Refills: 3 | Status: SHIPPED | OUTPATIENT
Start: 2020-11-11 | End: 2021-11-30

## 2020-11-15 DIAGNOSIS — Z11.59 ENCOUNTER FOR SCREENING FOR OTHER VIRAL DISEASES: ICD-10-CM

## 2020-11-15 PROCEDURE — U0003 INFECTIOUS AGENT DETECTION BY NUCLEIC ACID (DNA OR RNA); SEVERE ACUTE RESPIRATORY SYNDROME CORONAVIRUS 2 (SARS-COV-2) (CORONAVIRUS DISEASE [COVID-19]), AMPLIFIED PROBE TECHNIQUE, MAKING USE OF HIGH THROUGHPUT TECHNOLOGIES AS DESCRIBED BY CMS-2020-01-R: HCPCS | Performed by: ORTHOPAEDIC SURGERY

## 2020-11-16 LAB
SARS-COV-2 RNA SPEC QL NAA+PROBE: NOT DETECTED
SPECIMEN SOURCE: NORMAL

## 2020-11-16 NOTE — TELEPHONE ENCOUNTER
Surgery cancelled due to covid.   Please place case in covid depot.    Patient informed, we will contact her soon as we are able to reschedule.

## 2020-11-18 ENCOUNTER — TELEPHONE (OUTPATIENT)
Dept: FAMILY MEDICINE | Facility: CLINIC | Age: 75
End: 2020-11-18

## 2020-11-23 ENCOUNTER — TELEPHONE (OUTPATIENT)
Dept: FAMILY MEDICINE | Facility: CLINIC | Age: 75
End: 2020-11-23

## 2020-11-23 DIAGNOSIS — R60.9 EDEMA, UNSPECIFIED TYPE: ICD-10-CM

## 2020-11-23 DIAGNOSIS — N18.30 STAGE 3 CHRONIC KIDNEY DISEASE, UNSPECIFIED WHETHER STAGE 3A OR 3B CKD (H): ICD-10-CM

## 2020-11-23 DIAGNOSIS — I10 ESSENTIAL HYPERTENSION WITH GOAL BLOOD PRESSURE LESS THAN 140/90: ICD-10-CM

## 2020-11-23 DIAGNOSIS — E78.5 HYPERLIPIDEMIA WITH TARGET LDL LESS THAN 130: ICD-10-CM

## 2020-11-23 RX ORDER — METOPROLOL SUCCINATE 25 MG/1
TABLET, EXTENDED RELEASE ORAL
Qty: 90 TABLET | Refills: 0 | Status: SHIPPED | OUTPATIENT
Start: 2020-11-23 | End: 2021-02-22

## 2020-11-23 NOTE — TELEPHONE ENCOUNTER
Patient states she started to have symptoms on Tuesday, 11/17/2020.  She has had headache, fever, fatigue, loss of taste and smell, rash and diarrhea.    She is informed that this sounds like COVID.  She is informed if she would like to get tested, she can, but she prefers to just self quarantine for 14 days as she doesn't have the energy to drive anywhere to get tested.    Closing this encounter.  Clara Helton, EUNICEN, RN

## 2020-11-23 NOTE — TELEPHONE ENCOUNTER
Hydrochlorothiazide, Lisinopril, Zocor:  Routing refill request to provider for review/approval because:  Labs out of range:  Cr  Creatinine   Date Value Ref Range Status   11/04/2020 1.24 (H) 0.52 - 1.04 mg/dL Final     Labs not current:  LDL 2/19    Last Written Prescription Date:  9/21/2020  Last Fill Quantity: 3 months,  # refills: 0   Last office visit: 11/4/2020 with prescribing provider:     Future Office Visit:   Next 5 appointments (look out 90 days)    Jan 22, 2021  7:00 AM  PHYSICAL with Xochitl Hemphill MD  [facility] ([facility] [address] [place] [id number] [phone]         Clara Helton, EUNICEN, RN

## 2020-11-23 NOTE — TELEPHONE ENCOUNTER
"RN has attempted to contact this patient by phone to return their call, but there is no response.  Left message to \"call clinic at earliest convenience\".  Will try again later.     EUNICE DamonN, RN      "

## 2020-11-23 NOTE — TELEPHONE ENCOUNTER
.Reason for call:  Patient reporting a symptom    Symptom or request: diarrhea/headache/no taste or smell/body aches/no energy. Pt's COVID test is neg. Requesting to speak to a nurse.   Duration (how long have symptoms been present): 1 wk    Have you been treated for this before? No    Additional comments:     Phone Number patient can be reached at:  Home number on file [phone] (home)    Best Time:      Can we leave a detailed message on this number:  YES    Call taken on 11/23/2020 at 10:30 AM by Roxana June

## 2020-11-23 NOTE — TELEPHONE ENCOUNTER
Reason for Call:  Medication or medication refill:    Do you use a [facility]?  Name of the pharmacy and phone number for the current request:  [facility]    Name of the medication requested: all meds    Other request: pt was tested for COVID 11/15. Test was negative but pt stated she has body aches/cough/loss of smell and taste. Pt cancelled her appt on 11/30 but needs her meds refilled. Appt was yulia'd for 1/22.    Can we leave a detailed message on this number? YES    Phone number patient can be reached at: Home number on file [phone] (home)    Best Time:     Call taken on 11/23/2020 at 10:34 AM by Roxana June

## 2020-12-01 RX ORDER — LISINOPRIL 10 MG/1
10 TABLET ORAL DAILY
Qty: 90 TABLET | Refills: 0 | Status: SHIPPED | OUTPATIENT
Start: 2020-12-01 | End: 2021-02-22

## 2020-12-01 RX ORDER — SIMVASTATIN 10 MG
10 TABLET ORAL AT BEDTIME
Qty: 90 TABLET | Refills: 0 | Status: SHIPPED | OUTPATIENT
Start: 2020-12-01 | End: 2021-02-22

## 2020-12-01 RX ORDER — HYDROCHLOROTHIAZIDE 25 MG/1
12.5 TABLET ORAL DAILY
Qty: 45 TABLET | Refills: 0 | Status: SHIPPED | OUTPATIENT
Start: 2020-12-01 | End: 2021-02-22

## 2020-12-20 ENCOUNTER — HEALTH MAINTENANCE LETTER (OUTPATIENT)
Age: 75
End: 2020-12-20

## 2020-12-22 DIAGNOSIS — Z11.59 ENCOUNTER FOR SCREENING FOR OTHER VIRAL DISEASES: ICD-10-CM

## 2020-12-22 NOTE — TELEPHONE ENCOUNTER
Type of surgery: Right total knee replacment  Location of surgery: [facility]   Date of surgery: 1/12/21  Surgeon: Dr. Fermin  Pre-Op Appt Date: message sent to PCP  Post-Op Appt Date: 1/25/21   Packet sent out: Surgery packet mailed to patient's home address.   Pre-cert/Authorization completed: NA  Date: 12/22/2020    Tyra Dixon  Surgery Scheduler

## 2021-01-04 ENCOUNTER — OFFICE VISIT (OUTPATIENT)
Dept: FAMILY MEDICINE | Facility: CLINIC | Age: 76
End: 2021-01-04
Payer: COMMERCIAL

## 2021-01-04 VITALS
RESPIRATION RATE: 18 BRPM | TEMPERATURE: 96.5 F | OXYGEN SATURATION: 97 % | WEIGHT: 171 LBS | HEART RATE: 114 BPM | SYSTOLIC BLOOD PRESSURE: 126 MMHG | HEIGHT: 60 IN | DIASTOLIC BLOOD PRESSURE: 76 MMHG | BODY MASS INDEX: 33.57 KG/M2

## 2021-01-04 DIAGNOSIS — Z01.818 PREOP GENERAL PHYSICAL EXAM: Primary | ICD-10-CM

## 2021-01-04 DIAGNOSIS — I10 ESSENTIAL HYPERTENSION: ICD-10-CM

## 2021-01-04 DIAGNOSIS — M32.9 SYSTEMIC LUPUS ERYTHEMATOSUS, UNSPECIFIED SLE TYPE, UNSPECIFIED ORGAN INVOLVEMENT STATUS (H): ICD-10-CM

## 2021-01-04 DIAGNOSIS — Z79.899 LONG-TERM USE OF PLAQUENIL: ICD-10-CM

## 2021-01-04 DIAGNOSIS — M17.11 PRIMARY OSTEOARTHRITIS OF RIGHT KNEE: ICD-10-CM

## 2021-01-04 DIAGNOSIS — N18.31 STAGE 3A CHRONIC KIDNEY DISEASE (H): ICD-10-CM

## 2021-01-04 LAB
ANION GAP SERPL CALCULATED.3IONS-SCNC: 5 MMOL/L (ref 3–14)
BUN SERPL-MCNC: 34 MG/DL (ref 7–30)
CALCIUM SERPL-MCNC: 9.2 MG/DL (ref 8.5–10.1)
CHLORIDE SERPL-SCNC: 108 MMOL/L (ref 94–109)
CO2 SERPL-SCNC: 31 MMOL/L (ref 20–32)
CREAT SERPL-MCNC: 1.1 MG/DL (ref 0.52–1.04)
CREAT UR-MCNC: 210 MG/DL
ERYTHROCYTE [DISTWIDTH] IN BLOOD BY AUTOMATED COUNT: 12.3 % (ref 10–15)
GFR SERPL CREATININE-BSD FRML MDRD: 48 ML/MIN/{1.73_M2}
GLUCOSE SERPL-MCNC: 90 MG/DL (ref 70–99)
HCT VFR BLD AUTO: 34.7 % (ref 35–47)
HGB BLD-MCNC: 11.5 G/DL (ref 11.7–15.7)
MCH RBC QN AUTO: 35 PG (ref 26.5–33)
MCHC RBC AUTO-ENTMCNC: 33.1 G/DL (ref 31.5–36.5)
MCV RBC AUTO: 106 FL (ref 78–100)
MICROALBUMIN UR-MCNC: 20 MG/L
MICROALBUMIN/CREAT UR: 9.33 MG/G CR (ref 0–25)
PLATELET # BLD AUTO: 197 10E9/L (ref 150–450)
POTASSIUM SERPL-SCNC: 3.9 MMOL/L (ref 3.4–5.3)
RBC # BLD AUTO: 3.29 10E12/L (ref 3.8–5.2)
SODIUM SERPL-SCNC: 144 MMOL/L (ref 133–144)
WBC # BLD AUTO: 6.7 10E9/L (ref 4–11)

## 2021-01-04 PROCEDURE — 99215 OFFICE O/P EST HI 40 MIN: CPT | Performed by: FAMILY MEDICINE

## 2021-01-04 PROCEDURE — 36415 COLL VENOUS BLD VENIPUNCTURE: CPT | Performed by: FAMILY MEDICINE

## 2021-01-04 PROCEDURE — 85027 COMPLETE CBC AUTOMATED: CPT | Performed by: FAMILY MEDICINE

## 2021-01-04 PROCEDURE — 93000 ELECTROCARDIOGRAM COMPLETE: CPT | Performed by: FAMILY MEDICINE

## 2021-01-04 PROCEDURE — 82043 UR ALBUMIN QUANTITATIVE: CPT | Performed by: FAMILY MEDICINE

## 2021-01-04 PROCEDURE — 80048 BASIC METABOLIC PNL TOTAL CA: CPT | Performed by: FAMILY MEDICINE

## 2021-01-04 ASSESSMENT — MIFFLIN-ST. JEOR: SCORE: 1182.15

## 2021-01-04 ASSESSMENT — PAIN SCALES - GENERAL: PAINLEVEL: NO PAIN (0)

## 2021-01-04 NOTE — PATIENT INSTRUCTIONS

## 2021-01-04 NOTE — PROGRESS NOTES
[address] [address]  Phone: [phone]  Fax: [phone]  Primary Provider: Xochitl Hemphill  Pre-op Performing Provider: CARLITA CAMPBELL    PREOPERATIVE EVALUATION:  Today's date: 1/4/2021    Capri Wiseman is a 75 year old female who presents for a preoperative evaluation.    Surgical Information:  Surgery/Procedure: Total right knee replacement  Surgery Location: [facility]  Surgeon: Zander  Surgery Date: 01/12/21  Time of Surgery: 0730  Where patient plans to recover: At home with family  Fax number for surgical facility: Note does not need to be faxed, will be available electronically in Epic.    Type of Anesthesia Anticipated: General    Subjective     HPI related to upcoming procedure:     Preop Questions 1/4/2021   1. Have you ever had a heart attack or stroke? No   2. Have you ever had surgery on your heart or blood vessels, such as a stent placement, a coronary artery bypass, or surgery on an artery in your head, neck, heart, or legs? No   3. Do you have chest pain with activity? No   4. Do you have a history of  heart failure? No   5. Do you currently have a cold, bronchitis or symptoms of other infection? No   6. Do you have a cough, shortness of breath, or wheezing? No   7. Do you or anyone in your family have previous history of blood clots? YES - mother - due to leg ulcers leads to DVT at 100   8. Do you or does anyone in your family have a serious bleeding problem such as prolonged bleeding following surgeries or cuts? No   9. Have you ever had problems with anemia or been told to take iron pills? YES - was on iron pill - resolved.  No history of blood transfusion   10. Have you had any abnormal blood loss such as black, tarry or bloody stools, or abnormal vaginal bleeding? No   11. Have you ever had a blood transfusion? Yes with her last knee replacement   12. Are you willing to have a blood transfusion if it is medically  needed before, during, or after your surgery? Yes   13. Have you or any of your relatives ever had problems with anesthesia? No   14. Do you have sleep apnea, excessive snoring or daytime drowsiness? No   15. Do you have any artifical heart valves or other implanted medical devices like a pacemaker, defibrillator, or continuous glucose monitor? No   16. Do you have artificial joints? YES - left knee replacement   17. Are you allergic to latex? No       Capri is here today for pre-op physical. She is scheduled for right knee total replacement on 1/12/21 with Dr. Fermin at [facility].  She has chronic knee pain secondary to arthritis.  She had a left knee replacement about 10 years ago and it has been working well.  It is planned for inpatient recovery and general anesthesia. No personal or family history of anesthesia complication or pre-matured CAD or MI.  Has not been on steroid orally in the last 6 months.  She takes aspirin 325 mg daily but not other form of blood thinner.  Does not take NSAID.   Stated that she was no complication with the last knee replacement surgery.     Generally she is pretty health with stable chronic medical conditions, including high blood pressure, lupus, rheumatoid arthritis and chronic kidney disease.  Her blood pressure has been controlled with HCTZ, lisinopril and metoprolol.   Both of her rheumatoid arthritis and lupus are in remission with Plaquenil daily.     She generally is doing well and has no concern today. No headache or dizziness. No URI symptoms include running nose, nasal congestion, ST, coughing, fever or chill.  No chest pain or SOB.  No N/V/D/C or urinary symptoms.  She never smokes and denied of having breathing problem.  No history of asthma or COPD.         Health Care Directive:  Patient does not have a Health Care Directive or Living Will: Discussed advance care planning with patient; information given to patient to review.  Full code.  Son will be her  POA    Preoperative Review of :   reviewed - no concern, not on opioids chronically      Status of Chronic Conditions    Review of Systems  Constitutional, neuro, ENT, endocrine, pulmonary, cardiac, gastrointestinal, genitourinary, musculoskeletal, integument and psychiatric systems are negative, except as otherwise noted.    Patient Active Problem List    Diagnosis Date Noted     Primary osteoarthritis of right knee 10/21/2020     Priority: Medium     Added automatically from request for surgery [id number]       Long-term use of Plaquenil 07/16/2018     Priority: Medium     Systemic lupus erythematosus, unspecified SLE type, unspecified organ involvement status (H) 08/14/2017     Priority: Medium     CKD (chronic kidney disease) stage 3, GFR 30-59 ml/min 03/03/2015     Priority: Medium     Hypertension 03/03/2015     Priority: Medium     Esophageal reflux 03/03/2015     Priority: Medium     Hyperlipidemia with target LDL less than 130 03/03/2015     Priority: Medium     Diagnosis updated by automated process. Provider to review and confirm.       RA (rheumatoid arthritis) (H) 03/03/2015     Priority: Medium      Past Medical History:   Diagnosis Date     CKD (chronic kidney disease) stage 3, GFR 30-59 ml/min      Dental disorder     loosing top teeth due to lupus     GERD (gastroesophageal reflux disease)      High blood pressure      Hyperlipidemia      Mixed hyperlipidaemia      Osteoarthritis      RA (rheumatoid arthritis) (H)     mild, on plaquenil     SLE (systemic lupus erythematosus) (H)      Vision problem     dry eyes from Lupus     Past Surgical History:   Procedure Laterality Date     APPENDECTOMY  1969     C TOTAL KNEE ARTHROPLASTY  3/10    left     COLONOSCOPY  2009    repeat 5 years     COLONOSCOPY N/A 5/20/2015    normal, repeat 5 years due to family history     COLONOSCOPY N/A 10/7/2019    Procedure: COLONOSCOPY;  Surgeon: Con Recinos MD;  Location:  GI     FOOT SURGERY  2010    bone  and screws in 2 toes, mandyertoe     HYSTERECTOMY TOTAL ABDOMINAL  2010    due to bleeding, benign     LAPAROTOMY EXPLORATORY      scar tissue removal/repair - abd     SHOULDER SURGERY  05/2013    rotator cuff repair, right     Current Outpatient Medications   Medication Sig Dispense Refill     artificial saliva, BIOTENE MT, (BIOTENE MT) SOLN Swish and spit 5-10 mLs in mouth as needed for dry mouth 44.3 mL 3     Ascorbic Acid (VITAMIN C PO) Take 1,000 mg by mouth daily       aspirin 325 MG tablet Take 325 mg by mouth daily       calcium citrate-vitamin D (CITRACAL) 315-250 MG-UNIT TABS Take 2 tablets by mouth daily        Cyanocobalamin (VITAMIN B 12 PO) Take 1,000 mcg by mouth daily       cycloSPORINE (RESTASIS) 0.05 % ophthalmic emulsion Place 1 drop into both eyes 2 times daily 180 each 3     diclofenac (VOLTAREN) 1 % topical gel Place 4 g onto the skin 4 times daily 1 Tube 0     Glucosamine-Chondroit-Vit C-Mn (GLUCOSAMINE CHONDR 1500 COMPLX PO) Take 2 tablets by mouth daily       hydrochlorothiazide (HYDRODIURIL) 25 MG tablet Take 0.5 tablets (12.5 mg) by mouth daily Appointment needed for additional refills. 45 tablet 0     hydroxychloroquine (PLAQUENIL) 200 MG tablet TAKE 1 TABLET DAILY 90 tablet 3     lisinopril (ZESTRIL) 10 MG tablet Take 1 tablet (10 mg) by mouth daily Appointment needed for additional refills. 90 tablet 0     metoprolol succinate ER (TOPROL-XL) 25 MG 24 hr tablet TAKE ONE-HALF (1/2) TABLET TWICE A DAY (DUE FOR ANNUAL EXAM AND LABS) 90 tablet 0     Multiple Vitamins-Minerals (CENTRUM SILVER) per tablet Take 1 tablet by mouth daily       Omega-3 Fatty Acids (OMEGA-3 FISH OIL PO) Take 1,400 mg by mouth daily       ondansetron (ZOFRAN ODT) 4 MG ODT tab Take 1 tablet (4 mg) by mouth every 6 hours as needed for nausea 30 tablet 0     order for DME Equipment being ordered: Oxygen, portable for travel, 2 lpm while on flights 1 Device 0     simvastatin (ZOCOR) 10 MG tablet Take 1 tablet (10 mg) by  mouth At Bedtime Appointment needed for additional refills. 90 tablet 0       No Known Allergies     Social History     Tobacco Use     Smoking status: Never Smoker     Smokeless tobacco: Never Used   Substance Use Topics     Alcohol use: No     Alcohol/week: 0.0 standard drinks     Family History   Problem Relation Age of Onset     Colon Cancer Mother      Lupus Mother      Cancer Mother         colon cancer     Cerebrovascular Disease Father      No Known Problems Sister      No Known Problems Brother      No Known Problems Sister      No Known Problems Son      Neurologic Disorder Sister         ALS     Cancer Maternal Grandmother         stomach cancer     Heart Disease Maternal Grandfather      Unknown/Adopted Paternal Grandmother      Unknown/Adopted Paternal Grandfather      No Known Problems Sister      No Known Problems Sister      No Known Problems Son      No Known Problems Son      History   Drug Use No         Objective     /76   Pulse 114   Temp 96.5  F (35.8  C) (Temporal)   Resp 18   Ht 1.524 m (5')   Wt 77.6 kg (171 lb)   LMP  (LMP Unknown)   SpO2 97%   BMI 33.40 kg/m      Physical Exam    GENERAL APPEARANCE: healthy, alert and no distress     EYES: EOMI, PERRL     HENT: ear canals and TM's normal and nose and mouth without ulcers or lesions     NECK: no adenopathy, no asymmetry, masses, or scars and thyroid normal to palpation     RESP: lungs clear to auscultation - no rales, rhonchi or wheezes     CV: regular rates and rhythm, normal S1 S2, no S3 or S4 and no murmur, click or rub     ABDOMEN:  soft, nontender, no HSM or masses and bowel sounds normal     MS: limping to the right with walking.  No leg swelling.     NEURO: Normal strength and tone, sensory exam grossly normal, mentation intact and speech normal     PSYCH: mentation appears normal. and affect normal/bright    Recent Labs   Lab Test 11/04/20  1646 01/15/20  0855   HGB 12.1 11.9    215    139   POTASSIUM 4.7  4.2   CR 1.24* 1.10*        Diagnostics:  Labs pending at this time.  Results will be reviewed when available.     Results for orders placed or performed in visit on 01/04/21   Basic metabolic panel  (Ca, Cl, CO2, Creat, Gluc, K, Na, BUN)     Status: Abnormal   Result Value Ref Range    Sodium 144 133 - 144 mmol/L    Potassium 3.9 3.4 - 5.3 mmol/L    Chloride 108 94 - 109 mmol/L    Carbon Dioxide 31 20 - 32 mmol/L    Anion Gap 5 3 - 14 mmol/L    Glucose 90 70 - 99 mg/dL    Urea Nitrogen 34 (H) 7 - 30 mg/dL    Creatinine 1.10 (H) 0.52 - 1.04 mg/dL    GFR Estimate 48 (L) >60 mL/min/[1.73_m2]    GFR Estimate If Black 56 (L) >60 mL/min/[1.73_m2]    Calcium 9.2 8.5 - 10.1 mg/dL   CBC with platelets     Status: Abnormal   Result Value Ref Range    WBC 6.7 4.0 - 11.0 10e9/L    RBC Count 3.29 (L) 3.8 - 5.2 10e12/L    Hemoglobin 11.5 (L) 11.7 - 15.7 g/dL    Hematocrit 34.7 (L) 35.0 - 47.0 %     (H) 78 - 100 fl    MCH 35.0 (H) 26.5 - 33.0 pg    MCHC 33.1 31.5 - 36.5 g/dL    RDW 12.3 10.0 - 15.0 %    Platelet Count 197 150 - 450 10e9/L        EKG: Normal Sinus Rhythm, normal axis, normal intervals, no acute ST/T changes c/w ischemia, no LVH by voltage criteria, unchanged from previous tracings    Revised Cardiac Risk Index (RCRI):  The patient has the following serious cardiovascular risks for perioperative complications:   - High risk surgery (>5% cardiac complication risk) = 1 point     RCRI Interpretation: 1 point: Class II (low risk - 0.9% complication rate)         Assessment & Plan   The proposed surgical procedure is considered INTERMEDIATE risk.      ICD-10-CM    1. Preop general physical exam  Z01.818 EKG 12-lead complete w/read - Clinics     Basic metabolic panel  (Ca, Cl, CO2, Creat, Gluc, K, Na, BUN)     CBC with platelets   2. Primary osteoarthritis of right knee  M17.11    3. Essential hypertension  I10 EKG 12-lead complete w/read - Clinics     Albumin Random Urine Quantitative with Creat Ratio   4.  Systemic lupus erythematosus, unspecified SLE type, unspecified organ involvement status (H)  M32.9 CBC with platelets   5. Long-term use of Plaquenil  Z79.899 CBC with platelets   6. Stage 3a chronic kidney disease  N18.31 Basic metabolic panel  (Ca, Cl, CO2, Creat, Gluc, K, Na, BUN)      Her high blood pressure is controlled with the medications.  Her rheumatoid arthritis and lupus are also controlled with Plaquenil.  Her kidney function is slightly worsen as compared to 6 months ago, recheck the BMP today (creatinine dropped down to baseline).  Will need to monitor as an outpatient with her primary care provider.  She is also known to have macrocytosis without anemia which also will need follow-up with her primary care provider in the near future for further evaluation.  She is on B12 supplement.  Recheck the CBC today as well.  She was recommended to follow-up with her primary provider in the next 1-2 months for annual Medicare physical.       Risks and Recommendations:  The patient has the following additional risks and recommendations for perioperative complications:    --Consider to consult with hospital rounder / IM to assist post-op medical management - HTN if BP becomes unstable otherwise resume her outpatient meds.     -  Prophylactics antibiotic per surgeon's desire/recommendation.  She was instructed to take a shower with the provided antimicrobial shampoo the day before and am of the procedure.  Covid screening test ordered by the surgeon and recommend to get it done before the procedure.      RECOMMENDATION:  --No history of DVT or PE history, DVT/PE prophylaxis per surgeon's recommendation/desire.  Recommend to consider leg pumps during and after the procedure until she fully ambulatory.     Cardiovascular Risk  No cardiovascular risks identified.  No history of CAD, MI, CVA, or diabetes.  Her blood pressure is well controlled.  EKG today showed NSR.  No further cardiovascular work-up is needed for  this procedure.        Pulmonary Risk  No pulmonary risk identified.  She never smokes and has no history of COPD/asthma.        Obstructive Sleep Apnea (or suspected sleep apnea)  Hospital staff are advised to monitor for sleep related oxygen desaturations due to suspicion of JACINTO due to obesity with a BMI of 33.        Anemia  Has a history of anemia with transfusion with her last knee surgery.  She is okay to be transfused if necessary.       --Patient is to take all scheduled medications on the day of surgery EXCEPT for modifications listed below.  Please take your medications as prescribed except.  Hold aspirin for 7 days before the procedure.    No NSAID (Ibuprofen, Aleve, Motrin, Naproxen, ect.) for 7 days before the procedure.    Also hold your herbal and mineral supplements for 5 days before the procedure  Hold lisinopril for 24 hours before the procedure.    Take other meds as usual on am of procedure with small sips of water - resume the held meds after the surgery      APPROVAL GIVEN to proceed with proposed procedure, without further diagnostic evaluation.    Signed Electronically by: Pily Taylor Mai, MD    Copy of this evaluation report is provided to requesting physician.    Preop [facility] Preop Guidelines    Revised Cardiac Risk Index

## 2021-01-04 NOTE — H&P (VIEW-ONLY)
[address] [address]  Phone: [phone]  Fax: [phone]  Primary Provider: Xochitl Hemphill  Pre-op Performing Provider: CARLITA CAMPBELL    PREOPERATIVE EVALUATION:  Today's date: 1/4/2021    Capri Wiseman is a 75 year old female who presents for a preoperative evaluation.    Surgical Information:  Surgery/Procedure: Total right knee replacement  Surgery Location: [facility]  Surgeon: Zander  Surgery Date: 01/12/21  Time of Surgery: 0730  Where patient plans to recover: At home with family  Fax number for surgical facility: Note does not need to be faxed, will be available electronically in Epic.    Type of Anesthesia Anticipated: General    Subjective     HPI related to upcoming procedure:     Preop Questions 1/4/2021   1. Have you ever had a heart attack or stroke? No   2. Have you ever had surgery on your heart or blood vessels, such as a stent placement, a coronary artery bypass, or surgery on an artery in your head, neck, heart, or legs? No   3. Do you have chest pain with activity? No   4. Do you have a history of  heart failure? No   5. Do you currently have a cold, bronchitis or symptoms of other infection? No   6. Do you have a cough, shortness of breath, or wheezing? No   7. Do you or anyone in your family have previous history of blood clots? YES - mother - due to leg ulcers leads to DVT at 89   8. Do you or does anyone in your family have a serious bleeding problem such as prolonged bleeding following surgeries or cuts? No   9. Have you ever had problems with anemia or been told to take iron pills? YES - was on iron pill - resolved.  No history of blood transfusion   10. Have you had any abnormal blood loss such as black, tarry or bloody stools, or abnormal vaginal bleeding? No   11. Have you ever had a blood transfusion? Yes with her last knee replacement   12. Are you willing to have a blood transfusion if it is medically  needed before, during, or after your surgery? Yes   13. Have you or any of your relatives ever had problems with anesthesia? No   14. Do you have sleep apnea, excessive snoring or daytime drowsiness? No   15. Do you have any artifical heart valves or other implanted medical devices like a pacemaker, defibrillator, or continuous glucose monitor? No   16. Do you have artificial joints? YES - left knee replacement   17. Are you allergic to latex? No       Capri is here today for pre-op physical. She is scheduled for right knee total replacement on 1/12/21 with Dr. Fermin at [facility].  She has chronic knee pain secondary to arthritis.  She had a left knee replacement about 10 years ago and it has been working well.  It is planned for inpatient recovery and general anesthesia. No personal or family history of anesthesia complication or pre-matured CAD or MI.  Has not been on steroid orally in the last 6 months.  She takes aspirin 325 mg daily but not other form of blood thinner.  Does not take NSAID.   Stated that she was no complication with the last knee replacement surgery.     Generally she is pretty health with stable chronic medical conditions, including high blood pressure, lupus, rheumatoid arthritis and chronic kidney disease.  Her blood pressure has been controlled with HCTZ, lisinopril and metoprolol.   Both of her rheumatoid arthritis and lupus are in remission with Plaquenil daily.     She generally is doing well and has no concern today. No headache or dizziness. No URI symptoms include running nose, nasal congestion, ST, coughing, fever or chill.  No chest pain or SOB.  No N/V/D/C or urinary symptoms.  She never smokes and denied of having breathing problem.  No history of asthma or COPD.         Health Care Directive:  Patient does not have a Health Care Directive or Living Will: Discussed advance care planning with patient; information given to patient to review.  Full code.  Son will be her  POA    Preoperative Review of :   reviewed - no concern, not on opioids chronically      Status of Chronic Conditions    Review of Systems  Constitutional, neuro, ENT, endocrine, pulmonary, cardiac, gastrointestinal, genitourinary, musculoskeletal, integument and psychiatric systems are negative, except as otherwise noted.    Patient Active Problem List    Diagnosis Date Noted     Primary osteoarthritis of right knee 10/21/2020     Priority: Medium     Added automatically from request for surgery [id number]       Long-term use of Plaquenil 07/16/2018     Priority: Medium     Systemic lupus erythematosus, unspecified SLE type, unspecified organ involvement status (H) 08/14/2017     Priority: Medium     CKD (chronic kidney disease) stage 3, GFR 30-59 ml/min 03/03/2015     Priority: Medium     Hypertension 03/03/2015     Priority: Medium     Esophageal reflux 03/03/2015     Priority: Medium     Hyperlipidemia with target LDL less than 130 03/03/2015     Priority: Medium     Diagnosis updated by automated process. Provider to review and confirm.       RA (rheumatoid arthritis) (H) 03/03/2015     Priority: Medium      Past Medical History:   Diagnosis Date     CKD (chronic kidney disease) stage 3, GFR 30-59 ml/min      Dental disorder     loosing top teeth due to lupus     GERD (gastroesophageal reflux disease)      High blood pressure      Hyperlipidemia      Mixed hyperlipidaemia      Osteoarthritis      RA (rheumatoid arthritis) (H)     mild, on plaquenil     SLE (systemic lupus erythematosus) (H)      Vision problem     dry eyes from Lupus     Past Surgical History:   Procedure Laterality Date     APPENDECTOMY  1969     C TOTAL KNEE ARTHROPLASTY  3/10    left     COLONOSCOPY  2009    repeat 5 years     COLONOSCOPY N/A 5/20/2015    normal, repeat 5 years due to family history     COLONOSCOPY N/A 10/7/2019    Procedure: COLONOSCOPY;  Surgeon: Con Recinos MD;  Location:  GI     FOOT SURGERY  2010    bone  and screws in 2 toes, mandyertoe     HYSTERECTOMY TOTAL ABDOMINAL  2010    due to bleeding, benign     LAPAROTOMY EXPLORATORY      scar tissue removal/repair - abd     SHOULDER SURGERY  05/2013    rotator cuff repair, right     Current Outpatient Medications   Medication Sig Dispense Refill     artificial saliva, BIOTENE MT, (BIOTENE MT) SOLN Swish and spit 5-10 mLs in mouth as needed for dry mouth 44.3 mL 3     Ascorbic Acid (VITAMIN C PO) Take 1,000 mg by mouth daily       aspirin 325 MG tablet Take 325 mg by mouth daily       calcium citrate-vitamin D (CITRACAL) 315-250 MG-UNIT TABS Take 2 tablets by mouth daily        Cyanocobalamin (VITAMIN B 12 PO) Take 1,000 mcg by mouth daily       cycloSPORINE (RESTASIS) 0.05 % ophthalmic emulsion Place 1 drop into both eyes 2 times daily 180 each 3     diclofenac (VOLTAREN) 1 % topical gel Place 4 g onto the skin 4 times daily 1 Tube 0     Glucosamine-Chondroit-Vit C-Mn (GLUCOSAMINE CHONDR 1500 COMPLX PO) Take 2 tablets by mouth daily       hydrochlorothiazide (HYDRODIURIL) 25 MG tablet Take 0.5 tablets (12.5 mg) by mouth daily Appointment needed for additional refills. 45 tablet 0     hydroxychloroquine (PLAQUENIL) 200 MG tablet TAKE 1 TABLET DAILY 90 tablet 3     lisinopril (ZESTRIL) 10 MG tablet Take 1 tablet (10 mg) by mouth daily Appointment needed for additional refills. 90 tablet 0     metoprolol succinate ER (TOPROL-XL) 25 MG 24 hr tablet TAKE ONE-HALF (1/2) TABLET TWICE A DAY (DUE FOR ANNUAL EXAM AND LABS) 90 tablet 0     Multiple Vitamins-Minerals (CENTRUM SILVER) per tablet Take 1 tablet by mouth daily       Omega-3 Fatty Acids (OMEGA-3 FISH OIL PO) Take 1,400 mg by mouth daily       ondansetron (ZOFRAN ODT) 4 MG ODT tab Take 1 tablet (4 mg) by mouth every 6 hours as needed for nausea 30 tablet 0     order for DME Equipment being ordered: Oxygen, portable for travel, 2 lpm while on flights 1 Device 0     simvastatin (ZOCOR) 10 MG tablet Take 1 tablet (10 mg) by  mouth At Bedtime Appointment needed for additional refills. 90 tablet 0       No Known Allergies     Social History     Tobacco Use     Smoking status: Never Smoker     Smokeless tobacco: Never Used   Substance Use Topics     Alcohol use: No     Alcohol/week: 0.0 standard drinks     Family History   Problem Relation Age of Onset     Colon Cancer Mother      Lupus Mother      Cancer Mother         colon cancer     Cerebrovascular Disease Father      No Known Problems Sister      No Known Problems Brother      No Known Problems Sister      No Known Problems Son      Neurologic Disorder Sister         ALS     Cancer Maternal Grandmother         stomach cancer     Heart Disease Maternal Grandfather      Unknown/Adopted Paternal Grandmother      Unknown/Adopted Paternal Grandfather      No Known Problems Sister      No Known Problems Sister      No Known Problems Son      No Known Problems Son      History   Drug Use No         Objective     /76   Pulse 114   Temp 96.5  F (35.8  C) (Temporal)   Resp 18   Ht 1.524 m (5')   Wt 77.6 kg (171 lb)   LMP  (LMP Unknown)   SpO2 97%   BMI 33.40 kg/m      Physical Exam    GENERAL APPEARANCE: healthy, alert and no distress     EYES: EOMI, PERRL     HENT: ear canals and TM's normal and nose and mouth without ulcers or lesions     NECK: no adenopathy, no asymmetry, masses, or scars and thyroid normal to palpation     RESP: lungs clear to auscultation - no rales, rhonchi or wheezes     CV: regular rates and rhythm, normal S1 S2, no S3 or S4 and no murmur, click or rub     ABDOMEN:  soft, nontender, no HSM or masses and bowel sounds normal     MS: limping to the right with walking.  No leg swelling.     NEURO: Normal strength and tone, sensory exam grossly normal, mentation intact and speech normal     PSYCH: mentation appears normal. and affect normal/bright    Recent Labs   Lab Test 11/04/20  1646 01/15/20  0855   HGB 12.1 11.9    215    139   POTASSIUM 4.7  4.2   CR 1.24* 1.10*        Diagnostics:  Labs pending at this time.  Results will be reviewed when available.     Results for orders placed or performed in visit on 01/04/21   Basic metabolic panel  (Ca, Cl, CO2, Creat, Gluc, K, Na, BUN)     Status: Abnormal   Result Value Ref Range    Sodium 144 133 - 144 mmol/L    Potassium 3.9 3.4 - 5.3 mmol/L    Chloride 108 94 - 109 mmol/L    Carbon Dioxide 31 20 - 32 mmol/L    Anion Gap 5 3 - 14 mmol/L    Glucose 90 70 - 99 mg/dL    Urea Nitrogen 34 (H) 7 - 30 mg/dL    Creatinine 1.10 (H) 0.52 - 1.04 mg/dL    GFR Estimate 48 (L) >60 mL/min/[1.73_m2]    GFR Estimate If Black 56 (L) >60 mL/min/[1.73_m2]    Calcium 9.2 8.5 - 10.1 mg/dL   CBC with platelets     Status: Abnormal   Result Value Ref Range    WBC 6.7 4.0 - 11.0 10e9/L    RBC Count 3.29 (L) 3.8 - 5.2 10e12/L    Hemoglobin 11.5 (L) 11.7 - 15.7 g/dL    Hematocrit 34.7 (L) 35.0 - 47.0 %     (H) 78 - 100 fl    MCH 35.0 (H) 26.5 - 33.0 pg    MCHC 33.1 31.5 - 36.5 g/dL    RDW 12.3 10.0 - 15.0 %    Platelet Count 197 150 - 450 10e9/L        EKG: Normal Sinus Rhythm, normal axis, normal intervals, no acute ST/T changes c/w ischemia, no LVH by voltage criteria, unchanged from previous tracings    Revised Cardiac Risk Index (RCRI):  The patient has the following serious cardiovascular risks for perioperative complications:   - High risk surgery (>5% cardiac complication risk) = 1 point     RCRI Interpretation: 1 point: Class II (low risk - 0.9% complication rate)         Assessment & Plan   The proposed surgical procedure is considered INTERMEDIATE risk.      ICD-10-CM    1. Preop general physical exam  Z01.818 EKG 12-lead complete w/read - Clinics     Basic metabolic panel  (Ca, Cl, CO2, Creat, Gluc, K, Na, BUN)     CBC with platelets   2. Primary osteoarthritis of right knee  M17.11    3. Essential hypertension  I10 EKG 12-lead complete w/read - Clinics     Albumin Random Urine Quantitative with Creat Ratio   4.  Systemic lupus erythematosus, unspecified SLE type, unspecified organ involvement status (H)  M32.9 CBC with platelets   5. Long-term use of Plaquenil  Z79.899 CBC with platelets   6. Stage 3a chronic kidney disease  N18.31 Basic metabolic panel  (Ca, Cl, CO2, Creat, Gluc, K, Na, BUN)      Her high blood pressure is controlled with the medications.  Her rheumatoid arthritis and lupus are also controlled with Plaquenil.  Her kidney function is slightly worsen as compared to 6 months ago, recheck the BMP today (creatinine dropped down to baseline).  Will need to monitor as an outpatient with her primary care provider.  She is also known to have macrocytosis without anemia which also will need follow-up with her primary care provider in the near future for further evaluation.  She is on B12 supplement.  Recheck the CBC today as well.  She was recommended to follow-up with her primary provider in the next 1-2 months for annual Medicare physical.       Risks and Recommendations:  The patient has the following additional risks and recommendations for perioperative complications:    --Consider to consult with hospital rounder / IM to assist post-op medical management - HTN if BP becomes unstable otherwise resume her outpatient meds.     -  Prophylactics antibiotic per surgeon's desire/recommendation.  She was instructed to take a shower with the provided antimicrobial shampoo the day before and am of the procedure.  Covid screening test ordered by the surgeon and recommend to get it done before the procedure.      RECOMMENDATION:  --No history of DVT or PE history, DVT/PE prophylaxis per surgeon's recommendation/desire.  Recommend to consider leg pumps during and after the procedure until she fully ambulatory.     Cardiovascular Risk  No cardiovascular risks identified.  No history of CAD, MI, CVA, or diabetes.  Her blood pressure is well controlled.  EKG today showed NSR.  No further cardiovascular work-up is needed for  this procedure.        Pulmonary Risk  No pulmonary risk identified.  She never smokes and has no history of COPD/asthma.        Obstructive Sleep Apnea (or suspected sleep apnea)  Hospital staff are advised to monitor for sleep related oxygen desaturations due to suspicion of JACINTO due to obesity with a BMI of 33.        Anemia  Has a history of anemia with transfusion with her last knee surgery.  She is okay to be transfused if necessary.       --Patient is to take all scheduled medications on the day of surgery EXCEPT for modifications listed below.  Please take your medications as prescribed except.  Hold aspirin for 7 days before the procedure.    No NSAID (Ibuprofen, Aleve, Motrin, Naproxen, ect.) for 7 days before the procedure.    Also hold your herbal and mineral supplements for 5 days before the procedure  Hold lisinopril for 24 hours before the procedure.    Take other meds as usual on am of procedure with small sips of water - resume the held meds after the surgery      APPROVAL GIVEN to proceed with proposed procedure, without further diagnostic evaluation.    Signed Electronically by: Pily Taylor Mai, MD    Copy of this evaluation report is provided to requesting physician.    Preop [facility] Preop Guidelines    Revised Cardiac Risk Index

## 2021-01-06 ENCOUNTER — OFFICE VISIT (OUTPATIENT)
Dept: ORTHOPEDICS | Facility: CLINIC | Age: 76
End: 2021-01-06
Payer: COMMERCIAL

## 2021-01-06 VITALS
SYSTOLIC BLOOD PRESSURE: 136 MMHG | BODY MASS INDEX: 33.28 KG/M2 | HEIGHT: 60 IN | WEIGHT: 169.5 LBS | TEMPERATURE: 96.9 F | DIASTOLIC BLOOD PRESSURE: 86 MMHG

## 2021-01-06 DIAGNOSIS — M17.11 PRIMARY OSTEOARTHRITIS OF RIGHT KNEE: Primary | ICD-10-CM

## 2021-01-06 PROCEDURE — 99207 PR NO CHARGE LOS: CPT | Performed by: NURSE PRACTITIONER

## 2021-01-06 ASSESSMENT — PAIN SCALES - GENERAL: PAINLEVEL: EXTREME PAIN (8)

## 2021-01-06 ASSESSMENT — MIFFLIN-ST. JEOR: SCORE: 1175.35

## 2021-01-06 NOTE — LETTER
1/6/2021         RE: Capri Wiseman  [address]  [facility] [address]        Dear Colleague,    Thank you for referring your patient, Capri Wiseman, to the [facility]. Please see a copy of my visit note below.    1 week prior to total knee replacement.  Right knee continues to get worse.   H&P and labs reviewed: no red flags.    Creatinine 1.10, per patient she had COVID in Nov and her PCP reported mildly elevated creatinine is normal this far out, She states recovered very well from COVID otherwise.   Had contralateral side knee replacement 4 years ago in [place] and recovered well.  Anticipate  POD1 discharge, has son and daughter in law to stay with her.  BID ASA 325mg for DVT prevention.  In home physical therapy for the first 2 weeks.   All other questions answered.     ANTONIO Leiva, CNP  Orthopedic Surgery          Again, thank you for allowing me to participate in the care of your patient.        Sincerely,        ANTONIO Cespedes CNP

## 2021-01-06 NOTE — PROGRESS NOTES
1 week prior to total knee replacement.  Right knee continues to get worse.   H&P and labs reviewed: no red flags.    Creatinine 1.10, per patient she had COVID in Nov and her PCP reported mildly elevated creatinine is normal this far out, She states recovered very well from COVID otherwise.   Had contralateral side knee replacement 4 years ago in [place] and recovered well.  Anticipate  POD1 discharge, has son and daughter in law to stay with her.  BID ASA 325mg for DVT prevention.  In home physical therapy for the first 2 weeks.   All other questions answered.     Mikey Recinos APRN, CNP  Orthopedic Surgery

## 2021-01-09 DIAGNOSIS — Z11.59 ENCOUNTER FOR SCREENING FOR OTHER VIRAL DISEASES: ICD-10-CM

## 2021-01-09 LAB
SARS-COV-2 RNA RESP QL NAA+PROBE: NORMAL
SPECIMEN SOURCE: NORMAL

## 2021-01-09 PROCEDURE — U0003 INFECTIOUS AGENT DETECTION BY NUCLEIC ACID (DNA OR RNA); SEVERE ACUTE RESPIRATORY SYNDROME CORONAVIRUS 2 (SARS-COV-2) (CORONAVIRUS DISEASE [COVID-19]), AMPLIFIED PROBE TECHNIQUE, MAKING USE OF HIGH THROUGHPUT TECHNOLOGIES AS DESCRIBED BY CMS-2020-01-R: HCPCS | Performed by: ORTHOPAEDIC SURGERY

## 2021-01-09 PROCEDURE — U0005 INFEC AGEN DETEC AMPLI PROBE: HCPCS | Performed by: ORTHOPAEDIC SURGERY

## 2021-01-10 LAB
LABORATORY COMMENT REPORT: NORMAL
SARS-COV-2 RNA RESP QL NAA+PROBE: NEGATIVE
SPECIMEN SOURCE: NORMAL

## 2021-01-11 ENCOUNTER — ANESTHESIA EVENT (OUTPATIENT)
Dept: SURGERY | Facility: CLINIC | Age: 76
End: 2021-01-11
Payer: COMMERCIAL

## 2021-01-12 ENCOUNTER — ANESTHESIA (OUTPATIENT)
Dept: SURGERY | Facility: CLINIC | Age: 76
End: 2021-01-12
Payer: COMMERCIAL

## 2021-01-12 ENCOUNTER — APPOINTMENT (OUTPATIENT)
Dept: GENERAL RADIOLOGY | Facility: CLINIC | Age: 76
End: 2021-01-12
Attending: NURSE PRACTITIONER
Payer: COMMERCIAL

## 2021-01-12 ENCOUNTER — HOSPITAL ENCOUNTER (OUTPATIENT)
Facility: CLINIC | Age: 76
Discharge: HOME | End: 2021-01-13
Attending: ORTHOPAEDIC SURGERY | Admitting: ORTHOPAEDIC SURGERY
Payer: COMMERCIAL

## 2021-01-12 ENCOUNTER — APPOINTMENT (OUTPATIENT)
Dept: PHYSICAL THERAPY | Facility: CLINIC | Age: 76
End: 2021-01-12
Attending: NURSE PRACTITIONER
Payer: COMMERCIAL

## 2021-01-12 DIAGNOSIS — Z96.651 S/P TOTAL KNEE REPLACEMENT USING CEMENT, RIGHT: Primary | ICD-10-CM

## 2021-01-12 PROCEDURE — 97530 THERAPEUTIC ACTIVITIES: CPT | Mod: GP | Performed by: PHYSICAL THERAPIST

## 2021-01-12 PROCEDURE — 250N000011 HC RX IP 250 OP 636: Performed by: ORTHOPAEDIC SURGERY

## 2021-01-12 PROCEDURE — 710N000010 HC RECOVERY PHASE 1, LEVEL 2, PER MIN: Performed by: ORTHOPAEDIC SURGERY

## 2021-01-12 PROCEDURE — 258N000003 HC RX IP 258 OP 636: Performed by: NURSE PRACTITIONER

## 2021-01-12 PROCEDURE — C1776 JOINT DEVICE (IMPLANTABLE): HCPCS | Performed by: ORTHOPAEDIC SURGERY

## 2021-01-12 PROCEDURE — 278N000051 HC OR IMPLANT GENERAL: Performed by: ORTHOPAEDIC SURGERY

## 2021-01-12 PROCEDURE — 258N000003 HC RX IP 258 OP 636: Performed by: ORTHOPAEDIC SURGERY

## 2021-01-12 PROCEDURE — 250N000013 HC RX MED GY IP 250 OP 250 PS 637: Performed by: ORTHOPAEDIC SURGERY

## 2021-01-12 PROCEDURE — 272N000002 HC OR SUPPLY OTHER OPNP: Performed by: ORTHOPAEDIC SURGERY

## 2021-01-12 PROCEDURE — 250N000011 HC RX IP 250 OP 636: Performed by: NURSE PRACTITIONER

## 2021-01-12 PROCEDURE — 250N000025 HC SEVOFLURANE, PER MIN: Performed by: ORTHOPAEDIC SURGERY

## 2021-01-12 PROCEDURE — 27447 TOTAL KNEE ARTHROPLASTY: CPT | Mod: RT | Performed by: ORTHOPAEDIC SURGERY

## 2021-01-12 PROCEDURE — 250N000011 HC RX IP 250 OP 636: Performed by: NURSE ANESTHETIST, CERTIFIED REGISTERED

## 2021-01-12 PROCEDURE — 250N000013 HC RX MED GY IP 250 OP 250 PS 637: Performed by: NURSE PRACTITIONER

## 2021-01-12 PROCEDURE — 97162 PT EVAL MOD COMPLEX 30 MIN: CPT | Mod: GP | Performed by: PHYSICAL THERAPIST

## 2021-01-12 PROCEDURE — 250N000009 HC RX 250: Performed by: NURSE ANESTHETIST, CERTIFIED REGISTERED

## 2021-01-12 PROCEDURE — 999N000065 XR KNEE PORT RT 1/2 VW: Mod: RT

## 2021-01-12 PROCEDURE — 258N000003 HC RX IP 258 OP 636: Performed by: PAIN MEDICINE

## 2021-01-12 PROCEDURE — 97110 THERAPEUTIC EXERCISES: CPT | Mod: GP | Performed by: PHYSICAL THERAPIST

## 2021-01-12 PROCEDURE — 370N000017 HC ANESTHESIA TECHNICAL FEE, PER MIN: Performed by: ORTHOPAEDIC SURGERY

## 2021-01-12 PROCEDURE — 272N000001 HC OR GENERAL SUPPLY STERILE: Performed by: ORTHOPAEDIC SURGERY

## 2021-01-12 PROCEDURE — 27447 TOTAL KNEE ARTHROPLASTY: CPT | Mod: AS | Performed by: NURSE PRACTITIONER

## 2021-01-12 PROCEDURE — 360N000077 HC SURGERY LEVEL 4, PER MIN: Performed by: ORTHOPAEDIC SURGERY

## 2021-01-12 PROCEDURE — 999N000141 HC STATISTIC PRE-PROCEDURE NURSING ASSESSMENT: Performed by: ORTHOPAEDIC SURGERY

## 2021-01-12 PROCEDURE — 258N000003 HC RX IP 258 OP 636: Performed by: NURSE ANESTHETIST, CERTIFIED REGISTERED

## 2021-01-12 PROCEDURE — 250N000009 HC RX 250: Performed by: NURSE PRACTITIONER

## 2021-01-12 DEVICE — IMPLANTABLE DEVICE: Type: IMPLANTABLE DEVICE | Site: KNEE | Status: FUNCTIONAL

## 2021-01-12 DEVICE — BONE CEMENT GENTAMYCIN SMART SET GHV 5450-35-500: Type: IMPLANTABLE DEVICE | Site: KNEE | Status: FUNCTIONAL

## 2021-01-12 RX ORDER — CEFAZOLIN SODIUM 1 G/3ML
1 INJECTION, POWDER, FOR SOLUTION INTRAMUSCULAR; INTRAVENOUS SEE ADMIN INSTRUCTIONS
Status: DISCONTINUED | OUTPATIENT
Start: 2021-01-12 | End: 2021-01-12 | Stop reason: HOSPADM

## 2021-01-12 RX ORDER — SODIUM CHLORIDE, SODIUM LACTATE, POTASSIUM CHLORIDE, CALCIUM CHLORIDE 600; 310; 30; 20 MG/100ML; MG/100ML; MG/100ML; MG/100ML
INJECTION, SOLUTION INTRAVENOUS CONTINUOUS
Status: DISCONTINUED | OUTPATIENT
Start: 2021-01-12 | End: 2021-01-12 | Stop reason: HOSPADM

## 2021-01-12 RX ORDER — TIZANIDINE 2 MG/1
2 TABLET ORAL EVERY 6 HOURS PRN
Qty: 50 TABLET | Refills: 1 | Status: SHIPPED | OUTPATIENT
Start: 2021-01-12 | End: 2021-01-25

## 2021-01-12 RX ORDER — LIDOCAINE 40 MG/G
CREAM TOPICAL
Status: DISCONTINUED | OUTPATIENT
Start: 2021-01-12 | End: 2021-01-12 | Stop reason: HOSPADM

## 2021-01-12 RX ORDER — CEFAZOLIN SODIUM 1 G/3ML
1 INJECTION, POWDER, FOR SOLUTION INTRAMUSCULAR; INTRAVENOUS EVERY 8 HOURS
Status: COMPLETED | OUTPATIENT
Start: 2021-01-12 | End: 2021-01-13

## 2021-01-12 RX ORDER — PROPOFOL 10 MG/ML
INJECTION, EMULSION INTRAVENOUS PRN
Status: DISCONTINUED | OUTPATIENT
Start: 2021-01-12 | End: 2021-01-12

## 2021-01-12 RX ORDER — TRANEXAMIC ACID 650 MG/1
1950 TABLET ORAL ONCE
Status: COMPLETED | OUTPATIENT
Start: 2021-01-12 | End: 2021-01-12

## 2021-01-12 RX ORDER — BUPIVACAINE HYDROCHLORIDE AND EPINEPHRINE 5; 5 MG/ML; UG/ML
INJECTION, SOLUTION PERINEURAL PRN
Status: DISCONTINUED | OUTPATIENT
Start: 2021-01-12 | End: 2021-01-12

## 2021-01-12 RX ORDER — ONDANSETRON 4 MG/1
4 TABLET, ORALLY DISINTEGRATING ORAL EVERY 6 HOURS PRN
Status: DISCONTINUED | OUTPATIENT
Start: 2021-01-12 | End: 2021-01-13 | Stop reason: HOSPADM

## 2021-01-12 RX ORDER — ONDANSETRON 2 MG/ML
INJECTION INTRAMUSCULAR; INTRAVENOUS PRN
Status: DISCONTINUED | OUTPATIENT
Start: 2021-01-12 | End: 2021-01-12

## 2021-01-12 RX ORDER — ACETAMINOPHEN 325 MG/1
975 TABLET ORAL EVERY 8 HOURS
Status: DISCONTINUED | OUTPATIENT
Start: 2021-01-12 | End: 2021-01-13 | Stop reason: HOSPADM

## 2021-01-12 RX ORDER — FENTANYL CITRATE 50 UG/ML
25-50 INJECTION, SOLUTION INTRAMUSCULAR; INTRAVENOUS
Status: DISCONTINUED | OUTPATIENT
Start: 2021-01-12 | End: 2021-01-12 | Stop reason: HOSPADM

## 2021-01-12 RX ORDER — ONDANSETRON 2 MG/ML
4 INJECTION INTRAMUSCULAR; INTRAVENOUS EVERY 6 HOURS PRN
Status: DISCONTINUED | OUTPATIENT
Start: 2021-01-12 | End: 2021-01-13 | Stop reason: HOSPADM

## 2021-01-12 RX ORDER — METOPROLOL TARTRATE 1 MG/ML
1-2 INJECTION, SOLUTION INTRAVENOUS EVERY 5 MIN PRN
Status: DISCONTINUED | OUTPATIENT
Start: 2021-01-12 | End: 2021-01-12 | Stop reason: HOSPADM

## 2021-01-12 RX ORDER — OXYCODONE HYDROCHLORIDE 5 MG/1
5-10 TABLET ORAL EVERY 4 HOURS PRN
Qty: 50 TABLET | Refills: 0 | Status: SHIPPED | OUTPATIENT
Start: 2021-01-12 | End: 2021-01-21

## 2021-01-12 RX ORDER — DEXAMETHASONE SODIUM PHOSPHATE 10 MG/ML
INJECTION, SOLUTION INTRAMUSCULAR; INTRAVENOUS PRN
Status: DISCONTINUED | OUTPATIENT
Start: 2021-01-12 | End: 2021-01-12

## 2021-01-12 RX ORDER — HYDROMORPHONE HYDROCHLORIDE 1 MG/ML
.3-.5 INJECTION, SOLUTION INTRAMUSCULAR; INTRAVENOUS; SUBCUTANEOUS EVERY 5 MIN PRN
Status: DISCONTINUED | OUTPATIENT
Start: 2021-01-12 | End: 2021-01-12 | Stop reason: HOSPADM

## 2021-01-12 RX ORDER — ONDANSETRON 4 MG/1
4 TABLET, ORALLY DISINTEGRATING ORAL EVERY 6 HOURS PRN
Qty: 8 TABLET | Refills: 1 | Status: SHIPPED | OUTPATIENT
Start: 2021-01-12 | End: 2021-01-27

## 2021-01-12 RX ORDER — NALOXONE HYDROCHLORIDE 0.4 MG/ML
0.4 INJECTION, SOLUTION INTRAMUSCULAR; INTRAVENOUS; SUBCUTANEOUS
Status: DISCONTINUED | OUTPATIENT
Start: 2021-01-12 | End: 2021-01-13 | Stop reason: HOSPADM

## 2021-01-12 RX ORDER — ONDANSETRON 2 MG/ML
4 INJECTION INTRAMUSCULAR; INTRAVENOUS EVERY 30 MIN PRN
Status: DISCONTINUED | OUTPATIENT
Start: 2021-01-12 | End: 2021-01-12 | Stop reason: HOSPADM

## 2021-01-12 RX ORDER — NALOXONE HYDROCHLORIDE 0.4 MG/ML
0.2 INJECTION, SOLUTION INTRAMUSCULAR; INTRAVENOUS; SUBCUTANEOUS
Status: DISCONTINUED | OUTPATIENT
Start: 2021-01-12 | End: 2021-01-13 | Stop reason: HOSPADM

## 2021-01-12 RX ORDER — OXYCODONE HYDROCHLORIDE 5 MG/1
5-10 TABLET ORAL EVERY 4 HOURS PRN
Status: DISCONTINUED | OUTPATIENT
Start: 2021-01-12 | End: 2021-01-13 | Stop reason: HOSPADM

## 2021-01-12 RX ORDER — HYDROMORPHONE HYDROCHLORIDE 1 MG/ML
0.4 INJECTION, SOLUTION INTRAMUSCULAR; INTRAVENOUS; SUBCUTANEOUS
Status: DISCONTINUED | OUTPATIENT
Start: 2021-01-12 | End: 2021-01-13 | Stop reason: HOSPADM

## 2021-01-12 RX ORDER — FAMOTIDINE 20 MG/1
20 TABLET, FILM COATED ORAL DAILY
Status: DISCONTINUED | OUTPATIENT
Start: 2021-01-12 | End: 2021-01-13 | Stop reason: HOSPADM

## 2021-01-12 RX ORDER — TIZANIDINE 2 MG/1
2 TABLET ORAL EVERY 6 HOURS PRN
Status: DISCONTINUED | OUTPATIENT
Start: 2021-01-12 | End: 2021-01-13 | Stop reason: HOSPADM

## 2021-01-12 RX ORDER — POLYETHYLENE GLYCOL 3350 17 G/17G
17 POWDER, FOR SOLUTION ORAL DAILY
Status: DISCONTINUED | OUTPATIENT
Start: 2021-01-13 | End: 2021-01-13 | Stop reason: HOSPADM

## 2021-01-12 RX ORDER — CEFAZOLIN SODIUM 2 G/100ML
2 INJECTION, SOLUTION INTRAVENOUS
Status: COMPLETED | OUTPATIENT
Start: 2021-01-12 | End: 2021-01-12

## 2021-01-12 RX ORDER — HYDROXYZINE HYDROCHLORIDE 10 MG/1
10 TABLET, FILM COATED ORAL EVERY 6 HOURS PRN
Qty: 30 TABLET | Refills: 0 | Status: SHIPPED | OUTPATIENT
Start: 2021-01-12 | End: 2021-01-13

## 2021-01-12 RX ORDER — AMOXICILLIN 250 MG
1-2 CAPSULE ORAL 2 TIMES DAILY
Status: DISCONTINUED | OUTPATIENT
Start: 2021-01-12 | End: 2021-01-13 | Stop reason: HOSPADM

## 2021-01-12 RX ORDER — SODIUM CHLORIDE 9 MG/ML
INJECTION, SOLUTION INTRAVENOUS CONTINUOUS
Status: DISCONTINUED | OUTPATIENT
Start: 2021-01-12 | End: 2021-01-13 | Stop reason: HOSPADM

## 2021-01-12 RX ORDER — HYDROXYZINE HYDROCHLORIDE 10 MG/1
10 TABLET, FILM COATED ORAL EVERY 6 HOURS PRN
Status: DISCONTINUED | OUTPATIENT
Start: 2021-01-12 | End: 2021-01-13 | Stop reason: HOSPADM

## 2021-01-12 RX ORDER — LIDOCAINE 40 MG/G
CREAM TOPICAL
Status: DISCONTINUED | OUTPATIENT
Start: 2021-01-12 | End: 2021-01-13 | Stop reason: HOSPADM

## 2021-01-12 RX ORDER — DIMENHYDRINATE 50 MG/ML
25 INJECTION, SOLUTION INTRAMUSCULAR; INTRAVENOUS
Status: DISCONTINUED | OUTPATIENT
Start: 2021-01-12 | End: 2021-01-12 | Stop reason: HOSPADM

## 2021-01-12 RX ORDER — ONDANSETRON 4 MG/1
4 TABLET, ORALLY DISINTEGRATING ORAL EVERY 30 MIN PRN
Status: DISCONTINUED | OUTPATIENT
Start: 2021-01-12 | End: 2021-01-12 | Stop reason: HOSPADM

## 2021-01-12 RX ORDER — FENTANYL CITRATE 50 UG/ML
INJECTION, SOLUTION INTRAMUSCULAR; INTRAVENOUS PRN
Status: DISCONTINUED | OUTPATIENT
Start: 2021-01-12 | End: 2021-01-12

## 2021-01-12 RX ORDER — HYDROMORPHONE HCL IN WATER/PF 6 MG/30 ML
0.2 PATIENT CONTROLLED ANALGESIA SYRINGE INTRAVENOUS
Status: DISCONTINUED | OUTPATIENT
Start: 2021-01-12 | End: 2021-01-13 | Stop reason: HOSPADM

## 2021-01-12 RX ORDER — LIDOCAINE HYDROCHLORIDE 20 MG/ML
INJECTION, SOLUTION INFILTRATION; PERINEURAL PRN
Status: DISCONTINUED | OUTPATIENT
Start: 2021-01-12 | End: 2021-01-12

## 2021-01-12 RX ORDER — AMOXICILLIN 250 MG
1-2 CAPSULE ORAL 2 TIMES DAILY PRN
Qty: 45 TABLET | Refills: 2 | Status: SHIPPED | OUTPATIENT
Start: 2021-01-12 | End: 2021-04-05

## 2021-01-12 RX ORDER — PROPOFOL 10 MG/ML
INJECTION, EMULSION INTRAVENOUS CONTINUOUS PRN
Status: DISCONTINUED | OUTPATIENT
Start: 2021-01-12 | End: 2021-01-12

## 2021-01-12 RX ORDER — PROCHLORPERAZINE MALEATE 5 MG
5 TABLET ORAL EVERY 6 HOURS PRN
Status: DISCONTINUED | OUTPATIENT
Start: 2021-01-12 | End: 2021-01-13 | Stop reason: HOSPADM

## 2021-01-12 RX ORDER — ACETAMINOPHEN 325 MG/1
975 TABLET ORAL EVERY 8 HOURS PRN
Qty: 100 TABLET | Refills: 1 | Status: SHIPPED | OUTPATIENT
Start: 2021-01-12

## 2021-01-12 RX ADMIN — CEFAZOLIN SODIUM 2 G: 2 INJECTION, SOLUTION INTRAVENOUS at 07:53

## 2021-01-12 RX ADMIN — Medication 10 ML: at 09:45

## 2021-01-12 RX ADMIN — OXYCODONE HYDROCHLORIDE 5 MG: 5 TABLET ORAL at 13:54

## 2021-01-12 RX ADMIN — TIZANIDINE 2 MG: 2 TABLET ORAL at 20:47

## 2021-01-12 RX ADMIN — DEXAMETHASONE SODIUM PHOSPHATE 5 MG: 10 INJECTION, SOLUTION INTRAMUSCULAR; INTRAVENOUS at 09:44

## 2021-01-12 RX ADMIN — DOCUSATE SODIUM AND SENNOSIDES 1 TABLET: 8.6; 5 TABLET ORAL at 13:54

## 2021-01-12 RX ADMIN — FENTANYL CITRATE 25 MCG: 50 INJECTION, SOLUTION INTRAMUSCULAR; INTRAVENOUS at 08:24

## 2021-01-12 RX ADMIN — FENTANYL CITRATE 50 MCG: 50 INJECTION INTRAMUSCULAR; INTRAVENOUS at 10:25

## 2021-01-12 RX ADMIN — HYDROMORPHONE HYDROCHLORIDE 0.5 MG: 1 INJECTION, SOLUTION INTRAMUSCULAR; INTRAVENOUS; SUBCUTANEOUS at 08:26

## 2021-01-12 RX ADMIN — ASPIRIN 325 MG: 325 TABLET, DELAYED RELEASE ORAL at 20:49

## 2021-01-12 RX ADMIN — LIDOCAINE HYDROCHLORIDE 80 MG: 20 INJECTION, SOLUTION INFILTRATION; PERINEURAL at 07:53

## 2021-01-12 RX ADMIN — PROPOFOL 170 MG: 10 INJECTION, EMULSION INTRAVENOUS at 07:53

## 2021-01-12 RX ADMIN — HYDROMORPHONE HYDROCHLORIDE 0.2 MG: 0.2 INJECTION, SOLUTION INTRAMUSCULAR; INTRAVENOUS; SUBCUTANEOUS at 13:55

## 2021-01-12 RX ADMIN — HYDROMORPHONE HYDROCHLORIDE 0.5 MG: 1 INJECTION, SOLUTION INTRAMUSCULAR; INTRAVENOUS; SUBCUTANEOUS at 10:10

## 2021-01-12 RX ADMIN — Medication 10 ML: at 09:44

## 2021-01-12 RX ADMIN — SODIUM CHLORIDE: 9 INJECTION, SOLUTION INTRAVENOUS at 11:56

## 2021-01-12 RX ADMIN — FAMOTIDINE 20 MG: 20 TABLET, FILM COATED ORAL at 13:53

## 2021-01-12 RX ADMIN — METOPROLOL SUCCINATE 12.5 MG: 25 TABLET, EXTENDED RELEASE ORAL at 20:50

## 2021-01-12 RX ADMIN — SODIUM CHLORIDE, POTASSIUM CHLORIDE, SODIUM LACTATE AND CALCIUM CHLORIDE: 600; 310; 30; 20 INJECTION, SOLUTION INTRAVENOUS at 07:22

## 2021-01-12 RX ADMIN — FENTANYL CITRATE 50 MCG: 50 INJECTION INTRAMUSCULAR; INTRAVENOUS at 10:05

## 2021-01-12 RX ADMIN — ACETAMINOPHEN 975 MG: 325 TABLET, FILM COATED ORAL at 20:47

## 2021-01-12 RX ADMIN — MIDAZOLAM 2 MG: 1 INJECTION INTRAMUSCULAR; INTRAVENOUS at 07:22

## 2021-01-12 RX ADMIN — OXYCODONE HYDROCHLORIDE 10 MG: 5 TABLET ORAL at 20:48

## 2021-01-12 RX ADMIN — METOPROLOL TARTRATE 2 MG: 5 INJECTION INTRAVENOUS at 10:46

## 2021-01-12 RX ADMIN — HYDROMORPHONE HYDROCHLORIDE 0.4 MG: 1 INJECTION, SOLUTION INTRAMUSCULAR; INTRAVENOUS; SUBCUTANEOUS at 16:43

## 2021-01-12 RX ADMIN — CEFAZOLIN 1 G: 1 INJECTION, POWDER, FOR SOLUTION INTRAMUSCULAR; INTRAVENOUS at 16:29

## 2021-01-12 RX ADMIN — TRANEXAMIC ACID 1950 MG: 650 TABLET ORAL at 06:10

## 2021-01-12 RX ADMIN — FENTANYL CITRATE 25 MCG: 50 INJECTION, SOLUTION INTRAMUSCULAR; INTRAVENOUS at 07:35

## 2021-01-12 RX ADMIN — FENTANYL CITRATE 50 MCG: 50 INJECTION, SOLUTION INTRAMUSCULAR; INTRAVENOUS at 07:53

## 2021-01-12 RX ADMIN — DEXMEDETOMIDINE HYDROCHLORIDE 10 MCG: 100 INJECTION, SOLUTION INTRAVENOUS at 07:59

## 2021-01-12 RX ADMIN — DOCUSATE SODIUM AND SENNOSIDES 1 TABLET: 8.6; 5 TABLET ORAL at 20:49

## 2021-01-12 RX ADMIN — HYDROXYZINE HYDROCHLORIDE 10 MG: 10 TABLET, FILM COATED ORAL at 16:43

## 2021-01-12 RX ADMIN — DEXAMETHASONE SODIUM PHOSPHATE 5 MG: 10 INJECTION, SOLUTION INTRAMUSCULAR; INTRAVENOUS at 09:45

## 2021-01-12 RX ADMIN — ACETAMINOPHEN 975 MG: 325 TABLET, FILM COATED ORAL at 13:53

## 2021-01-12 RX ADMIN — PROPOFOL 100 MCG/KG/MIN: 10 INJECTION, EMULSION INTRAVENOUS at 07:53

## 2021-01-12 RX ADMIN — ONDANSETRON 4 MG: 2 INJECTION INTRAMUSCULAR; INTRAVENOUS at 07:39

## 2021-01-12 RX ADMIN — HYDROMORPHONE HYDROCHLORIDE 0.5 MG: 1 INJECTION, SOLUTION INTRAMUSCULAR; INTRAVENOUS; SUBCUTANEOUS at 10:36

## 2021-01-12 SDOH — HEALTH STABILITY: MENTAL HEALTH: CURRENT SMOKER: 0

## 2021-01-12 ASSESSMENT — LIFESTYLE VARIABLES: TOBACCO_USE: 0

## 2021-01-12 ASSESSMENT — MIFFLIN-ST. JEOR: SCORE: 1190.32

## 2021-01-12 NOTE — PROGRESS NOTES
Saw and examined patient.  POD0 s/p total knee replacement, right  Per patient doing very well. Some pain but controlled.  No current nausea toleraing oral intact, will be advancing diet as tolerated.  Has not voided yet, DTV. Has not stood at bedside but will be working with physical therapy shortly. Failed spinal, had general. Denies SOB, sore throat, or N/V currently.   Patient has 24/7 help available upon discharge.  No current concerns.    Answered all questions.     /70   Pulse 69   Temp 96.2  F (35.7  C) (Oral)   Resp 12   Ht 1.524 m (5')   Wt 78.4 kg (172 lb 12.8 oz)   LMP  (LMP Unknown)   SpO2 95%   BMI 33.75 kg/m      Alert and orient x 4, sitting up in bed in NAD.   Dressing CDI.  Immobilizer in place.   Sensation, motor intact to foot.  Toes well perfused. D.p. Pulse 2+    Plan: will see again tomorrow Am.   Plan for discharge tomorrow afternoon pending progress.    Patient requested antiemetic with discharge medications.  Will plan for this.   2 weeks in home followed by outpatient physical therapy.     ANTONIO Leiva, CNP  Orthopedic Surgery

## 2021-01-12 NOTE — BRIEF OP NOTE
[facility] Brief Operative Note    Pre-operative diagnosis: Primary osteoarthritis of right knee [M17.11]   Post-operative diagnosis: Same   Procedure: Procedure(s):  right total knee replacement   Surgeon:  Anesthesia: DO SAMIR Rodriguez   Assistant(s): Fred Recinos, APRN, CNP, DNP (A advanced practice provider was necessary for his expertise, exposure and surgical assistance throughout the case.)   Estimated blood loss:  Tourniquet time/pressure:  Urine output:  Fluids: Less than 10 ml  70 minutes at 300 mmHg  na  700 ml Crystalloids   Specimens: None   Findings: right knee primary osteoarthritis [id number]     Frankie Fermin D.O.      Implants:   Implant Name Type Inv. Item Serial No.  Lot No. LRB No. Used Action   BONE CEMENT GENTAMYCIN SMART SET [facility] 5450- Cement, Bone BONE CEMENT GENTAMYCIN SMART SET GHV 5450-  J [id number] Right 1 Implanted   Attune Patella Medialized    Depuy [id number] Right 1 Implanted   Attune Femoral Posteior Stabilized     Depuy [id number] Right 1 Implanted   Attune Tibial Instert    Depuy [id number] Right 1 Implanted   Attune Pinning Sysytem    Depuy [id number] Right 1 Used as a Supply   Attune Tibial base    Depuy [id number] Right 1 Implanted

## 2021-01-12 NOTE — ANESTHESIA CARE TRANSFER NOTE
Patient: Capri Wiseman    Procedure(s):  right total knee replacement    Diagnosis: Primary osteoarthritis of right knee [M17.11]  Diagnosis Additional Information: No value filed.    Anesthesia Type:   MAC, Spinal, Peripheral Nerve Block     Note:  Airway :Face Mask  Patient transferred to:PACU  Handoff Report: Identifed the Patient, Identified the Reponsible Provider, Reviewed the pertinent medical history, Discussed the surgical course, Reviewed Intra-OP anesthesia mangement and issues during anesthesia, Set expectations for post-procedure period and Allowed opportunity for questions and acknowledgement of understanding      Vitals: (Last set prior to Anesthesia Care Transfer)    CRNA VITALS  1/12/2021 0904 - 1/12/2021 1002      1/12/2021             Pulse:  78    SpO2:  98 %                Electronically Signed By: ANTONIO Rivera CRNA  January 12, 2021  10:02 AM

## 2021-01-12 NOTE — OP NOTE
Procedure Date: 01/12/2021      PREOPERATIVE DIAGNOSIS:  Right knee primary osteoarthritis.      POSTOPERATIVE DIAGNOSIS:  Right knee primary osteoarthritis.      PROCEDURE:  Right total knee arthroplasty.      SURGEON:  Thad Fermin DO      FIRST ASSISTANT:  ANTONIO Kolb CNP (utilized throughout the procedure, assisting with soft tissue retraction, assisting with trial and final implant placement, and he provided skin closure).      ANESTHESIA:  LMA.      ESTIMATED BLOOD LOSS:  Less than 10 mL.      FLUIDS:  700 mL of crystalloids.      TOURNIQUET TIME AND PRESSURE:  70 minutes at 300 mmHg.      COUNTS:  Correct.      DISPOSITION:  To PACU in stable condition.      GROSS FINDINGS:  There was an incompletely flexible varus contracture.  Motion was approximately 0-120 degrees.  There was complete loss of articular cartilage throughout the medial compartment.  Patellofemoral compartment also showed some loss with some bony erosion along the apex.      NOTE:  This is a pleasant 75-year-old female complaining of longstanding knee pain.  She has attempted rest, activity modification, chiropractic care, Tylenol, cane, intra-articular steroid injections, viscosupplementation injections with no improvement.  Pain impacts the quality of her life. She has difficulty with simple tasks, such as being in the kitchen.  She has pain at rest and pain that causes her to limp.  Her radiographs are consistent with her clinical exam.  Given her continued pain refractory to conservative care impacting the quality of her life, we discussed proceeding with knee replacement.  The risks, benefits, complications discussed.  Postoperative timeframe for recovery reviewed.  Once done, she opted to proceed.      DETAILS OF PROCEDURE:  She was met preoperatively.  Again, informed consent was verified, appropriate site was marked, and she was wheeled to operative suite #1.  Transferred to the OR table without any issues.  When deemed  appropriate by Anesthesia, right lower extremity was sterilely prepped and draped in normal manner.  Prior to incision, a timeout was performed.  Again, the appropriate patient, surgery and extremity were verified and antibiotics administered.  An Esmarch was utilized to exsanguinate the extremity.  The tourniquet was inflated on her upper thigh.  Total tourniquet time as noted above.  A midline skin incision was followed with a medial parapatellar arthrotomy.  The anterior fat pad was excised.  The medial tibia was skeletonized with a small release being performed.  Rimming osteophytes were removed.  The knee flexed up.  Retractors were placed and maintained through the key components of the procedure.  A drill was utilized to enter the distal intramedullary canal of the femur with no issues.  The distal guide was then very gradually and easily inserted.  It was set to take 10 mm off distal and 5 degrees of valgus.  With this pinned, the distal cut was performed with no issues.  The ACL was then excised.  A posterior retractor was placed and the extramedullary tibia device was used.  It was set to take 4 mm off the medial side with care to recreate the slope in varus and valgus alignment and the jig was pinned.  The cut on the entire proximal tibia was then performed with no issues.  The knee was brought out to extension.  A spacer block was placed, as well as a drop magan, which showed normal resection.  There was some residual tightness on the medial side.  Further release was performed, balancing the knee nicely.  The knee was flexed up to 90 degrees.  A tibial baseplate was placed prior to doing this and a drop magan placed showing acceptable resection of the bone.  Attention was turned back to the femur.  The sizing guide was placed.  Rotation was based on Meli line and the epicondylar axis.  The pins were placed.  It measured a size 5.  Size 5, 4-in-1 block was placed.  The 90-degree flexion space under the  block was consistent with her extension space.  The 4-in-1 cuts were then performed with no issues.  This was followed with a box cut.  Excess bone and meniscal remnants were removed.  Trial components were placed.  She had 0-130 degrees of motion.  The patella tracked with a no-thumbs technique through the full arc of motion.  The knee was balanced through the full arc of motion.  The patella was then resurfaced back down to anatomic dimensions using a freehand technique, again taken through full range of motion, showing no issues with tracking.  The tibia and femur were then prepared.  The final components as listed above were cemented in position, held in approximately 20 degrees of flexion until the cement cured.  During this process, a dilute Betadine lavage was performed for 3 minutes followed with pulse lavage.  The knee was inspected.  Excess cement and bone fragments had been removed.  The arthrotomy was then closed with 0 Vicryl, followed with 2-0 Vicryl subcutaneous, followed with a running Monocryl suture and skin glue.  A sterile bandage was applied.  She subsequently transferred to PACU in stable condition.  She will be brought into the hospital to continue orthopedic care, DVT prophylaxis, pain management, and physical therapy.         NELLY POE DO             D: 2021   T: 2021   MT: DEBBIE      Name:     WARNER BEAL   MRN:      [id number]        Account:        [id number]   :      1945           Procedure Date: 2021      Document: [id number]

## 2021-01-12 NOTE — ANESTHESIA PROCEDURE NOTES
Procedure note : intrathecal      Staff -   Resident/Fellow: Regina Couch  CRNA: Teresa Torre APRN CRNA  Other Anesthesia Staff: Luzma Conn APRN CRNA  Performed By: CRNA, SRNA and other anesthesia staff  Pre-Procedure    Location: OR    Procedure Times:1/12/2021 7:25 AM and 1/12/2021 7:50 AM  Pre-Anesthestic Checklist: patient identified, IV checked, risks and benefits discussed, informed consent, monitors and equipment checked and pre-op evaluation    Timeout  Correct Patient: Yes   Correct Procedure: Yes   Correct Site: Yes   Correct Laterality: N/A   Correct Position: Yes   Site Marked: N/A   .   Procedure Documentation  ASA 3  Diagnosis:Right total knee.    Procedure: intrathecal, .   Patient Position:sitting Insertion Site:L2-3, L3-4, L4-5  (midline approach)     Patient Prep/Sterile Barriers; mask, sterile gloves, povidone-iodine 7.5% surgical scrub, patient draped.  .  Needle:  Spinal Needle (gauge): 25  Spinal/LP Needle Length (inches): 3.5 # of attempts: 3 and  # of redirects:  3 Introducer used Introducer: 20 G .        Assessment/Narrative  Paresthesias: Resolved.  .  .  . Time Injected:while sitting   Comments:  Unable to to place spinal anesthetic. SRNA attempt x1 at L4/5 with multiple redirects. CRNA attempt at L3/4 and L4/5 with multiple redirects, and 2nd CRNA attempts at L3/4 and L2/3 with multiple redirects, not successful. Patient very agreeable to GA, so abandoned spinal attempts and proceeded with GA with LMA.

## 2021-01-12 NOTE — PROGRESS NOTES
01/12/21 1315   Quick Adds   Type of Visit Initial PT Evaluation       Present no   Living Environment   People in home alone   Current Living Arrangements house  ([facility])   Home Accessibility stairs to enter home   Number of Stairs, Main Entrance 3   Stair Railings, Main Entrance railing on right side (ascending)   Number of Stairs, Within Home, Primary none   Transportation Anticipated family or friend will provide  (mini-van)   Living Environment Comments Patient's son and daughter in law plan to assist with cares at discharge. Step over tub, has a chair to use. grab bar set up   Self-Care   Usual Activity Tolerance moderate   Current Activity Tolerance fair   Regular Exercise No   Equipment Currently Used at Home cane, straight;grab bar, tub/shower;grab bar, toilet;shower chair   Activity/Exercise/Self-Care Comment adjustable bed   Disability/Function   Hearing Difficulty or Deaf no   Wear Glasses or Blind no   Concentrating, Remembering or Making Decisions Difficulty no   Difficulty Communicating no   Difficulty Eating/Swallowing no   Walking or Climbing Stairs Difficulty yes   Walking or Climbing Stairs ambulation difficulty, requires equipment   Mobility Management cane   Dressing/Bathing Difficulty no   Toileting issues no   Doing Errands Independently Difficulty (such as shopping) yes   Fall history within last six months no   Change in Functional Status Since Onset of Current Illness/Injury yes   General Information   Onset of Illness/Injury or Date of Surgery 01/12/21   Referring Physician Fred Recinos CNP   Patient/Family Therapy Goals Statement (PT) home with family assistance and HHPT   Pertinent History of Current Problem (include personal factors and/or comorbidities that impact the POC) Patient is a 75 year old female, day 0 status post right total knee arthroplasty by Dr. Fermin with adductor nerve block and failed spinal. Patient with a previous medical histor of L TKA,  HTN, CKD, lupus, RA, and hyperlipidemia.   Existing Precautions/Restrictions weight bearing;fall   Weight-Bearing Status - LUE full weight-bearing   Weight-Bearing Status - RUE full weight-bearing   Weight-Bearing Status - LLE full weight-bearing   Weight-Bearing Status - RLE weight-bearing as tolerated   General Observations PT orders: eval and treat post operative status TKA. Activity orders: ambulate, up in chair, WBAT, knee immobilizer, IS, ROM   Cognition   Orientation Status (Cognition) oriented x 4   Affect/Mental Status (Cognition) WNL   Follows Commands (Cognition) WNL   Pain Assessment   Patient Currently in Pain Yes, see Vital Sign flowsheet  (8/10 right anterior knee)   Integumentary/Edema   Integumentary/Edema Comments post operative dressing CDI   Posture    Posture Forward head position;Protracted shoulders;Kyphosis   Range of Motion (ROM)   ROM Comment right knee flexion 5-50 degrees with pain   Strength   Strength Comments poor quad control, unable to complete SLR flexion. Instability on RLE in stance   Bed Mobility   Bed Mobility supine-sit;sit-supine;scooting/bridging   Scooting/Bridging [place] (Bed Mobility) independent   Supine-Sit [place] (Bed Mobility) independent   Sit-Supine [place] (Bed Mobility) independent   Bed Mobility Limitations decreased ability to use legs for bridging/pushing   Impairments Contributing to Impaired Bed Mobility pain;decreased ROM;decreased strength   Transfers   Transfers toilet transfer;bed-chair transfer;sit-stand transfer   Transfer Safety Concerns Noted decreased step length;inability to maintain weight-bearing restrictions w/o assist;decreased weight-shifting ability;decreased balance during turns   Impairments Contributing to Impaired Transfers decreased strength;decreased ROM;pain   Transfer Safety Comments severe pain 10/10 reports with toilet transfer, completed due to urgent need to void   Bed-Chair Transfer   Bed-Chair [place]  (Transfers) moderate assist (50% patient effort)   Assistive Device (Bed-Chair Transfers) walker, front-wheeled   Sit-Stand Transfer   Sit-Stand [place] (Transfers) moderate assist (50% patient effort)   Assistive Device (Sit-Stand Transfers) walker, front-wheeled   Toilet Transfer   Type (Toilet Transfer) squat pivot   [place] (Toilet Transfer) moderate assist (50% patient effort)   Assistive Device (Toilet Transfer) walker, front-wheeled   Toilet Transfer Skill Comments required assistance to maintain stance due to severe pain and poor RLE control. Due to pain poor teaching and poor command following   Gait/Stairs (Locomotion)   Comment (Gait/Stairs) unable due to severe pain, dizziness and increased blood pressure with transfer. Patient with poor control of RLE due to pain   Balance   Balance Comments unable to assess static stance balance. sitting IND balance   Sensory Examination   Sensory Perception Comments decreased dermatome sweep to distal RLE shank   Coordination   Coordination Comments poor sequencing and command following with training due to severe knee pain   Clinical Impression   Criteria for Skilled Therapeutic Intervention yes, treatment indicated   PT Diagnosis (PT) muscle weaknees, joint stiffness, pain, muscle tension   Influenced by the following impairments day 0 status post TKA   Functional limitations due to impairments pain, increased level of assistance for baseline mobility   Clinical Presentation Evolving/Changing   Clinical Presentation Rationale pain 10/10 with mobility. blood pressure 163/76 with transfer due to pain and expressed dizziness. medical status, medical history   Clinical Decision Making (Complexity) moderate complexity   Therapy Frequency (PT) 2x/day   Predicted Duration of Therapy Intervention (days/wks) 2-3 days   Planned Therapy Interventions (PT) balance training;bed mobility training;home exercise program;strengthening;stair training;ROM (range of  motion)   Anticipated Equipment Needs at Discharge (PT)   (defer to HHPT)   Risk & Benefits of therapy have been explained evaluation/treatment results reviewed;care plan/treatment goals reviewed;patient   PT Discharge Planning    PT Discharge Recommendation (DC Rec) home with assist;home with home care physical therapy   PT Rationale for DC Rec Patient with assistance available in the home, previous experience with TKA recovery. Patient has good home set up and feels comfortable with DC home. Patient would benefit from HHPT to address post operative impairments in order to safely progress her towards PLOF in accordance with her surgeon's protocol.   PT Brief overview of current status  IND bed mobility. Poor tolerance to HEP, poor quad control and right knee AROM 5-50 degrees with pain. Bed to commode transfer MOD assist with poor sequencing and command following for limb control. /76 with mobility due to 10/10 pain. Poor RLE control in stance   Total Evaluation Time   Total Evaluation Time (Minutes) 15     Thank you for your referral.  Tomeka Lepe, PT, DPT, ATC    [facility]  O: [phone]  E: [email or web address]

## 2021-01-12 NOTE — DISCHARGE INSTRUCTIONS
Total Knee Replacement Discharge Instructions                                 [phone]  Bone and Joint Service Line for issues or concerns    You have been prescribed high dose aspirin for the next 6 weeks (full strength aspirin twice a day) to help prevent blood clots. Do not take your baby aspirin with the high dose aspirin.  Once you are done taking full strength aspirin you can resume your previous baby aspirin dose.     Wait for a couple days before restarting your lisinopril medication (for high blood pressure).  On Friday you can resume this. If you are feeling dizzy or lightheaded with getting up then wait to resume and have your blood pressure checked.  If you have a way to measure you blood pressure at home, once the systolic number (top number) is consistently >120 you may resume your lisinopril       General Care:  After surgery you may feel tired/sleepy. This is normal. If you have any question along the way please contact the office. If you feel it is an issue cannot wait for normal office hours, contact the 24 hour bone and joint line at [phone]. You should not drive or operate machines/equipment until released by your physician to do so.     Wound Care:  Keep incision covered with hospital dressing (Aquacel) for one week. It is okay to shower with this dressing on. However, do not submerge your dressing and incision in water for roughly 2 weeks. After one week remove this dressing and then keep it clean, dry, and covered. If this dressing become looses or falls off it is ok to cover with gauze and tape.  Wash the incision gently with warm soapy water after removing your hospital dressing and lightly pat dry.       Diet:  Start with non-alcoholic liquids at first, particularly water or sports drinks after surgery. Progress to bland foods such as crackers and bread and finally to your normal diet if you have no problems. Avoid alcohol when taking narcotic pain medications.      Pain  control:  Take your pain medications as prescribed. These medications may make you sleepy. Do not drive, operate equipment, or drink alcohol when taking these.  You may take Tylenol (Generic name is acetaminophen) as directed on the bottle for additional relief or in place of the prescribed pain medications as your pain gets better. Do not take any other NSAIDs (Motrin, Ibuprofen, Aleve, Naproxen) while taking the blood thinner. If the medications cause a reaction such as nausea or skin rash, stop taking them and contact your doctor. Please plan accordingly, pain medications will not be re-filled on the weekends or at night. Call the office during the day if you need more medications.    Blood thinner:  It is very important to take it as prescribed. It is a medication to help prevent blood clots in your legs or lungs. No medication is perfect, so if you notice a sudden onset of pain/swelling in your calf area call your doctor. If you notice a sudden onset of troubles breathing and/or chest pain call 911.     Swelling (edema) control:  Preventing swelling (edema) in your legs after surgery is very important. It is helpful for achieving optimal range of motion as well as preventing blood clots.  It starts with simple things such as elevation of your legs and icing. Elevate your legs above your heart.    Do this for 20 minutes every couple hours the first few days after surgery. We also recommend DEE DEE hose (compression hose) to be worn. Wear on both legs during the day. You may remove at night. Wear these until directed to stop.      Icing:  It is common for some swelling, aching and stiffness to occur for up to 6-9 months after a knee replacement. If you knee swells, get off your feet, elevate your leg and apply some ice packs. Apply for 20 minutes at a time. For the first 1-2 weeks apply ice 2-3 x day or more after therapy.    Walker/crutches:  Use a walker/crutches when you go home. You will transition to the use of  a cane and finally to no additional support.     Braces:  You will go home with a knee immobilizer. You can take it off when you are lying or sitting down. Wear it when you are walking. This immobilizer can come off after you are doing a straight leg raise. Your physician and or physical therapist will help to determine when to stop wearing it.     Physical Therapy:  The success of your knee replacement is based on doing physical therapy. You will have some pain and discomfort along the way. If you feel your pain is limiting your progress make sure to take some pain medication prior to your therapy session. If you pain medications are not working talk to your surgeon.   For the first 2 weeks after going home you will have in-home physical therapy. The goal is to work on range of motion. While it is important to working on bending your knee, it is equally important to make sure you knee comes out all the way straight. To assist in this do not place any bumps, pillows and or blankets under your knee when you are lying down. You should have an outpatient physical therapy appointment scheduled for about 2 weeks after surgery.     Activity:  Unless otherwise instructed, you can weight bear as tolerated. While laying or sitting down you should straighten your knee all the way out and then gently work on bending the knee back. Do not worry at first if your knee feels stiff and will not bend like normal, this will get better. Never put a pillow or bump under you knee, instead put a pillow under your ankle so your knee will straighten out all the way.     Normal findings after surgery:  Numbness and tenderness around the incisions and to the outside of the incision is normal.  You may have bruising around the incisions and down the lower leg.   Your knee will be swollen for months after surgery. It will feel  tight  to move.   Low grade fevers less than 100.5 degrees Fahrenheit are normal.   You may have some minor swelling  in the leg/calf area.  You will have some increased pain after your therapy sessions.     When to call the Office:  Temperature greater than 101.5 degrees Fahreheit.  Fever, chills, and increasing pain in the knee.  Excessive drainage from the incisions that include bright red blood.  Drainage from the incisions sites that appear yellow, pus-like, or foul smelling.  Increasing pain the knee not relieved by the prescribed pain medications or ice.  Persistent nausea or vomiting not helped by the Zofran.  Increased pain or swelling in your calf area (in back above your ankle) that wasn t there when in the hospital.  Any other effects you feel are significant.  Call 911 if you experience any chest pain and/or shortness or breath.

## 2021-01-12 NOTE — INTERVAL H&P NOTE
This H&P has been reviewed and there are no clinically significant changes in the patient s condition.  The patient was evaluated by myself as well as Dr. Mello prior to surgery. The Patient is approved for the surgery and the stated surgical procedure is still clinically indicated.

## 2021-01-12 NOTE — CONSULTS
Care Management Initial Consult    General Information  Assessment completed with: Patient,    Type of CM/SW Visit: Initial Assessment    Primary Care Provider verified and updated as needed:     Readmission within the last 30 days:        Reason for Consult: discharge planning  Advance Care Planning:          Communication Assessment  Patient's communication style: spoken language (English or Bilingual)    Hearing Difficulty or Deaf: no   Wear Glasses or Blind: no    Cognitive  Cognitive/Neuro/Behavioral: WDL  Level of Consciousness: alert  Arousal Level: opens eyes spontaneously  Orientation: oriented x 4     Best Language: 0 - No aphasia  Speech: clear    Living Environment:   People in home: alone     Current living Arrangements: other (see comments)(town home)      Able to return to prior arrangements: yes       Family/Social Support:  Care provided by: self  Provides care for:    Marital Status:   Children          Description of Support System: Supportive, Involved       Current Resources:   Skilled Home Care Services:    Community Resources: None  Equipment currently used at home: cane, straight, grab bar, tub/shower, grab bar, toilet, shower chair  Supplies currently used at home:      Employment/Financial:  Employment Status:          Financial Concerns:           Lifestyle & Psychosocial Needs:        Socioeconomic History     Marital status:      Spouse name: Jarett     Number of children: 3     Years of education: Not on file     Highest education level: Not on file   Occupational History     Occupation: teacher     Comment: Retired     Tobacco Use     Smoking status: Never Smoker     Smokeless tobacco: Never Used   Substance and Sexual Activity     Alcohol use: No     Alcohol/week: 0.0 standard drinks     Drug use: No     Sexual activity: Not Currently       Functional Status:  Prior to admission patient needed assistance:          Mental Health Status:          Chemical Dependency  Status:              Values/Beliefs:  Spiritual, Cultural Beliefs, Mandaeism Practices, Values that affect care: no               Additional Information:  Patient lives alone in a one level town home.  She has 3 stairs to enter the home.  Patient does NOT receive any in-home services.  She has two different walkers at home for use at discharge.  Patient states her son or daughter-n-law will be staying with her at discharge.  Discussed recommendation for home P/T and provided list of choices.  Patient is in agreement with home care P/T and [facility] (Phone: [phone]).  No other identified discharge needs.    MARIA T Ernst  [facility] [phone]/ [facility] [phone]  Care Management

## 2021-01-12 NOTE — ANESTHESIA PROCEDURE NOTES
Procedure note : Adductor canal      Staff -   CRNA: Teresa Torre APRN CRNA  Other Anesthesia Staff: Regina Couch  Performed By: CRNA and SRNA  Pre-Procedure    Location: post-op    Procedure Times:1/12/2021 9:40 AM and 1/12/2021 9:45 AM  Pre-Anesthestic Checklist: patient identified, IV checked, site marked, risks and benefits discussed, informed consent, monitors and equipment checked, pre-op evaluation, at physician/surgeon's request and post-op pain management    Timeout  Correct Patient: Yes   Correct Procedure: Yes   Correct Site: Yes   Correct Laterality: Yes   Correct Position: Yes   Site Marked: Yes   .   Procedure Documentation    Diagnosis:TOTAL KNEE REPLACEMENT.    Procedure: Adductor canal, right.   Patient Position:supine Local skin infiltrated with 3 mL of 2% lidocaine.    Ultrasound used to identify targeted nerve, plexus, or vascular marker and placed a needle adjacent to it., Ultrasound was used to visualize the spread of the anesthetic in close proximity to the above stated nerve. A permanent image is entered into the patient's record.  Patient Prep/Sterile Barriers; mask, sterile gloves, chlorhexidine gluconate and isopropyl alcohol.  .  Needle: insulated   Needle Gauge: 21.  Needle Length (millimeters) 100  Insertion Method: Single Shot.        Assessment/Narrative  Paresthesias: No.  Injection made incrementally with aspirations every 5 mL..  The placement was negative for: blood aspirated, painful injection and site bleeding.  Bolus given via needle..   Secured via.   Complications: none. Test dose of mL at. Test dose negative for signs of intravascular, subdural or intrathecal injection. Comments:  Peripheral nerve block placed by ROBINSON Luz under the direct supervision of Teresa Torre CRNA.  Good visualization of the femoral artery and the local spread lateral to the artery.  No HR increase with injections and no other complications noted.  I will follow up with the  pt if needed.

## 2021-01-12 NOTE — OR NURSING
Transfer from  PACU to Room 269  Transferred to bed via glyder mat    S: 74 y/o female  S/P right total knee replacement       Anesthesia Type:  Failed spinal, converted to general with adductor canal block in PACU       Surgeon:  Dr. Fermin       Allergies:  See Medication Reconciliation Record       DNR: no      B:  Pertinent Medical History:   Past Medical History:   Diagnosis Date     CKD (chronic kidney disease) stage 3, GFR 30-59 ml/min      Dental disorder     loosing top teeth due to lupus     GERD (gastroesophageal reflux disease)      High blood pressure      Hyperlipidemia      Mixed hyperlipidaemia      Osteoarthritis      RA (rheumatoid arthritis) (H)     mild, on plaquenil     SLE (systemic lupus erythematosus) (H)      Vision problem     dry eyes from Lupus             Surgical History:    Past Surgical History:   Procedure Laterality Date     APPENDECTOMY  1969     C TOTAL KNEE ARTHROPLASTY  3/10    left     COLONOSCOPY  2009    repeat 5 years     COLONOSCOPY N/A 5/20/2015    normal, repeat 5 years due to family history     COLONOSCOPY N/A 10/7/2019    Procedure: COLONOSCOPY;  Surgeon: Con Recinos MD;  Location:  GI     FOOT SURGERY  2010    bone and screws in 2 toes, hammertoe     HYSTERECTOMY TOTAL ABDOMINAL  2010    due to bleeding, benign     LAPAROTOMY EXPLORATORY      scar tissue removal/repair - abd     SHOULDER SURGERY  05/2013    rotator cuff repair, right         A:  EBL: 10ml        IVF:  900ml        UOP:  No urge        NPO:  ___Yes _x__No         Vomiting:  ___Yes __x_No         Drainage: none noted        Skin Integrity: WDL except incision         RFO: ___Yes_x__No         SSI Patient?  ___Yes__x_No         Brace/sling/equipment:  __x_Yes, immobilizer (-no on presently)         See PACU record for ongoing assessment, vital signs and pain assessment.    R: Post-Op vitals and assessments as ordered/indicated per patient's condition.       Follow Post-Op orders and notify  Physician prn.       Continue to involve patient/family in plan of care and discharge planning.       Reinforce Pre-Operative education.       Implement skin safety interventions as appropriate.    Telephone report given to MARTHA Jerome med-surge

## 2021-01-12 NOTE — ANESTHESIA PREPROCEDURE EVALUATION
Anesthesia Pre-Procedure Evaluation    Patient: Capri Wiseman   MRN: [id number] : 1945          Preoperative Diagnosis: Primary osteoarthritis of right knee [M17.11]    Procedure(s):  right total knee replacement    Past Medical History:   Diagnosis Date     CKD (chronic kidney disease) stage 3, GFR 30-59 ml/min      Dental disorder     loosing top teeth due to lupus     GERD (gastroesophageal reflux disease)      High blood pressure      Hyperlipidemia      Mixed hyperlipidaemia      Osteoarthritis      RA (rheumatoid arthritis) (H)     mild, on plaquenil     SLE (systemic lupus erythematosus) (H)      Vision problem     dry eyes from Lupus     Past Surgical History:   Procedure Laterality Date     APPENDECTOMY       C TOTAL KNEE ARTHROPLASTY  3/10    left     COLONOSCOPY      repeat 5 years     COLONOSCOPY N/A 2015    normal, repeat 5 years due to family history     COLONOSCOPY N/A 10/7/2019    Procedure: COLONOSCOPY;  Surgeon: Con Recinos MD;  Location:  GI     FOOT SURGERY      bone and screws in 2 toes, hammertoe     HYSTERECTOMY TOTAL ABDOMINAL      due to bleeding, benign     LAPAROTOMY EXPLORATORY      scar tissue removal/repair - abd     SHOULDER SURGERY  2013    rotator cuff repair, right       Anesthesia Evaluation     . Pt has had prior anesthetic. Type: MAC, General and Regional    No history of anesthetic complications          ROS/MED HX    ENT/Pulmonary:  - neg pulmonary ROS    (-) tobacco use   Neurologic:  - neg neurologic ROS     Cardiovascular:     (+) Dyslipidemia, hypertension----. : . . . :. . Previous cardiac testing Echodate:12results:EF 60-65%date: results:ECG reviewed date:20 results:Sinus Rhythm   WITHIN NORMAL LIMITS   date: results:          METS/Exercise Tolerance:  4 - Raking leaves, gardening   Hematologic:     (+) History of Transfusion no previous transfusion reaction -      Musculoskeletal: Comment: Long term use of  plaquenil for RA  (+) arthritis,  other musculoskeletal- rheumatoid and otsto arthritis       GI/Hepatic:     (+) GERD Asymptomatic on medication,       Renal/Genitourinary:     (+) chronic renal disease, type: CRI, Pt does not require dialysis, Pt has no history of transplant,       Endo: Comment: Systemic lupus erythematosus, unspecified organ involvement.         Psychiatric:  - neg psychiatric ROS       Infectious Disease:  - neg infectious disease ROS       Malignancy:      - no malignancy   Other: Comment: SLE and RA   (+) No chance of pregnancy C-spine cleared: N/A, H/O Chronic Pain,                        Physical Exam  Normal systems: cardiovascular, pulmonary and dental    Airway   Mallampati: II  TM distance: >3 FB  Neck ROM: full    Dental     Cardiovascular   Rhythm and rate: regular and normal      Pulmonary    breath sounds clear to auscultation            Lab Results   Component Value Date    WBC 6.7 01/04/2021    HGB 11.5 (L) 01/04/2021    HCT 34.7 (L) 01/04/2021     01/04/2021    CRP 17.1 (H) 10/10/2019    SED 21 10/10/2019     01/04/2021    POTASSIUM 3.9 01/04/2021    CHLORIDE 108 01/04/2021    CO2 31 01/04/2021    BUN 34 (H) 01/04/2021    CR 1.10 (H) 01/04/2021    GLC 90 01/04/2021    SEBASTIEN 9.2 01/04/2021    MAG 1.7 08/04/2019    ALBUMIN 3.9 10/10/2019    PROTTOTAL 7.5 08/04/2019    ALT 29 10/10/2019    AST 19 10/10/2019    ALKPHOS 42 08/04/2019    BILITOTAL 0.4 08/04/2019    LIPASE 141 07/31/2015    TSH 1.600 07/08/2013       Preop Vitals  BP Readings from Last 3 Encounters:   01/12/21 (!) 153/81   01/06/21 136/86   01/04/21 126/76    Pulse Readings from Last 3 Encounters:   01/12/21 83   01/04/21 114   11/04/20 98      Resp Readings from Last 3 Encounters:   01/12/21 16   01/04/21 18   11/04/20 20    SpO2 Readings from Last 3 Encounters:   01/12/21 97%   01/04/21 97%   11/04/20 96%      Temp Readings from Last 1 Encounters:   01/12/21 98  F (36.7  C) (Core)    Ht Readings from Last 1  Encounters:   01/06/21 1.524 m (5')      Wt Readings from Last 1 Encounters:   01/06/21 76.9 kg (169 lb 8 oz)    Estimated body mass index is 33.1 kg/m  as calculated from the following:    Height as of 1/6/21: 1.524 m (5').    Weight as of 1/6/21: 76.9 kg (169 lb 8 oz).       Anesthesia Plan      History & Physical Review  History and physical reviewed and following examination; no interval change.    ASA Status:  3 .    NPO Status:  > 8 hours    Plan for MAC, Spinal and Peripheral Nerve Block with Intravenous and Propofol induction. Maintenance will be TIVA.  Reason for MAC:  Deep or markedly invasive procedure (G8)  PONV prophylaxis:  Ondansetron (or other 5HT-3) and Dexamethasone or Solumedrol  Plan for adductor canal nerve block for postoperative pain management. All risks and benefits were discussed, and patient verbalized understanding and wishes to proceed with nerve block. Paper consent obtained.     The patient is not a current smoker      Postoperative Care  Postoperative pain management:  Peripheral nerve block (Single Shot), Neuraxial analgesia, Multi-modal analgesia, IV analgesics and Oral pain medications.      Consents  Anesthetic plan, risks, benefits and alternatives discussed with:  Patient.  Use of blood products discussed: Yes.   Use of blood products discussed with Patient.  Consented to blood products.  .                 ANTONIO Rivera CRNA

## 2021-01-13 ENCOUNTER — APPOINTMENT (OUTPATIENT)
Dept: PHYSICAL THERAPY | Facility: CLINIC | Age: 76
End: 2021-01-13
Attending: ORTHOPAEDIC SURGERY
Payer: COMMERCIAL

## 2021-01-13 VITALS
WEIGHT: 172.8 LBS | OXYGEN SATURATION: 92 % | TEMPERATURE: 97.3 F | HEIGHT: 60 IN | HEART RATE: 67 BPM | SYSTOLIC BLOOD PRESSURE: 113 MMHG | RESPIRATION RATE: 16 BRPM | DIASTOLIC BLOOD PRESSURE: 55 MMHG | BODY MASS INDEX: 33.92 KG/M2

## 2021-01-13 LAB — HGB BLD-MCNC: 9.6 G/DL (ref 11.7–15.7)

## 2021-01-13 PROCEDURE — 250N000013 HC RX MED GY IP 250 OP 250 PS 637: Performed by: NURSE PRACTITIONER

## 2021-01-13 PROCEDURE — 97116 GAIT TRAINING THERAPY: CPT | Mod: GP,59 | Performed by: PHYSICAL THERAPIST

## 2021-01-13 PROCEDURE — 250N000011 HC RX IP 250 OP 636: Performed by: NURSE PRACTITIONER

## 2021-01-13 PROCEDURE — 97110 THERAPEUTIC EXERCISES: CPT | Mod: GP | Performed by: PHYSICAL THERAPIST

## 2021-01-13 PROCEDURE — 97530 THERAPEUTIC ACTIVITIES: CPT | Mod: GP | Performed by: PHYSICAL THERAPIST

## 2021-01-13 PROCEDURE — 36415 COLL VENOUS BLD VENIPUNCTURE: CPT | Performed by: NURSE PRACTITIONER

## 2021-01-13 PROCEDURE — 85018 HEMOGLOBIN: CPT | Performed by: NURSE PRACTITIONER

## 2021-01-13 RX ADMIN — ASPIRIN 325 MG: 325 TABLET, DELAYED RELEASE ORAL at 08:20

## 2021-01-13 RX ADMIN — METOPROLOL SUCCINATE 12.5 MG: 25 TABLET, EXTENDED RELEASE ORAL at 08:20

## 2021-01-13 RX ADMIN — FAMOTIDINE 20 MG: 20 TABLET, FILM COATED ORAL at 08:20

## 2021-01-13 RX ADMIN — ACETAMINOPHEN 975 MG: 325 TABLET, FILM COATED ORAL at 04:50

## 2021-01-13 RX ADMIN — TIZANIDINE 2 MG: 2 TABLET ORAL at 04:50

## 2021-01-13 RX ADMIN — OXYCODONE HYDROCHLORIDE 10 MG: 5 TABLET ORAL at 09:22

## 2021-01-13 RX ADMIN — POLYETHYLENE GLYCOL 3350 17 G: 17 POWDER, FOR SOLUTION ORAL at 08:20

## 2021-01-13 RX ADMIN — OXYCODONE HYDROCHLORIDE 10 MG: 5 TABLET ORAL at 00:14

## 2021-01-13 RX ADMIN — DOCUSATE SODIUM AND SENNOSIDES 1 TABLET: 8.6; 5 TABLET ORAL at 08:20

## 2021-01-13 RX ADMIN — OXYCODONE HYDROCHLORIDE 10 MG: 5 TABLET ORAL at 04:50

## 2021-01-13 RX ADMIN — HYDROXYZINE HYDROCHLORIDE 10 MG: 10 TABLET, FILM COATED ORAL at 00:14

## 2021-01-13 RX ADMIN — CEFAZOLIN 1 G: 1 INJECTION, POWDER, FOR SOLUTION INTRAMUSCULAR; INTRAVENOUS at 00:14

## 2021-01-13 NOTE — PLAN OF CARE
S-(situation): Patient discharged to home via wheelchair to door with son at 1057.    B-(background): S/p right knee replacement    A-(assessment): Alert and oriented, VSS on room air. Lung sounds clear and equal. Bowel sounds normoactive, adequate urine output. Incision to right knee edges well approximated without redness, swelling or drainage. Aquacel applied, teds on. Ambulates in room as SBA with walker. Tolerating foods and fluids with good appetite. Able to make needs known and use call light appropriately.     R-(recommendations): Discharge instructions reviewed with patient. Listed belongings gathered and returned to patient.          Discharge Nursing Criteria:     Care Plan and Patient education resolved: Yes    New Medications- pt has been educated about purpose and side effects: Yes    Vaccines  Influenza status verified at discharge:  Yes        MISC  Prescriptions if needed, hard copies sent with patient  NA  Home and hospital aquired medications returned to patient: NA  Medication Bin checked and emptied on discharge Yes  Patient reports post-discharge pain management plan is effective: Yes    TKA/KEEGAN ONLY:   Discharged with DEED EE Stockings Yes  DEE DEE stocking Education Completed Yes

## 2021-01-13 NOTE — PROGRESS NOTES
S-(situation): Patient registered to Observation. Patient arrived to room 269 via cart from PACU at about 1130.    B-(background): Right TKA    A-(assessment): to room on cart, moved to bed via air bakari and 3 staff. IV fluids open to gravity infusing LR on arrival. Patient stated pain was about 8/10. Alert and oriented but sleepy at times.    R-(recommendations): Orders and observation goals reviewed with patient.    Nursing Observation criteria listed below was met:    Skin issues/needs documented:NA  Isolation needs addressed and Signage up: NA  Fall Prevention: Education given and documented: Yes  Education Assessment documented:Yes  Education Documented: Yes  OBS video/handout Reviewed & Documented: Yes  Allergies Reviewed: Yes  Medication Reconciliation Complete: Yes  New medication patient education completed and documented (Possible Side Effects of Common Medications handout): Yes  Home medications if not able to send immediately home with family stored here: NA  Reminder note placed in discharge instructions: NA  Patient has discharge needs (If yes, please explain): Yes, will need home PT upon discharge.

## 2021-01-13 NOTE — PLAN OF CARE
Physical Therapy Discharge Summary    Reason for therapy discharge:    Discharged to home with home therapy.    Progress towards therapy goal(s). See goals on Care Plan in [facility] electronic health record for goal details.  Goals met    Therapy recommendation(s):    Continued therapy is recommended.  Rationale/Recommendations:   . Patient would benefit from continued skilled therapeutic intervention in order to progress them towards a higher level of function in accordance with their surgeon's protocol.    Thank you for your referral.  Tomeka Lepe, PT, DPT, ATC    [facility]  O: [phone]  E: [email or web address]

## 2021-01-13 NOTE — PROGRESS NOTES
Patient vital signs are at baseline: Yes  Patient able to ambulate as they were prior to admission or with assist devices provided by therapies during their stay:  No,  Reason:  Needs assistance up to bathroom with a walker  Patient MUST void prior to discharge:  Yes  Patient able to tolerate oral intake:  Yes  Pain has adequate pain control using Oral analgesics:  No,  Reason:  Working on pain level, it is trending down but has spikes of increased amounts of pain.

## 2021-01-13 NOTE — ANESTHESIA POSTPROCEDURE EVALUATION
Patient: Capri Wiseman    Procedure(s):  right total knee replacement    Diagnosis:Primary osteoarthritis of right knee [M17.11]  Diagnosis Additional Information: No value filed.    Anesthesia Type:  MAC, Spinal, Peripheral Nerve Block    Note:  Anesthesia Post Evaluation    Patient location during evaluation: Bedside  Patient participation: Able to fully participate in evaluation  Level of consciousness: awake and alert  Pain management: adequate  Airway patency: patent  Cardiovascular status: acceptable and stable  Respiratory status: acceptable and room air  Hydration status: acceptable  PONV: none     Anesthetic complications: None    Comments:  Patient was happy with the anesthesia care received and no anesthesia related complications were noted.  Patient denied soreness in back despite multiple spinal attempts. She reported pain has been well controlled and has been able to participate in physical therapy.  I will follow up with the patient again if it is needed.        Last vitals:  Vitals:    01/12/21 1830 01/12/21 2330 01/13/21 0803   BP: 136/61 129/62 113/55   Pulse: 73 68 67   Resp: 16 16 16   Temp: 97  F (36.1  C) 97.6  F (36.4  C) 97.3  F (36.3  C)   SpO2: 95% 97% 92%         Electronically Signed By: ANTONIO Rivera CRNA  January 13, 2021  9:19 AM

## 2021-01-13 NOTE — PROGRESS NOTES
[facility]  Orthopedics Progress Note           Assessment and Plan:    Assessment:   Post-operative day #1 Procedure(s):  right total knee replacement by Dr. Fermin        Plan:   Encourage IS  Start or continue physical therapy  Activity as tolerated  Weight-bear as tolerated R LE  Discharge from hospital today.  Pain control measures, doing well on oxycodone 5-10, tylenol and zanaflex and atarax.  Discussed with patient and RN that Zanaflex might work best.  Patient would like some Zanaflex on discharge.  Advance diet as tolerated  Routine wound care, RN to change dressing to Aquacel prior to discharge  DVT Prophylaxis: Asprin 325mg BID x 6 weeks   PT:Recommend discharge to home with HC PT  Case Management:Home with HC PT.  Son and or Daughter in Law staying with at Discharge.  No acute medical issues.  Discharge to Home with Son/Daughter in Law and HC PT today.             Interval History:   Doing well.  Continues to improve.  Pain is well-controlled.  No fevers.  Pain well-controlled.  Felt that Zanaflex helped as mentioned above.  We discussed discharge to home and medications and home care physical therapy and the fact that her son and daughter-in-law will be there.  Discussed discharge home today.           Review of Systems:    The patient denies any chest pain, shortness of breath, excessive pain, fever, chills, purulent drainage from the wound, nausea or vomiting.               Physical Exam:   General: awake, alert, appropriate and in no acute distress  Blood pressure 129/62, pulse 68, temperature 97.6  F (36.4  C), temperature source Oral, resp. rate 16, height 1.524 m (5'), weight 78.4 kg (172 lb 12.8 oz), SpO2 97 %, not currently breastfeeding.  Hemoglobin   Date Value Ref Range Status   01/13/2021 9.6 (L) 11.7 - 15.7 g/dL Final     No results found for: INR  Right knee:  Dressing clean, dry and intact.  Patient does have a full Ace wrap from foot to upper thigh.  Surrounding skin  intact, no breakdown.  Calves are soft, nontender with no significant swelling. Distal neurovascular grossly intact.  Compartments soft and non-tender.  Foot warm, sensation intact to foot, able to df/pf against resistance.    Moving all toes 5 out of 5 dorsiflexion plantarflexion.  Able to do a straight leg raise today.           Data:     Results for orders placed or performed during the hospital encounter of 01/12/21 (from the past 24 hour(s))   XR Knee Port Right 1/2 Views    Narrative    XR KNEE PORT RT 1 VW 1/12/2021 10:05 AM     HISTORY: Post-Op Total Knee      Impression    IMPRESSION: Right total knee arthroplasty without apparent  complication on the frontal projection.    MEERA MICHAEL MD   Social Work IP Consult    Narrative    Niki Martin     1/12/2021  4:11 PM  Care Management Initial Consult    General Information  Assessment completed with: Patient,    Type of CM/SW Visit: Initial Assessment    Primary Care Provider verified and updated as needed:     Readmission within the last 30 days:        Reason for Consult: discharge planning  Advance Care Planning:          Communication Assessment  Patient's communication style: spoken language (English or   Bilingual)    Hearing Difficulty or Deaf: no   Wear Glasses or Blind: no    Cognitive  Cognitive/Neuro/Behavioral: WDL  Level of Consciousness: alert    Arousal Level: opens eyes spontaneously  Orientation: oriented x   4     Best Language: 0 - No aphasia  Speech: clear    Living Environment:   People in home: alone     Current living Arrangements: other (see comments)(town home)      Able to return to prior arrangements: yes       Family/Social Support:  Care provided by: self  Provides care for:    Marital Status:   Children          Description of Support System: Supportive, Involved       Current Resources:   Skilled Home Care Services:    Community Resources: None  Equipment currently used at home: cane, straight, grab bar,   tub/shower,  grab bar, toilet, shower chair  Supplies currently used at home:      Employment/Financial:  Employment Status:          Financial Concerns:           Lifestyle & Psychosocial Needs:        Socioeconomic History     Marital status:      Spouse name: Jarett     Number of children: 3     Years of education: Not on file     Highest education level: Not on file   Occupational History     Occupation: teacher     Comment: Retired     Tobacco Use     Smoking status: Never Smoker     Smokeless tobacco: Never Used   Substance and Sexual Activity     Alcohol use: No     Alcohol/week: 0.0 standard drinks     Drug use: No     Sexual activity: Not Currently       Functional Status:  Prior to admission patient needed assistance:          Mental Health Status:          Chemical Dependency Status:              Values/Beliefs:  Spiritual, Cultural Beliefs, Restorationism Practices, Values that   affect care: no               Additional Information:  Patient lives alone in a one level town home.  She has 3 stairs   to enter the home.  Patient does NOT receive any in-home   services.  She has two different walkers at home for use at   discharge.  Patient states her son or daughter-n-law will be   staying with her at discharge.  Discussed recommendation for home   P/T and provided list of choices.  Patient is in agreement with   home care P/T and chooses [facility] (Phone:   [phone]).  No other identified discharge needs.    MARIA T Ernst  [facility] [phone]/ [facility] [phone]  Care Management     Hemoglobin   Result Value Ref Range    Hemoglobin 9.6 (L) 11.7 - 15.7 g/dL

## 2021-01-13 NOTE — DISCHARGE SUMMARY
[facility] Discharge Summary    Capri Wiseman MRN# [id number]   Age: 75 year old YOB: 1945     Date of Admission:  1/12/2021  Date of Discharge::  1/13/2021  Admitting Physician:  Thad Fermin DO  Discharge Physician:  Hank Monson PA-C     Home clinic: [facility] [place]          Admission Diagnoses:   Primary osteoarthritis of right knee [M17.11]          Discharge Diagnosis:     Primary osteoarthritis of right knee [M17.11]          Procedures:     Procedure(s): Procedure(s):  right total knee replacement       No other procedures performed during this admission           Medications Prior to Admission:     Medications Prior to Admission   Medication Sig Dispense Refill Last Dose     Ascorbic Acid (VITAMIN C PO) Take 1,000 mg by mouth daily   1/11/2021 at Unknown time     calcium citrate-vitamin D (CITRACAL) 315-250 MG-UNIT TABS Take 2 tablets by mouth daily    1/11/2021 at Unknown time     Cyanocobalamin (VITAMIN B 12 PO) Take 1,000 mcg by mouth daily   1/11/2021 at Unknown time     Glucosamine-Chondroit-Vit C-Mn (GLUCOSAMINE CHONDR 1500 COMPLX PO) Take 2 tablets by mouth daily   1/4/2021 at Unknown time     hydrochlorothiazide (HYDRODIURIL) 25 MG tablet Take 0.5 tablets (12.5 mg) by mouth daily Appointment needed for additional refills. 45 tablet 0 1/12/2021 at 0400     hydroxychloroquine (PLAQUENIL) 200 MG tablet TAKE 1 TABLET DAILY 90 tablet 3 1/11/2021 at Unknown time     lisinopril (ZESTRIL) 10 MG tablet Take 1 tablet (10 mg) by mouth daily Appointment needed for additional refills. 90 tablet 0 1/11/2021 at Unknown time     metoprolol succinate ER (TOPROL-XL) 25 MG 24 hr tablet TAKE ONE-HALF (1/2) TABLET TWICE A DAY (DUE FOR ANNUAL EXAM AND LABS) 90 tablet 0 1/12/2021 at 0400     Multiple Vitamins-Minerals (CENTRUM SILVER) per tablet Take 1 tablet by mouth daily   1/11/2021 at Unknown time     Omega-3 Fatty Acids (OMEGA-3 FISH OIL PO) Take 1,400 mg by  mouth daily   1/4/2021     simvastatin (ZOCOR) 10 MG tablet Take 1 tablet (10 mg) by mouth At Bedtime Appointment needed for additional refills. 90 tablet 0 1/11/2021 at Unknown time     artificial saliva, BIOTENE MT, (BIOTENE MT) SOLN Swish and spit 5-10 mLs in mouth as needed for dry mouth 44.3 mL 3 Unknown at Unknown time     cycloSPORINE (RESTASIS) 0.05 % ophthalmic emulsion Place 1 drop into both eyes 2 times daily 180 each 3 Unknown at Unknown time     [DISCONTINUED] aspirin (ASA) 81 MG EC tablet Take 81 mg by mouth daily    1/4/2021 at Unknown time             Discharge Medications:     Current Discharge Medication List      START taking these medications    Details   acetaminophen (TYLENOL) 325 MG tablet Take 3 tablets (975 mg) by mouth every 8 hours as needed for other (mild pain)  Qty: 100 tablet, Refills: 1    Associated Diagnoses: S/P total knee replacement using cement, right      ondansetron (ZOFRAN-ODT) 4 MG ODT tab Take 1 tablet (4 mg) by mouth every 6 hours as needed for nausea or vomiting  Qty: 8 tablet, Refills: 1    Associated Diagnoses: S/P total knee replacement using cement, right      oxyCODONE (ROXICODONE) 5 MG tablet Take 1-2 tablets (5-10 mg) by mouth every 4 hours as needed for pain (Moderate to Severe)  Qty: 50 tablet, Refills: 0    Associated Diagnoses: S/P total knee replacement using cement, right      senna-docusate (SENOKOT-S/PERICOLACE) 8.6-50 MG tablet Take 1-2 tablets by mouth 2 times daily as needed for constipation (while taking narcotics) Take while on oral narcotics to prevent or treat constipation.  Qty: 45 tablet, Refills: 2    Comments: While taking narcotics  Associated Diagnoses: S/P total knee replacement using cement, right      tiZANidine (ZANAFLEX) 2 MG tablet Take 1 tablet (2 mg) by mouth every 6 hours as needed for muscle spasms (pain)  Qty: 50 tablet, Refills: 1    Associated Diagnoses: S/P total knee replacement using cement, right         CONTINUE these  medications which have CHANGED    Details   !! aspirin (ASA) 325 MG EC tablet Take 1 tablet (325 mg) by mouth 2 times daily  Qty: 84 tablet, Refills: 0    Associated Diagnoses: S/P total knee replacement using cement, right      !! aspirin (ASA) 81 MG EC tablet Take 1 tablet (81 mg) by mouth daily  Qty:         !! - Potential duplicate medications found. Please discuss with provider.      CONTINUE these medications which have NOT CHANGED    Details   Ascorbic Acid (VITAMIN C PO) Take 1,000 mg by mouth daily      calcium citrate-vitamin D (CITRACAL) 315-250 MG-UNIT TABS Take 2 tablets by mouth daily       Cyanocobalamin (VITAMIN B 12 PO) Take 1,000 mcg by mouth daily      Glucosamine-Chondroit-Vit C-Mn (GLUCOSAMINE CHONDR 1500 COMPLX PO) Take 2 tablets by mouth daily      hydrochlorothiazide (HYDRODIURIL) 25 MG tablet Take 0.5 tablets (12.5 mg) by mouth daily Appointment needed for additional refills.  Qty: 45 tablet, Refills: 0    Associated Diagnoses: Edema, unspecified type      hydroxychloroquine (PLAQUENIL) 200 MG tablet TAKE 1 TABLET DAILY  Qty: 90 tablet, Refills: 3    Associated Diagnoses: Systemic lupus erythematosus with other organ involvement, unspecified SLE type (H)      lisinopril (ZESTRIL) 10 MG tablet Take 1 tablet (10 mg) by mouth daily Appointment needed for additional refills.  Qty: 90 tablet, Refills: 0    Associated Diagnoses: Stage 3 chronic kidney disease, unspecified whether stage 3a or 3b CKD; Essential hypertension with goal blood pressure less than 140/90      metoprolol succinate ER (TOPROL-XL) 25 MG 24 hr tablet TAKE ONE-HALF (1/2) TABLET TWICE A DAY (DUE FOR ANNUAL EXAM AND LABS)  Qty: 90 tablet, Refills: 0    Associated Diagnoses: Essential hypertension with goal blood pressure less than 140/90      Multiple Vitamins-Minerals (CENTRUM SILVER) per tablet Take 1 tablet by mouth daily      Omega-3 Fatty Acids (OMEGA-3 FISH OIL PO) Take 1,400 mg by mouth daily      simvastatin (ZOCOR) 10  MG tablet Take 1 tablet (10 mg) by mouth At Bedtime Appointment needed for additional refills.  Qty: 90 tablet, Refills: 0    Associated Diagnoses: Hyperlipidemia with target LDL less than 130      artificial saliva, BIOTENE MT, (BIOTENE MT) SOLN Swish and spit 5-10 mLs in mouth as needed for dry mouth  Qty: 44.3 mL, Refills: 3    Associated Diagnoses: Dry mouth; RA (rheumatoid arthritis) (H)      cycloSPORINE (RESTASIS) 0.05 % ophthalmic emulsion Place 1 drop into both eyes 2 times daily  Qty: 180 each, Refills: 3    Associated Diagnoses: CKD (chronic kidney disease) stage 3, GFR 30-59 ml/min                   Consultations:   Physical Therapy/Case Management           Brief History of Illness:   Reason for admission requiring a surgical or invasive procedure:   Primary osteoarthritis of right knee [M17.11]   The patient underwent the following procedure(s):   Procedure(s):  right total knee replacement   There were no immediate complications during this procedure.    Please refer to the full operative summary for details.           Hospital Course:   This patient was admitted for the above diagnosis to under go the above procedure.  Postoperatively she did very well with no apparent complications, and she had an uneventful hospital stay. Antibiotics were given for 24 hours after surgery. She was given Asprin, leg pumps as well as Teds  for DVT prophylaxis and her pain was well controlled with IV pain meds, then transitioned to oral pain medications during her hospital stay. The patient was followed by the Orthopedic team and on postoperative day 1 CMS/dressing were intact.  She progressed well with physical therapy and discharge to Home was recommended. Her chronic medical problems were followed by the Ortho team during her hospital stay and there were no significant changes to conditions or treatment plans.  No new medical problems presented during her hospital stay.       Discharge Instructions and Follow-Up:      Discharge diet: Pre-procedure diet or regular   Discharge activity: Activity as tolerated  Driving restricitIons: no driving while taking narcotic analgesics  Weight bearing status: Weight bearing as tolerated   Discharge follow-up: Follow up with ANTONIO Kolb CNP in 2 weeks, we will get Standing AP Lateral and Sunrise Xrays of the right Knee at that time.   Wound care: Keep the Aquacel (surgical) dressing on for 7 days after surgery, on the 7th day you can remove the dressing and apply gauze and tape.  Change that dressing once per day until your clinic follow up.     Anticoagulation:            Asprin 325mg BID x 6 weeks              Discharge Disposition:     Discharged to home with son and daughter-in-law, and home care physical therapy      Attestation:  I have reviewed today's vital signs, notes, medications, labs and imaging.  Total time: 45 minutes    Hank Monson PA-C

## 2021-01-13 NOTE — PROGRESS NOTES
Patient vital signs are at baseline: Yes  Patient able to ambulate as they were prior to admission or with assist devices provided by therapies during their stay:  Yes  Patient MUST void prior to discharge:  Yes  Patient able to tolerate oral intake:  Yes  Pain has adequate pain control using Oral analgesics:  Yes     S-(situation): shift note    B-(background): post op TKA    A-(assessment): patient alert and oriented. Lungs clear vitals stable. urine output adequate. Dressing clean dry and intact. She ambulated 80 feet during the night and tolerated well. Taking oxycodone along with alternating atarax and Zanaflex with. She states some relief from her pain but not less than a 6.     R-(recommendations): will continue to monitor vitals and pain encourage use of IS and walking.

## 2021-01-13 NOTE — PROGRESS NOTES
Patient vital signs are at baseline: Yes  Patient able to ambulate as they were prior to admission or with assist devices provided by therapies during their stay:  Yes  Patient MUST void prior to discharge:  Yes  Patient able to tolerate oral intake:  Yes  Pain has adequate pain control using Oral analgesics:  No,  Reason:  Continuing to work on pain control.

## 2021-01-14 ENCOUNTER — TELEPHONE (OUTPATIENT)
Dept: ORTHOPEDICS | Facility: OTHER | Age: 76
End: 2021-01-14

## 2021-01-14 NOTE — PROGRESS NOTES
[facility]  Discharge Call Back  Capri Wiseman  1945  MRN: [id number]  Home: [phone] (home)   PCP: Xochitl Hemphill    We are calling to see how you're doing since your surgery/procedure with us?   Comments: good  Clinical Questions  1. Have you had time to look at your discharge instructions? Do you have any questions in regards to the instructions?   Comment: none  2. Do you feel your pain is being controlled with the regimen the surgeon sent you home on? (ie: prescription medications, over the counter pain medications, ice packs)   Comments: yes  3. Have you noticed any drainage on your dressing? Do you know what to do if you have bleeding as a result of your procedure?   Comments: none  4. Have you had any nausea/vomiting? Do you know how to treat this?   Comment: none  5. Have you had any signs/symptoms of infection? (ie: fever, swelling, heat, drainage or redness) Do you know what to do if you have?   Comment: none  6. Do you have a follow up appointment made with your surgeon? Do you have a number to contact them at if you need it?   Comment: yes        You may be randomly selected to fill out a [facility] survey. We would appreciate you taking the time to fill this out. It is important to us if you would answer all of the questions on the survey.

## 2021-01-14 NOTE — TELEPHONE ENCOUNTER
Dr. Fermin,   During medication reconciliation today during my PT visit with Capri, the software alerted of possible severe interaction between hydroxychloroquine and tizanidine   And   Hydroxychloroquine and ondansetron.   Please advise if changes.    Thank you,  Lori Lowe, PT  [phone]

## 2021-01-18 NOTE — TELEPHONE ENCOUNTER
I called  and discussed this with her.  She stated that the patient was not having any sort of reaction at the time but she was obligated to let us know.  I also [place] around and called the patient and she is not taking the Zofran at all.  As far as tizanidine she does take that here and there and has had no reaction whatsoever.  I talked to our pharmacist team today also and when we ran the medications to [employer] there was no interaction between hydroxychloroquine and tenacity in.  Since the patient is doing well and not having a reaction we will allow her to continue this medication.  Patient is aware of this.

## 2021-01-19 ENCOUNTER — APPOINTMENT (OUTPATIENT)
Dept: CT IMAGING | Facility: CLINIC | Age: 76
End: 2021-01-19
Attending: EMERGENCY MEDICINE
Payer: COMMERCIAL

## 2021-01-19 ENCOUNTER — HOSPITAL ENCOUNTER (EMERGENCY)
Facility: CLINIC | Age: 76
Discharge: HOME | End: 2021-01-19
Attending: EMERGENCY MEDICINE | Admitting: EMERGENCY MEDICINE
Payer: COMMERCIAL

## 2021-01-19 VITALS
HEART RATE: 95 BPM | RESPIRATION RATE: 18 BRPM | DIASTOLIC BLOOD PRESSURE: 60 MMHG | SYSTOLIC BLOOD PRESSURE: 122 MMHG | OXYGEN SATURATION: 93 % | TEMPERATURE: 99 F

## 2021-01-19 DIAGNOSIS — M25.561 RIGHT KNEE PAIN, UNSPECIFIED CHRONICITY: ICD-10-CM

## 2021-01-19 DIAGNOSIS — Z96.651 STATUS POST RIGHT KNEE REPLACEMENT: ICD-10-CM

## 2021-01-19 DIAGNOSIS — M54.50 ACUTE LOW BACK PAIN WITHOUT SCIATICA, UNSPECIFIED BACK PAIN LATERALITY: ICD-10-CM

## 2021-01-19 PROCEDURE — 250N000013 HC RX MED GY IP 250 OP 250 PS 637: Performed by: EMERGENCY MEDICINE

## 2021-01-19 PROCEDURE — 99284 EMERGENCY DEPT VISIT MOD MDM: CPT | Mod: 25 | Performed by: EMERGENCY MEDICINE

## 2021-01-19 PROCEDURE — 99284 EMERGENCY DEPT VISIT MOD MDM: CPT | Performed by: EMERGENCY MEDICINE

## 2021-01-19 PROCEDURE — 72131 CT LUMBAR SPINE W/O DYE: CPT

## 2021-01-19 RX ORDER — OXYCODONE HYDROCHLORIDE 5 MG/1
10 TABLET ORAL ONCE
Status: COMPLETED | OUTPATIENT
Start: 2021-01-19 | End: 2021-01-19

## 2021-01-19 RX ORDER — LIDOCAINE 4 G/G
1 PATCH TOPICAL ONCE
Status: DISCONTINUED | OUTPATIENT
Start: 2021-01-19 | End: 2021-01-19 | Stop reason: HOSPADM

## 2021-01-19 RX ORDER — LIDOCAINE 4 G/G
1 PATCH TOPICAL EVERY 24 HOURS
Qty: 10 PATCH | Refills: 0 | Status: SHIPPED | OUTPATIENT
Start: 2021-01-19 | End: 2021-11-30

## 2021-01-19 RX ADMIN — LIDOCAINE 1 PATCH: 560 PATCH PERCUTANEOUS; TOPICAL; TRANSDERMAL at 06:29

## 2021-01-19 RX ADMIN — OXYCODONE HYDROCHLORIDE 10 MG: 5 TABLET ORAL at 06:07

## 2021-01-19 NOTE — ED TRIAGE NOTES
"Pt presents by EMS with concerns of back pain.   Pt had a knee replacement last Tuesday, had a \"block\" in the back.   Pt states that she started to have back pain Thursday, worse today.    "

## 2021-01-19 NOTE — ED NOTES
Bed: ED11  Expected date:   Expected time:   Means of arrival:   Comments:  [facility]  75 year   Back pain

## 2021-01-19 NOTE — ED NOTES
Pt up to the bedside commode with assist of one.  Pt moved well, able to move the right leg on her own.

## 2021-01-19 NOTE — ED NOTES
Pt confused on when she last took pain medication.  Pt changed last dose to prior to going to bed.  Pt is drowsy and falling asleep during questions.  EMS reports that the son told them pt had PT on Monday and started having pain after.  Son was concerned that there was an issue when the block was placed.  Pt denies numbness or tingling down the legs or arms.

## 2021-01-19 NOTE — ED PROVIDER NOTES
Patient is a 75-year-old female presents with right knee and low back pain.  Please see Dr. Alfredo's note for initial evaluation, history, work-up, and treatment.  At 8:25 I did speak with the patient.  She was sleeping but easily awakened.  She admits that her back pain is improved with the Lidoderm patch and the oral pain medicine provided.  We will give her a prescription for the Lidoderm patches to use as directed.  She can continue her pain meds but will need to get a refill through her primary doctor or orthopedic surgeon.  She thinks she overdid it with the therapists and states that she will skip therapy today.  I advised her that she still needs to be up and moving around and she states that with the sense of her son and daughter-in-law that she does walk.  Her imaging today did not show any worrisome findings.  Her knee dressing is intact without signs of bleeding.  There is no surrounding erythema that would suggest infection.  She does have bruising in various stages of resolution from her knee down to her ankle.  Palpable pulses and intact sensation.  Has good strength in lower extremity.  I did recommend patient to continue to rehab including the therapy and daily walking.  Reasons to return to emergency room were discussed.     Trey Noel MD  01/19/21 [id number]

## 2021-01-19 NOTE — ED NOTES
Pt back from CT at this time.  Warm blankets applied to pt, pt continues to rest between cares.  Call light placed in the left hand.

## 2021-01-19 NOTE — ED NOTES
Pt states that she only walks from the bed to the commode at home, a few feet.  Pt walked with a walker about 5 ft at the hospital.  Able to walk, but then stated that that was as far as she was going.  Pt able to move in bed easily.  Updated provider.

## 2021-01-19 NOTE — ED PROVIDER NOTES
History     Chief Complaint   Patient presents with     Back Pain     HPI  Capri Wiseman is a 75 year old female who went to the ER with back pain for the last 6 days.  Recently had knee surgery, and said that she needed a spinal injection before the surgery.  Had been doing well postop but then had physical therapy come to the house on Wednesday, 1/13/2021.  He says since physical therapy came over she has been in extreme pain and unable to get up and walk.  Both her back and her knee have been hurting.  No falls or reinjury of either her back or her knee.  Has been taking the pain medication that was prescribed but it is not helping.  Last took the medication last night before bed, but says that it did not help.  She says that her pain is a 15/10 and does not get any better.  Denies any numbness or tingling down legs or into her feet.  No numbness or tingling of her inner thighs or groin.  Denies any bowel or bladder incontinence.  Has not been running a fever.  Capri says that she called her doctor and let them know that she was in pain, but they did not say to do anything else.        Allergies:  No Known Allergies    Problem List:    Patient Active Problem List    Diagnosis Date Noted     S/P total knee replacement using cement, right 01/12/2021     Priority: Medium     Primary osteoarthritis of right knee 10/21/2020     Priority: Medium     Added automatically from request for surgery [id number]       Long-term use of Plaquenil 07/16/2018     Priority: Medium     Systemic lupus erythematosus, unspecified SLE type, unspecified organ involvement status (H) 08/14/2017     Priority: Medium     CKD (chronic kidney disease) stage 3, GFR 30-59 ml/min 03/03/2015     Priority: Medium     Hypertension 03/03/2015     Priority: Medium     Esophageal reflux 03/03/2015     Priority: Medium     Hyperlipidemia with target LDL less than 130 03/03/2015     Priority: Medium     Diagnosis updated by automated process. Provider  to review and confirm.       RA (rheumatoid arthritis) (H) 03/03/2015     Priority: Medium        Past Medical History:    Past Medical History:   Diagnosis Date     CKD (chronic kidney disease) stage 3, GFR 30-59 ml/min      Dental disorder      GERD (gastroesophageal reflux disease)      High blood pressure      Hyperlipidemia      Mixed hyperlipidaemia      Osteoarthritis      RA (rheumatoid arthritis) (H)      SLE (systemic lupus erythematosus) (H)      Vision problem        Past Surgical History:    Past Surgical History:   Procedure Laterality Date     APPENDECTOMY  1969     ARTHROPLASTY KNEE Right 1/12/2021    Procedure: right total knee replacement;  Surgeon: Thad Fermin DO;  Location: [facility]     C TOTAL KNEE ARTHROPLASTY  3/10    left     COLONOSCOPY  2009    repeat 5 years     COLONOSCOPY N/A 5/20/2015    normal, repeat 5 years due to family history     COLONOSCOPY N/A 10/7/2019    Procedure: COLONOSCOPY;  Surgeon: Con Recinos MD;  Location: [facility]     FOOT SURGERY  2010    bone and screws in 2 toes, hammertoe     HYSTERECTOMY TOTAL ABDOMINAL  2010    due to bleeding, benign     LAPAROTOMY EXPLORATORY      scar tissue removal/repair - abd     SHOULDER SURGERY  05/2013    rotator cuff repair, right       Family History:    Family History   Problem Relation Age of Onset     Colon Cancer Mother      Lupus Mother      Cancer Mother         colon cancer     Cerebrovascular Disease Father      No Known Problems Sister      No Known Problems Brother      No Known Problems Sister      No Known Problems Son      Neurologic Disorder Sister         ALS     Cancer Maternal Grandmother         stomach cancer     Heart Disease Maternal Grandfather      Unknown/Adopted Paternal Grandmother      Unknown/Adopted Paternal Grandfather      No Known Problems Sister      No Known Problems Sister      No Known Problems Son      No Known Problems Son        Social History:  Marital Status:    [5]  Social History     Tobacco Use     Smoking status: Never Smoker     Smokeless tobacco: Never Used   Substance Use Topics     Alcohol use: No     Alcohol/week: 0.0 standard drinks     Drug use: No        Medications:         acetaminophen (TYLENOL) 325 MG tablet       artificial saliva, BIOTENE MT, (BIOTENE MT) SOLN       Ascorbic Acid (VITAMIN C PO)       aspirin (ASA) 325 MG EC tablet       [START ON 2/23/2021] aspirin (ASA) 81 MG EC tablet       calcium citrate-vitamin D (CITRACAL) 315-250 MG-UNIT TABS       Cyanocobalamin (VITAMIN B 12 PO)       cycloSPORINE (RESTASIS) 0.05 % ophthalmic emulsion       Glucosamine-Chondroit-Vit C-Mn (GLUCOSAMINE CHONDR 1500 COMPLX PO)       hydrochlorothiazide (HYDRODIURIL) 25 MG tablet       hydroxychloroquine (PLAQUENIL) 200 MG tablet       lisinopril (ZESTRIL) 10 MG tablet       metoprolol succinate ER (TOPROL-XL) 25 MG 24 hr tablet       Multiple Vitamins-Minerals (CENTRUM SILVER) per tablet       Omega-3 Fatty Acids (OMEGA-3 FISH OIL PO)       ondansetron (ZOFRAN-ODT) 4 MG ODT tab       oxyCODONE (ROXICODONE) 5 MG tablet       senna-docusate (SENOKOT-S/PERICOLACE) 8.6-50 MG tablet       simvastatin (ZOCOR) 10 MG tablet       tiZANidine (ZANAFLEX) 2 MG tablet          Review of Systems   All other systems reviewed and are negative.      Physical Exam   BP: (!) 151/69  Pulse: 101  Temp: 99  F (37.2  C)  Resp: 18  SpO2: 96 %      Physical Exam  Vitals signs and nursing note reviewed.   Constitutional:       General: She is not in acute distress.     Appearance: She is not diaphoretic.   HENT:      Head: Atraumatic.      Mouth/Throat:      Pharynx: No oropharyngeal exudate.   Eyes:      General: No scleral icterus.     Pupils: Pupils are equal, round, and reactive to light.   Cardiovascular:      Rate and Rhythm: Regular rhythm. Tachycardia present.      Pulses: Normal pulses.      Heart sounds: Normal heart sounds.   Pulmonary:      Effort: No respiratory distress.       Breath sounds: Normal breath sounds.   Abdominal:      General: Bowel sounds are normal.      Palpations: Abdomen is soft.      Tenderness: There is no abdominal tenderness.   Musculoskeletal:         General: No tenderness.      Lumbar back: She exhibits no swelling.        Back:    Skin:     General: Skin is warm.      Findings: No rash.   Neurological:      General: No focal deficit present.         ED Course        Procedures               Critical Care time:  none               Results for orders placed or performed during the hospital encounter of 01/19/21 (from the past 24 hour(s))   CT Lumbar Spine w/o Contrast    Narrative    EXAM: CT LUMBAR SPINE W/O CONTRAST  LOCATION: [facility]  DATE/TIME: 1/19/2021 4:36 AM    INDICATION: Low back pain, > 6 wks  COMPARISON: None.  TECHNIQUE: Routine CT Lumbar Spine without IV contrast. Multiplanar reformats. Dose reduction techniques were used.     FINDINGS:  VERTEBRA: Normal vertebral body heights. No acute malalignment. No fracture or posttraumatic subluxation.     CANAL/FORAMINA:     Multilevel degenerative changes involving the lumbar spine.    At L1-L2 there is disc height loss with a small anterior osteophyte. Additionally at this level there is a concentric disc bulge which contributes to moderate central spinal canal stenosis. No significant neural foraminal stenosis is present.    At L2-L3 there is a concentric disc bulge with mild associated facet arthropathy. At this level there is mild central spinal canal stenosis with mild bilateral neural foraminal stenosis.    At L3-L4 there is disc height loss. A prominent disc osteophyte complex/concentric disc bulge is present. At this level there is associated facet arthropathy. The constellation of these findings contribute to moderate to severe central spinal canal   stenosis. Additionally there is mild to moderate right and mild to moderate left neural foraminal stenosis.    At L4-L5 there is disc  height loss with a mild concentric disc bulge. Associated facet arthropathy is present at this level. There is mild central spinal canal stenosis at this level. No significant neural foraminal stenosis is present.     At L5-S1 there is advanced disc height loss with endplate sclerosis. A small calcified disc osteophyte complex is present. No central spinal canal stenosis is present. There is mild to moderate bilateral neural foraminal stenosis.    PARASPINAL: Incompletely imaged low-density lesion involving the right kidney measuring up to 4.5 cm.      Impression    IMPRESSION:  1.  No fracture or posttraumatic subluxation.  2.  Multilevel degenerative changes of lumbar spine, as above.  3.  Incompletely imaged low-density lesion involving the right kidney measuring up to 4.5 cm.       Medications   Lidocaine (LIDOCARE) 4 % Patch 1 patch (1 patch Transdermal Patch/Med Applied 1/19/21 0629)   oxyCODONE (ROXICODONE) tablet 10 mg (10 mg Oral Given 1/19/21 0607)       Assessments & Plan (with Medical Decision Making)  Capri is a 75-year-old female who presents to the ER via EMS for back pain and knee pain status post right knee replacement.  See history and focused physical exam as above  Home 75-year-old female in no acute distress, vital signs are stable and she is afebrile.  Was resting comfortably when going to examine her, but does open eyes to verbal stim.  Is able to move herself in the bed and roll over for me to examine her back.  No signs of ecchymosis, hematoma, underlying fluctuance, rash, or overlying lesions where intrathecal spinal injection was attempted.  See that per anesthesia notes there were 3 attempts, but they were unsuccessful.  Patient underwent general anesthesia for the procedure.  When anesthesia rounded on patient postop she had been doing well and did not have any complaints about pain in her back at that time, there were no issues during the patient's hospitalization or upon discharge.   Also see per chart review that orthopedics was contacted by patient's home health due to possible drug interaction, but they had reviewed her medications and discussed with pharmacy that there were no possible known interactions despite the automated warning, and the patient was not experiencing any, so was encouraged to continue her medications as prescribed.  CT scan of the lumbar spine was ordered to evaluate for possible underlying abscess.  Results as above.  There is multilevel degenerative changes of the lumbar spine.  Does not have any red flag symptoms to indicate that she needs an MRI emergently.  Patient was given a dose of her oral pain medication and also applied a lidocaine patch.  Nurses were going to assist the patient to get up and try and ambulate in the department.  Signed out at change of shift to Dr. Trey Field, if the patient does well with the lidocaine patch and her normal oral pain medication, can consider discharge home.  If she is not doing well, may consider admitting for pain control.  See his note for ultimate patient disposition.     I have reviewed the nursing notes.    I have reviewed the findings, diagnosis, plan and need for follow up with the patient.       New Prescriptions    No medications on file       Final diagnoses:   Acute low back pain without sciatica, unspecified back pain laterality   Right knee pain, unspecified chronicity   Status post right knee replacement       1/19/2021   [facility] EMERGENCY DEPT     Judi Alfredo DO  01/19/21 2032

## 2021-01-21 DIAGNOSIS — Z96.651 S/P TOTAL KNEE REPLACEMENT USING CEMENT, RIGHT: ICD-10-CM

## 2021-01-21 RX ORDER — OXYCODONE HYDROCHLORIDE 5 MG/1
5-10 TABLET ORAL EVERY 6 HOURS PRN
Qty: 35 TABLET | Refills: 0 | Status: SHIPPED | OUTPATIENT
Start: 2021-01-21 | End: 2021-01-25

## 2021-01-21 NOTE — TELEPHONE ENCOUNTER
Patient calling and stating she had surgery on her knee and is having a lot of pain with her leg and also her back since the surgery. She is requesting a refill on the oxycodone be sent to the surgeon. Informed request would be placed. Please advise.  Maryjo Leo LPN    OXYCODONE (ROXICODONE) 5 mg      Last Written Prescription Date:  1/12/2021  Last Fill Quantity: 50,   # refills: 0  Last Office Visit: 1/6/2021 with Ortho Dr. Recinos  Future Office visit:    Next 5 appointments (look out 90 days)    Jan 22, 2021  7:00 AM  PHYSICAL with Xochitl Hemphill MD  [facility] ([facility]) [address] [place] [id number]  [phone]   Jan 25, 2021 10:00 AM  Return Visit with ANTONIO Fitzgerald CNP  [address] [address]  [phone]           Routing refill request to provider for review/approval because:  Drug not on the [facility] or [facility] refill protocol or controlled substance

## 2021-01-21 NOTE — TELEPHONE ENCOUNTER
Approved refill. Start to refill. Please call and speak with patient ensure she is doing ok after recent ED visit for back pain and knee pain.  Also make sure she is utilizing non-narcotic adjuncts and non-pharmacological adjuncts as well as no red flag symptoms.    ANTONIO Leiva, CNP  Orthopedic Surgery

## 2021-01-21 NOTE — TELEPHONE ENCOUNTER
Left a detailed message on confidential VM informing of this, reviewing at home interventions, listing s/s that would warrant seeking care.     Ayala MCPHERSON, Lead RN, BSN. . .  1/21/2021, 1:15 PM

## 2021-01-23 ENCOUNTER — APPOINTMENT (OUTPATIENT)
Dept: CT IMAGING | Facility: CLINIC | Age: 76
End: 2021-01-23
Attending: EMERGENCY MEDICINE
Payer: COMMERCIAL

## 2021-01-23 ENCOUNTER — HOSPITAL ENCOUNTER (EMERGENCY)
Facility: CLINIC | Age: 76
Discharge: HOME | End: 2021-01-23
Attending: EMERGENCY MEDICINE | Admitting: EMERGENCY MEDICINE
Payer: COMMERCIAL

## 2021-01-23 VITALS
OXYGEN SATURATION: 98 % | TEMPERATURE: 98.5 F | SYSTOLIC BLOOD PRESSURE: 134 MMHG | BODY MASS INDEX: 32.61 KG/M2 | HEART RATE: 97 BPM | RESPIRATION RATE: 18 BRPM | WEIGHT: 167 LBS | DIASTOLIC BLOOD PRESSURE: 59 MMHG

## 2021-01-23 DIAGNOSIS — M51.369 DDD (DEGENERATIVE DISC DISEASE), LUMBAR: ICD-10-CM

## 2021-01-23 DIAGNOSIS — M47.816 SPONDYLOSIS OF LUMBAR REGION WITHOUT MYELOPATHY OR RADICULOPATHY: ICD-10-CM

## 2021-01-23 PROCEDURE — 99284 EMERGENCY DEPT VISIT MOD MDM: CPT | Mod: 25 | Performed by: EMERGENCY MEDICINE

## 2021-01-23 PROCEDURE — 99284 EMERGENCY DEPT VISIT MOD MDM: CPT | Performed by: EMERGENCY MEDICINE

## 2021-01-23 PROCEDURE — 72131 CT LUMBAR SPINE W/O DYE: CPT

## 2021-01-23 NOTE — ED NOTES
Discharge reviewed with patient and assisted patient via wheelchair out to the car where her son was waiting. She will f/u Monday as scheduled.

## 2021-01-23 NOTE — ED PROVIDER NOTES
History     Chief Complaint   Patient presents with     Back Pain     HPI  Capri Wiseman is a 75 year old female who presents with low back pain.    Status post right total knee arthroplasty January 12  Participating in home physical therapy  Lives independent prior to surgery now has 2 children living with her to provide assistance  Surgery involved a spinal anesthesia  Patient is convinced something went wrong when she had the anesthetic needle placed in her spine resulting in her back pain.  Acknowledges that back pain did not start until greater than 1 week post surgery.  Initially thought she may be overdid it in physical therapy.  Rates back pain at x10/10 intensity  No radicular symptoms  No change in bladder bowel function  Has been using Lidoderm patches after initial ED visit for same complaint on January 19.  Has also been using oxycodone as prescribed by her orthopedic surgeon.  Has had no fever chills or night sweats  No redness or swelling at the needle entrance site    Allergies:  No Known Allergies    Problem List:    Patient Active Problem List    Diagnosis Date Noted     S/P total knee replacement using cement, right 01/12/2021     Priority: Medium     Primary osteoarthritis of right knee 10/21/2020     Priority: Medium     Added automatically from request for surgery [id number]       Long-term use of Plaquenil 07/16/2018     Priority: Medium     Systemic lupus erythematosus, unspecified SLE type, unspecified organ involvement status (H) 08/14/2017     Priority: Medium     CKD (chronic kidney disease) stage 3, GFR 30-59 ml/min 03/03/2015     Priority: Medium     Hypertension 03/03/2015     Priority: Medium     Esophageal reflux 03/03/2015     Priority: Medium     Hyperlipidemia with target LDL less than 130 03/03/2015     Priority: Medium     Diagnosis updated by automated process. Provider to review and confirm.       RA (rheumatoid arthritis) (H) 03/03/2015     Priority: Medium        Past  Medical History:    Past Medical History:   Diagnosis Date     CKD (chronic kidney disease) stage 3, GFR 30-59 ml/min      Dental disorder      GERD (gastroesophageal reflux disease)      High blood pressure      Hyperlipidemia      Mixed hyperlipidaemia      Osteoarthritis      RA (rheumatoid arthritis) (H)      SLE (systemic lupus erythematosus) (H)      Vision problem        Past Surgical History:    Past Surgical History:   Procedure Laterality Date     APPENDECTOMY  1969     ARTHROPLASTY KNEE Right 1/12/2021    Procedure: right total knee replacement;  Surgeon: Thad Fermin DO;  Location: [facility]     C TOTAL KNEE ARTHROPLASTY  3/10    left     COLONOSCOPY  2009    repeat 5 years     COLONOSCOPY N/A 5/20/2015    normal, repeat 5 years due to family history     COLONOSCOPY N/A 10/7/2019    Procedure: COLONOSCOPY;  Surgeon: Con Recinos MD;  Location: [facility]     FOOT SURGERY  2010    bone and screws in 2 toes, hammertoe     HYSTERECTOMY TOTAL ABDOMINAL  2010    due to bleeding, benign     LAPAROTOMY EXPLORATORY      scar tissue removal/repair - abd     SHOULDER SURGERY  05/2013    rotator cuff repair, right       Family History:    Family History   Problem Relation Age of Onset     Colon Cancer Mother      Lupus Mother      Cancer Mother         colon cancer     Cerebrovascular Disease Father      No Known Problems Sister      No Known Problems Brother      No Known Problems Sister      No Known Problems Son      Neurologic Disorder Sister         ALS     Cancer Maternal Grandmother         stomach cancer     Heart Disease Maternal Grandfather      Unknown/Adopted Paternal Grandmother      Unknown/Adopted Paternal Grandfather      No Known Problems Sister      No Known Problems Sister      No Known Problems Son      No Known Problems Son        Social History:  Marital Status:   [5]  Social History     Tobacco Use     Smoking status: Never Smoker     Smokeless tobacco: Never Used    Substance Use Topics     Alcohol use: No     Alcohol/week: 0.0 standard drinks     Drug use: No        Medications:         acetaminophen (TYLENOL) 325 MG tablet       artificial saliva, BIOTENE MT, (BIOTENE MT) SOLN       Ascorbic Acid (VITAMIN C PO)       aspirin (ASA) 325 MG EC tablet       [START ON 2/23/2021] aspirin (ASA) 81 MG EC tablet       calcium citrate-vitamin D (CITRACAL) 315-250 MG-UNIT TABS       Cyanocobalamin (VITAMIN B 12 PO)       cycloSPORINE (RESTASIS) 0.05 % ophthalmic emulsion       Glucosamine-Chondroit-Vit C-Mn (GLUCOSAMINE CHONDR 1500 COMPLX PO)       hydrochlorothiazide (HYDRODIURIL) 25 MG tablet       hydroxychloroquine (PLAQUENIL) 200 MG tablet       Lidocaine (LIDOCARE) 4 % Patch       lisinopril (ZESTRIL) 10 MG tablet       metoprolol succinate ER (TOPROL-XL) 25 MG 24 hr tablet       Multiple Vitamins-Minerals (CENTRUM SILVER) per tablet       Omega-3 Fatty Acids (OMEGA-3 FISH OIL PO)       ondansetron (ZOFRAN-ODT) 4 MG ODT tab       oxyCODONE (ROXICODONE) 5 MG tablet       senna-docusate (SENOKOT-S/PERICOLACE) 8.6-50 MG tablet       simvastatin (ZOCOR) 10 MG tablet       tiZANidine (ZANAFLEX) 2 MG tablet          Review of Systems   All other systems reviewed and are negative.      Physical Exam   BP: 135/81  Pulse: 97  Temp: 98.5  F (36.9  C)  Resp: 18  Weight: 75.8 kg (167 lb)  SpO2: 100 %      Physical Exam  Vitals signs and nursing note reviewed.   Constitutional:       Appearance: She is obese.   HENT:      Mouth/Throat:      Mouth: Mucous membranes are moist.   Eyes:      Conjunctiva/sclera: Conjunctivae normal.   Cardiovascular:      Rate and Rhythm: Normal rate.   Pulmonary:      Effort: Pulmonary effort is normal.   Abdominal:      General: Abdomen is flat.      Tenderness: There is no abdominal tenderness.   Musculoskeletal:      Comments: Right knee: Dressing removed.  Incision healing well with no drainage or erythema.  No discomfort with movement and mild flexion.   Venous insufficiency with varicose veins present.  Calf and thigh are nontender to compression with no signs for DVT    Lumbar spine: With assistance was able to move supine to sitting position.  Needle placement site around L4 found and there is no surrounding erythema, warmth or palpatory tenderness.  No indication for infection and no indication for palpable hematoma.  SLR testing on the right negative up to 30 degrees.     Neurological:      Mental Status: She is alert.         ED Course        Procedures                 Results for orders placed or performed during the hospital encounter of 01/23/21 (from the past 24 hour(s))   CT Lumbar Spine w/o Contrast    Narrative    CT LUMBAR SPINE WITHOUT CONTRAST  1/23/2021 8:48 AM     HISTORY: Osteoarthritis, lumbosacral. Right total knee arthroplasty  with spinal anesthesia January 12. Second ED visit for severe  postoperative lumbar back pain. Clinically no sign for infection.     TECHNIQUE: Axial images of the lumbar spine were obtained without  intravenous contrast. Multiplanar reformations were performed.  Radiation dose for this scan was reduced using automated exposure  control, adjustment of the mA and/or kV according to patient size, or  iterative reconstruction technique.    COMPARISON: CT 1/19/2021    FINDINGS:    No evidence of fracture or posttraumatic subluxation. Multilevel  degenerative disc disease, worst at L5-S1, unchanged. Multiple areas  of disc bulging worst at L3-L4 contributing to moderate to severe  spinal canal stenosis. Multilevel mild spinal canal stenosis. No  high-grade foraminal stenosis. Multilevel mild to moderate facet  arthropathy.    Paraspinal soft tissues demonstrate no concerning fluid collections.  Scattered vascular calcifications. Nonobstructive nephrolithiasis. 4.7  cm lesion off the right kidney which is incompletely characterized,  statistically likely a benign cyst. Renal ultrasound correlation could  be performed.       Impression    IMPRESSION: No acute osseous abnormality. No change.       BELKIS POLLOCK MD       Medications - No data to display    Assessments & Plan (with Medical Decision Making)  75-year-old female status post right total knee arthroplasty January 12 -Dr. Fermin at [facility].  Approximate week after surgery start having low back pain.  She initially attributed to her physical therapy.  She is now concerned it might be related to injury at the time of needle placement for spinal anesthesia.  This is her second ED visit for same complaint.  Has been using Lidoderm patches and oxycodone for pain around both knee and low back.  Rates her back pain 10/10.  No radicular component.  She is having no signs of cauda equina.  Limited mobility postop.  Is back home where she typically lives by herself but has 2 family members providing full-time assistance.  Reports no fall or injury.  States that she is never had pain like this prior to surgery.  Acknowledges that she is much less mobile and is sitting and supine more.  Examination none noted no obvious hematoma or infection around the needle entrance in the L4 interspace.  SLR was negative both left and right.  She was able to move from supine to sitting position on her own.  She had no midline pain with palpation.  Plan: No availability for MRI imaging - complete CT without contrast lumbar spine.    10:35 AM  I reviewed CT scan with patient. Informed her that there is no postoperative complication due to the spinal needle being inserted for spinal anesthesia time of her recent total knee arthroplasty. I did go through the scan results showing that she does have underlying age-related degenerative arthritis and degenerative disc disease.  Discharged home. Advised patient to be more active and reduce her time with prolonged sitting and lying. If needed she can be referred to physical therapy. Continue Lidoderm patches and extra strength Tylenol.     I have  reviewed the nursing notes.    I have reviewed the findings, diagnosis, plan and need for follow up with the patient.      New Prescriptions    No medications on file       Final diagnoses:   Spondylosis of lumbar region without myelopathy or radiculopathy   DDD (degenerative disc disease), lumbar       1/23/2021   [facility] EMERGENCY DEPT     Jack Veronica,   01/23/21 1036

## 2021-01-23 NOTE — ED TRIAGE NOTES
Pt presents by EMS with concerns of back pain when she woke up this morning.  Pt had right knee replacement recently.  Pt was seen in the ED on 1/19/21 for similar complaint.  Pt received 75 mcg of Fentanyl by EMS.

## 2021-01-23 NOTE — DISCHARGE INSTRUCTIONS
Evaluation for your ongoing back pain since your surgery on Jan 12 shows no complications from placement of the spinal needle when you received spinal anesthesia. CT scan shows no evidence for soft tissue swelling, infection, abscess or bleeding(hematoma) around the site of needle insertion.    CT scan does show extensive degenerative arthritis, degenerative disc disease. The combination of altered daily activities, walking mechanics, more prolonged sitting or lying will cause acute flare of underlying degenerative low back problems. Fortunately you are not having any evidence for compression on the spinal cord or sciatic nerves.     If your low back pain continues and have your primary care doctor authorize physical therapy. Increasing mobility by walking and changing positions frequently will be helpful. Prolonged sitting and lying down will worsen her low back pain. You can continue using your Lidoderm patches along with extra strength Tylenol.

## 2021-01-25 ENCOUNTER — ANCILLARY PROCEDURE (OUTPATIENT)
Dept: GENERAL RADIOLOGY | Facility: CLINIC | Age: 76
End: 2021-01-25
Attending: NURSE PRACTITIONER
Payer: COMMERCIAL

## 2021-01-25 ENCOUNTER — OFFICE VISIT (OUTPATIENT)
Dept: ORTHOPEDICS | Facility: CLINIC | Age: 76
End: 2021-01-25
Payer: COMMERCIAL

## 2021-01-25 ENCOUNTER — TELEPHONE (OUTPATIENT)
Dept: SURGERY | Facility: CLINIC | Age: 76
End: 2021-01-25

## 2021-01-25 ENCOUNTER — HOSPITAL ENCOUNTER (OUTPATIENT)
Dept: MRI IMAGING | Facility: CLINIC | Age: 76
Discharge: HOME | End: 2021-01-25
Attending: NURSE PRACTITIONER | Admitting: NURSE PRACTITIONER
Payer: COMMERCIAL

## 2021-01-25 ENCOUNTER — OFFICE VISIT (OUTPATIENT)
Dept: NEUROSURGERY | Facility: CLINIC | Age: 76
End: 2021-01-25
Payer: COMMERCIAL

## 2021-01-25 VITALS
SYSTOLIC BLOOD PRESSURE: 110 MMHG | WEIGHT: 167 LBS | TEMPERATURE: 97.9 F | HEIGHT: 60 IN | DIASTOLIC BLOOD PRESSURE: 64 MMHG | BODY MASS INDEX: 32.79 KG/M2

## 2021-01-25 VITALS
TEMPERATURE: 97.9 F | DIASTOLIC BLOOD PRESSURE: 64 MMHG | SYSTOLIC BLOOD PRESSURE: 110 MMHG | HEIGHT: 60 IN | BODY MASS INDEX: 32.79 KG/M2 | WEIGHT: 167 LBS

## 2021-01-25 DIAGNOSIS — Z96.651 S/P TOTAL KNEE REPLACEMENT USING CEMENT, RIGHT: ICD-10-CM

## 2021-01-25 DIAGNOSIS — Z53.9 DIAGNOSIS NOT YET DEFINED: Primary | ICD-10-CM

## 2021-01-25 DIAGNOSIS — M54.16 ACUTE RADICULAR LOW BACK PAIN: ICD-10-CM

## 2021-01-25 DIAGNOSIS — M54.16 ACUTE RADICULAR LOW BACK PAIN: Primary | ICD-10-CM

## 2021-01-25 DIAGNOSIS — Z96.651 S/P TOTAL KNEE REPLACEMENT USING CEMENT, RIGHT: Primary | ICD-10-CM

## 2021-01-25 PROCEDURE — 72158 MRI LUMBAR SPINE W/O & W/DYE: CPT

## 2021-01-25 PROCEDURE — A9585 GADOBUTROL INJECTION: HCPCS | Performed by: NURSE PRACTITIONER

## 2021-01-25 PROCEDURE — 99203 OFFICE O/P NEW LOW 30 MIN: CPT | Performed by: PHYSICIAN ASSISTANT

## 2021-01-25 PROCEDURE — 73562 X-RAY EXAM OF KNEE 3: CPT | Mod: TC | Performed by: RADIOLOGY

## 2021-01-25 PROCEDURE — 255N000002 HC RX 255 OP 636: Performed by: NURSE PRACTITIONER

## 2021-01-25 PROCEDURE — 99024 POSTOP FOLLOW-UP VISIT: CPT | Performed by: NURSE PRACTITIONER

## 2021-01-25 PROCEDURE — G0180 MD CERTIFICATION HHA PATIENT: HCPCS | Performed by: ORTHOPAEDIC SURGERY

## 2021-01-25 RX ORDER — POLYETHYLENE GLYCOL 3350 17 G/17G
17 POWDER, FOR SOLUTION ORAL DAILY PRN
Qty: 510 G | Refills: 1 | Status: SHIPPED | OUTPATIENT
Start: 2021-01-25 | End: 2021-11-30

## 2021-01-25 RX ORDER — GADOBUTROL 604.72 MG/ML
7.5 INJECTION INTRAVENOUS ONCE
Status: COMPLETED | OUTPATIENT
Start: 2021-01-25 | End: 2021-01-25

## 2021-01-25 RX ORDER — TIZANIDINE 2 MG/1
2 TABLET ORAL EVERY 6 HOURS PRN
Qty: 50 TABLET | Refills: 1 | Status: SHIPPED | OUTPATIENT
Start: 2021-01-25 | End: 2021-02-10

## 2021-01-25 RX ORDER — OXYCODONE HYDROCHLORIDE 5 MG/1
5-10 TABLET ORAL EVERY 6 HOURS PRN
Qty: 35 TABLET | Refills: 0 | Status: SHIPPED | OUTPATIENT
Start: 2021-01-25 | End: 2021-02-01

## 2021-01-25 RX ADMIN — GADOBUTROL 7.5 ML: 604.72 INJECTION INTRAVENOUS at 11:28

## 2021-01-25 ASSESSMENT — MIFFLIN-ST. JEOR
SCORE: 1164.01
SCORE: 1164.01

## 2021-01-25 ASSESSMENT — PAIN SCALES - GENERAL
PAINLEVEL: WORST PAIN (10)
PAINLEVEL: WORST PAIN (10)

## 2021-01-25 NOTE — LETTER
1/25/2021         RE: Capri Wiseman  [address] [facility] [address]        Dear Colleague,    Thank you for referring your patient, Capri Wiseman, to the [facility]. Please see a copy of my visit note below.    Orthopedic Clinic Post-Operative Note    CHIEF COMPLAINT:   Chief Complaint   Patient presents with     Surgical Followup     DOS: 1/12/2021~Right total knee arthroplasty~13 days postop       HISTORY OF PRESENT ILLNESS  Returns to clinic. States the knee is doing alright, however having extreme pain with the back. States started a few days after surgery.  The pain is low back pain with occasional shooting pains into the right leg accompanied by numbness/tingling.  Feels she is barely able to ambulate due to pain, states the leg feels weaker but also 2 weeks from total knee replacement.  Denies foot drop. States leg movement is more limited by pain than weakness. Denies loss of bowel or bladder. States pain is getting worse. Has been seen twice in the ED, with CT of lumbar spine. Was recommended by ED physician to follow-up with neurosurgery.  States has had some back pain in the past but nothing like this.  No reports of fever.  Using ASA and DEE DEE stockings. Overall back pain is severe, 10/10 currently, pain medications have been mildly helpful. Taking like prescribed.  knee pain is much less.     Also notes has been dealing with constipation as well. Has had bowel movements but few and far between. No reports of n/v. Overall miserable due to the back feels would be doing much better with the knee if it was not for the back.      Patient's past medical, surgical, social and family histories reviewed.     Past Medical History:   Diagnosis Date     CKD (chronic kidney disease) stage 3, GFR 30-59 ml/min      Dental disorder     loosing top teeth due to lupus     GERD (gastroesophageal reflux disease)      High blood pressure      Hyperlipidemia      Mixed  hyperlipidaemia      Osteoarthritis      RA (rheumatoid arthritis) (H)     mild, on plaquenil     SLE (systemic lupus erythematosus) (H)      Vision problem     dry eyes from Lupus       Past Surgical History:   Procedure Laterality Date     APPENDECTOMY  1969     ARTHROPLASTY KNEE Right 1/12/2021    Procedure: right total knee replacement;  Surgeon: Thad Fermin DO;  Location:  OR     C TOTAL KNEE ARTHROPLASTY  3/10    left     COLONOSCOPY  2009    repeat 5 years     COLONOSCOPY N/A 5/20/2015    normal, repeat 5 years due to family history     COLONOSCOPY N/A 10/7/2019    Procedure: COLONOSCOPY;  Surgeon: Con Recinos MD;  Location:  GI     FOOT SURGERY  2010    bone and screws in 2 toes, hammertoe     HYSTERECTOMY TOTAL ABDOMINAL  2010    due to bleeding, benign     LAPAROTOMY EXPLORATORY      scar tissue removal/repair - abd     SHOULDER SURGERY  05/2013    rotator cuff repair, right       Medications:       acetaminophen (TYLENOL) 325 MG tablet, Take 3 tablets (975 mg) by mouth every 8 hours as needed for other (mild pain)       artificial saliva, BIOTENE MT, (BIOTENE MT) SOLN, Swish and spit 5-10 mLs in mouth as needed for dry mouth       Ascorbic Acid (VITAMIN C PO), Take 1,000 mg by mouth daily       aspirin (ASA) 325 MG EC tablet, Take 1 tablet (325 mg) by mouth 2 times daily       calcium citrate-vitamin D (CITRACAL) 315-250 MG-UNIT TABS, Take 2 tablets by mouth daily        Cyanocobalamin (VITAMIN B 12 PO), Take 1,000 mcg by mouth daily       cycloSPORINE (RESTASIS) 0.05 % ophthalmic emulsion, Place 1 drop into both eyes 2 times daily       Glucosamine-Chondroit-Vit C-Mn (GLUCOSAMINE CHONDR 1500 COMPLX PO), Take 2 tablets by mouth daily       hydrochlorothiazide (HYDRODIURIL) 25 MG tablet, Take 0.5 tablets (12.5 mg) by mouth daily Appointment needed for additional refills.       hydroxychloroquine (PLAQUENIL) 200 MG tablet, TAKE 1 TABLET DAILY       Lidocaine (LIDOCARE) 4 %  Patch, Place 1 patch onto the skin every 24 hours To prevent lidocaine toxicity, patient should be patch free for 12 hrs daily.       lisinopril (ZESTRIL) 10 MG tablet, Take 1 tablet (10 mg) by mouth daily Appointment needed for additional refills.       metoprolol succinate ER (TOPROL-XL) 25 MG 24 hr tablet, TAKE ONE-HALF (1/2) TABLET TWICE A DAY (DUE FOR ANNUAL EXAM AND LABS)       Multiple Vitamins-Minerals (CENTRUM SILVER) per tablet, Take 1 tablet by mouth daily       Omega-3 Fatty Acids (OMEGA-3 FISH OIL PO), Take 1,400 mg by mouth daily       ondansetron (ZOFRAN-ODT) 4 MG ODT tab, Take 1 tablet (4 mg) by mouth every 6 hours as needed for nausea or vomiting       oxyCODONE (ROXICODONE) 5 MG tablet, Take 1-2 tablets (5-10 mg) by mouth every 6 hours as needed for pain (Moderate to Severe)       senna-docusate (SENOKOT-S/PERICOLACE) 8.6-50 MG tablet, Take 1-2 tablets by mouth 2 times daily as needed for constipation (while taking narcotics) Take while on oral narcotics to prevent or treat constipation.       simvastatin (ZOCOR) 10 MG tablet, Take 1 tablet (10 mg) by mouth At Bedtime Appointment needed for additional refills.       tiZANidine (ZANAFLEX) 2 MG tablet, Take 1 tablet (2 mg) by mouth every 6 hours as needed for muscle spasms (pain)       [START ON 2/23/2021] aspirin (ASA) 81 MG EC tablet, Take 1 tablet (81 mg) by mouth daily    No current facility-administered medications on file prior to visit.       No Known Allergies    Social History     Occupational History     Occupation: teacher     Comment: Retired   Tobacco Use     Smoking status: Never Smoker     Smokeless tobacco: Never Used   Substance and Sexual Activity     Alcohol use: No     Alcohol/week: 0.0 standard drinks     Drug use: No     Sexual activity: Not Currently       Family History   Problem Relation Age of Onset     Colon Cancer Mother      Lupus Mother      Cancer Mother         colon cancer     Cerebrovascular Disease Father      No  Known Problems Sister      No Known Problems Brother      No Known Problems Sister      No Known Problems Son      Neurologic Disorder Sister         ALS     Cancer Maternal Grandmother         stomach cancer     Heart Disease Maternal Grandfather      Unknown/Adopted Paternal Grandmother      Unknown/Adopted Paternal Grandfather      No Known Problems Sister      No Known Problems Sister      No Known Problems Son      No Known Problems Son        REVIEW OF SYSTEMS  General: negative for, night sweats, dizziness, fatigue  Resp: No shortness of breath and no cough  CV: negative for chest pain, syncope or near-syncope  GI: negative for nausea, vomiting and diarrhea  : negative for dysuria and hematuria  Musculoskeletal: as above  Neurologic: negative for syncope   Hematologic: negative for bleeding disorder    Physical Exam:  Vitals: /64   Temp 97.9  F (36.6  C) (Temporal)   Ht 1.524 m (5')   Wt 75.8 kg (167 lb)   LMP  (LMP Unknown)   BMI 32.61 kg/m    BMI= Body mass index is 32.61 kg/m .  Constitutional: healthy, alert and no acute distress   Psychiatric: mentation appears normal and affect normal/bright  NEURO: no focal deficits  SKIN: .healing well, well approximated skin edges, without signs of infection including no erythema, incision breakdown or purlent drainage. Lidoderm patches present to low back. No evidence of infection to spinal anesthesia injection sites. No redness, drainage, erythema.   JOINT/EXTREMITIES: right knee: expected post-op swelling. Extensor intact. Motion 15-85.  Pain with motion.  No instability.  DF/PF intact, motion to EHL intact, notes pain with Df/pf. Sensation intact to the foot. Toes well perfused.  Weak hip flexion, 4-/5.  Calf is soft non-tender.     GAIT: not tested     Diagnostic Modalities:  right knee X-ray: The prosthesis has acceptable alignment. No fractures or dislocations. Prosthesis is well seated with no evidence of loosening    Independent visualization of  the images was performed.      Impression:   Chief Complaint   Patient presents with     Surgical Followup     DOS: 1/12/2021~Right total knee arthroplasty~13 days postop   Severe back pain with radicular symptoms.     Plan:   Patient had a spinal anesthesia 14 days ago with knee replacement.  She has been having severe back pain with radicular symptoms though no cauda equina symptoms that has been ended up with 2 ambulance trips to the ED. She does report leg weakness though some of this could certainly be explained by recent knee replacement. She also states has been barely ambulatory due to the back pain, not the knee.     However, with the proximity to surgery and severity of symptoms recommend an MRI w/wo of lumbar spine urgently to rule out abscess or hematoma from the anesthesia.  Will have her obtain the MRI today then see neurosurgery. She is at high risk for return to ED due to severity of symptoms. In terms of the knee, the described pain is about what is expected at 2 weeks.     No evidence of infection to the incision site.  She feels she would be much more mobile and doing better if it was not for the back. No evidence of DVT.      Once a plan for the back is determine, she can continue physical therapy for the knee and for the back, aspirin for another 4 weeks, Amelia stockings another 4 weeks. Discussed return to clinic today with neurosurgery then with us in 4 weeks, however as the back feels better if the knee is not doing better as expected would want to see her back sooner.  She and her son understand this.      Refill provided for pain medications. Though she did have a refill 5 days ago she is having much greater than expected pain due to the acute back issues on top of the knee surgery.  She is also dealing with constipation, she is taking in lots of water, will add miralax to the sennakot. The constipation is likely due to lack of mobility and oxycodone use. Hopefully the back will start  improving soon and can wean off of this.   Contact the clinic if continues to struggle with constipation.     Return to clinic as above, or sooner as needed for changes.    Re-x-ray on return: No    ANTONIO Leiva, CNP  Orthopedic Surgery          Again, thank you for allowing me to participate in the care of your patient.        Sincerely,        ANTONIO Cespedes CNP

## 2021-01-25 NOTE — LETTER
1/25/2021         RE: Capri Wiseman  [address] [facility] [address]        Dear Colleague,    Thank you for referring your patient, Capri Wiseman, to the [facility] [place]. Please see a copy of my visit note below.    Dr. Fred Prather  [facility]  Neurosurgery Clinic Visit      CC: low back pain     Primary care Provider: Xochitl Hemphill    Referring Provider:  Fred Lin CNP      Reason For Visit:   I was asked to consult on the patient for back pain .      HPI: Capri Wiseman is a 75 year old female who presents for evaluation of her chief complaint of acute low back pain.  She had her right knee replaced on January 13 of this year.  She states that while they were doing the spinal block in preop, she experienced a severe low back pain, that has basically unrelenting since that time.  She has had a couple of episodes where she has been rendered immobile, and an ambulance has been called.  She does have some oxycodone and tizanidine available at home, but has only been using these sporadically, and mainly at night.  She was able to get in for an MRI this morning, and we have that for review today.  She denies any bowel or bladder changes.    Past Medical History:   Diagnosis Date     CKD (chronic kidney disease) stage 3, GFR 30-59 ml/min      Dental disorder     loosing top teeth due to lupus     GERD (gastroesophageal reflux disease)      High blood pressure      Hyperlipidemia      Mixed hyperlipidaemia      Osteoarthritis      RA (rheumatoid arthritis) (H)     mild, on plaquenil     SLE (systemic lupus erythematosus) (H)      Vision problem     dry eyes from Lupus       Past Medical History reviewed with patient during visit.    Past Surgical History:   Procedure Laterality Date     APPENDECTOMY  1969     ARTHROPLASTY KNEE Right 1/12/2021    Procedure: right total knee replacement;  Surgeon: Thad Fermin DO;   Location: [facility] OR     C TOTAL KNEE ARTHROPLASTY  3/10    left     COLONOSCOPY  2009    repeat 5 years     COLONOSCOPY N/A 5/20/2015    normal, repeat 5 years due to family history     COLONOSCOPY N/A 10/7/2019    Procedure: COLONOSCOPY;  Surgeon: Con Recinos MD;  Location: [facility] GI     FOOT SURGERY  2010    bone and screws in 2 toes, hammertoe     HYSTERECTOMY TOTAL ABDOMINAL  2010    due to bleeding, benign     LAPAROTOMY EXPLORATORY      scar tissue removal/repair - abd     SHOULDER SURGERY  05/2013    rotator cuff repair, right     Past Surgical History reviewed with patient during visit.    Current Outpatient Medications   Medication     acetaminophen (TYLENOL) 325 MG tablet     artificial saliva, BIOTENE MT, (BIOTENE MT) SOLN     Ascorbic Acid (VITAMIN C PO)     aspirin (ASA) 325 MG EC tablet     [START ON 2/23/2021] aspirin (ASA) 81 MG EC tablet     calcium citrate-vitamin D (CITRACAL) 315-250 MG-UNIT TABS     Cyanocobalamin (VITAMIN B 12 PO)     cycloSPORINE (RESTASIS) 0.05 % ophthalmic emulsion     Glucosamine-Chondroit-Vit C-Mn (GLUCOSAMINE CHONDR 1500 COMPLX PO)     hydrochlorothiazide (HYDRODIURIL) 25 MG tablet     hydroxychloroquine (PLAQUENIL) 200 MG tablet     Lidocaine (LIDOCARE) 4 % Patch     lisinopril (ZESTRIL) 10 MG tablet     metoprolol succinate ER (TOPROL-XL) 25 MG 24 hr tablet     Multiple Vitamins-Minerals (CENTRUM SILVER) per tablet     Omega-3 Fatty Acids (OMEGA-3 FISH OIL PO)     ondansetron (ZOFRAN-ODT) 4 MG ODT tab     oxyCODONE (ROXICODONE) 5 MG tablet     polyethylene glycol (MIRALAX) 17 GM/Dose powder     senna-docusate (SENOKOT-S/PERICOLACE) 8.6-50 MG tablet     simvastatin (ZOCOR) 10 MG tablet     tiZANidine (ZANAFLEX) 2 MG tablet     No current facility-administered medications for this visit.        No Known Allergies    Social History     Socioeconomic History     Marital status:      Spouse name: Jarett     Number of children: 3     Years of education: None      Highest education level: None   Occupational History     Occupation: teacher     Comment: Retired   Social Needs     Financial resource strain: None     Food insecurity     Worry: None     Inability: None     Transportation needs     Medical: None     Non-medical: None   Tobacco Use     Smoking status: Never Smoker     Smokeless tobacco: Never Used   Substance and Sexual Activity     Alcohol use: No     Alcohol/week: 0.0 standard drinks     Drug use: No     Sexual activity: Not Currently   Lifestyle     Physical activity     Days per week: None     Minutes per session: None     Stress: None   Relationships     Social connections     Talks on phone: None     Gets together: None     Attends [employer] service: None     Active member of club or organization: None     Attends meetings of clubs or organizations: None     Relationship status: None     Intimate partner violence     Fear of current or ex partner: None     Emotionally abused: None     Physically abused: None     Forced sexual activity: None   Other Topics Concern     Parent/sibling w/ CABG, MI or angioplasty before 65F 55M? Not Asked   Social History Narrative     None       Family History   Problem Relation Age of Onset     Colon Cancer Mother      Lupus Mother      Cancer Mother         colon cancer     Cerebrovascular Disease Father      No Known Problems Sister      No Known Problems Brother      No Known Problems Sister      No Known Problems Son      Neurologic Disorder Sister         ALS     Cancer Maternal Grandmother         stomach cancer     Heart Disease Maternal Grandfather      Unknown/Adopted Paternal Grandmother      Unknown/Adopted Paternal Grandfather      No Known Problems Sister      No Known Problems Sister      No Known Problems Son      No Known Problems Son           ROS: 10 point ROS neg other than the symptoms noted above in the HPI.    Vital Signs: /64   Temp 97.9  F (36.6  C) (Temporal)   Ht 5' (1.524 m)   Wt 167 lb (75.8  kg)   LMP  (LMP Unknown)   BMI 32.61 kg/m      Examination:  Constitutional:  Alert, well nourished, NAD.  HEENT: Normocephalic, atraumatic.   Pulmonary:  Without shortness of breath, normal effort.   Lymph: no lymphadenopathy to low back or LE.   Integumentary: Skin is free of rashes or lesions.   Cardiovascular:  No pitting edema of BLE.    Psych: Normal affect, no apparent distress    Neurological:  Awake  Alert  Oriented x 3  Speech clear  Cranial nerves II - XII grossly intact  Motor exam   Hip Flexor:                Right: 5/5  Left:  5/5  Hip Adductor:             Right:  5/5  Left:  5/5  Hip Abductor:             Right:  5/5  Left:  5/5  Gastroc Soleus:        Right:  5/5  Left:  5/5  Tib/Ant:                      Right:  5/5  Left:  5/5  EHL:                          Right:  5/5  Left:  5/5       Sensation normal to bilateral upper and lower extremities.    Musculoskeletal:  Gait: currently in wheelchair due to discomfort    Imaging:   MRI of the lumbar spine was reviewed in the office today.  She has severe disc height loss at L5-S1.  There is mild to moderate central canal stenosis at L3-4, due to ligamentous hypertrophy and broad-based disc bulging.  There is a very small synovial cyst off the left facet joint at L4-5.    Assessment/Plan:     Low back pain  Right leg pain  Lumbar disc degeneration and moderate stenosis at L3-4      Capri Wiseman is a 75 year old female.  I did have a discussion with the patient and her significant other regarding her symptoms.  She has acute low back pain, as well as pain in her right leg.  She does feel like her leg pain is mostly related to her knee.  However, her acute back pain has become very debilitating.  She understands of findings described on her MRI.  There are no acute appearing fractures or disc herniations.  She is requesting an epidural injection, which I think is appropriate.  We will have this done at L3-4.  I also encouraged her to consider using  some of her pain medication a little bit more liberally, as she is still in the acute postoperative phase of her knee replacement, and these should also help with her back pain.  We will see how she does with the epidural injection.  She voiced agreement and understanding.          Gaurav Schroeder PA-C  [facility]  [address]  [address]  [place] [address]    Tel [phone]  Pager [phone]      Capri Wiseman is a 75 year old female who presents for:  Chief Complaint   Patient presents with     Consult     Low back pain         Initial Vitals:  /64   Temp 97.9  F (36.6  C) (Temporal)   Ht 5' (1.524 m)   Wt 167 lb (75.8 kg)   LMP  (LMP Unknown)   BMI 32.61 kg/m   Estimated body mass index is 32.61 kg/m  as calculated from the following:    Height as of this encounter: 5' (1.524 m).    Weight as of this encounter: 167 lb (75.8 kg).. Body surface area is 1.79 meters squared. BP completed using cuff size: regular  Worst Pain (10)    Nursing Comments:     Omid Mansfield MA        Again, thank you for allowing me to participate in the care of your patient.        Sincerely,        Gaurav Schroeder PA-C

## 2021-01-25 NOTE — PROGRESS NOTES
Capri Wiseman is a 75 year old female who presents for:  Chief Complaint   Patient presents with     Consult     Low back pain         Initial Vitals:  /64   Temp 97.9  F (36.6  C) (Temporal)   Ht 5' (1.524 m)   Wt 167 lb (75.8 kg)   LMP  (LMP Unknown)   BMI 32.61 kg/m   Estimated body mass index is 32.61 kg/m  as calculated from the following:    Height as of this encounter: 5' (1.524 m).    Weight as of this encounter: 167 lb (75.8 kg).. Body surface area is 1.79 meters squared. BP completed using cuff size: regular  Worst Pain (10)    Nursing Comments:     mOid Mansfield MA

## 2021-01-25 NOTE — PROGRESS NOTES
Orthopedic Clinic Post-Operative Note    CHIEF COMPLAINT:   Chief Complaint   Patient presents with     Surgical Followup     DOS: 1/12/2021~Right total knee arthroplasty~13 days postop       HISTORY OF PRESENT ILLNESS  Returns to clinic. States the knee is doing alright, however having extreme pain with the back. States started a few days after surgery.  The pain is low back pain with occasional shooting pains into the right leg accompanied by numbness/tingling.  Feels she is barely able to ambulate due to pain, states the leg feels weaker but also 2 weeks from total knee replacement.  Denies foot drop. States leg movement is more limited by pain than weakness. Denies loss of bowel or bladder. States pain is getting worse. Has been seen twice in the ED, with CT of lumbar spine. Was recommended by ED physician to follow-up with neurosurgery.  States has had some back pain in the past but nothing like this.  No reports of fever.  Using ASA and DEE DEE stockings. Overall back pain is severe, 10/10 currently, pain medications have been mildly helpful. Taking like prescribed.  knee pain is much less.     Also notes has been dealing with constipation as well. Has had bowel movements but few and far between. No reports of n/v. Overall miserable due to the back feels would be doing much better with the knee if it was not for the back.      Patient's past medical, surgical, social and family histories reviewed.     Past Medical History:   Diagnosis Date     CKD (chronic kidney disease) stage 3, GFR 30-59 ml/min      Dental disorder     loosing top teeth due to lupus     GERD (gastroesophageal reflux disease)      High blood pressure      Hyperlipidemia      Mixed hyperlipidaemia      Osteoarthritis      RA (rheumatoid arthritis) (H)     mild, on plaquenil     SLE (systemic lupus erythematosus) (H)      Vision problem     dry eyes from Lupus       Past Surgical History:   Procedure Laterality Date     APPENDECTOMY  1969      ARTHROPLASTY KNEE Right 1/12/2021    Procedure: right total knee replacement;  Surgeon: Thad Fermin DO;  Location: [facility]     C TOTAL KNEE ARTHROPLASTY  3/10    left     COLONOSCOPY  2009    repeat 5 years     COLONOSCOPY N/A 5/20/2015    normal, repeat 5 years due to family history     COLONOSCOPY N/A 10/7/2019    Procedure: COLONOSCOPY;  Surgeon: Con Recinos MD;  Location: [facility]     FOOT SURGERY  2010    bone and screws in 2 toes, hammertoe     HYSTERECTOMY TOTAL ABDOMINAL  2010    due to bleeding, benign     LAPAROTOMY EXPLORATORY      scar tissue removal/repair - abd     SHOULDER SURGERY  05/2013    rotator cuff repair, right       Medications:       acetaminophen (TYLENOL) 325 MG tablet, Take 3 tablets (975 mg) by mouth every 8 hours as needed for other (mild pain)       artificial saliva, BIOTENE MT, (BIOTENE MT) SOLN, Swish and spit 5-10 mLs in mouth as needed for dry mouth       Ascorbic Acid (VITAMIN C PO), Take 1,000 mg by mouth daily       aspirin (ASA) 325 MG EC tablet, Take 1 tablet (325 mg) by mouth 2 times daily       calcium citrate-vitamin D (CITRACAL) 315-250 MG-UNIT TABS, Take 2 tablets by mouth daily        Cyanocobalamin (VITAMIN B 12 PO), Take 1,000 mcg by mouth daily       cycloSPORINE (RESTASIS) 0.05 % ophthalmic emulsion, Place 1 drop into both eyes 2 times daily       Glucosamine-Chondroit-Vit C-Mn (GLUCOSAMINE CHONDR 1500 COMPLX PO), Take 2 tablets by mouth daily       hydrochlorothiazide (HYDRODIURIL) 25 MG tablet, Take 0.5 tablets (12.5 mg) by mouth daily Appointment needed for additional refills.       hydroxychloroquine (PLAQUENIL) 200 MG tablet, TAKE 1 TABLET DAILY       Lidocaine (LIDOCARE) 4 % Patch, Place 1 patch onto the skin every 24 hours To prevent lidocaine toxicity, patient should be patch free for 12 hrs daily.       lisinopril (ZESTRIL) 10 MG tablet, Take 1 tablet (10 mg) by mouth daily Appointment needed for additional refills.       metoprolol  succinate ER (TOPROL-XL) 25 MG 24 hr tablet, TAKE ONE-HALF (1/2) TABLET TWICE A DAY (DUE FOR ANNUAL EXAM AND LABS)       Multiple Vitamins-Minerals (CENTRUM SILVER) per tablet, Take 1 tablet by mouth daily       Omega-3 Fatty Acids (OMEGA-3 FISH OIL PO), Take 1,400 mg by mouth daily       ondansetron (ZOFRAN-ODT) 4 MG ODT tab, Take 1 tablet (4 mg) by mouth every 6 hours as needed for nausea or vomiting       oxyCODONE (ROXICODONE) 5 MG tablet, Take 1-2 tablets (5-10 mg) by mouth every 6 hours as needed for pain (Moderate to Severe)       senna-docusate (SENOKOT-S/PERICOLACE) 8.6-50 MG tablet, Take 1-2 tablets by mouth 2 times daily as needed for constipation (while taking narcotics) Take while on oral narcotics to prevent or treat constipation.       simvastatin (ZOCOR) 10 MG tablet, Take 1 tablet (10 mg) by mouth At Bedtime Appointment needed for additional refills.       tiZANidine (ZANAFLEX) 2 MG tablet, Take 1 tablet (2 mg) by mouth every 6 hours as needed for muscle spasms (pain)       [START ON 2/23/2021] aspirin (ASA) 81 MG EC tablet, Take 1 tablet (81 mg) by mouth daily    No current facility-administered medications on file prior to visit.       No Known Allergies    Social History     Occupational History     Occupation: teacher     Comment: Retired   Tobacco Use     Smoking status: Never Smoker     Smokeless tobacco: Never Used   Substance and Sexual Activity     Alcohol use: No     Alcohol/week: 0.0 standard drinks     Drug use: No     Sexual activity: Not Currently       Family History   Problem Relation Age of Onset     Colon Cancer Mother      Lupus Mother      Cancer Mother         colon cancer     Cerebrovascular Disease Father      No Known Problems Sister      No Known Problems Brother      No Known Problems Sister      No Known Problems Son      Neurologic Disorder Sister         ALS     Cancer Maternal Grandmother         stomach cancer     Heart Disease Maternal Grandfather      Unknown/Adopted  Paternal Grandmother      Unknown/Adopted Paternal Grandfather      No Known Problems Sister      No Known Problems Sister      No Known Problems Son      No Known Problems Son        REVIEW OF SYSTEMS  General: negative for, night sweats, dizziness, fatigue  Resp: No shortness of breath and no cough  CV: negative for chest pain, syncope or near-syncope  GI: negative for nausea, vomiting and diarrhea  : negative for dysuria and hematuria  Musculoskeletal: as above  Neurologic: negative for syncope   Hematologic: negative for bleeding disorder    Physical Exam:  Vitals: /64   Temp 97.9  F (36.6  C) (Temporal)   Ht 1.524 m (5')   Wt 75.8 kg (167 lb)   LMP  (LMP Unknown)   BMI 32.61 kg/m    BMI= Body mass index is 32.61 kg/m .  Constitutional: healthy, alert and no acute distress   Psychiatric: mentation appears normal and affect normal/bright  NEURO: no focal deficits  SKIN: .healing well, well approximated skin edges, without signs of infection including no erythema, incision breakdown or purlent drainage. Lidoderm patches present to low back. No evidence of infection to spinal anesthesia injection sites. No redness, drainage, erythema.   JOINT/EXTREMITIES: right knee: expected post-op swelling. Extensor intact. Motion 15-85.  Pain with motion.  No instability.  DF/PF intact, motion to EHL intact, notes pain with Df/pf. Sensation intact to the foot. Toes well perfused.  Weak hip flexion, 4-/5.  Calf is soft non-tender.     GAIT: not tested     Diagnostic Modalities:  right knee X-ray: The prosthesis has acceptable alignment. No fractures or dislocations. Prosthesis is well seated with no evidence of loosening    Independent visualization of the images was performed.      Impression:   Chief Complaint   Patient presents with     Surgical Followup     DOS: 1/12/2021~Right total knee arthroplasty~13 days postop   Severe back pain with radicular symptoms.     Plan:   Patient had a spinal anesthesia 14 days  ago with knee replacement.  She has been having severe back pain with radicular symptoms though no cauda equina symptoms that has been ended up with 2 ambulance trips to the ED. She does report leg weakness though some of this could certainly be explained by recent knee replacement. She also states has been barely ambulatory due to the back pain, not the knee.     However, with the proximity to surgery and severity of symptoms recommend an MRI w/wo of lumbar spine urgently to rule out abscess or hematoma from the anesthesia.  Will have her obtain the MRI today then see neurosurgery. She is at high risk for return to ED due to severity of symptoms. In terms of the knee, the described pain is about what is expected at 2 weeks.     No evidence of infection to the incision site.  She feels she would be much more mobile and doing better if it was not for the back. No evidence of DVT.      Once a plan for the back is determine, she can continue physical therapy for the knee and for the back, aspirin for another 4 weeks, Amelia stockings another 4 weeks. Discussed return to clinic today with neurosurgery then with us in 4 weeks, however as the back feels better if the knee is not doing better as expected would want to see her back sooner.  She and her son understand this.      Refill provided for pain medications. Though she did have a refill 5 days ago she is having much greater than expected pain due to the acute back issues on top of the knee surgery.  She is also dealing with constipation, she is taking in lots of water, will add miralax to the sennakot. The constipation is likely due to lack of mobility and oxycodone use. Hopefully the back will start improving soon and can wean off of this.   Contact the clinic if continues to struggle with constipation.     Return to clinic as above, or sooner as needed for changes.    Re-x-ray on return: No    ANTONIO Leiva, CNP  Orthopedic Surgery

## 2021-01-25 NOTE — PROGRESS NOTES
Appropriate assistive devices provided during their visit. Yes (Yes, No, N/A) Tech (list device)    Exam table and/or cart  placed in the lowest position. Yes (Yes, No, N/A)    Brakes on tables/carts/wheelchairs used at all times. Yes (Yes, No, N/A)    Non slip footwear applied. NA (Yes, No, NA)    Patient was accompanied by staff throughout visit. Yes (Yes, No, N/A)    Equipment safety straps used. NA (Yes, No, N/A)    Assist with toileting. NA (Yes, No, N/A)

## 2021-01-25 NOTE — PROGRESS NOTES
Dr. Fred Prather  [facility]  Neurosurgery Clinic Visit      CC: low back pain     Primary care Provider: Xochitl Hemphill    Referring Provider:  Fred Lin CNP      Reason For Visit:   I was asked to consult on the patient for back pain .      HPI: Capri Wiseman is a 75 year old female who presents for evaluation of her chief complaint of acute low back pain.  She had her right knee replaced on January 13 of this year.  She states that while they were doing the spinal block in preop, she experienced a severe low back pain, that has basically unrelenting since that time.  She has had a couple of episodes where she has been rendered immobile, and an ambulance has been called.  She does have some oxycodone and tizanidine available at home, but has only been using these sporadically, and mainly at night.  She was able to get in for an MRI this morning, and we have that for review today.  She denies any bowel or bladder changes.    Past Medical History:   Diagnosis Date     CKD (chronic kidney disease) stage 3, GFR 30-59 ml/min      Dental disorder     loosing top teeth due to lupus     GERD (gastroesophageal reflux disease)      High blood pressure      Hyperlipidemia      Mixed hyperlipidaemia      Osteoarthritis      RA (rheumatoid arthritis) (H)     mild, on plaquenil     SLE (systemic lupus erythematosus) (H)      Vision problem     dry eyes from Lupus       Past Medical History reviewed with patient during visit.    Past Surgical History:   Procedure Laterality Date     APPENDECTOMY  1969     ARTHROPLASTY KNEE Right 1/12/2021    Procedure: right total knee replacement;  Surgeon: Thad Fermin DO;  Location: [facility]     C TOTAL KNEE ARTHROPLASTY  3/10    left     COLONOSCOPY  2009    repeat 5 years     COLONOSCOPY N/A 5/20/2015    normal, repeat 5 years due to family history     COLONOSCOPY N/A 10/7/2019    Procedure: COLONOSCOPY;  Surgeon: Con Recinos MD;   Location:  GI     FOOT SURGERY  2010    bone and screws in 2 toes, hammertoe     HYSTERECTOMY TOTAL ABDOMINAL  2010    due to bleeding, benign     LAPAROTOMY EXPLORATORY      scar tissue removal/repair - abd     SHOULDER SURGERY  05/2013    rotator cuff repair, right     Past Surgical History reviewed with patient during visit.    Current Outpatient Medications   Medication     acetaminophen (TYLENOL) 325 MG tablet     artificial saliva, BIOTENE MT, (BIOTENE MT) SOLN     Ascorbic Acid (VITAMIN C PO)     aspirin (ASA) 325 MG EC tablet     [START ON 2/23/2021] aspirin (ASA) 81 MG EC tablet     calcium citrate-vitamin D (CITRACAL) 315-250 MG-UNIT TABS     Cyanocobalamin (VITAMIN B 12 PO)     cycloSPORINE (RESTASIS) 0.05 % ophthalmic emulsion     Glucosamine-Chondroit-Vit C-Mn (GLUCOSAMINE CHONDR 1500 COMPLX PO)     hydrochlorothiazide (HYDRODIURIL) 25 MG tablet     hydroxychloroquine (PLAQUENIL) 200 MG tablet     Lidocaine (LIDOCARE) 4 % Patch     lisinopril (ZESTRIL) 10 MG tablet     metoprolol succinate ER (TOPROL-XL) 25 MG 24 hr tablet     Multiple Vitamins-Minerals (CENTRUM SILVER) per tablet     Omega-3 Fatty Acids (OMEGA-3 FISH OIL PO)     ondansetron (ZOFRAN-ODT) 4 MG ODT tab     oxyCODONE (ROXICODONE) 5 MG tablet     polyethylene glycol (MIRALAX) 17 GM/Dose powder     senna-docusate (SENOKOT-S/PERICOLACE) 8.6-50 MG tablet     simvastatin (ZOCOR) 10 MG tablet     tiZANidine (ZANAFLEX) 2 MG tablet     No current facility-administered medications for this visit.        No Known Allergies    Social History     Socioeconomic History     Marital status:      Spouse name: Jarett     Number of children: 3     Years of education: None     Highest education level: None   Occupational History     Occupation: teacher     Comment: Retired   Social Needs     Financial resource strain: None     Food insecurity     Worry: None     Inability: None     Transportation needs     Medical: None     Non-medical: None    Tobacco Use     Smoking status: Never Smoker     Smokeless tobacco: Never Used   Substance and Sexual Activity     Alcohol use: No     Alcohol/week: 0.0 standard drinks     Drug use: No     Sexual activity: Not Currently   Lifestyle     Physical activity     Days per week: None     Minutes per session: None     Stress: None   Relationships     Social connections     Talks on phone: None     Gets together: None     Attends Anabaptist service: None     Active member of club or organization: None     Attends meetings of clubs or organizations: None     Relationship status: None     Intimate partner violence     Fear of current or ex partner: None     Emotionally abused: None     Physically abused: None     Forced sexual activity: None   Other Topics Concern     Parent/sibling w/ CABG, MI or angioplasty before 65F 55M? Not Asked   Social History Narrative     None       Family History   Problem Relation Age of Onset     Colon Cancer Mother      Lupus Mother      Cancer Mother         colon cancer     Cerebrovascular Disease Father      No Known Problems Sister      No Known Problems Brother      No Known Problems Sister      No Known Problems Son      Neurologic Disorder Sister         ALS     Cancer Maternal Grandmother         stomach cancer     Heart Disease Maternal Grandfather      Unknown/Adopted Paternal Grandmother      Unknown/Adopted Paternal Grandfather      No Known Problems Sister      No Known Problems Sister      No Known Problems Son      No Known Problems Son           ROS: 10 point ROS neg other than the symptoms noted above in the HPI.    Vital Signs: /64   Temp 97.9  F (36.6  C) (Temporal)   Ht 5' (1.524 m)   Wt 167 lb (75.8 kg)   LMP  (LMP Unknown)   BMI 32.61 kg/m      Examination:  Constitutional:  Alert, well nourished, NAD.  HEENT: Normocephalic, atraumatic.   Pulmonary:  Without shortness of breath, normal effort.   Lymph: no lymphadenopathy to low back or LE.   Integumentary: Skin  is free of rashes or lesions.   Cardiovascular:  No pitting edema of BLE.    Psych: Normal affect, no apparent distress    Neurological:  Awake  Alert  Oriented x 3  Speech clear  Cranial nerves II - XII grossly intact  Motor exam   Hip Flexor:                Right: 5/5  Left:  5/5  Hip Adductor:             Right:  5/5  Left:  5/5  Hip Abductor:             Right:  5/5  Left:  5/5  Gastroc Soleus:        Right:  5/5  Left:  5/5  Tib/Ant:                      Right:  5/5  Left:  5/5  EHL:                          Right:  5/5  Left:  5/5       Sensation normal to bilateral upper and lower extremities.    Musculoskeletal:  Gait: currently in wheelchair due to discomfort    Imaging:   MRI of the lumbar spine was reviewed in the office today.  She has severe disc height loss at L5-S1.  There is mild to moderate central canal stenosis at L3-4, due to ligamentous hypertrophy and broad-based disc bulging.  There is a very small synovial cyst off the left facet joint at L4-5.    Assessment/Plan:     Low back pain  Right leg pain  Lumbar disc degeneration and moderate stenosis at L3-4      Capri Wiseman is a 75 year old female.  I did have a discussion with the patient and her significant other regarding her symptoms.  She has acute low back pain, as well as pain in her right leg.  She does feel like her leg pain is mostly related to her knee.  However, her acute back pain has become very debilitating.  She understands of findings described on her MRI.  There are no acute appearing fractures or disc herniations.  She is requesting an epidural injection, which I think is appropriate.  We will have this done at L3-4.  I also encouraged her to consider using some of her pain medication a little bit more liberally, as she is still in the acute postoperative phase of her knee replacement, and these should also help with her back pain.  We will see how she does with the epidural injection.  She voiced agreement and  understanding.          Gaurav Schroeder PA-C  [facility]  [address]  [address] [place] [id number]    Tel [phone]  Pager [phone]

## 2021-01-27 ENCOUNTER — APPOINTMENT (OUTPATIENT)
Dept: GENERAL RADIOLOGY | Facility: CLINIC | Age: 76
End: 2021-01-27
Attending: PHYSICIAN ASSISTANT
Payer: COMMERCIAL

## 2021-01-27 ENCOUNTER — TELEPHONE (OUTPATIENT)
Dept: ORTHOPEDICS | Facility: CLINIC | Age: 76
End: 2021-01-27

## 2021-01-27 ENCOUNTER — HOSPITAL ENCOUNTER (EMERGENCY)
Facility: CLINIC | Age: 76
Discharge: HOME | End: 2021-01-27
Attending: PHYSICIAN ASSISTANT | Admitting: PHYSICIAN ASSISTANT
Payer: COMMERCIAL

## 2021-01-27 VITALS
WEIGHT: 160 LBS | DIASTOLIC BLOOD PRESSURE: 63 MMHG | TEMPERATURE: 98.7 F | RESPIRATION RATE: 14 BRPM | HEART RATE: 100 BPM | BODY MASS INDEX: 31.25 KG/M2 | SYSTOLIC BLOOD PRESSURE: 125 MMHG | OXYGEN SATURATION: 92 %

## 2021-01-27 DIAGNOSIS — Z96.651 S/P TOTAL KNEE REPLACEMENT USING CEMENT, RIGHT: ICD-10-CM

## 2021-01-27 DIAGNOSIS — R19.7 NAUSEA VOMITING AND DIARRHEA: ICD-10-CM

## 2021-01-27 DIAGNOSIS — R11.2 NAUSEA VOMITING AND DIARRHEA: ICD-10-CM

## 2021-01-27 DIAGNOSIS — M54.9 BACK PAIN: ICD-10-CM

## 2021-01-27 LAB
ALBUMIN SERPL-MCNC: 3 G/DL (ref 3.4–5)
ALP SERPL-CCNC: 101 U/L (ref 40–150)
ALT SERPL W P-5'-P-CCNC: 14 U/L (ref 0–50)
ANION GAP SERPL CALCULATED.3IONS-SCNC: 9 MMOL/L (ref 3–14)
AST SERPL W P-5'-P-CCNC: 14 U/L (ref 0–45)
BASOPHILS # BLD AUTO: 0 10E9/L (ref 0–0.2)
BASOPHILS NFR BLD AUTO: 0.1 %
BILIRUB SERPL-MCNC: 0.4 MG/DL (ref 0.2–1.3)
BUN SERPL-MCNC: 40 MG/DL (ref 7–30)
CALCIUM SERPL-MCNC: 9.6 MG/DL (ref 8.5–10.1)
CHLORIDE SERPL-SCNC: 103 MMOL/L (ref 94–109)
CO2 SERPL-SCNC: 26 MMOL/L (ref 20–32)
CREAT SERPL-MCNC: 1.36 MG/DL (ref 0.52–1.04)
DIFFERENTIAL METHOD BLD: ABNORMAL
EOSINOPHIL # BLD AUTO: 0 10E9/L (ref 0–0.7)
EOSINOPHIL NFR BLD AUTO: 0 %
ERYTHROCYTE [DISTWIDTH] IN BLOOD BY AUTOMATED COUNT: 12.3 % (ref 10–15)
GFR SERPL CREATININE-BSD FRML MDRD: 37 ML/MIN/{1.73_M2}
GLUCOSE SERPL-MCNC: 123 MG/DL (ref 70–99)
HCT VFR BLD AUTO: 31 % (ref 35–47)
HGB BLD-MCNC: 10.2 G/DL (ref 11.7–15.7)
LIPASE SERPL-CCNC: 81 U/L (ref 73–393)
LYMPHOCYTES # BLD AUTO: 0.3 10E9/L (ref 0.8–5.3)
LYMPHOCYTES NFR BLD AUTO: 2.3 %
MCH RBC QN AUTO: 33.7 PG (ref 26.5–33)
MCHC RBC AUTO-ENTMCNC: 32.9 G/DL (ref 31.5–36.5)
MCV RBC AUTO: 102 FL (ref 78–100)
MONOCYTES # BLD AUTO: 0.6 10E9/L (ref 0–1.3)
MONOCYTES NFR BLD AUTO: 4.5 %
NEUTROPHILS # BLD AUTO: 12.6 10E9/L (ref 1.6–8.3)
NEUTROPHILS NFR BLD AUTO: 93.1 %
PLATELET # BLD AUTO: 393 10E9/L (ref 150–450)
POTASSIUM SERPL-SCNC: 3.9 MMOL/L (ref 3.4–5.3)
PROT SERPL-MCNC: 6.5 G/DL (ref 6.8–8.8)
RBC # BLD AUTO: 3.03 10E12/L (ref 3.8–5.2)
SODIUM SERPL-SCNC: 138 MMOL/L (ref 133–144)
WBC # BLD AUTO: 13.5 10E9/L (ref 4–11)

## 2021-01-27 PROCEDURE — 258N000003 HC RX IP 258 OP 636: Performed by: PHYSICIAN ASSISTANT

## 2021-01-27 PROCEDURE — 96360 HYDRATION IV INFUSION INIT: CPT | Performed by: PHYSICIAN ASSISTANT

## 2021-01-27 PROCEDURE — 99284 EMERGENCY DEPT VISIT MOD MDM: CPT | Mod: 25 | Performed by: PHYSICIAN ASSISTANT

## 2021-01-27 PROCEDURE — 36415 COLL VENOUS BLD VENIPUNCTURE: CPT | Performed by: PHYSICIAN ASSISTANT

## 2021-01-27 PROCEDURE — 74019 RADEX ABDOMEN 2 VIEWS: CPT

## 2021-01-27 PROCEDURE — 250N000013 HC RX MED GY IP 250 OP 250 PS 637: Performed by: PHYSICIAN ASSISTANT

## 2021-01-27 PROCEDURE — 85025 COMPLETE CBC W/AUTO DIFF WBC: CPT | Performed by: PHYSICIAN ASSISTANT

## 2021-01-27 PROCEDURE — 99284 EMERGENCY DEPT VISIT MOD MDM: CPT | Performed by: PHYSICIAN ASSISTANT

## 2021-01-27 PROCEDURE — 83690 ASSAY OF LIPASE: CPT | Performed by: PHYSICIAN ASSISTANT

## 2021-01-27 PROCEDURE — 80053 COMPREHEN METABOLIC PANEL: CPT | Performed by: PHYSICIAN ASSISTANT

## 2021-01-27 RX ORDER — ONDANSETRON 4 MG/1
4 TABLET, ORALLY DISINTEGRATING ORAL EVERY 6 HOURS PRN
Qty: 8 TABLET | Refills: 1 | Status: SHIPPED | OUTPATIENT
Start: 2021-01-27 | End: 2021-02-03

## 2021-01-27 RX ORDER — OXYCODONE HYDROCHLORIDE 5 MG/1
5 TABLET ORAL ONCE
Status: COMPLETED | OUTPATIENT
Start: 2021-01-27 | End: 2021-01-27

## 2021-01-27 RX ORDER — SODIUM CHLORIDE 9 MG/ML
INJECTION, SOLUTION INTRAVENOUS CONTINUOUS
Status: DISCONTINUED | OUTPATIENT
Start: 2021-01-27 | End: 2021-01-27 | Stop reason: HOSPADM

## 2021-01-27 RX ADMIN — SODIUM CHLORIDE 1000 ML: 9 INJECTION, SOLUTION INTRAVENOUS at 17:18

## 2021-01-27 RX ADMIN — OXYCODONE HYDROCHLORIDE 5 MG: 5 TABLET ORAL at 18:06

## 2021-01-27 NOTE — ED TRIAGE NOTES
Brought in complaining of Nausea, vomiting, diarrhea, back pain. Post right knee replacement 1/12/2021. Repost struggling with constipation since surgery.

## 2021-01-27 NOTE — TELEPHONE ENCOUNTER
Received forms from [employer], to be faxed to [phone].     Ayala MCPHERSON, Lead RN, BSN. . .  1/27/2021, 2:49 PM

## 2021-01-27 NOTE — TELEPHONE ENCOUNTER
#2 VM left for patient to call.............Monse Goodman CMA  ([facility])  
Left message for patient to triage her nausea as she is post operative. Also, which pharmacy does she use.     Ayala MCPHERSON, Lead RN, BSN. . .  1/27/2021, 9:22 AM    
Patient has not had pain medications since 0300 and continues to have nausea and vomiting. Patient had small hard BM first which then turned into diarrhea.  Writer advised son to have patient evaluated by patients PCP and if they are unable to be seen in clinic to head to the ED.     Son will discuss options with mom and have her seen. All questions answered, son verbalized understanding.  Samanta Arellano RN on 1/27/2021 at 3:51 PM    
Reason for Call:  Other call back    Detailed comments: Pt was given an anti nausea medication from [facility] and is wondering if any refills are available or if it can extended. Pt is feeling nauseated    Phone Number Patient can be reached at: Cell number on file:    Telephone Information:   Mobile [phone]       Best Time: any    Can we leave a detailed message on this number? YES    Call taken on 1/27/2021 at 9:14 AM by Gretchen Wade      
Spoke with patients son Miguel, who reports mom was having nausea with vomiting.  Patient has been taking stimulant laxatives and now has had diarrhea. Writer suggested stopping the stimulant laxatives and taking the Miralax instead as needed.  No fever, knee pain under control, patient has been complaining of back pain for which she has been seen  by neurology.     Zofran  Last Written Prescription Date:  1/12/2021  Last Fill Quantity: 8,  # refills: 1   Last office visit: 1/25/2021 with prescribing provider:     Future Office Visit:      Requested Prescriptions   Pending Prescriptions Disp Refills     ondansetron (ZOFRAN-ODT) 4 MG ODT tab 8 tablet 1     Sig: Take 1 tablet (4 mg) by mouth every 6 hours as needed for nausea or vomiting       There is no refill protocol information for this order              Samanta Arellano RN on 1/27/2021 at 3:15 PM    
Will refill.  Please call and speak with patient and determine if nausea/vomiting from pain medication, if she has any other symptoms of illness or infection (fever, chills,) if any symptoms of GI bleed, etc to get seen.      Ensure she is able to keep down foods/liquids and not getting dehydrated.     If the nausea is from the pain medications, ok to continue the zofran, if it is from other/unknown causes she get evaluated.     Agree with stopping the laxatives, miralax would be better option. Please ask if before the diarrhea started, she was starting to have bowel movements again, then diarrhea, or went straight from constipated to diarrhea, also if having significant ABD symptoms should be getting seen.     ANTONIO Leiva, CNP  Orthopedic Surgery      
Statement Selected

## 2021-01-27 NOTE — TELEPHONE ENCOUNTER
Patient's son called to schedule and felt that patient could not wait until Feb 11th.  Provided contact number to [facility] to be referred to another facility to get in sooner.

## 2021-01-27 NOTE — ED PROVIDER NOTES
History     Chief Complaint   Patient presents with     Nausea, Vomiting, & Diarrhea     Back Pain     HPI  Capri Wiseman is a 75 year old female who [facility] to the emergency department via EMS for concerns of nausea, vomiting, diarrhea, and back pain.  The patient reports she has had issues with her low back hurting since her knee surgery.  She had a spinal for anesthesia during her knee surgery which was unsuccessful and she has had issues with pain ever since.  She is scheduled for an epidural injection but not for a few weeks.  She denies any recent falls or worsening of her back pain, only persistent. She reports that she had not had a bowel movement for over a week up until today she had a small hard stool then had multiple episodes of watery soft stools.  There is no blood in the stool.  She developed nausea this morning and up vomiting clear fluid at few times.  She has been eating and drinking okay up until today.  She denies any abdominal pain.  Denies any chest pain or shortness of breath.  She did have Covid in November that she has fully recovered from.  No reported numbness/weakness in extremities.  She received Zofran in route which helped her nausea greatly.  She has been taking oxycodone for her knee pain in addition to tizanidine.  She also been taking stool softeners that she had been prescribed after surgery.  She was just given MiraLAX to start today but she has not taken it yet since she started having diarrhea.    Patient son says that she has not had any pain medication since 3 AM today and she ate some breakfast but then within a few hours started throwing up and having the diarrhea.  He was unsure what was going on with her but he was worried about dehydration so he sent her here.  He acknowledges her ongoing back issues from her spinal anesthetic.  Did have an MRI and saw neurosurgery who did not have any significant concerns from results and did recommend epidural.    Allergies:  No  Known Allergies    Problem List:    Patient Active Problem List    Diagnosis Date Noted     Acute radicular low back pain 01/25/2021     Priority: Medium     S/P total knee replacement using cement, right 01/12/2021     Priority: Medium     Primary osteoarthritis of right knee 10/21/2020     Priority: Medium     Added automatically from request for surgery [id number]       Long-term use of Plaquenil 07/16/2018     Priority: Medium     Systemic lupus erythematosus, unspecified SLE type, unspecified organ involvement status (H) 08/14/2017     Priority: Medium     CKD (chronic kidney disease) stage 3, GFR 30-59 ml/min 03/03/2015     Priority: Medium     Hypertension 03/03/2015     Priority: Medium     Esophageal reflux 03/03/2015     Priority: Medium     Hyperlipidemia with target LDL less than 130 03/03/2015     Priority: Medium     Diagnosis updated by automated process. Provider to review and confirm.       RA (rheumatoid arthritis) (H) 03/03/2015     Priority: Medium        Past Medical History:    Past Medical History:   Diagnosis Date     CKD (chronic kidney disease) stage 3, GFR 30-59 ml/min      Dental disorder      GERD (gastroesophageal reflux disease)      High blood pressure      Hyperlipidemia      Mixed hyperlipidaemia      Osteoarthritis      RA (rheumatoid arthritis) (H)      SLE (systemic lupus erythematosus) (H)      Vision problem        Past Surgical History:    Past Surgical History:   Procedure Laterality Date     APPENDECTOMY  1969     ARTHROPLASTY KNEE Right 1/12/2021    Procedure: right total knee replacement;  Surgeon: Thad Fermin DO;  Location:  OR     C TOTAL KNEE ARTHROPLASTY  3/10    left     COLONOSCOPY  2009    repeat 5 years     COLONOSCOPY N/A 5/20/2015    normal, repeat 5 years due to family history     COLONOSCOPY N/A 10/7/2019    Procedure: COLONOSCOPY;  Surgeon: Con Recinos MD;  Location:  GI     FOOT SURGERY  2010    bone and screws in 2 toes, niels      HYSTERECTOMY TOTAL ABDOMINAL  2010    due to bleeding, benign     LAPAROTOMY EXPLORATORY      scar tissue removal/repair - abd     SHOULDER SURGERY  05/2013    rotator cuff repair, right       Family History:    Family History   Problem Relation Age of Onset     Colon Cancer Mother      Lupus Mother      Cancer Mother         colon cancer     Cerebrovascular Disease Father      No Known Problems Sister      No Known Problems Brother      No Known Problems Sister      No Known Problems Son      Neurologic Disorder Sister         ALS     Cancer Maternal Grandmother         stomach cancer     Heart Disease Maternal Grandfather      Unknown/Adopted Paternal Grandmother      Unknown/Adopted Paternal Grandfather      No Known Problems Sister      No Known Problems Sister      No Known Problems Son      No Known Problems Son        Social History:  Marital Status:   [5]  Social History     Tobacco Use     Smoking status: Never Smoker     Smokeless tobacco: Never Used   Substance Use Topics     Alcohol use: No     Alcohol/week: 0.0 standard drinks     Drug use: No        Medications:         acetaminophen (TYLENOL) 325 MG tablet       Ascorbic Acid (VITAMIN C PO)       aspirin (ASA) 325 MG EC tablet       [START ON 2/23/2021] aspirin (ASA) 81 MG EC tablet       calcium citrate-vitamin D (CITRACAL) 315-250 MG-UNIT TABS       Cyanocobalamin (VITAMIN B 12 PO)       cycloSPORINE (RESTASIS) 0.05 % ophthalmic emulsion       diclofenac (VOLTAREN) 1 % topical gel       Glucosamine-Chondroit-Vit C-Mn (GLUCOSAMINE CHONDR 1500 COMPLX PO)       hydrochlorothiazide (HYDRODIURIL) 25 MG tablet       hydroxychloroquine (PLAQUENIL) 200 MG tablet       Lidocaine (LIDOCARE) 4 % Patch       lisinopril (ZESTRIL) 10 MG tablet       metoprolol succinate ER (TOPROL-XL) 25 MG 24 hr tablet       Multiple Vitamins-Minerals (CENTRUM SILVER) per tablet       Omega-3 Fatty Acids (OMEGA-3 FISH OIL PO)       ondansetron (ZOFRAN-ODT) 4 MG ODT  tab       oxyCODONE (ROXICODONE) 5 MG tablet       polyethylene glycol (MIRALAX) 17 GM/Dose powder       senna-docusate (SENOKOT-S/PERICOLACE) 8.6-50 MG tablet       simvastatin (ZOCOR) 10 MG tablet       tiZANidine (ZANAFLEX) 2 MG tablet       artificial saliva, BIOTENE MT, (BIOTENE MT) SOLN          Review of Systems   All other systems reviewed and are negative.      Physical Exam   BP: 130/62  Pulse: 100  Temp: 98.7  F (37.1  C)  Resp: 14  Weight: 72.6 kg (160 lb)  SpO2: 97 %      Physical Exam  Vitals signs and nursing note reviewed.   Constitutional:       General: She is not in acute distress.     Appearance: Normal appearance. She is well-developed. She is obese. She is not ill-appearing, toxic-appearing or diaphoretic.   HENT:      Head: Normocephalic and atraumatic.      Nose: Nose normal.   Eyes:      Conjunctiva/sclera: Conjunctivae normal.      Pupils: Pupils are equal, round, and reactive to light.   Neck:      Musculoskeletal: Neck supple.   Cardiovascular:      Rate and Rhythm: Regular rhythm. Tachycardia present.      Heart sounds: Normal heart sounds.   Pulmonary:      Effort: Pulmonary effort is normal. No respiratory distress.      Breath sounds: Normal breath sounds.   Abdominal:      General: Bowel sounds are decreased. There is distension.      Palpations: Abdomen is soft.      Tenderness: There is no abdominal tenderness. There is no guarding or rebound.   Musculoskeletal:         General: No deformity.      Comments: Lumbar back with lidocaine patch in place.  Mild tenderness to bilateral paraspinous musculature of the upper lumbar spine.  Patient sits up easily in bed independently without evidence of pain.   Skin:     General: Skin is warm and dry.   Neurological:      Mental Status: She is alert and oriented to person, place, and time. Mental status is at baseline.      Coordination: Coordination normal.   Psychiatric:         Mood and Affect: Mood normal.         Behavior: Behavior  normal.         ED Course        Procedures      Results for orders placed or performed during the hospital encounter of 01/27/21 (from the past 24 hour(s))   CBC with platelets differential   Result Value Ref Range    WBC 13.5 (H) 4.0 - 11.0 10e9/L    RBC Count 3.03 (L) 3.8 - 5.2 10e12/L    Hemoglobin 10.2 (L) 11.7 - 15.7 g/dL    Hematocrit 31.0 (L) 35.0 - 47.0 %     (H) 78 - 100 fl    MCH 33.7 (H) 26.5 - 33.0 pg    MCHC 32.9 31.5 - 36.5 g/dL    RDW 12.3 10.0 - 15.0 %    Platelet Count 393 150 - 450 10e9/L    Diff Method Automated Method     % Neutrophils 93.1 %    % Lymphocytes 2.3 %    % Monocytes 4.5 %    % Eosinophils 0.0 %    % Basophils 0.1 %    Absolute Neutrophil 12.6 (H) 1.6 - 8.3 10e9/L    Absolute Lymphocytes 0.3 (L) 0.8 - 5.3 10e9/L    Absolute Monocytes 0.6 0.0 - 1.3 10e9/L    Absolute Eosinophils 0.0 0.0 - 0.7 10e9/L    Absolute Basophils 0.0 0.0 - 0.2 10e9/L   Comprehensive metabolic panel   Result Value Ref Range    Sodium 138 133 - 144 mmol/L    Potassium 3.9 3.4 - 5.3 mmol/L    Chloride 103 94 - 109 mmol/L    Carbon Dioxide 26 20 - 32 mmol/L    Anion Gap 9 3 - 14 mmol/L    Glucose 123 (H) 70 - 99 mg/dL    Urea Nitrogen 40 (H) 7 - 30 mg/dL    Creatinine 1.36 (H) 0.52 - 1.04 mg/dL    GFR Estimate 37 (L) >60 mL/min/[1.73_m2]    GFR Estimate If Black 43 (L) >60 mL/min/[1.73_m2]    Calcium 9.6 8.5 - 10.1 mg/dL    Bilirubin Total 0.4 0.2 - 1.3 mg/dL    Albumin 3.0 (L) 3.4 - 5.0 g/dL    Protein Total 6.5 (L) 6.8 - 8.8 g/dL    Alkaline Phosphatase 101 40 - 150 U/L    ALT 14 0 - 50 U/L    AST 14 0 - 45 U/L   Lipase   Result Value Ref Range    Lipase 81 73 - 393 U/L   XR Abdomen 2 Views    Narrative    ABDOMEN TWO VIEW 1/27/2021 5:43 PM     HISTORY: Nausea, vomiting, evaluate constipation.       Impression    IMPRESSION: Flat and upright views of the abdomen. Bowel gas pattern  is unremarkable. No abnormal air-fluid levels on upright view. No  significant constipation. No free intraperitoneal air. No  abnormal  calcifications are evident.    POLLO CLAUDIO MD       Medications   0.9% sodium chloride BOLUS (0 mLs Intravenous Stopped 1/27/21 1807)     Followed by   sodium chloride 0.9% infusion (has no administration in time range)   oxyCODONE (ROXICODONE) tablet 5 mg (5 mg Oral Given 1/27/21 1806)          Assessments & Plan (with Medical Decision Making)  Capri Wiseman is a 75 year old female who presented to the ED via EMS for nausea, vomiting, and diarrhea that began today.  She also reports ongoing back issues since a spinal anesthesia was performed a couple weeks ago during her knee replacement surgery.  No change in back pain reported, only persistent.  On arrival to the ED she was mildly tachycardic but otherwise had normal vital signs.  Exam today demonstrated mild tenderness through the paraspinous musculature of the lumbar spine, Lidoderm patch was in place, but no visualized skin changes noted.  She had a completely benign abdominal exam.  Son reported that she had not taken her home pain medication since early this morning which could be why her back is flaring up.  IV was established and she was given fluids here given her tachycardia and history of vomiting with diarrhea.  Lab studies showed BUN of 40, creatinine 1.36, near her baseline overall.  Her white count was elevated at 13.5, hemoglobin stable at 10.2.  Abdominal x-ray showed no obstructive pattern and no signs of significant constipation.  Throughout course of ED stay patient reported no symptoms of nausea and had no vomiting or diarrhea here.  She was given one of her home tablets of oxycodone for back pain.  Clinically, because of her vomiting and diarrhea episodes is unclear but it appears symptoms have much improved with just the Zofran she received in route.  I had a conversation with the patient and her son regarding observation stay to ensure her symptoms are resolved and make sure pain is adequately managed.  No son prefer this,  patient was adamant that she go home today.  I did discuss with the patient that if symptoms return she may end up back in the ED and she acknowledges this and still would like to try being at home.  Her clinic provider already sent her a prescription of Zofran that her son has picked up and she can use as needed for recurrent nausea.  Encouraged her to increase fluid intake to maintain hydration and then advance diet slowly as tolerated.  She should keep up with her pain medications to prevent exacerbations of her back pain.  I encouraged her to contact her clinic provider for follow-up this week.  She was given instructions on when to return to the ED.  All questions answered and patient discharged home in suitable condition.     I have reviewed the nursing notes.    I have reviewed the findings, diagnosis, plan and need for follow up with the patient.    New Prescriptions    No medications on file       Final diagnoses:   Nausea vomiting and diarrhea   Back pain     Note: Chart documentation done in part with Dragon Voice Recognition software. Although reviewed after completion, some word and grammatical errors may remain.    1/27/2021   [facility] EMERGENCY DEPT     Carolina Fields PA-C  01/27/21 1932

## 2021-01-28 ENCOUNTER — PATIENT OUTREACH (OUTPATIENT)
Dept: CARE COORDINATION | Facility: CLINIC | Age: 76
End: 2021-01-28

## 2021-01-28 DIAGNOSIS — Z71.89 OTHER SPECIFIED COUNSELING: ICD-10-CM

## 2021-01-28 NOTE — LETTER
[place] CARE COORDINATION  [address]   [place] [id number]    January 29, 2021    Capri Wiseman  [address] [place] [address]      Dear Capri,    I am a clinic community health worker who works with Xochitl Hemphill MD at [facility]. I have been trying to reach you recently to introduce Clinic Care Coordination and to see if there was anything I could assist you with.  Below is a description of clinic care coordination and how I can further assist you.      The clinic care coordination team is made up of a registered nurse,  and community health worker who understand the health care system. The goal of clinic care coordination is to help you manage your health and improve access to the health care system in the most efficient manner. The team can assist you in meeting your health care goals by providing education, coordinating services, strengthening the communication among your providers and supporting you with any resource needs.    Please feel free to contact the Community Health Worker at [phone] with any questions or concerns. We are focused on providing you with the highest-quality healthcare experience possible and that all starts with you.     Sincerely,       QUINTEN Sumner  Clinic Care Coordination  [facility]

## 2021-01-28 NOTE — PROGRESS NOTES
Clinic Care Coordination Contact  [facility]/Voicemail       Clinical Data: Care Coordinator Outreach  Outreach attempted x 1.  Left message on patient's voicemail with call back information and requested return call.  Plan: Care Coordinator will try to reach patient again in 1-2 business days.

## 2021-01-29 NOTE — PROGRESS NOTES
Clinic Care Coordination Contact  [facility]/Voicemail       Clinical Data: Care Coordinator Outreach  Outreach attempted x 2.  Left message on patient's voicemail with call back information and requested return call.  Plan: Care Coordinator will send unable to contact letter with care coordinator contact information via [employer]. Care Coordinator will do no further outreaches at this time.

## 2021-02-01 ENCOUNTER — TELEPHONE (OUTPATIENT)
Dept: ORTHOPEDICS | Facility: CLINIC | Age: 76
End: 2021-02-01

## 2021-02-01 DIAGNOSIS — Z96.651 S/P TOTAL KNEE REPLACEMENT USING CEMENT, RIGHT: ICD-10-CM

## 2021-02-01 RX ORDER — OXYCODONE HYDROCHLORIDE 5 MG/1
5 TABLET ORAL EVERY 6 HOURS PRN
Qty: 25 TABLET | Refills: 0 | Status: SHIPPED | OUTPATIENT
Start: 2021-02-01 | End: 2021-02-10

## 2021-02-01 NOTE — TELEPHONE ENCOUNTER
Patient calling today requesting a refill of pain medication.      Patient underwent right total knee on 1/12/2021 with Dr. Fermin.      Symptoms: No new injury or signs of infection. Per patient still dealing with back pain/immobility. Following with neurosurgery for this. Constipation is improving.  Right knee virtually no swelling, redness and no drainage.      Patient reports pain is currently 9/10 with walking 7/10 at rest in knee.     Patient currently taking:    mg BID  Oxycodone 5 mg Q8H  Tizanidine 2 mg at night  Lidocaine patch on back at night  Tylenol 975 mg TID      Patient utilizing the following at home interventions:   RICE    History of narcotic fills for this issue:   1/12/20201 #50  1/21/2021 #35  1/25/2021 #35      Patient has 0 tabs remaining.     Patient uses [facility].     Next follow up appointment: TBD (4 weeks after LOV 1/25/21)    Ayala MCPHERSON, Lead RN, BSN. . .  2/1/2021, 1:53 PM

## 2021-02-01 NOTE — TELEPHONE ENCOUNTER
Checked against .  Will refill. However needs to continue to wean. If this is being used primarily for her back and radicular symptoms and not for the knee, then she needs to speaking with her PCP or neurosurgery for ideals for non-narcotic based pain relief of the back pain.  She is now nearly 3 weeks from surgery.  Would recommend to continue to wean off of the narcotics and rely more on muscle relaxer, tylenol along with icing, rest, elevation, etc.    ANTONIO Leiva, CNP  Orthopedic Surgery

## 2021-02-01 NOTE — TELEPHONE ENCOUNTER
Reason for Call:  Other appointment    Detailed comments: Patient is calling to see if she can get a refill on her oxycodone and ondansetron due to continued pain. Uses [facility] in [place]    Phone Number Patient can be reached at: Home number on file [phone] (home)    Best Time: any    Can we leave a detailed message on this number? YES    Call taken on 2/1/2021 at 8:03 AM by Clara Bowen

## 2021-02-03 ENCOUNTER — TELEPHONE (OUTPATIENT)
Dept: ORTHOPEDICS | Facility: CLINIC | Age: 76
End: 2021-02-03

## 2021-02-03 DIAGNOSIS — Z96.651 S/P TOTAL KNEE REPLACEMENT USING CEMENT, RIGHT: ICD-10-CM

## 2021-02-03 RX ORDER — ONDANSETRON 4 MG/1
4 TABLET, ORALLY DISINTEGRATING ORAL EVERY 6 HOURS PRN
Qty: 12 TABLET | Refills: 1 | Status: SHIPPED | OUTPATIENT
Start: 2021-02-03 | End: 2021-04-05

## 2021-02-03 NOTE — TELEPHONE ENCOUNTER
I spoke with the patient who continues to have nausea and vomiting post operatively. (DOS: 1/12/21)  She was seen and evaluated in the ER for this issue on 1/27/2021 and was told to continue with current treatment plan.  Patient is continuing to utilizing a daily laxative and is having normal formed daily stools.  She vomits about once every other day but feels nauseated almost every time she eats or takes her pain medicine.  She feels she is gradually improving with the nausea as she is trying to eat smaller meals throughout the day and is avoiding spicy foods, etc.      She denies any concerning symptoms for infection such as fatigue, fevers, dizziness, abdominal distention, chills,etc.      She is scheduled with Dr. Prather on 2/11/2021 to discuss her back pain and will follow up with Dr. Fermin in a few weeks as recommended at her last OV.      I discussed this patient with ANKITA Recinos CNP today via telephone at 1:46 PM who agrees to refill her Zofran 4 mg ODT 1 tab every 6 hours PRN #12 with 1 refill.      Educated patient about hydration status and discussed signs and symptoms that would warrant seeking care.      RX sent to [facility] [place].     Ayala MCPHERSON Lead RN, BSN. . .  2/3/2021, 1:53 PM

## 2021-02-03 NOTE — TELEPHONE ENCOUNTER
Reason for Call:  Other call back    Detailed comments: Pt is requesting a refill on the anitnausea med from [facility]    Phone Number Patient can be reached at: Home number on file [phone] (home)    Best Time: any    Can we leave a detailed message on this number? YES    Call taken on 2/3/2021 at 1:31 PM by Gretchen Wade

## 2021-02-08 DIAGNOSIS — Z96.651 S/P TOTAL KNEE REPLACEMENT USING CEMENT, RIGHT: ICD-10-CM

## 2021-02-08 NOTE — TELEPHONE ENCOUNTER
Writing nurse attempted to contact patient to triage for pain medication refill.   No answer.  Samanta Arellano RN on 2/8/2021 at 11:02 AM

## 2021-02-08 NOTE — TELEPHONE ENCOUNTER
Patient s/p right TKA on 1/12/21requesting refill for Zanaflex and Oxycodone.   Patient reports 10/10 pain.  Patient utilizing home interventions such as RICE, and   Tylenol 1 tablet every 8 hours.  Oxycodone 0 tablets remaining  Zanaflex takes 1 tablet every 6 hours.     Oxycodone  Last Written Prescription Date:  2/1/2021  Last Fill Quantity: 25,  # refills: 0   Last office visit: 1/25/2021 with prescribing provider:     Future Office Visit:      Requested Prescriptions   Pending Prescriptions Disp Refills     oxyCODONE (ROXICODONE) 5 MG tablet 25 tablet 0     Sig: Take 1 tablet (5 mg) by mouth every 6 hours as needed for pain (Moderate to Severe)       There is no refill protocol information for this order        tiZANidine (ZANAFLEX) 2 MG tablet 50 tablet 1     Sig: Take 1 tablet (2 mg) by mouth every 6 hours as needed for muscle spasms (pain)       There is no refill protocol information for this order            Samanta Arellano RN on 2/8/2021 at 11:40 AM

## 2021-02-08 NOTE — TELEPHONE ENCOUNTER
Reason for Call:  Other call back    Detailed comments: pt calling to request a refill of oxy and hydroxidine be sent to the [facility]armacy in [place]    Phone Number Patient can be reached at: Home number on file [phone] (home)    Best Time: anytime    Can we leave a detailed message on this number? YES    Call taken on 2/8/2021 at 10:35 AM by Judi Byrd

## 2021-02-08 NOTE — TELEPHONE ENCOUNTER
Patient was informed Dr. Fermin was still in surgery and may not be able to refill until tomorrow. Patient verbalized understanding and was educated on home intervention for pain relief.  Samanta Arellano RN on 2/8/2021 at 4:24 PM

## 2021-02-09 NOTE — TELEPHONE ENCOUNTER
Sent message to team, they are in the OR and will address as able.     Ayala MCPHERSON, Lead RN, BSN. . .  2/9/2021, 3:24 PM

## 2021-02-10 RX ORDER — TIZANIDINE 2 MG/1
2 TABLET ORAL EVERY 6 HOURS PRN
Qty: 50 TABLET | Refills: 0 | Status: SHIPPED | OUTPATIENT
Start: 2021-02-10 | End: 2021-04-05

## 2021-02-10 RX ORDER — OXYCODONE HYDROCHLORIDE 5 MG/1
5 TABLET ORAL EVERY 8 HOURS PRN
Qty: 20 TABLET | Refills: 0 | Status: SHIPPED | OUTPATIENT
Start: 2021-02-10 | End: 2021-04-05

## 2021-02-10 NOTE — TELEPHONE ENCOUNTER
I was able to call and discussed the patient's medications with her today.  I do believe most of her pain is coming from her back.  She states she is getting some lateral knee pain but a lot of her pain originates from her back.  She is set up to get a steroid injection at [facility] on Friday.  Hopefully after this injection she will not require as much medication.  Also we talked about the muscle relaxant and it sounds like the muscle relaxant works well and when she takes that she does not have to take the pain medication and she alternates it.  I told her to focus more on taking the muscle relaxant then the narcotic if possible.  She understands.  Prescription sent.

## 2021-02-13 NOTE — TELEPHONE ENCOUNTER
ED Time Seen By Provider Entered On:  2/13/2020 13:40     Performed On:  2/13/2020 13:39  by Jack Perez NP               Time Seen By Provider   Time Seen by Provider :   2/13/2020 13:39    Jack Perez NP - 2/13/2020 13:39                Electronically Signed On 02.13.2020 13:39  ___________________________________________________   Jack Preez NP     Spoke to the patient. She was seen for diverticulitis follow up on 8/8/19. Patient said since then, she has been feeling miserable again. She said she finished her antibiotics on Sunday. Patient said she has seen a slight improvement in her pain but it is still present. She said she has now developed some diarrhea and a sharp pain on her right side. No fever. Last normal BM was Saturday. Patient said she didn't sleep all night as the pain was really bothering her. Currently rates her pain a 5/10. Patient has been watching what she eats. Does think the antibiotics helped some. Percocet has helped with the pain.     Patient was not able to get to [facility] by 10:20 this morning. She is wondering if Dr. Recinos thinks it would be reasonable to send in another week of antibiotics and have her follow up next week? She thinks maybe another week will take care of it.     Will route to provider to advise.     EUNICE MichelN, RN  [facility]

## 2021-02-16 ENCOUNTER — TELEPHONE (OUTPATIENT)
Dept: ORTHOPEDICS | Facility: OTHER | Age: 76
End: 2021-02-16
Payer: COMMERCIAL

## 2021-02-16 NOTE — TELEPHONE ENCOUNTER
I called and gave that verbal order. It sounds like her knee is doing good and the injection she just received in her back has helped her back a lot.  Order given.

## 2021-02-16 NOTE — TELEPHONE ENCOUNTER
Reason for Call:  Other appointment    Detailed comments: Macho from home physical therapy calling to get verbal orders for one more home visit. Patient had knee replacement surgery. Please call Macho at [phone]    Phone Number Patient can be reached at:    Best Time: any    Can we leave a detailed message on this number? YES    Call taken on 2/16/2021 at 12:18 PM by Clara Bowen

## 2021-02-20 DIAGNOSIS — N18.30 STAGE 3 CHRONIC KIDNEY DISEASE, UNSPECIFIED WHETHER STAGE 3A OR 3B CKD (H): ICD-10-CM

## 2021-02-20 DIAGNOSIS — E78.5 HYPERLIPIDEMIA WITH TARGET LDL LESS THAN 130: ICD-10-CM

## 2021-02-20 DIAGNOSIS — N18.30 ANEMIA DUE TO STAGE 3 CHRONIC KIDNEY DISEASE, UNSPECIFIED WHETHER STAGE 3A OR 3B CKD (H): Primary | ICD-10-CM

## 2021-02-20 DIAGNOSIS — I10 ESSENTIAL HYPERTENSION WITH GOAL BLOOD PRESSURE LESS THAN 140/90: ICD-10-CM

## 2021-02-20 DIAGNOSIS — R60.9 EDEMA, UNSPECIFIED TYPE: ICD-10-CM

## 2021-02-20 DIAGNOSIS — D63.1 ANEMIA DUE TO STAGE 3 CHRONIC KIDNEY DISEASE, UNSPECIFIED WHETHER STAGE 3A OR 3B CKD (H): Primary | ICD-10-CM

## 2021-02-22 ENCOUNTER — ANCILLARY PROCEDURE (OUTPATIENT)
Dept: GENERAL RADIOLOGY | Facility: CLINIC | Age: 76
End: 2021-02-22
Attending: ORTHOPAEDIC SURGERY
Payer: COMMERCIAL

## 2021-02-22 ENCOUNTER — OFFICE VISIT (OUTPATIENT)
Dept: ORTHOPEDICS | Facility: CLINIC | Age: 76
End: 2021-02-22
Payer: COMMERCIAL

## 2021-02-22 VITALS
WEIGHT: 161.4 LBS | SYSTOLIC BLOOD PRESSURE: 138 MMHG | DIASTOLIC BLOOD PRESSURE: 82 MMHG | BODY MASS INDEX: 31.69 KG/M2 | HEIGHT: 60 IN

## 2021-02-22 DIAGNOSIS — Z96.651 S/P TOTAL KNEE REPLACEMENT USING CEMENT, RIGHT: Primary | ICD-10-CM

## 2021-02-22 DIAGNOSIS — Z96.651 S/P TOTAL KNEE REPLACEMENT USING CEMENT, RIGHT: ICD-10-CM

## 2021-02-22 PROCEDURE — 99024 POSTOP FOLLOW-UP VISIT: CPT | Performed by: ORTHOPAEDIC SURGERY

## 2021-02-22 PROCEDURE — 73562 X-RAY EXAM OF KNEE 3: CPT | Mod: TC | Performed by: RADIOLOGY

## 2021-02-22 RX ORDER — SIMVASTATIN 10 MG
TABLET ORAL
Qty: 90 TABLET | Refills: 0 | Status: SHIPPED | OUTPATIENT
Start: 2021-02-22 | End: 2021-06-02

## 2021-02-22 RX ORDER — HYDROCHLOROTHIAZIDE 25 MG/1
TABLET ORAL
Qty: 45 TABLET | Refills: 0 | Status: SHIPPED | OUTPATIENT
Start: 2021-02-22 | End: 2021-06-02

## 2021-02-22 RX ORDER — METOPROLOL SUCCINATE 25 MG/1
TABLET, EXTENDED RELEASE ORAL
Qty: 90 TABLET | Refills: 1 | Status: SHIPPED | OUTPATIENT
Start: 2021-02-22 | End: 2021-08-18

## 2021-02-22 RX ORDER — LISINOPRIL 10 MG/1
TABLET ORAL
Qty: 90 TABLET | Refills: 0 | Status: SHIPPED | OUTPATIENT
Start: 2021-02-22 | End: 2021-06-02

## 2021-02-22 ASSESSMENT — MIFFLIN-ST. JEOR: SCORE: 1138.61

## 2021-02-22 ASSESSMENT — PAIN SCALES - GENERAL: PAINLEVEL: SEVERE PAIN (7)

## 2021-02-22 NOTE — PROGRESS NOTES
Orthopedic Clinic Post-Operative Note    CHIEF COMPLAINT:   Chief Complaint   Patient presents with     RECHECK     right knee follow up     Surgical Followup     DOS: 1/12/2021~Right total knee arthroplasty~6 weeks postop       HISTORY OF PRESENT ILLNESS  Overall making some progress.  Recently seen by a spine doctor at [facility] and received some injections in the back which is been helpful.  She feels like her knee is getting better.  Therapy is been going well.    Patient's past medical, surgical, social and family histories reviewed.     Past Medical History:   Diagnosis Date     CKD (chronic kidney disease) stage 3, GFR 30-59 ml/min      Dental disorder     loosing top teeth due to lupus     GERD (gastroesophageal reflux disease)      High blood pressure      Hyperlipidemia      Mixed hyperlipidaemia      Osteoarthritis      RA (rheumatoid arthritis) (H)     mild, on plaquenil     SLE (systemic lupus erythematosus) (H)      Vision problem     dry eyes from Lupus       Past Surgical History:   Procedure Laterality Date     APPENDECTOMY  1969     ARTHROPLASTY KNEE Right 1/12/2021    Procedure: right total knee replacement;  Surgeon: Thad Fermin DO;  Location: [facility]     C TOTAL KNEE ARTHROPLASTY  3/10    left     COLONOSCOPY  2009    repeat 5 years     COLONOSCOPY N/A 5/20/2015    normal, repeat 5 years due to family history     COLONOSCOPY N/A 10/7/2019    Procedure: COLONOSCOPY;  Surgeon: Con Recinos MD;  Location:  GI     FOOT SURGERY  2010    bone and screws in 2 toes, hammertoe     HYSTERECTOMY TOTAL ABDOMINAL  2010    due to bleeding, benign     LAPAROTOMY EXPLORATORY      scar tissue removal/repair - abd     SHOULDER SURGERY  05/2013    rotator cuff repair, right       Medications:  acetaminophen (TYLENOL) 325 MG tablet, Take 3 tablets (975 mg) by mouth every 8 hours as needed for other (mild pain)  artificial saliva, BIOTENE MT, (BIOTENE MT) SOLN, Swish and spit 5-10 mLs in mouth as  needed for dry mouth  Ascorbic Acid (VITAMIN C PO), Take 1,000 mg by mouth daily  aspirin (ASA) 325 MG EC tablet, Take 1 tablet (325 mg) by mouth 2 times daily  [START ON 2/23/2021] aspirin (ASA) 81 MG EC tablet, Take 1 tablet (81 mg) by mouth daily  calcium citrate-vitamin D (CITRACAL) 315-250 MG-UNIT TABS, Take 2 tablets by mouth daily   Cyanocobalamin (VITAMIN B 12 PO), Take 1,000 mcg by mouth daily  cycloSPORINE (RESTASIS) 0.05 % ophthalmic emulsion, Place 1 drop into both eyes 2 times daily  diclofenac (VOLTAREN) 1 % topical gel, Apply 4 g topically 4 times daily as needed for pain  Glucosamine-Chondroit-Vit C-Mn (GLUCOSAMINE CHONDR 1500 COMPLX PO), Take 1 tablet by mouth daily   hydrochlorothiazide (HYDRODIURIL) 25 MG tablet, Take 0.5 tablets (12.5 mg) by mouth daily Appointment needed for additional refills.  hydroxychloroquine (PLAQUENIL) 200 MG tablet, TAKE 1 TABLET DAILY  Lidocaine (LIDOCARE) 4 % Patch, Place 1 patch onto the skin every 24 hours To prevent lidocaine toxicity, patient should be patch free for 12 hrs daily.  lisinopril (ZESTRIL) 10 MG tablet, Take 1 tablet (10 mg) by mouth daily Appointment needed for additional refills.  metoprolol succinate ER (TOPROL-XL) 25 MG 24 hr tablet, TAKE ONE-HALF (1/2) TABLET TWICE A DAY (DUE FOR ANNUAL EXAM AND LABS)  Multiple Vitamins-Minerals (CENTRUM SILVER) per tablet, Take 1 tablet by mouth daily  Omega-3 Fatty Acids (OMEGA-3 FISH OIL PO), Take 1,400 mg by mouth daily  ondansetron (ZOFRAN-ODT) 4 MG ODT tab, Take 1 tablet (4 mg) by mouth every 6 hours as needed for nausea or vomiting  oxyCODONE (ROXICODONE) 5 MG tablet, Take 1 tablet (5 mg) by mouth every 8 hours as needed for pain (Moderate to Severe)  polyethylene glycol (MIRALAX) 17 GM/Dose powder, Take 17 g by mouth daily as needed for constipation  senna-docusate (SENOKOT-S/PERICOLACE) 8.6-50 MG tablet, Take 1-2 tablets by mouth 2 times daily as needed for constipation (while taking narcotics) Take while  on oral narcotics to prevent or treat constipation.  simvastatin (ZOCOR) 10 MG tablet, Take 1 tablet (10 mg) by mouth At Bedtime Appointment needed for additional refills. (Patient taking differently: Take 10 mg by mouth every morning )  tiZANidine (ZANAFLEX) 2 MG tablet, Take 1 tablet (2 mg) by mouth every 6 hours as needed for muscle spasms (pain)    No current facility-administered medications on file prior to visit.       No Known Allergies    Social History     Occupational History     Occupation: teacher     Comment: Retired   Tobacco Use     Smoking status: Never Smoker     Smokeless tobacco: Never Used   Substance and Sexual Activity     Alcohol use: No     Alcohol/week: 0.0 standard drinks     Drug use: No     Sexual activity: Not Currently       Family History   Problem Relation Age of Onset     Colon Cancer Mother      Lupus Mother      Cancer Mother         colon cancer     Cerebrovascular Disease Father      No Known Problems Sister      No Known Problems Brother      No Known Problems Sister      No Known Problems Son      Neurologic Disorder Sister         ALS     Cancer Maternal Grandmother         stomach cancer     Heart Disease Maternal Grandfather      Unknown/Adopted Paternal Grandmother      Unknown/Adopted Paternal Grandfather      No Known Problems Sister      No Known Problems Sister      No Known Problems Son      No Known Problems Son        REVIEW OF SYSTEMS  General: negative for, night sweats, dizziness, fatigue  Resp: No shortness of breath and no cough  CV: negative for chest pain, syncope or near-syncope  GI: negative for nausea, vomiting and diarrhea  : negative for dysuria and hematuria  Musculoskeletal: as above  Neurologic: negative for syncope   Hematologic: negative for bleeding disorder    Physical Exam:  Vitals: /82   Ht 1.524 m (5')   Wt 73.2 kg (161 lb 6.4 oz)   LMP  (LMP Unknown)   BMI 31.52 kg/m    BMI= Body mass index is 31.52 kg/m .  Constitutional: healthy,  alert and no acute distress   Psychiatric: mentation appears normal and affect normal/bright  NEURO: no focal deficits  SKIN: .well healed, no erythema, no incision breakdown and no drainage.  JOINT/EXTREMITIES: Active motion 2-100 degrees.  Passively 110.  No instability.  Patella tracks midline.  No effusion.  No evidence infection  GAIT: not tested     Diagnostic Modalities:  right knee X-ray: The prosthesis has acceptable alignment. No fractures or dislocations. Prosthesis is well seated with no evidence of loosening  Independent visualization of the images was performed.      Impression:   Chief Complaint   Patient presents with     RECHECK     right knee follow up     Surgical Followup     DOS: 1/12/2021~Right total knee arthroplasty~6 weeks postop   Overall improving.  Postop recovery slowed by back issues.  Those are currently getting better.  Recent injections [facility] the spine at [facility].    Plan:   Continue therapy for the knee.  Continue progressing activities as she tolerates.  She asked about driving.  I would be apprehensive at this point in her recovery until she has less discomfort.  She verbalized understanding.  We will continue therapy.    Return to clinic 6, week(s), or sooner as needed for changes.    Re-x-ray on return: Yes.    Frankie Fermin D.O.

## 2021-02-22 NOTE — TELEPHONE ENCOUNTER
"Requested Prescriptions   Pending Prescriptions Disp Refills     hydrochlorothiazide (HYDRODIURIL) 25 MG tablet [Pharmacy Med Name: HYDROCHLOROTHIAZIDE TABS 25MG] 45 tablet 3     Sig: TAKE ONE-HALF (1/2) TABLET DAILY (APPOINTMENT NEEDED FOR ADDITIONAL REFILLS)   Last Written Prescription Date:  1/4/2021  Last Fill Quantity: 12/1/2020,  # refills: 45   Last office visit: 1/4/2021    Future Office Visit:   Next 5 appointments (look out 90 days)    Apr 05, 2021  7:30 AM  Return Visit with Thad Fermin DO  [facility] ([facility] ) [address] [place] [id number] [phone]           Diuretics (Including Combos) Protocol Failed - 2/20/2021  6:27 AM        Failed - Normal serum creatinine on file in past 12 months     Recent Labs   Lab Test 01/27/21  1725   CR 1.36*            Passed - Blood pressure under 140/90 in past 12 months     BP Readings from Last 3 Encounters:   02/22/21 138/82   01/27/21 125/63   01/25/21 110/64           Passed - Recent (12 mo) or future (30 days) visit within the authorizing provider's specialty     Patient has had an office visit with the authorizing provider or a provider within the authorizing providers department within the previous 12 mos or has a future within next 30 days. See \"Patient Info\" tab in inbasket, or \"Choose Columns\" in Meds & Orders section of the refill encounter.          Passed - Medication is active on med list        Passed - Patient is age 18 or older        Passed - No active pregancy on record        Passed - Normal serum potassium on file in past 12 months     Recent Labs   Lab Test 01/27/21  1725   POTASSIUM 3.9            Passed - Normal serum sodium on file in past 12 months     Recent Labs   Lab Test 01/27/21  1725               Passed - No positive pregnancy test in past 12 months      Routing refill request to provider for review/approval        simvastatin (ZOCOR) 10 MG " "tablet [Pharmacy Med Name: SIMVASTATIN TABS 10MG] 90 tablet 3     Sig: TAKE 1 TABLET AT BEDTIME (APPOINTMENT NEEDED FOR ADDITIONAL REFILLS)       Statins Protocol Failed - 2/20/2021  6:27 AM        Failed - LDL on file in past 12 months     Recent Labs   Lab Test 02/15/19  0929   LDL 59           Passed - No abnormal creatine kinase in past 12 months     No lab results found.           Passed - Recent (12 mo) or future (30 days) visit within the authorizing provider's specialty     Patient has had an office visit with the authorizing provider or a provider within the authorizing providers department within the previous 12 mos or has a future within next 30 days. See \"Patient Info\" tab in inbasket, or \"Choose Columns\" in Meds & Orders section of the refill encounter.            Passed - Medication is active on med list        Passed - Patient is age 18 or older        Passed - No active pregnancy on record        Passed - No positive pregnancy test in past 12 months      Routing refill request to provider for review/approval         lisinopril (ZESTRIL) 10 MG tablet [Pharmacy Med Name: LISINOPRIL TABS 10MG] 90 tablet 3     Sig: TAKE 1 TABLET DAILY (APPOINTMENT NEEDED FOR ADDITIONAL REFILLS)       ACE Inhibitors (Including Combos) Protocol Failed - 2/20/2021  6:27 AM        Failed - Normal serum creatinine on file in past 12 months     Recent Labs   Lab Test 01/27/21  1725 07/31/19  1455 07/31/19  1455   CR 1.36*   < >  --    CREAT  --   --  1.3*    < > = values in this interval not displayed.     Ok to refill medication if creatinine is low          Passed - Blood pressure under 140/90 in past 12 months     BP Readings from Last 3 Encounters:   02/22/21 138/82   01/27/21 125/63   01/25/21 110/64           Passed - Recent (12 mo) or future (30 days) visit within the authorizing provider's specialty     Patient has had an office visit with the authorizing provider or a provider within the authorizing providers " "department within the previous 12 mos or has a future within next 30 days. See \"Patient Info\" tab in inbasket, or \"Choose Columns\" in Meds & Orders section of the refill encounter.            Passed - Medication is active on med list        Passed - Patient is age 18 or older        Passed - No active pregnancy on record        Passed - Normal serum potassium on file in past 12 months     Recent Labs   Lab Test 01/27/21  1725   POTASSIUM 3.9             Passed - No positive pregnancy test within past 12 months      Routing refill request to provider for review/approval          metoprolol succinate ER (TOPROL-XL) 25 MG 24 hr tablet [Pharmacy Med Name: METOPROLOL SUCCINATE ER TABS 25MG] 90 tablet 3     Sig: TAKE ONE-HALF (1/2) TABLET TWICE A DAY ( DUE FOR ANNUAL EXAM AND LABS )       Beta-Blockers Protocol Passed - 2/20/2021  6:27 AM        Passed - Blood pressure under 140/90 in past 12 months     BP Readings from Last 3 Encounters:   02/22/21 138/82   01/27/21 125/63   01/25/21 110/64           Passed - Patient is age 6 or older        Passed - Recent (12 mo) or future (30 days) visit within the authorizing provider's specialty     Patient has had an office visit with the authorizing provider or a provider within the authorizing providers department within the previous 12 mos or has a future within next 30 days. See \"Patient Info\" tab in inbasket, or \"Choose Columns\" in Meds & Orders section of the refill encounter.          Passed - Medication is active on med list         Prescription approved per [facility] Refill Protocol.  Jackelyn Priest RN    "

## 2021-02-22 NOTE — TELEPHONE ENCOUNTER
hydrochlorothiazide (HYDRODIURIL) 25 MG tablet [Pharmacy Med Name: HYDROCHLOROTHIAZIDE TABS 25MG] 45 tablet 3    Sig: TAKE ONE-HALF (1/2) TABLET DAILY (APPOINTMENT NEEDED FOR ADDITIONAL REFILLS)   Last Written Prescription Date:  12/1/2020  Last Fill Quantity: 45,  # refills: 0   Last office visit: 1/4/2021 with prescribing provider:     Future Office Visit:   Next 5 appointments (look out 90 days)    Apr 05, 2021  7:30 AM  Return Visit with Thad Fermin DO  [facility] ([facility] ) [address] [place] [id number]  [phone]      Routing refill request to provider for review/approval because:  Labs out of range:  CR on 1/27/2021 1.36  T'd up 1 month for provider review.      simvastatin (ZOCOR) 10 MG tablet [Pharmacy Med Name: SIMVASTATIN TABS 10MG] 90 tablet 3    Sig: TAKE 1 TABLET AT BEDTIME (APPOINTMENT NEEDED FOR ADDITIONAL REFILLS)   Last refilled 12/1/2020 for 90 tablets  Routing refill request to provider for review/approval because:  Labs not current:  LDL last taken on 2/15/2019  T'd up 1 month for provider review.        lisinopril (ZESTRIL) 10 MG tablet [Pharmacy Med Name: LISINOPRIL TABS 10MG] 90 tablet 3    Sig: TAKE 1 TABLET DAILY (APPOINTMENT NEEDED FOR ADDITIONAL REFILLS)      ACE Inhibitors (Including Combos) Protocol Failed - 2/20/2021  6:27 AM   Last refilled on 12/1/2020 for 90 tablets  Routing refill request to provider for review/approval because:  Labs out of range:  CR  T'd up 1 month for provider review.    Jackelyn Priest, RN

## 2021-02-22 NOTE — LETTER
2/22/2021         RE: Capri Wiseman  [address]  [facility] [address]        Dear Colleague,    Thank you for referring your patient, Capri Wiseman, to the [facility]. Please see a copy of my visit note below.    Orthopedic Clinic Post-Operative Note    CHIEF COMPLAINT:   Chief Complaint   Patient presents with     RECHECK     right knee follow up     Surgical Followup     DOS: 1/12/2021~Right total knee arthroplasty~6 weeks postop       HISTORY OF PRESENT ILLNESS  Overall making some progress.  Recently seen by a spine doctor at [facility] and received some injections in the back which is been helpful.  She feels like her knee is getting better.  Therapy is been going well.    Patient's past medical, surgical, social and family histories reviewed.     Past Medical History:   Diagnosis Date     CKD (chronic kidney disease) stage 3, GFR 30-59 ml/min      Dental disorder     loosing top teeth due to lupus     GERD (gastroesophageal reflux disease)      High blood pressure      Hyperlipidemia      Mixed hyperlipidaemia      Osteoarthritis      RA (rheumatoid arthritis) (H)     mild, on plaquenil     SLE (systemic lupus erythematosus) (H)      Vision problem     dry eyes from Lupus       Past Surgical History:   Procedure Laterality Date     APPENDECTOMY  1969     ARTHROPLASTY KNEE Right 1/12/2021    Procedure: right total knee replacement;  Surgeon: Thad Fermin DO;  Location: [facility]     C TOTAL KNEE ARTHROPLASTY  3/10    left     COLONOSCOPY  2009    repeat 5 years     COLONOSCOPY N/A 5/20/2015    normal, repeat 5 years due to family history     COLONOSCOPY N/A 10/7/2019    Procedure: COLONOSCOPY;  Surgeon: Con Recinos MD;  Location:  GI     FOOT SURGERY  2010    bone and screws in 2 toes, hammertoe     HYSTERECTOMY TOTAL ABDOMINAL  2010    due to bleeding, benign     LAPAROTOMY EXPLORATORY      scar tissue removal/repair - abd     SHOULDER SURGERY   05/2013    rotator cuff repair, right       Medications:  acetaminophen (TYLENOL) 325 MG tablet, Take 3 tablets (975 mg) by mouth every 8 hours as needed for other (mild pain)  artificial saliva, BIOTENE MT, (BIOTENE MT) SOLN, Swish and spit 5-10 mLs in mouth as needed for dry mouth  Ascorbic Acid (VITAMIN C PO), Take 1,000 mg by mouth daily  aspirin (ASA) 325 MG EC tablet, Take 1 tablet (325 mg) by mouth 2 times daily  [START ON 2/23/2021] aspirin (ASA) 81 MG EC tablet, Take 1 tablet (81 mg) by mouth daily  calcium citrate-vitamin D (CITRACAL) 315-250 MG-UNIT TABS, Take 2 tablets by mouth daily   Cyanocobalamin (VITAMIN B 12 PO), Take 1,000 mcg by mouth daily  cycloSPORINE (RESTASIS) 0.05 % ophthalmic emulsion, Place 1 drop into both eyes 2 times daily  diclofenac (VOLTAREN) 1 % topical gel, Apply 4 g topically 4 times daily as needed for pain  Glucosamine-Chondroit-Vit C-Mn (GLUCOSAMINE CHONDR 1500 COMPLX PO), Take 1 tablet by mouth daily   hydrochlorothiazide (HYDRODIURIL) 25 MG tablet, Take 0.5 tablets (12.5 mg) by mouth daily Appointment needed for additional refills.  hydroxychloroquine (PLAQUENIL) 200 MG tablet, TAKE 1 TABLET DAILY  Lidocaine (LIDOCARE) 4 % Patch, Place 1 patch onto the skin every 24 hours To prevent lidocaine toxicity, patient should be patch free for 12 hrs daily.  lisinopril (ZESTRIL) 10 MG tablet, Take 1 tablet (10 mg) by mouth daily Appointment needed for additional refills.  metoprolol succinate ER (TOPROL-XL) 25 MG 24 hr tablet, TAKE ONE-HALF (1/2) TABLET TWICE A DAY (DUE FOR ANNUAL EXAM AND LABS)  Multiple Vitamins-Minerals (CENTRUM SILVER) per tablet, Take 1 tablet by mouth daily  Omega-3 Fatty Acids (OMEGA-3 FISH OIL PO), Take 1,400 mg by mouth daily  ondansetron (ZOFRAN-ODT) 4 MG ODT tab, Take 1 tablet (4 mg) by mouth every 6 hours as needed for nausea or vomiting  oxyCODONE (ROXICODONE) 5 MG tablet, Take 1 tablet (5 mg) by mouth every 8 hours as needed for pain (Moderate to  Severe)  polyethylene glycol (MIRALAX) 17 GM/Dose powder, Take 17 g by mouth daily as needed for constipation  senna-docusate (SENOKOT-S/PERICOLACE) 8.6-50 MG tablet, Take 1-2 tablets by mouth 2 times daily as needed for constipation (while taking narcotics) Take while on oral narcotics to prevent or treat constipation.  simvastatin (ZOCOR) 10 MG tablet, Take 1 tablet (10 mg) by mouth At Bedtime Appointment needed for additional refills. (Patient taking differently: Take 10 mg by mouth every morning )  tiZANidine (ZANAFLEX) 2 MG tablet, Take 1 tablet (2 mg) by mouth every 6 hours as needed for muscle spasms (pain)    No current facility-administered medications on file prior to visit.       No Known Allergies    Social History     Occupational History     Occupation: teacher     Comment: Retired   Tobacco Use     Smoking status: Never Smoker     Smokeless tobacco: Never Used   Substance and Sexual Activity     Alcohol use: No     Alcohol/week: 0.0 standard drinks     Drug use: No     Sexual activity: Not Currently       Family History   Problem Relation Age of Onset     Colon Cancer Mother      Lupus Mother      Cancer Mother         colon cancer     Cerebrovascular Disease Father      No Known Problems Sister      No Known Problems Brother      No Known Problems Sister      No Known Problems Son      Neurologic Disorder Sister         ALS     Cancer Maternal Grandmother         stomach cancer     Heart Disease Maternal Grandfather      Unknown/Adopted Paternal Grandmother      Unknown/Adopted Paternal Grandfather      No Known Problems Sister      No Known Problems Sister      No Known Problems Son      No Known Problems Son        REVIEW OF SYSTEMS  General: negative for, night sweats, dizziness, fatigue  Resp: No shortness of breath and no cough  CV: negative for chest pain, syncope or near-syncope  GI: negative for nausea, vomiting and diarrhea  : negative for dysuria and hematuria  Musculoskeletal: as  above  Neurologic: negative for syncope   Hematologic: negative for bleeding disorder    Physical Exam:  Vitals: /82   Ht 1.524 m (5')   Wt 73.2 kg (161 lb 6.4 oz)   LMP  (LMP Unknown)   BMI 31.52 kg/m    BMI= Body mass index is 31.52 kg/m .  Constitutional: healthy, alert and no acute distress   Psychiatric: mentation appears normal and affect normal/bright  NEURO: no focal deficits  SKIN: .well healed, no erythema, no incision breakdown and no drainage.  JOINT/EXTREMITIES: Active motion 2-100 degrees.  Passively 110.  No instability.  Patella tracks midline.  No effusion.  No evidence infection  GAIT: not tested     Diagnostic Modalities:  right knee X-ray: The prosthesis has acceptable alignment. No fractures or dislocations. Prosthesis is well seated with no evidence of loosening  Independent visualization of the images was performed.      Impression:   Chief Complaint   Patient presents with     RECHECK     right knee follow up     Surgical Followup     DOS: 1/12/2021~Right total knee arthroplasty~6 weeks postop   Overall improving.  Postop recovery slowed by back issues.  Those are currently getting better.  Recent injections into the spine at [facility].    Plan:   Continue therapy for the knee.  Continue progressing activities as she tolerates.  She asked about driving.  I would be apprehensive at this point in her recovery until she has less discomfort.  She verbalized understanding.  We will continue therapy.    Return to clinic 6, week(s), or sooner as needed for changes.    Re-x-ray on return: Yes.    Frankie Fermin D.O.      Again, thank you for allowing me to participate in the care of your patient.        Sincerely,        Thad Fermin, DO

## 2021-02-23 NOTE — TELEPHONE ENCOUNTER
She is due for labs and annual wellness exam. I'll place orders. I approved 3 months worth.   Xochitl Hemphill MD

## 2021-02-23 NOTE — TELEPHONE ENCOUNTER
I called pt and she will call back later to schedule since she has knee surgery and can't drive right now and has to have her kids drive her, I inform her that the sooner the better since she books out a ways.ah

## 2021-04-05 ENCOUNTER — OFFICE VISIT (OUTPATIENT)
Dept: ORTHOPEDICS | Facility: CLINIC | Age: 76
End: 2021-04-05
Payer: COMMERCIAL

## 2021-04-05 ENCOUNTER — ANCILLARY PROCEDURE (OUTPATIENT)
Dept: GENERAL RADIOLOGY | Facility: CLINIC | Age: 76
End: 2021-04-05
Attending: ORTHOPAEDIC SURGERY
Payer: COMMERCIAL

## 2021-04-05 VITALS
HEIGHT: 60 IN | SYSTOLIC BLOOD PRESSURE: 122 MMHG | WEIGHT: 160.6 LBS | BODY MASS INDEX: 31.53 KG/M2 | DIASTOLIC BLOOD PRESSURE: 68 MMHG

## 2021-04-05 DIAGNOSIS — Z96.651 S/P TOTAL KNEE REPLACEMENT USING CEMENT, RIGHT: ICD-10-CM

## 2021-04-05 DIAGNOSIS — Z96.651 S/P TOTAL KNEE REPLACEMENT USING CEMENT, RIGHT: Primary | ICD-10-CM

## 2021-04-05 DIAGNOSIS — R22.31 MASS OF RIGHT WRIST: ICD-10-CM

## 2021-04-05 PROCEDURE — 73562 X-RAY EXAM OF KNEE 3: CPT | Mod: TC | Performed by: RADIOLOGY

## 2021-04-05 PROCEDURE — 99024 POSTOP FOLLOW-UP VISIT: CPT | Performed by: NURSE PRACTITIONER

## 2021-04-05 ASSESSMENT — MIFFLIN-ST. JEOR: SCORE: 1134.98

## 2021-04-05 ASSESSMENT — PAIN SCALES - GENERAL: PAINLEVEL: NO PAIN (0)

## 2021-04-05 NOTE — LETTER
"    4/5/2021         RE: Capri Wiseman  [address]  [facility] [address]        Dear Colleague,    Thank you for referring your patient, Cpari Wiseman, to the [facility]. Please see a copy of my visit note below.    Orthopedic Clinic Post-Operative Note    CHIEF COMPLAINT:   Chief Complaint   Patient presents with     RECHECK     right knee follow up     Surgical Followup     DOS: 1/12/2021~Right total knee arthroplasty~11 weeks postop       HISTORY OF PRESENT ILLNESS  Returns 11 weeks post-op. Doing very well. States no pain to the knee. No incision concerns. Does home exercises, including a pedal machine, states this going well. Reports \"chased my grandkids around a park this weekend\" and no pain or difficulties. States active without any pain. Very happy with outcomes for the knee.     Does note Saturday started getting some mild swelling to the wrist and a small mass on the dorsal side over the radial artery. No know trauma, no open areas, no drainage, no erythema, warmth. Mildly bothersome when bumped.     Patient's past medical, surgical, social and family histories reviewed.     Past Medical History:   Diagnosis Date     CKD (chronic kidney disease) stage 3, GFR 30-59 ml/min      Dental disorder     loosing top teeth due to lupus     GERD (gastroesophageal reflux disease)      High blood pressure      Hyperlipidemia      Mixed hyperlipidaemia      Osteoarthritis      RA (rheumatoid arthritis) (H)     mild, on plaquenil     SLE (systemic lupus erythematosus) (H)      Vision problem     dry eyes from Lupus       Past Surgical History:   Procedure Laterality Date     APPENDECTOMY  1969     ARTHROPLASTY KNEE Right 1/12/2021    Procedure: right total knee replacement;  Surgeon: Thad Fermin DO;  Location: [facility]     C TOTAL KNEE ARTHROPLASTY  3/10    left     COLONOSCOPY  2009    repeat 5 years     COLONOSCOPY N/A 5/20/2015    normal, repeat 5 years due to family " history     COLONOSCOPY N/A 10/7/2019    Procedure: COLONOSCOPY;  Surgeon: Con Recinos MD;  Location:  GI     FOOT SURGERY  2010    bone and screws in 2 toes, hammertoe     HYSTERECTOMY TOTAL ABDOMINAL  2010    due to bleeding, benign     LAPAROTOMY EXPLORATORY      scar tissue removal/repair - abd     SHOULDER SURGERY  05/2013    rotator cuff repair, right       Medications:  acetaminophen (TYLENOL) 325 MG tablet, Take 3 tablets (975 mg) by mouth every 8 hours as needed for other (mild pain)  artificial saliva, BIOTENE MT, (BIOTENE MT) SOLN, Swish and spit 5-10 mLs in mouth as needed for dry mouth  Ascorbic Acid (VITAMIN C PO), Take 1,000 mg by mouth daily  aspirin (ASA) 81 MG EC tablet, Take 1 tablet (81 mg) by mouth daily  calcium citrate-vitamin D (CITRACAL) 315-250 MG-UNIT TABS, Take 2 tablets by mouth daily   Cyanocobalamin (VITAMIN B 12 PO), Take 1,000 mcg by mouth daily  cycloSPORINE (RESTASIS) 0.05 % ophthalmic emulsion, Place 1 drop into both eyes 2 times daily  diclofenac (VOLTAREN) 1 % topical gel, Apply 4 g topically 4 times daily as needed for pain  Glucosamine-Chondroit-Vit C-Mn (GLUCOSAMINE CHONDR 1500 COMPLX PO), Take 1 tablet by mouth daily   hydrochlorothiazide (HYDRODIURIL) 25 MG tablet, TAKE ONE-HALF (1/2) TABLET DAILY (APPOINTMENT NEEDED FOR ADDITIONAL REFILLS)  hydroxychloroquine (PLAQUENIL) 200 MG tablet, TAKE 1 TABLET DAILY  Lidocaine (LIDOCARE) 4 % Patch, Place 1 patch onto the skin every 24 hours To prevent lidocaine toxicity, patient should be patch free for 12 hrs daily.  lisinopril (ZESTRIL) 10 MG tablet, TAKE 1 TABLET DAILY (APPOINTMENT NEEDED FOR ADDITIONAL REFILLS)  metoprolol succinate ER (TOPROL-XL) 25 MG 24 hr tablet, TAKE ONE-HALF (1/2) TABLET TWICE A DAY ( DUE FOR ANNUAL EXAM AND LABS )  Multiple Vitamins-Minerals (CENTRUM SILVER) per tablet, Take 1 tablet by mouth daily  Omega-3 Fatty Acids (OMEGA-3 FISH OIL PO), Take 1,400 mg by mouth daily  polyethylene glycol  (MIRALAX) 17 GM/Dose powder, Take 17 g by mouth daily as needed for constipation  simvastatin (ZOCOR) 10 MG tablet, TAKE 1 TABLET AT BEDTIME (APPOINTMENT NEEDED FOR ADDITIONAL REFILLS)    No current facility-administered medications on file prior to visit.       No Known Allergies    Social History     Occupational History     Occupation: teacher     Comment: Retired   Tobacco Use     Smoking status: Never Smoker     Smokeless tobacco: Never Used   Substance and Sexual Activity     Alcohol use: No     Alcohol/week: 0.0 standard drinks     Drug use: No     Sexual activity: Not Currently       Family History   Problem Relation Age of Onset     Colon Cancer Mother      Lupus Mother      Cancer Mother         colon cancer     Cerebrovascular Disease Father      No Known Problems Sister      No Known Problems Brother      No Known Problems Sister      No Known Problems Son      Neurologic Disorder Sister         ALS     Cancer Maternal Grandmother         stomach cancer     Heart Disease Maternal Grandfather      Unknown/Adopted Paternal Grandmother      Unknown/Adopted Paternal Grandfather      No Known Problems Sister      No Known Problems Sister      No Known Problems Son      No Known Problems Son        REVIEW OF SYSTEMS  General: negative for, night sweats, dizziness, fatigue  Resp: No shortness of breath and no cough  CV: negative for chest pain, syncope or near-syncope  GI: negative for nausea, vomiting and diarrhea  : negative for dysuria and hematuria  Musculoskeletal: as above  Neurologic: negative for syncope   Hematologic: negative for bleeding disorder    Physical Exam:  Vitals: /68   Ht 1.524 m (5')   Wt 72.8 kg (160 lb 9.6 oz)   LMP  (LMP Unknown)   BMI 31.37 kg/m    BMI= Body mass index is 31.37 kg/m .  Constitutional: healthy, alert and no acute distress   Psychiatric: mentation appears normal and affect normal/bright  NEURO: no focal deficits  SKIN: .well healed, no erythema, no incision  breakdown and no drainage.  Nonvisible, small semi-mobile <1cm by 1cm mass on the dorsum of the wrist superficial to the radial artery, deep to the skin. Skin colored. Mildly painful when palpated.  Some mild swelling to the wrist. Motion intact. Radial pulse 2+ and palpable. Fingers well perfused, cap refill <2, skin pink warm dry, sensation intact, motion and strength intact to the hand.   JOINT/EXTREMITIES: right knee: full motion without pain. Extensor intact. No instability with valgus or varus. Patella tracks midline. No tenderness. Distal neurovascular grossly intact.   GAIT: not tested     Diagnostic Modalities:  right knee X-ray: The prosthesis has acceptable alignment. No fractures or dislocations. Prosthesis is well seated with no evidence of loosening  Independent visualization of the images was performed.      Impression:   Chief Complaint   Patient presents with     RECHECK     right knee follow up     Surgical Followup     DOS: 1/12/2021~Right total knee arthroplasty~11 weeks postop   Doing very well  Mass on right wrist.     Plan:   Regarding the knee, continue to work on home exercises, with a focus now on strengthening, continue to progress activities as tolerated. No restrictions.      Until 1 year post-op contact the clinic prior to dental work for antibiotic.      Regarding the right wrist mass: small, semi-mobile, mildly tender with some fullness of the wrist. No evidence of infection.  Consistent with ganglion cyst. Started Saturday. No hx of trauma. Recommended watchful waiting. If not improving, worsening, or new symptoms return and do full evaluation including xrays, in the meantime, modify activity and icing as needed for comfort.     Return to clinic 1 year post-op, or sooner as needed for changes.    Re-x-ray on return: Yes.    ANTONIO Leiva, CNP  Orthopedic Surgery          Again, thank you for allowing me to participate in the care of your patient.         Sincerely,        Thad Fermin, DO

## 2021-04-05 NOTE — PROGRESS NOTES
"Orthopedic Clinic Post-Operative Note    CHIEF COMPLAINT:   Chief Complaint   Patient presents with     RECHECK     right knee follow up     Surgical Followup     DOS: 1/12/2021~Right total knee arthroplasty~11 weeks postop       HISTORY OF PRESENT ILLNESS  Returns 11 weeks post-op. Doing very well. States no pain to the knee. No incision concerns. Does home exercises, including a pedal machine, states this going well. Reports \"chased my grandkids around a park this weekend\" and no pain or difficulties. States active without any pain. Very happy with outcomes for the knee.     Does note Saturday started getting some mild swelling to the wrist and a small mass on the dorsal side over the radial artery. No know trauma, no open areas, no drainage, no erythema, warmth. Mildly bothersome when bumped.     Patient's past medical, surgical, social and family histories reviewed.     Past Medical History:   Diagnosis Date     CKD (chronic kidney disease) stage 3, GFR 30-59 ml/min      Dental disorder     loosing top teeth due to lupus     GERD (gastroesophageal reflux disease)      High blood pressure      Hyperlipidemia      Mixed hyperlipidaemia      Osteoarthritis      RA (rheumatoid arthritis) (H)     mild, on plaquenil     SLE (systemic lupus erythematosus) (H)      Vision problem     dry eyes from Lupus       Past Surgical History:   Procedure Laterality Date     APPENDECTOMY  1969     ARTHROPLASTY KNEE Right 1/12/2021    Procedure: right total knee replacement;  Surgeon: Thad Fermin DO;  Location:  OR     C TOTAL KNEE ARTHROPLASTY  3/10    left     COLONOSCOPY  2009    repeat 5 years     COLONOSCOPY N/A 5/20/2015    normal, repeat 5 years due to family history     COLONOSCOPY N/A 10/7/2019    Procedure: COLONOSCOPY;  Surgeon: Con Recinos MD;  Location:  GI     FOOT SURGERY  2010    bone and screws in 2 toes, hammertoe     HYSTERECTOMY TOTAL ABDOMINAL  2010    due to bleeding, benign     " LAPAROTOMY EXPLORATORY      scar tissue removal/repair - abd     SHOULDER SURGERY  05/2013    rotator cuff repair, right       Medications:  acetaminophen (TYLENOL) 325 MG tablet, Take 3 tablets (975 mg) by mouth every 8 hours as needed for other (mild pain)  artificial saliva, BIOTENE MT, (BIOTENE MT) SOLN, Swish and spit 5-10 mLs in mouth as needed for dry mouth  Ascorbic Acid (VITAMIN C PO), Take 1,000 mg by mouth daily  aspirin (ASA) 81 MG EC tablet, Take 1 tablet (81 mg) by mouth daily  calcium citrate-vitamin D (CITRACAL) 315-250 MG-UNIT TABS, Take 2 tablets by mouth daily   Cyanocobalamin (VITAMIN B 12 PO), Take 1,000 mcg by mouth daily  cycloSPORINE (RESTASIS) 0.05 % ophthalmic emulsion, Place 1 drop into both eyes 2 times daily  diclofenac (VOLTAREN) 1 % topical gel, Apply 4 g topically 4 times daily as needed for pain  Glucosamine-Chondroit-Vit C-Mn (GLUCOSAMINE CHONDR 1500 COMPLX PO), Take 1 tablet by mouth daily   hydrochlorothiazide (HYDRODIURIL) 25 MG tablet, TAKE ONE-HALF (1/2) TABLET DAILY (APPOINTMENT NEEDED FOR ADDITIONAL REFILLS)  hydroxychloroquine (PLAQUENIL) 200 MG tablet, TAKE 1 TABLET DAILY  Lidocaine (LIDOCARE) 4 % Patch, Place 1 patch onto the skin every 24 hours To prevent lidocaine toxicity, patient should be patch free for 12 hrs daily.  lisinopril (ZESTRIL) 10 MG tablet, TAKE 1 TABLET DAILY (APPOINTMENT NEEDED FOR ADDITIONAL REFILLS)  metoprolol succinate ER (TOPROL-XL) 25 MG 24 hr tablet, TAKE ONE-HALF (1/2) TABLET TWICE A DAY ( DUE FOR ANNUAL EXAM AND LABS )  Multiple Vitamins-Minerals (CENTRUM SILVER) per tablet, Take 1 tablet by mouth daily  Omega-3 Fatty Acids (OMEGA-3 FISH OIL PO), Take 1,400 mg by mouth daily  polyethylene glycol (MIRALAX) 17 GM/Dose powder, Take 17 g by mouth daily as needed for constipation  simvastatin (ZOCOR) 10 MG tablet, TAKE 1 TABLET AT BEDTIME (APPOINTMENT NEEDED FOR ADDITIONAL REFILLS)    No current facility-administered medications on file prior to  visit.       No Known Allergies    Social History     Occupational History     Occupation: teacher     Comment: Retired   Tobacco Use     Smoking status: Never Smoker     Smokeless tobacco: Never Used   Substance and Sexual Activity     Alcohol use: No     Alcohol/week: 0.0 standard drinks     Drug use: No     Sexual activity: Not Currently       Family History   Problem Relation Age of Onset     Colon Cancer Mother      Lupus Mother      Cancer Mother         colon cancer     Cerebrovascular Disease Father      No Known Problems Sister      No Known Problems Brother      No Known Problems Sister      No Known Problems Son      Neurologic Disorder Sister         ALS     Cancer Maternal Grandmother         stomach cancer     Heart Disease Maternal Grandfather      Unknown/Adopted Paternal Grandmother      Unknown/Adopted Paternal Grandfather      No Known Problems Sister      No Known Problems Sister      No Known Problems Son      No Known Problems Son        REVIEW OF SYSTEMS  General: negative for, night sweats, dizziness, fatigue  Resp: No shortness of breath and no cough  CV: negative for chest pain, syncope or near-syncope  GI: negative for nausea, vomiting and diarrhea  : negative for dysuria and hematuria  Musculoskeletal: as above  Neurologic: negative for syncope   Hematologic: negative for bleeding disorder    Physical Exam:  Vitals: /68   Ht 1.524 m (5')   Wt 72.8 kg (160 lb 9.6 oz)   LMP  (LMP Unknown)   BMI 31.37 kg/m    BMI= Body mass index is 31.37 kg/m .  Constitutional: healthy, alert and no acute distress   Psychiatric: mentation appears normal and affect normal/bright  NEURO: no focal deficits  SKIN: .well healed, no erythema, no incision breakdown and no drainage.  Nonvisible, small semi-mobile <1cm by 1cm mass on the dorsum of the wrist superficial to the radial artery, deep to the skin. Skin colored. Mildly painful when palpated.  Some mild swelling to the wrist. Motion intact.  Radial pulse 2+ and palpable. Fingers well perfused, cap refill <2, skin pink warm dry, sensation intact, motion and strength intact to the hand.   JOINT/EXTREMITIES: right knee: full motion without pain. Extensor intact. No instability with valgus or varus. Patella tracks midline. No tenderness. Distal neurovascular grossly intact.   GAIT: not tested     Diagnostic Modalities:  right knee X-ray: The prosthesis has acceptable alignment. No fractures or dislocations. Prosthesis is well seated with no evidence of loosening  Independent visualization of the images was performed.      Impression:   Chief Complaint   Patient presents with     RECHECK     right knee follow up     Surgical Followup     DOS: 1/12/2021~Right total knee arthroplasty~11 weeks postop   Doing very well  Mass on right wrist.     Plan:   Regarding the knee, continue to work on home exercises, with a focus now on strengthening, continue to progress activities as tolerated. No restrictions.      Until 1 year post-op contact the clinic prior to dental work for antibiotic.      Regarding the right wrist mass: small, semi-mobile, mildly tender with some fullness of the wrist. No evidence of infection.  Consistent with ganglion cyst. Started Saturday. No hx of trauma. Recommended watchful waiting. If not improving, worsening, or new symptoms return and do full evaluation including xrays, in the meantime, modify activity and icing as needed for comfort.     Return to clinic 1 year post-op, or sooner as needed for changes.    Re-x-ray on return: Yes.    ANTONIO Leiva, CNP  Orthopedic Surgery

## 2021-05-17 ENCOUNTER — TELEPHONE (OUTPATIENT)
Dept: FAMILY MEDICINE | Facility: CLINIC | Age: 76
End: 2021-05-17

## 2021-05-17 NOTE — TELEPHONE ENCOUNTER
Reason for call:  Form   Our goal is to have forms completed within 72 hours, however some forms may require a visit or additional information.     Who is the form from? [employer] (if other please explain)  Where did the form come from? Patient or family brought in     What clinic location was the form placed at? [place]   Where was the form placed? Dr Alex[facility]  What number is listed as a contact on the form? patients    Phone call message - patient request for a letter, form or note:     Date needed: as soon as possible  Please mail to envelope included  Has the patient signed a consent form for release of information? Not Applicable    Additional comments: has a note about oxygen tank, note in envelope with her request    Type of letter, form or note: medical    Phone number to reach patient:  Cell number on file:    Telephone Information:   Mobile [phone]       Best Time:  anytime    Can we leave a detailed message on this number?  YES    Travel screening: Not Applicable

## 2021-05-17 NOTE — LETTER
[address] [address]  Phone: [phone]  Fax: [phone]    May 19, 2021        Capri Wiseman  [address] [facility] [address]          To whom it may concern:    RE: Capri Farooq is a patient under my care who uses portable oxygen during air travel. This is for medical reasons, and she needs to be allowed to carry her oxygen with her throughout her flight and travel time.     Please contact me for questions or concerns.      Sincerely,        Xochitl Hemphill MD

## 2021-05-17 NOTE — LETTER
[address] [address]  Phone: [phone]  Fax: [phone]    May 19, 2021        Capri Wiseman  [address] [place] [id number]          To whom it may concern:    RE: Capri Wiseman    Please allow patient to carry on her portable oxygen, due to health condition.    Please contact me for questions or concerns.      Sincerely,        Xochitl Hemphill MD

## 2021-05-19 NOTE — TELEPHONE ENCOUNTER
Call placed to patient to clarify what the form was needed for? Patient stating she received from [facility] and they requested updated information for continuation  for filling of her medications. Provider needs to fill in her information and sign. Informed patient that we can fax and send her the original. Patient is asking that the note for portable O2 be placed up front for her to . Please advise when note is ready to be picked up. Maryjo Leo LPN

## 2021-05-21 NOTE — TELEPHONE ENCOUNTER
Patient calling on status of letter that she needed for her portable oxygen that she is purchasing. Patient stating she was to  her oxygen tank tomorrow. Informed we will check on status and call her back. Maryjo Leo LPN

## 2021-05-21 NOTE — TELEPHONE ENCOUNTER
Patient is going to head to the clinic now while Dr. Hemphill works on the letter. She will bring it to the  when complete.     Ilsa Fox CMA ([facility]) 5/21/2021

## 2021-05-22 NOTE — TELEPHONE ENCOUNTER
She uses portable oxygen only for travel because she gets lightheaded during travel. She does not use oxygen otherwise. She is purchasing a tank for herself since it is cheaper than renting.   I advised her I have completed her letter and it will be up front for her to . The other letter is a form she just needs signed for her medications for [employer]. That is being left up front with the letter as well. She is aware.  Xochitl Hemphill MD

## 2021-05-24 ENCOUNTER — TELEPHONE (OUTPATIENT)
Dept: FAMILY MEDICINE | Facility: CLINIC | Age: 76
End: 2021-05-24

## 2021-05-24 NOTE — TELEPHONE ENCOUNTER
Leela is calling again, she only uses the oxygen during traveling and only needs a verbal NOT all the office notes.  She will send a clarification note via fax at later time but would really like a verbal ok for this to be completed soon as the patient is in house now.     Anna MIDDLETON

## 2021-05-24 NOTE — TELEPHONE ENCOUNTER
Reason for Call:  Form, our goal is to have forms completed with 72 hours, however, some forms may require a visit or additional information.    Type of letter, form or note:  medical    Who is the form from?: Patient    Where did the form come from: Patient or family brought in       What clinic location was the form placed at?: [facility]    Where the form was placed: box [facility]    What number is listed as a contact on the form?: [phone]       Additional comments: thanks      Call taken on 5/24/2021 at 1:37 PM by Flavia Stanford

## 2021-05-24 NOTE — TELEPHONE ENCOUNTER
Leela calling from [facility] stating patient presented to them to purchase portable oxygen for travel Leela is stating they can not sell patient a portable oxygen with out a DME order with recorded oxygen saturation and leader flow for use. They also need office visit notes that state need of oxygen use. Any questions, Leela can be reached at [phone], exten: [phone]. If orders can be completed fax them to [facility] at [phone] with the required office visit notes as well. Maryjo Leo LPN

## 2021-05-31 DIAGNOSIS — E78.5 HYPERLIPIDEMIA WITH TARGET LDL LESS THAN 130: ICD-10-CM

## 2021-05-31 DIAGNOSIS — I10 ESSENTIAL HYPERTENSION WITH GOAL BLOOD PRESSURE LESS THAN 140/90: ICD-10-CM

## 2021-05-31 DIAGNOSIS — N18.30 STAGE 3 CHRONIC KIDNEY DISEASE, UNSPECIFIED WHETHER STAGE 3A OR 3B CKD (H): ICD-10-CM

## 2021-05-31 DIAGNOSIS — R60.9 EDEMA, UNSPECIFIED TYPE: ICD-10-CM

## 2021-06-02 NOTE — TELEPHONE ENCOUNTER
"Requested Prescriptions   Pending Prescriptions Disp Refills     lisinopril (ZESTRIL) 10 MG tablet [Pharmacy Med Name: LISINOPRIL TABS 10MG] 90 tablet 3     Sig: TAKE 1 TABLET DAILY (APPOINTMENT NEEDED FOR ADDITIONAL REFILLS)   Last Written Prescription Date:  2/22/2021  Last Fill Quantity: 90,  # refills: 0   Last office visit: 1/4/2021 with prescribing provider:     Future Office Visit:        ACE Inhibitors (Including Combos) Protocol Failed - 5/31/2021 10:37 AM        Failed - Normal serum creatinine on file in past 12 months     Recent Labs   Lab Test 01/27/21  1725 07/31/19  1455 07/31/19  1455   CR 1.36*   < >  --    CREAT  --   --  1.3*    < > = values in this interval not displayed.     Ok to refill medication if creatinine is low          Passed - Blood pressure under 140/90 in past 12 months     BP Readings from Last 3 Encounters:   04/05/21 122/68   02/22/21 138/82   01/27/21 125/63           Passed - Recent (12 mo) or future (30 days) visit within the authorizing provider's specialty     Patient has had an office visit with the authorizing provider or a provider within the authorizing providers department within the previous 12 mos or has a future within next 30 days. See \"Patient Info\" tab in inbasket, or \"Choose Columns\" in Meds & Orders section of the refill encounter.            Passed - Medication is active on med list        Passed - Patient is age 18 or older        Passed - No active pregnancy on record        Passed - Normal serum potassium on file in past 12 months     Recent Labs   Lab Test 01/27/21  1725   POTASSIUM 3.9             Passed - No positive pregnancy test within past 12 months           simvastatin (ZOCOR) 10 MG tablet [Pharmacy Med Name: SIMVASTATIN TABS 10MG] 90 tablet 3     Sig: TAKE 1 TABLET AT BEDTIME (APPOINTMENT NEEDED FOR ADDITIONAL REFILLS)       Statins Protocol Failed - 5/31/2021 10:37 AM        Failed - LDL on file in past 12 months     Recent Labs   Lab Test " "02/15/19  0929   LDL 59           Passed - No abnormal creatine kinase in past 12 months     No lab results found.           Passed - Recent (12 mo) or future (30 days) visit within the authorizing provider's specialty     Patient has had an office visit with the authorizing provider or a provider within the authorizing providers department within the previous 12 mos or has a future within next 30 days. See \"Patient Info\" tab in inbasket, or \"Choose Columns\" in Meds & Orders section of the refill encounter.            Passed - Medication is active on med list        Passed - Patient is age 18 or older        Passed - No active pregnancy on record        Passed - No positive pregnancy test in past 12 months           hydrochlorothiazide (HYDRODIURIL) 25 MG tablet [Pharmacy Med Name: HYDROCHLOROTHIAZIDE TABS 25MG] 45 tablet 3     Sig: TAKE ONE-HALF (1/2) TABLET DAILY (APPOINTMENT NEEDED FOR ADDITIONAL REFILLS)       Diuretics (Including Combos) Protocol Failed - 5/31/2021 10:37 AM        Failed - Normal serum creatinine on file in past 12 months     Recent Labs   Lab Test 01/27/21  1725   CR 1.36*              Passed - Blood pressure under 140/90 in past 12 months     BP Readings from Last 3 Encounters:   04/05/21 122/68   02/22/21 138/82   01/27/21 125/63           Passed - Recent (12 mo) or future (30 days) visit within the authorizing provider's specialty     Patient has had an office visit with the authorizing provider or a provider within the authorizing providers department within the previous 12 mos or has a future within next 30 days. See \"Patient Info\" tab in inbasket, or \"Choose Columns\" in Meds & Orders section of the refill encounter.            Passed - Medication is active on med list        Passed - Patient is age 18 or older        Passed - No active pregancy on record        Passed - Normal serum potassium on file in past 12 months     Recent Labs   Lab Test 01/27/21  1725   POTASSIUM 3.9            " Passed - Normal serum sodium on file in past 12 months     Recent Labs   Lab Test 01/27/21  1725               Passed - No positive pregnancy test in past 12 months         Routing refill request to provider for review/approval  Jackelyn Priest RN

## 2021-06-02 NOTE — TELEPHONE ENCOUNTER
lisinopril (ZESTRIL) 10 MG tablet [Pharmacy Med Name: LISINOPRIL TABS 10MG] 90 tablet 3     Sig: TAKE 1 TABLET DAILY (APPOINTMENT NEEDED FOR ADDITIONAL REFILLS)   Last Written Prescription Date:  2/22/2021  Last Fill Quantity: 90,  # refills: 0   Last office visit: 1/4/2021 with prescribing provider:     Future Office Visit:     Routing refill request to provider for review/approval because:  Labs out of range:  CR on 1/27/2021 was 1.36  T'd up 1 month for provider review.      simvastatin (ZOCOR) 10 MG tablet [Pharmacy Med Name: SIMVASTATIN TABS 10MG] 90 tablet 3    Sig: TAKE 1 TABLET AT BEDTIME (APPOINTMENT NEEDED FOR ADDITIONAL REFILLS)   Last refilled 2/22/2021 for 90 tablets  Routing refill request to provider for review/approval because:  Labs not current:  LDL last taken 2/15/2019  T'd up 1 month for provider review.      hydrochlorothiazide (HYDRODIURIL) 25 MG tablet [Pharmacy Med Name: HYDROCHLOROTHIAZIDE TABS 25MG] 45 tablet 3    Sig: TAKE ONE-HALF (1/2) TABLET DAILY (APPOINTMENT NEEDED FOR ADDITIONAL REFILLS)   Last refilled 2/22/2021 for 45 tablets  Routing refill request to provider for review/approval because:  Labs out of range:  CR on 1/27/2021 was 1.36  T'd up 1 month for provider review.    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  CT head, CT fatuma and CT perfusion negative  Symptoms stable  History of DM type 2 and HTN    Decision to proceed with TPA in light of being in window and young age making it worth treating even though low NIHSS of 1.    To ICU post TPA orders placed.    Marielena Mancini MD  [facility]  10/5/2017 9:11 PM

## 2021-06-04 RX ORDER — HYDROCHLOROTHIAZIDE 25 MG/1
12.5 TABLET ORAL DAILY
Qty: 45 TABLET | Refills: 1 | Status: SHIPPED | OUTPATIENT
Start: 2021-06-04 | End: 2021-11-30

## 2021-06-04 RX ORDER — SIMVASTATIN 10 MG
10 TABLET ORAL AT BEDTIME
Qty: 90 TABLET | Refills: 1 | Status: SHIPPED | OUTPATIENT
Start: 2021-06-04 | End: 2021-11-30

## 2021-06-04 RX ORDER — LISINOPRIL 10 MG/1
10 TABLET ORAL DAILY
Qty: 90 TABLET | Refills: 1 | Status: SHIPPED | OUTPATIENT
Start: 2021-06-04 | End: 2021-11-30

## 2021-06-04 NOTE — TELEPHONE ENCOUNTER
I approved her for 6 months.  She had a preop in January.  Please let her know to see me for a physical in the fall.  Xochitl Hemphill MD

## 2021-08-16 DIAGNOSIS — I10 ESSENTIAL HYPERTENSION WITH GOAL BLOOD PRESSURE LESS THAN 140/90: ICD-10-CM

## 2021-08-16 DIAGNOSIS — Z12.31 ENCOUNTER FOR SCREENING MAMMOGRAM FOR MALIGNANT NEOPLASM OF BREAST: ICD-10-CM

## 2021-08-16 DIAGNOSIS — Z12.11 SPECIAL SCREENING FOR MALIGNANT NEOPLASMS, COLON: Primary | ICD-10-CM

## 2021-08-18 RX ORDER — METOPROLOL SUCCINATE 25 MG/1
TABLET, EXTENDED RELEASE ORAL
Qty: 90 TABLET | Refills: 0 | Status: SHIPPED | OUTPATIENT
Start: 2021-08-18 | End: 2021-11-30

## 2021-08-18 NOTE — TELEPHONE ENCOUNTER
Routing to team to set up F2F appointment for patient for yearly.  Patient has been given #90 to get to appointment per triage protocol.

## 2021-08-20 NOTE — TELEPHONE ENCOUNTER
Pt scheduled a wellness visit for November 30. She is wondering if she should have a mammogram. Please advise and place order if necessary. Marielena Blakely, CMA

## 2021-08-31 ENCOUNTER — HOSPITAL ENCOUNTER (OUTPATIENT)
Dept: MAMMOGRAPHY | Facility: CLINIC | Age: 76
Discharge: HOME | End: 2021-08-31
Attending: FAMILY MEDICINE | Admitting: FAMILY MEDICINE
Payer: COMMERCIAL

## 2021-08-31 DIAGNOSIS — Z12.31 ENCOUNTER FOR SCREENING MAMMOGRAM FOR MALIGNANT NEOPLASM OF BREAST: ICD-10-CM

## 2021-08-31 PROCEDURE — 77063 BREAST TOMOSYNTHESIS BI: CPT

## 2021-09-20 NOTE — DISCHARGE INSTRUCTIONS
I am glad you are feeling better.  Please be sure you are drinking fluids at home to stay hydrated.  Use the Zofran your doctor prescribed you to help with nausea.  Continue with your home pain medication to help with your back pain.  If you have any worsening symptoms please return to the emergency department.  Otherwise follow-up with your clinic provider for reassessment in the next week or so if needed.    Thank you for choosing [facility]'s Emergency Department. It was a pleasure taking care of you today. If you have any questions, please call [phone].    Carolina Fields PA-C     Advancement Flap (Double) Text: The defect edges were debeveled with a #15 scalpel blade.  Given the location of the defect and the proximity to free margins a double advancement flap was deemed most appropriate.  Using a sterile surgical marker, the appropriate advancement flaps were drawn incorporating the defect and placing the expected incisions within the relaxed skin tension lines where possible.    The area thus outlined was incised deep to adipose tissue with a #15 scalpel blade.  The skin margins were undermined to an appropriate distance in all directions utilizing iris scissors.

## 2021-10-03 ENCOUNTER — HEALTH MAINTENANCE LETTER (OUTPATIENT)
Age: 76
End: 2021-10-03

## 2021-11-30 ENCOUNTER — OFFICE VISIT (OUTPATIENT)
Dept: FAMILY MEDICINE | Facility: CLINIC | Age: 76
End: 2021-11-30
Payer: COMMERCIAL

## 2021-11-30 VITALS
HEART RATE: 96 BPM | WEIGHT: 177.4 LBS | TEMPERATURE: 98.1 F | DIASTOLIC BLOOD PRESSURE: 80 MMHG | RESPIRATION RATE: 16 BRPM | SYSTOLIC BLOOD PRESSURE: 152 MMHG | HEIGHT: 60 IN | OXYGEN SATURATION: 96 % | BODY MASS INDEX: 34.83 KG/M2

## 2021-11-30 DIAGNOSIS — N18.30 ANEMIA DUE TO STAGE 3 CHRONIC KIDNEY DISEASE, UNSPECIFIED WHETHER STAGE 3A OR 3B CKD (H): ICD-10-CM

## 2021-11-30 DIAGNOSIS — D63.1 ANEMIA DUE TO STAGE 3 CHRONIC KIDNEY DISEASE, UNSPECIFIED WHETHER STAGE 3A OR 3B CKD (H): ICD-10-CM

## 2021-11-30 DIAGNOSIS — N18.30 STAGE 3 CHRONIC KIDNEY DISEASE, UNSPECIFIED WHETHER STAGE 3A OR 3B CKD (H): ICD-10-CM

## 2021-11-30 DIAGNOSIS — Z00.00 MEDICARE ANNUAL WELLNESS VISIT, SUBSEQUENT: Primary | ICD-10-CM

## 2021-11-30 DIAGNOSIS — E78.5 HYPERLIPIDEMIA WITH TARGET LDL LESS THAN 130: ICD-10-CM

## 2021-11-30 DIAGNOSIS — M32.19 SYSTEMIC LUPUS ERYTHEMATOSUS WITH OTHER ORGAN INVOLVEMENT, UNSPECIFIED SLE TYPE (H): ICD-10-CM

## 2021-11-30 DIAGNOSIS — R60.9 EDEMA, UNSPECIFIED TYPE: ICD-10-CM

## 2021-11-30 DIAGNOSIS — I10 ESSENTIAL HYPERTENSION WITH GOAL BLOOD PRESSURE LESS THAN 140/90: ICD-10-CM

## 2021-11-30 LAB
CHOLEST SERPL-MCNC: 135 MG/DL
CREAT UR-MCNC: 227 MG/DL
FASTING STATUS PATIENT QL REPORTED: NO
HDLC SERPL-MCNC: 73 MG/DL
LDLC SERPL CALC-MCNC: 35 MG/DL
MICROALBUMIN UR-MCNC: 27 MG/L
MICROALBUMIN/CREAT UR: 11.89 MG/G CR (ref 0–25)
NONHDLC SERPL-MCNC: 62 MG/DL
TRIGL SERPL-MCNC: 134 MG/DL
TSH SERPL DL<=0.005 MIU/L-ACNC: 1.48 MU/L (ref 0.4–4)

## 2021-11-30 PROCEDURE — 80061 LIPID PANEL: CPT | Performed by: FAMILY MEDICINE

## 2021-11-30 PROCEDURE — 99397 PER PM REEVAL EST PAT 65+ YR: CPT | Performed by: FAMILY MEDICINE

## 2021-11-30 PROCEDURE — 36415 COLL VENOUS BLD VENIPUNCTURE: CPT | Performed by: FAMILY MEDICINE

## 2021-11-30 PROCEDURE — 84443 ASSAY THYROID STIM HORMONE: CPT | Performed by: FAMILY MEDICINE

## 2021-11-30 PROCEDURE — 82043 UR ALBUMIN QUANTITATIVE: CPT | Performed by: FAMILY MEDICINE

## 2021-11-30 RX ORDER — HYDROCHLOROTHIAZIDE 25 MG/1
12.5 TABLET ORAL DAILY
Qty: 45 TABLET | Refills: 3 | Status: SHIPPED | OUTPATIENT
Start: 2021-11-30 | End: 2022-11-28

## 2021-11-30 RX ORDER — METOPROLOL SUCCINATE 25 MG/1
25 TABLET, EXTENDED RELEASE ORAL 2 TIMES DAILY
Qty: 180 TABLET | Refills: 3 | Status: SHIPPED | OUTPATIENT
Start: 2021-11-30 | End: 2022-11-28

## 2021-11-30 RX ORDER — LISINOPRIL 10 MG/1
10 TABLET ORAL DAILY
Qty: 90 TABLET | Refills: 3 | Status: SHIPPED | OUTPATIENT
Start: 2021-11-30 | End: 2022-11-28

## 2021-11-30 RX ORDER — HYDROXYCHLOROQUINE SULFATE 200 MG/1
200 TABLET, FILM COATED ORAL DAILY
Qty: 90 TABLET | Refills: 3 | Status: SHIPPED | OUTPATIENT
Start: 2021-11-30 | End: 2022-11-28

## 2021-11-30 RX ORDER — SIMVASTATIN 10 MG
10 TABLET ORAL AT BEDTIME
Qty: 90 TABLET | Refills: 3 | Status: SHIPPED | OUTPATIENT
Start: 2021-11-30 | End: 2022-11-28

## 2021-11-30 ASSESSMENT — MIFFLIN-ST. JEOR: SCORE: 1202.21

## 2021-11-30 ASSESSMENT — PAIN SCALES - GENERAL: PAINLEVEL: NO PAIN (0)

## 2021-11-30 ASSESSMENT — ACTIVITIES OF DAILY LIVING (ADL): CURRENT_FUNCTION: NO ASSISTANCE NEEDED

## 2021-11-30 NOTE — PATIENT INSTRUCTIONS
I refilled all your medications today.     I am going to INCREASE your dose of metoprolol. Instead of taking 1/2 tablet twice daily, I want you to take a FULL pill twice daily, in the morning and in the evening.     We'll get you set up to have your COVID vaccine in mid-December then repeat your dose 3 weeks later.     Then you can get your Tetanus shot and your shingles shot 1-2 months down the road after completing your COVID shots.   Your Tetanus (Tdap) and shingles (Shingrix) vaccines CAN be given at the same time if you'd like. Or if you prefer, you can split them up and get one at a time.     Please fill out the Advanced healthcare Directive form with your family and bring me a copy when complete.

## 2021-11-30 NOTE — PROGRESS NOTES
"SUBJECTIVE:   Capri Wiseman is a 76 year old female who presents for Preventive Visit.      Patient has been advised of split billing requirements and indicates understanding: Yes   Are you in the first 12 months of your Medicare coverage?  No    Healthy Habits:    In general, how would you rate your overall health?  Excellent    Frequency of exercise:  6-7 days/week    Duration of exercise:  45-60 minutes    Do you usually eat at least 4 servings of fruit and vegetables a day, include whole grains    & fiber and avoid regularly eating high fat or \"junk\" foods?  Yes    Taking medications regularly:  Yes    Barriers to taking medications:  None    Medication side effects:  None    Ability to successfully perform activities of daily living:  No assistance needed    Home Safety:  No safety concerns identified    Hearing Impairment:  No hearing concerns    In the past 6 months, have you been bothered by leaking of urine? Yes    In general, how would you rate your overall mental or emotional health?  Excellent      PHQ-2 Total Score:    Additional concerns today:  Yes    Do you feel safe in your environment? Yes    Have you ever done Advance Care Planning? (For example, a Health Directive, POLST, or a discussion with a medical provider or your loved ones about your wishes): Yes, advance care planning is on file.       Fall risk  Fallen 2 or more times in the past year?: No  Any fall with injury in the past year?: No    Cognitive Screening   1) Repeat 3 items (Leader, Season, Table)    2) Clock draw: ABNORMAL wrong time  3) 3 item recall: Recalls 3 objects  Results: 3 items recalled: COGNITIVE IMPAIRMENT LESS LIKELY    Mini-CogTM Copyright AMARIS Palafox. Licensed by the author for use in [facility]; reprinted with permission (moris@.[facility]). All rights reserved.      Reviewed and updated as needed this visit by clinical staff  Tobacco  Allergies  Meds   Med Hx  Surg Hx  Fam Hx  Soc Hx       Reviewed and " updated as needed this visit by Provider               Social History     Tobacco Use     Smoking status: Never Smoker     Smokeless tobacco: Never Used   Substance Use Topics     Alcohol use: No     Alcohol/week: 0.0 standard drinks       Current providers sharing in care for this patient include:   Patient Care Team:  Xochitl Hemphill MD as PCP - General (Family Practice)  Thad Fermin DO as Assigned Musculoskeletal Provider  Pily Mello MD as Assigned PCP    The following health maintenance items are reviewed in Epic and correct as of today:  Health Maintenance Due   Topic Date Due     ANNUAL REVIEW OF HM ORDERS  Never done     ADVANCE CARE PLANNING  Never done     COVID-19 Vaccine (1) Never done     HEPATITIS C SCREENING  Never done     ZOSTER IMMUNIZATION (1 of 2) Never done     MEDICARE ANNUAL WELLNESS VISIT  02/15/2020     LIPID  02/15/2020     EYE EXAM  07/22/2020     INFLUENZA VACCINE (1) 09/01/2021     FALL RISK ASSESSMENT  11/04/2021     DTAP/TDAP/TD IMMUNIZATION (3 - Td or Tdap) 11/22/2021     HEMOGLOBIN  01/27/2022     BP Readings from Last 3 Encounters:   12/16/21 129/80   11/30/21 (!) 152/80   04/05/21 122/68    Wt Readings from Last 3 Encounters:   12/16/21 79.8 kg (176 lb)   11/30/21 80.5 kg (177 lb 6.4 oz)   04/05/21 72.8 kg (160 lb 9.6 oz)                  Patient Active Problem List   Diagnosis     Anemia due to stage 3 chronic kidney disease, unspecified whether stage 3a or 3b CKD (H)     Essential hypertension with goal blood pressure less than 140/90     Esophageal reflux     Hyperlipidemia with target LDL less than 130     RA (rheumatoid arthritis) (H)     Systemic lupus erythematosus with other organ involvement, unspecified SLE type (H)     Long-term use of Plaquenil     Primary osteoarthritis of right knee     S/P total knee replacement using cement, right     Acute radicular low back pain     Mass of right wrist     Edema, unspecified type     Past Surgical  History:   Procedure Laterality Date     APPENDECTOMY  1969     ARTHROPLASTY KNEE Right 1/12/2021    Procedure: right total knee replacement;  Surgeon: Thad Fermin DO;  Location: [facility] OR     COLONOSCOPY  2009    repeat 5 years     COLONOSCOPY N/A 5/20/2015    normal, repeat 5 years due to family history     COLONOSCOPY N/A 10/7/2019    Procedure: COLONOSCOPY;  Surgeon: Con Recinos MD;  Location:  GI     FOOT SURGERY  2010    bone and screws in 2 toes, hammertoe     HYSTERECTOMY TOTAL ABDOMINAL  2010    due to bleeding, benign     LAPAROTOMY EXPLORATORY      scar tissue removal/repair - abd     SHOULDER SURGERY  05/2013    rotator cuff repair, right     ZZC TOTAL KNEE ARTHROPLASTY  3/10    left       Social History     Tobacco Use     Smoking status: Never Smoker     Smokeless tobacco: Never Used   Substance Use Topics     Alcohol use: No     Alcohol/week: 0.0 standard drinks     Family History   Problem Relation Age of Onset     Colon Cancer Mother      Lupus Mother      Cancer Mother         colon cancer     Cerebrovascular Disease Father      No Known Problems Sister      No Known Problems Brother      No Known Problems Sister      No Known Problems Son      Neurologic Disorder Sister         ALS     Cancer Maternal Grandmother         stomach cancer     Heart Disease Maternal Grandfather      Unknown/Adopted Paternal Grandmother      Unknown/Adopted Paternal Grandfather      No Known Problems Sister      No Known Problems Sister      No Known Problems Son      No Known Problems Son          Pneumonia Vaccine:complete  Mammogram Screening: Mammogram Screening - Patient over age 75, has elected to continue with screening.    Pertinent mammograms are reviewed under the imaging tab.    Review of Systems  CONSTITUTIONAL: NEGATIVE for fever, chills, change in weight  INTEGUMENTARY/SKIN: NEGATIVE for worrisome rashes, moles or lesions  EYES: NEGATIVE for vision changes or irritation, due for eye  "exam.   ENT/MOUTH: NEGATIVE for ear, mouth and throat problems  RESP: NEGATIVE for significant cough or SOB  BREAST: NEGATIVE for masses, tenderness or discharge  CV: NEGATIVE for chest pain, palpitations or peripheral edema  GI: NEGATIVE for nausea, abdominal pain, heartburn, or change in bowel habits  : NEGATIVE for frequency, dysuria, or hematuria  MUSCULOSKELETAL: NEGATIVE for significant arthralgias or myalgia  NEURO: NEGATIVE for weakness, dizziness or paresthesias  ENDOCRINE: NEGATIVE for temperature intolerance, skin/hair changes  HEME: NEGATIVE for bleeding problems  PSYCHIATRIC: NEGATIVE for changes in mood or affect    She had a back injection this past year or so with honey-bee serum and steroid from a provider near [facility], would like to go back there for a repeat as it really helped, but needs a referral. Ever since her knee surgery when they attempted spinal anesthesia she has had more back pain with extended walking, which is what prompted her to go to him. She states the injection helped a lot, she is now able to walk more.     She is not using her voltaren, using something called blue emu which she finds helpful.   She is seeing rheumatologist for her lupus. She is on plaquenil. Has follow up planned in December.   She uses a cane.   She uses portable oxygen for travel because she gets SOB with travel.     OBJECTIVE:   BP (!) 152/80   Pulse 96   Temp 98.1  F (36.7  C) (Temporal)   Resp 16   Ht 1.518 m (4' 11.75\")   Wt 80.5 kg (177 lb 6.4 oz)   LMP  (LMP Unknown)   SpO2 96%   BMI 34.94 kg/m   Estimated body mass index is 34.94 kg/m  as calculated from the following:    Height as of this encounter: 1.518 m (4' 11.75\").    Weight as of this encounter: 80.5 kg (177 lb 6.4 oz).  Physical Exam  GENERAL APPEARANCE: healthy, alert and no distress  EYES: Eyes grossly normal to inspection, PERRL and conjunctivae and sclerae normal  HENT: ear canals and TM's normal, nose and mouth without " ulcers or lesions, oropharynx clear and oral mucous membranes moist  NECK: no adenopathy, no asymmetry, masses, or scars and thyroid normal to palpation  RESP: lungs clear to auscultation - no rales, rhonchi or wheezes  BREAST: normal without masses, tenderness or nipple discharge and no palpable axillary masses or adenopathy  CV: regular rate and rhythm, normal S1 S2, no S3 or S4, no murmur, click or rub, no peripheral edema and peripheral pulses strong  ABDOMEN: soft, nontender, no hepatosplenomegaly, no masses and bowel sounds normal  MS: no musculoskeletal defects are noted and gait is age appropriate without ataxia  SKIN: no suspicious lesions or rashes  NEURO: Normal strength and tone, sensory exam grossly normal, mentation intact and speech normal  PSYCH: mentation appears normal and affect normal/bright    Orders Placed This Encounter   Procedures     REVIEW OF HEALTH MAINTENANCE PROTOCOL ORDERS     Albumin Random Urine Quantitative with Creat Ratio     TSH with free T4 reflex        ASSESSMENT / PLAN:       ICD-10-CM    1. Medicare annual wellness visit, subsequent  Z00.00    2. Stage 3 chronic kidney disease, unspecified whether stage 3a or 3b CKD (H)  N18.30 lisinopril (ZESTRIL) 10 MG tablet     Basic metabolic panel  (Ca, Cl, CO2, Creat, Gluc, K, Na, BUN)     Albumin Random Urine Quantitative with Creat Ratio     Albumin Random Urine Quantitative with Creat Ratio   3. Essential hypertension with goal blood pressure less than 140/90  I10 lisinopril (ZESTRIL) 10 MG tablet     metoprolol succinate ER (TOPROL-XL) 25 MG 24 hr tablet     Basic metabolic panel  (Ca, Cl, CO2, Creat, Gluc, K, Na, BUN)     TSH with free T4 reflex     TSH with free T4 reflex   4. Systemic lupus erythematosus with other organ involvement, unspecified SLE type (H)  M32.19 hydroxychloroquine (PLAQUENIL) 200 MG tablet     CBC with platelets     AST     ALT     Complement C3     Complement C4     DNA ANTIBODY, NATV/2 STRAND      "Creatinine   5. Hyperlipidemia with target LDL less than 130  E78.5 simvastatin (ZOCOR) 10 MG tablet     Lipid panel reflex to direct LDL Fasting   6. Anemia due to stage 3 chronic kidney disease, unspecified whether stage 3a or 3b CKD (H)  N18.30 CBC with platelets    D63.1    7. Edema, unspecified type  R60.9 hydrochlorothiazide (HYDRODIURIL) 25 MG tablet     Basic metabolic panel  (Ca, Cl, CO2, Creat, Gluc, K, Na, BUN)       Patient has been advised of split billing requirements and indicates understanding: Yes  Recommend yearly physical, monthly self breast exam, mammogram every 1 to 2 years as long as screening desired.  I am raising her dose of metoprolol since her blood pressure is not at goal.  We will see her back in 4 weeks for repeat blood pressure check.  I refilled her medications as needed.  Will notify with lab results.      COUNSELING:  Reviewed preventive health counseling, as reflected in patient instructions  Special attention given to:       Regular exercise       Healthy diet/nutrition       Vision screening       Hearing screening       Dental care       Bladder control       Fall risk prevention       Immunizations    She is due for Tdap or Td and plans to get COVID vaccine in December      Discussed Shingrix, will check insurance coverage.          Osteoporosis prevention/bone health is up to date, normal.        Colon cancer screening is complete due to age and previous normal.        Advanced Planning forms given.     Estimated body mass index is 34.94 kg/m  as calculated from the following:    Height as of this encounter: 1.518 m (4' 11.75\").    Weight as of this encounter: 80.5 kg (177 lb 6.4 oz).    Weight management plan: Discussed healthy diet and exercise guidelines    She reports that she has never smoked. She has never used smokeless tobacco.      Appropriate preventive services were discussed with this patient, including applicable screening as appropriate for cardiovascular " disease, diabetes, osteopenia/osteoporosis, and glaucoma.  As appropriate for age/gender, discussed screening for colorectal cancer, prostate cancer, breast cancer, and cervical cancer. Checklist reviewing preventive services available has been given to the patient.    Reviewed patients plan of care and provided an AVS. The Basic Care Plan (routine screening as documented in Health Maintenance) for Capri meets the Care Plan requirement. This Care Plan has been established and reviewed with the Patient.    See patient instructions:    Patient Instructions   I refilled all your medications today.     I am going to INCREASE your dose of metoprolol. Instead of taking 1/2 tablet twice daily, I want you to take a FULL pill twice daily, in the morning and in the evening.     We'll get you set up to have your COVID vaccine in mid-December then repeat your dose 3 weeks later.     Then you can get your Tetanus shot and your shingles shot 1-2 months down the road after completing your COVID shots.   Your Tetanus (Tdap) and shingles (Shingrix) vaccines CAN be given at the same time if you'd like. Or if you prefer, you can split them up and get one at a time.     Please fill out the Advanced healthcare Directive form with your family and bring me a copy when complete.         Counseling Resources:  ATP IV Guidelines  Pooled Cohorts Equation Calculator  Breast Cancer Risk Calculator  Breast Cancer: Medication to Reduce Risk  FRAX Risk Assessment  ICSI Preventive Guidelines  Dietary Guidelines for Americans, 2010  USDA's MyPlate  ASA Prophylaxis  Lung CA Screening    Xochitl Hemphill MD  [facility]    Identified Health Risks:

## 2021-12-01 ENCOUNTER — TELEPHONE (OUTPATIENT)
Dept: RHEUMATOLOGY | Facility: CLINIC | Age: 76
End: 2021-12-01
Payer: COMMERCIAL

## 2021-12-01 NOTE — TELEPHONE ENCOUNTER
Patient saw her PCP this week who recommended she get the COVID vaccine. Patient calling to ask if she should be concerned about any of her medications reacting with the vaccine. Patient currently takes Plaquenil. Per ACR guidelines, no modifications are necessary for Plaquenil. She is okay to receive the vaccine at any time. Patient was appreciative and had no further questions at this time.     Amaris Galindo, BSN, RN  Medical Specialty Care Coordinator  [facility]

## 2021-12-01 NOTE — TELEPHONE ENCOUNTER
[facility] Call Center    Phone Message    May a detailed message be left on voicemail: yes     Reason for Call: Other: Pt is wondering if she should get the COVD vaccination. Pt would like to talk to someone about this. Please call pt at [phone].    Action Taken: Message routed to:  Adult Clinics: Rheumatology p [phone]    Travel Screening: Not Applicable

## 2021-12-16 ENCOUNTER — OFFICE VISIT (OUTPATIENT)
Dept: RHEUMATOLOGY | Facility: CLINIC | Age: 76
End: 2021-12-16
Payer: COMMERCIAL

## 2021-12-16 VITALS
WEIGHT: 176 LBS | BODY MASS INDEX: 34.66 KG/M2 | OXYGEN SATURATION: 97 % | SYSTOLIC BLOOD PRESSURE: 129 MMHG | HEART RATE: 82 BPM | DIASTOLIC BLOOD PRESSURE: 80 MMHG

## 2021-12-16 DIAGNOSIS — M32.19 SYSTEMIC LUPUS ERYTHEMATOSUS WITH OTHER ORGAN INVOLVEMENT, UNSPECIFIED SLE TYPE (H): Primary | ICD-10-CM

## 2021-12-16 LAB
ALT SERPL W P-5'-P-CCNC: 18 U/L (ref 0–50)
AST SERPL W P-5'-P-CCNC: 16 U/L (ref 0–45)
CREAT SERPL-MCNC: 1.04 MG/DL (ref 0.52–1.04)
ERYTHROCYTE [DISTWIDTH] IN BLOOD BY AUTOMATED COUNT: 13.1 % (ref 10–15)
GFR SERPL CREATININE-BSD FRML MDRD: 52 ML/MIN/1.73M2
HCT VFR BLD AUTO: 33.4 % (ref 35–47)
HGB BLD-MCNC: 10.9 G/DL (ref 11.7–15.7)
MCH RBC QN AUTO: 33.6 PG (ref 26.5–33)
MCHC RBC AUTO-ENTMCNC: 32.6 G/DL (ref 31.5–36.5)
MCV RBC AUTO: 103 FL (ref 78–100)
PLATELET # BLD AUTO: 210 10E3/UL (ref 150–450)
RBC # BLD AUTO: 3.24 10E6/UL (ref 3.8–5.2)
WBC # BLD AUTO: 5 10E3/UL (ref 4–11)

## 2021-12-16 PROCEDURE — 86160 COMPLEMENT ANTIGEN: CPT | Performed by: FAMILY MEDICINE

## 2021-12-16 PROCEDURE — 85027 COMPLETE CBC AUTOMATED: CPT | Performed by: FAMILY MEDICINE

## 2021-12-16 PROCEDURE — 36415 COLL VENOUS BLD VENIPUNCTURE: CPT | Performed by: FAMILY MEDICINE

## 2021-12-16 PROCEDURE — 84450 TRANSFERASE (AST) (SGOT): CPT | Performed by: FAMILY MEDICINE

## 2021-12-16 PROCEDURE — 99213 OFFICE O/P EST LOW 20 MIN: CPT | Performed by: STUDENT IN AN ORGANIZED HEALTH CARE EDUCATION/TRAINING PROGRAM

## 2021-12-16 PROCEDURE — 86160 COMPLEMENT ANTIGEN: CPT | Mod: 59 | Performed by: FAMILY MEDICINE

## 2021-12-16 PROCEDURE — 84460 ALANINE AMINO (ALT) (SGPT): CPT | Performed by: FAMILY MEDICINE

## 2021-12-16 PROCEDURE — 82565 ASSAY OF CREATININE: CPT | Performed by: FAMILY MEDICINE

## 2021-12-16 PROCEDURE — 86225 DNA ANTIBODY NATIVE: CPT | Performed by: FAMILY MEDICINE

## 2021-12-16 ASSESSMENT — PAIN SCALES - GENERAL: PAINLEVEL: NO PAIN (0)

## 2021-12-16 NOTE — NURSING NOTE
"Chief Complaint   Patient presents with     RECHECK     Systemic lupus erythematosus with other organ involvement, unspecified SLE type     Plaquenil     eye exam due - appt scheduled next week.        Initial /80 (BP Location: Left arm, Patient Position: Sitting, Cuff Size: Adult Large)   Pulse 82   Wt 79.8 kg (176 lb)   LMP  (LMP Unknown)   SpO2 97%   BMI 34.66 kg/m   Estimated body mass index is 34.66 kg/m  as calculated from the following:    Height as of 11/30/21: 1.518 m (4' 11.75\").    Weight as of this encounter: 79.8 kg (176 lb)..    She requests these members of her care team be copied on today's visit information:     PCP: Xochitl Hemphill    Do you need any medication refills at today's visit?      Funmilayo Tejeda MA   Email: [email or web address]  [facility] - Rheumatology  Phone: [phone]  Fax: [phone]      "

## 2021-12-16 NOTE — PATIENT INSTRUCTIONS
Will continue Plaquenil 200 mg daily. Give her eye exam form.     You can try Voltaren gel as needed.     Blood tests ordered     Follow up in a year.

## 2021-12-16 NOTE — PROGRESS NOTES
Rheumatology Visit     Capri Wiseman MRN# [id number]   YOB: 1945 Age: 72 year old     Date of Visit: Dec 16, 2021  Primary care provider: Xochitl Hemphill          Assessment and Plan:   76-year-old woman with a 13-year history of SLE characterized by arthralgias, sicca symptoms, alopecia, and GERD. +AMY in 2013. Has been in remission for 7+ years, on maintenance therapy with  mg qd. Possible history of RA; likely a component of secondary Sjogren's impacting dental health.       2.  Other medical problems as per EPIC      PLAN: Discussed in detail with the patient      1. Continue Plaquenil - Rx renewed. Eye exam yearly  2. For her dental issues due to sicca symptoms, continue with [facility]  3.  Blood test orders placed today.  4. Return in 1 year  5. Flu shot this fall.                    History of Present Illness:   76-year-old woman who presents to [facility] to followup fo diagnosis of SLE. She was seen by Dr Loredo in March 2015 after she moved to [place] from [place]. EPIC reviewed.      HISTORY CARRIED FORWARD:      She first began experiencing symptoms SLE about 12 years ago, when she was hospitalized for ACS-like chest pain. Cardiovascular workup was negative for coronary disease. About one year later, she was again hospitalized for chest pain and underwent a cardiac catheterization, which showed clean coronaries. Her chest pain was ultimately diagnosed as GERD and additional blood work (specifics unknown) suggested this was secondary to SLE. She had no other symptoms of SLE at the time.      Over the years she has experienced additional symptoms, including arthralgias (primarily in knees, ankles, hands, wrists), alopecia, sicca symptoms, and Raynaud s. She was started on prednisone sometime early in her disease course but became very  swollen  and was never given corticosteroids again after this, per her recollection. Currently taking  mg  qd and has been on this dose for years. Last eye exam was in 10/2013. Any additional treatment history is unknown.      Believes she also has RA but history of diagnosis is entirely unknown. Over the past 2-3 years she has been losing her upper row of teeth 2/2 dry mouth and has been told she needs dental surgery to support her palate. Has never used Evoxac. Used Restasis for dry eyes in the past but now too expensive and she uses OTC eye drops instead.       In July 2014, while she was still living in [place], she had an abnormal UA that suggested possible kidney disease 2/2 SLE. There were plans to obtain kidney ultrasound and/or biopsy but this was not done as Ms. Wiseman was in the process of relocating to [place]. She has already established care with a new PCP here, with plans to pursue kidney US. She does already carry a diagnosis of CKDIII though the history of this is quite unclear.      INTERVAL HISTORY:      Today, she overall feels well and states her SLE has now been in remission for about 7 years. No exacerbations since she was last seen. No arthralgias.  She continues on apple vinegar in AM and lemon in hot water in PM and feels great. She has [facility] with this [facility].  She no longer has leg aches.      She remains on Plaquenil one daily without side effects.  She saw her eye doctor last month and exam stable.      She is on Restasis and artificial saliva. She had continued to have some dental deterioration and had full upper mouth [facility]ctions [facility] and a bridge and this is stable.      Complement levels and dsDNA normal in July 2017.    Unfortunately her  has terminal [facility]. We discussed this at length.  She seems to be coping ok.  Her whole family has been visiting.    Other ongoing issues include mild alopecia and Raynaud s, both stable. GERD well controlled on Prilosec. Has never had any major infections    October 10, 2019 - She is  using Biotene products and has noticed improvement in her sicca symptoms. She is doing better, her knees are better. She is getting Synvisc injection in her knees. Her Plaquenil eye exam was on 7/22/19 and was normal. Denies any joint pain, skin rash. She is on Plaquenil for 11 years. When she was first diagnosed with SLE she was very fatigued. It is better now.     December 16, 2021 - She got TINA in her back a year ago which helped but it has weared off now. She had right knee surgery in January 2021. She is on Plaquenil 200 mg daily and is due for eye exam. She has raynaud's which is stable.  Denies chest pain, oral/nasal mucosal sores, photosensitivity.  Sicca symptoms are stable, she uses eyedrops.           Review of Systems:   Review Of Systems  Skin: negative  Eyes: see HPI  Ears/Nose/Throat: sicca unchanged  Respiratory: No shortness of breath, dyspnea on exertion, cough, or hemoptysis  Cardiovascular: negative  Gastrointestinal: negative  Genitourinary: negative  Musculoskeletal: see HPI  Neurologic: negative  Psychiatric: negative  Hematologic/Lymphatic/Immunologic: negative  Endocrine: negative          Past Medical History:     Past Medical History:   Diagnosis Date     CKD (chronic kidney disease) stage 3, GFR 30-59 ml/min (H)      Dental disorder     loosing top teeth due to lupus     GERD (gastroesophageal reflux disease)      High blood pressure      Hyperlipidemia      Mixed hyperlipidaemia      Osteoarthritis      RA (rheumatoid arthritis) (H)     mild, on plaquenil     SLE (systemic lupus erythematosus) (H)      Vision problem     dry eyes from Lupus       Patient Active Problem List    Diagnosis Date Noted     Mass of right wrist 04/05/2021     Priority: Medium     Acute radicular low back pain 01/25/2021     Priority: Medium     S/P total knee replacement using cement, right 01/12/2021     Priority: Medium     Primary osteoarthritis of right knee 10/21/2020     Priority: Medium     Added  automatically from request for surgery [id number]       Long-term use of Plaquenil 07/16/2018     Priority: Medium     Systemic lupus erythematosus, unspecified SLE type, unspecified organ involvement status (H) 08/14/2017     Priority: Medium     CKD (chronic kidney disease) stage 3, GFR 30-59 ml/min (H) 03/03/2015     Priority: Medium     Hypertension 03/03/2015     Priority: Medium     Esophageal reflux 03/03/2015     Priority: Medium     Hyperlipidemia with target LDL less than 130 03/03/2015     Priority: Medium     Diagnosis updated by automated process. Provider to review and confirm.       RA (rheumatoid arthritis) (H) 03/03/2015     Priority: Medium             Past Surgical History:     Past Surgical History:   Procedure Laterality Date     APPENDECTOMY  1969     ARTHROPLASTY KNEE Right 1/12/2021    Procedure: right total knee replacement;  Surgeon: Thad Fermin DO;  Location:  OR     C TOTAL KNEE ARTHROPLASTY  3/10    left     COLONOSCOPY  2009    repeat 5 years     COLONOSCOPY N/A 5/20/2015    normal, repeat 5 years due to family history     COLONOSCOPY N/A 10/7/2019    Procedure: COLONOSCOPY;  Surgeon: Con Recinos MD;  Location:  GI     FOOT SURGERY  2010    bone and screws in 2 toes, hammertoe     HYSTERECTOMY TOTAL ABDOMINAL  2010    due to bleeding, benign     LAPAROTOMY EXPLORATORY      scar tissue removal/repair - abd     SHOULDER SURGERY  05/2013    rotator cuff repair, right             Social History:     Social History     Tobacco Use     Smoking status: Never Smoker     Smokeless tobacco: Never Used   Substance Use Topics     Alcohol use: No     Alcohol/week: 0.0 standard drinks             Family History:     Family History   Problem Relation Age of Onset     Colon Cancer Mother      Lupus Mother      Cancer Mother         colon cancer     Cerebrovascular Disease Father      No Known Problems Sister      No Known Problems Brother      No Known Problems Sister      No  Known Problems Son      Neurologic Disorder Sister         ALS     Cancer Maternal Grandmother         stomach cancer     Heart Disease Maternal Grandfather      Unknown/Adopted Paternal Grandmother      Unknown/Adopted Paternal Grandfather      No Known Problems Sister      No Known Problems Sister      No Known Problems Son      No Known Problems Son             Allergies:     No Known Allergies          Medications:     Current Outpatient Medications   Medication Sig Dispense Refill     acetaminophen (TYLENOL) 325 MG tablet Take 3 tablets (975 mg) by mouth every 8 hours as needed for other (mild pain) 100 tablet 1     artificial saliva, BIOTENE MT, (BIOTENE MT) SOLN Swish and spit 5-10 mLs in mouth as needed for dry mouth 44.3 mL 3     Ascorbic Acid (VITAMIN C PO) Take 1,000 mg by mouth daily       aspirin (ASA) 81 MG EC tablet Take 1 tablet (81 mg) by mouth daily       calcium citrate-vitamin D (CITRACAL) 315-250 MG-UNIT TABS Take 2 tablets by mouth daily        Cyanocobalamin (VITAMIN B 12 PO) Take 1,000 mcg by mouth daily       cycloSPORINE (RESTASIS) 0.05 % ophthalmic emulsion Place 1 drop into both eyes 2 times daily 180 each 3     hydrochlorothiazide (HYDRODIURIL) 25 MG tablet Take 0.5 tablets (12.5 mg) by mouth daily 45 tablet 3     hydroxychloroquine (PLAQUENIL) 200 MG tablet Take 1 tablet (200 mg) by mouth daily 90 tablet 3     lisinopril (ZESTRIL) 10 MG tablet Take 1 tablet (10 mg) by mouth daily 90 tablet 3     metoprolol succinate ER (TOPROL-XL) 25 MG 24 hr tablet Take 1 tablet (25 mg) by mouth 2 times daily 180 tablet 3     Multiple Vitamins-Minerals (CENTRUM SILVER) per tablet Take 1 tablet by mouth daily       simvastatin (ZOCOR) 10 MG tablet Take 1 tablet (10 mg) by mouth At Bedtime 90 tablet 3            Physical Exam:   Blood pressure 129/80, pulse 82, weight 79.8 kg (176 lb), SpO2 97 %, not currently breastfeeding.  Constitutional: WD-WN-[id number] cooperative  Eyes: nl EOM, conjunctiva, sclera  ENT:  nl external ears, nose, lips. In process of upper extractions   No mucous membrane lesions  GI: no ABD mass or tenderness, no HSM  Lymph: no cervical or supraclavicular nodes  MS: All TMJ, neck, shoulder, elbow, wrist, MCP/PIP/DIP, spine, hip, knee, ankle, and foot MTP/IP joints were examined. No active synovitis. Heberdon nodes over DIP joints.  Full ROM. Normal  strength. No dactylitis, tenosynovitis, enthesopathy. Minimal hallux valgus and prominence of 1st MTP in R foot.  Skin: no nail pitting, alopecia, rash, nodules or lesions  Neuro: intact CN, strength  Psych: nl affect         Data:     Lab Results   Component Value Date    WBC 13.5 (H) 01/27/2021    WBC 6.7 01/04/2021    WBC 5.5 11/04/2020    HGB 10.2 (L) 01/27/2021    HGB 9.6 (L) 01/13/2021    HGB 11.5 (L) 01/04/2021    HCT 31.0 (L) 01/27/2021    HCT 34.7 (L) 01/04/2021    HCT 36.4 11/04/2020     (H) 01/27/2021     (H) 01/04/2021     (H) 11/04/2020     01/27/2021     01/04/2021     11/04/2020     Lab Results   Component Value Date    BUN 40 (H) 01/27/2021    BUN 34 (H) 01/04/2021    BUN 44 (H) 11/04/2020     No components found for: SEDRATE  Lab Results   Component Value Date    TSH 1.48 11/30/2021    TSH 1.600 07/08/2013     Lab Results   Component Value Date    AST 14 01/27/2021    AST 19 10/10/2019    AST 13 08/04/2019    ALT 14 01/27/2021    ALT 29 10/10/2019    ALT 13 08/04/2019    ALKPHOS 101 01/27/2021    ALKPHOS 42 08/04/2019    ALKPHOS 50 02/15/2019     Reviewed Rheumatology lab flowsheet    Dione Sykes MD  [facility]  Department of Rheumatology & Autoimmune Disorders  [facility]: [phone]  Pager - [phone]

## 2021-12-17 LAB
C3 SERPL-MCNC: 122 MG/DL (ref 81–157)
C4 SERPL-MCNC: 30 MG/DL (ref 13–39)

## 2021-12-21 LAB — DSDNA AB SER-ACNC: <0.6 IU/ML

## 2022-01-23 PROBLEM — R60.9 EDEMA, UNSPECIFIED TYPE: Status: ACTIVE | Noted: 2022-01-23

## 2022-01-23 PROBLEM — M32.19 SYSTEMIC LUPUS ERYTHEMATOSUS WITH OTHER ORGAN INVOLVEMENT, UNSPECIFIED SLE TYPE (H): Status: ACTIVE | Noted: 2017-08-14

## 2022-09-30 NOTE — TELEPHONE ENCOUNTER
Message from K1 Speedhart:  Original authorizing provider: MD Capri Valentine would like a refill of the following medications:  metoprolol (TOPROL-XL) 25 MG 24 hr tablet [Xochitl Hemphill MD]  hydrochlorothiazide (HYDRODIURIL) 25 MG tablet [Xochitl Hemphill MD]    Preferred pharmacy: [facility] - USE FOR MAILING ONLY - [place], [place]    Comment:     EM[facility]

## 2022-11-28 DIAGNOSIS — R60.9 EDEMA, UNSPECIFIED TYPE: ICD-10-CM

## 2022-11-28 DIAGNOSIS — E78.5 HYPERLIPIDEMIA WITH TARGET LDL LESS THAN 130: ICD-10-CM

## 2022-11-28 DIAGNOSIS — M32.19 SYSTEMIC LUPUS ERYTHEMATOSUS WITH OTHER ORGAN INVOLVEMENT, UNSPECIFIED SLE TYPE (H): ICD-10-CM

## 2022-11-28 DIAGNOSIS — N18.30 STAGE 3 CHRONIC KIDNEY DISEASE, UNSPECIFIED WHETHER STAGE 3A OR 3B CKD (H): ICD-10-CM

## 2022-11-28 DIAGNOSIS — I10 ESSENTIAL HYPERTENSION WITH GOAL BLOOD PRESSURE LESS THAN 140/90: ICD-10-CM

## 2022-11-28 RX ORDER — HYDROXYCHLOROQUINE SULFATE 200 MG/1
200 TABLET, FILM COATED ORAL DAILY
Qty: 90 TABLET | Refills: 0 | Status: SHIPPED | OUTPATIENT
Start: 2022-11-28 | End: 2023-02-28

## 2022-11-28 RX ORDER — SIMVASTATIN 10 MG
10 TABLET ORAL AT BEDTIME
Qty: 90 TABLET | Refills: 0 | Status: SHIPPED | OUTPATIENT
Start: 2022-11-28 | End: 2023-02-28

## 2022-11-28 RX ORDER — METOPROLOL SUCCINATE 25 MG/1
25 TABLET, EXTENDED RELEASE ORAL 2 TIMES DAILY
Qty: 180 TABLET | Refills: 0 | Status: SHIPPED | OUTPATIENT
Start: 2022-11-28 | End: 2023-02-28

## 2022-11-28 RX ORDER — HYDROCHLOROTHIAZIDE 25 MG/1
12.5 TABLET ORAL DAILY
Qty: 45 TABLET | Refills: 0 | Status: SHIPPED | OUTPATIENT
Start: 2022-11-28 | End: 2023-01-11

## 2022-11-28 RX ORDER — LISINOPRIL 10 MG/1
10 TABLET ORAL DAILY
Qty: 90 TABLET | Refills: 0 | Status: SHIPPED | OUTPATIENT
Start: 2022-11-28 | End: 2023-02-28

## 2022-11-28 NOTE — TELEPHONE ENCOUNTER
Patient is leaving for [place] this weekend, she is needing her Rx refilled as she will be gone until sometime in January.    Patient has appointment scheduled to see provider on 02/28/2022 with Dr Hemphill for her Annual Wellness Check.    Stacey Melton XRO/

## 2022-11-28 NOTE — TELEPHONE ENCOUNTER
"Requested Prescriptions   Pending Prescriptions Disp Refills    hydrochlorothiazide (HYDRODIURIL) 25 MG tablet 45 tablet 3     Sig: Take 0.5 tablets (12.5 mg) by mouth daily       Diuretics (Including Combos) Protocol Failed - 11/28/2022 11:40 AM        Failed - Normal serum potassium on file in past 12 months     Recent Labs   Lab Test 01/27/21  1725   POTASSIUM 3.9                    Failed - Normal serum sodium on file in past 12 months     Recent Labs   Lab Test 01/27/21  1725                 Passed - Blood pressure under 140/90 in past 12 months     BP Readings from Last 3 Encounters:   12/16/21 129/80   11/30/21 (!) 152/80   04/05/21 122/68                 Passed - Recent (12 mo) or future (30 days) visit within the authorizing provider's specialty     Patient has had an office visit with the authorizing provider or a provider within the authorizing providers department within the previous 12 mos or has a future within next 30 days. See \"Patient Info\" tab in inbasket, or \"Choose Columns\" in Meds & Orders section of the refill encounter.              Passed - Medication is active on med list        Passed - Patient is age 18 or older        Passed - No active pregancy on record        Passed - Normal serum creatinine on file in past 12 months     Recent Labs   Lab Test 12/16/21  0923   CR 1.04              Passed - No positive pregnancy test in past 12 months          hydroxychloroquine (PLAQUENIL) 200 MG tablet 90 tablet 3     Sig: Take 1 tablet (200 mg) by mouth daily       There is no refill protocol information for this order       simvastatin (ZOCOR) 10 MG tablet 90 tablet 3     Sig: Take 1 tablet (10 mg) by mouth At Bedtime       Statins Protocol Passed - 11/28/2022 11:40 AM        Passed - LDL on file in past 12 months     Recent Labs   Lab Test 11/30/21  1523   LDL 35             Passed - No abnormal creatine kinase in past 12 months     No lab results found.             Passed - Recent (12 mo) or " "future (30 days) visit within the authorizing provider's specialty     Patient has had an office visit with the authorizing provider or a provider within the authorizing providers department within the previous 12 mos or has a future within next 30 days. See \"Patient Info\" tab in inbasket, or \"Choose Columns\" in Meds & Orders section of the refill encounter.              Passed - Medication is active on med list        Passed - Patient is age 18 or older        Passed - No active pregnancy on record        Passed - No positive pregnancy test in past 12 months          lisinopril (ZESTRIL) 10 MG tablet 90 tablet 3     Sig: Take 1 tablet (10 mg) by mouth daily       ACE Inhibitors (Including Combos) Protocol Failed - 11/28/2022 11:40 AM        Failed - Normal serum potassium on file in past 12 months     Recent Labs   Lab Test 01/27/21  1725   POTASSIUM 3.9             Passed - Blood pressure under 140/90 in past 12 months     BP Readings from Last 3 Encounters:   12/16/21 129/80   11/30/21 (!) 152/80   04/05/21 122/68                 Passed - Recent (12 mo) or future (30 days) visit within the authorizing provider's specialty     Patient has had an office visit with the authorizing provider or a provider within the authorizing providers department within the previous 12 mos or has a future within next 30 days. See \"Patient Info\" tab in inbasket, or \"Choose Columns\" in Meds & Orders section of the refill encounter.              Passed - Medication is active on med list        Passed - Patient is age 18 or older        Passed - No active pregnancy on record        Passed - Normal serum creatinine on file in past 12 months     Recent Labs   Lab Test 12/16/21  0923 08/04/19  0935 07/31/19  1455   CR 1.04   < >  --    CREAT  --   --  1.3*    < > = values in this interval not displayed.       Ok to refill medication if creatinine is low          Passed - No positive pregnancy test within past 12 months          metoprolol " "succinate ER (TOPROL XL) 25 MG 24 hr tablet 180 tablet 3     Sig: Take 1 tablet (25 mg) by mouth 2 times daily       Beta-Blockers Protocol Passed - 11/28/2022 11:40 AM        Passed - Blood pressure under 140/90 in past 12 months     BP Readings from Last 3 Encounters:   12/16/21 129/80   11/30/21 (!) 152/80   04/05/21 122/68                 Passed - Patient is age 6 or older        Passed - Recent (12 mo) or future (30 days) visit within the authorizing provider's specialty     Patient has had an office visit with the authorizing provider or a provider within the authorizing providers department within the previous 12 mos or has a future within next 30 days. See \"Patient Info\" tab in inbasket, or \"Choose Columns\" in Meds & Orders section of the refill encounter.              Passed - Medication is active on med list             EUNICE ScottN, RN     "

## 2023-01-01 ENCOUNTER — TELEPHONE (OUTPATIENT)
Dept: FAMILY MEDICINE | Facility: CLINIC | Age: 78
End: 2023-01-01

## 2023-01-01 ENCOUNTER — NURSE TRIAGE (OUTPATIENT)
Dept: NURSING | Facility: CLINIC | Age: 78
End: 2023-01-01

## 2023-01-01 NOTE — TELEPHONE ENCOUNTER
Reason for call:  Other   Patient called regarding (reason for call): appointment  Additional comments: Patient referred by FNA for a clinic visit as soon as possible for an ER follow up. Patient is currently in [place] and went to the ER on Friday 12/27/22. She experienced low BP and magnesium, blood in urine, and fainting. She is hoping to be worked into Dr. Hemphill's schedule for an in clinic visit when she is back in [facility], as appointments are not available at [facility] in the near future. Please advise.     Phone number to reach patient:  Other phone number:  [phone] Pritesh crowley    Best Time:  Asap    Can we leave a detailed message on this number?  YES    Travel screening: Not Applicable

## 2023-01-01 NOTE — TELEPHONE ENCOUNTER
"Nurse Triage SBAR    Situation:   -Medication question    Background:   -Calling with her son and daughter in law kaur.  -FNA RN unable to traige as she is in [place]  -She gave verbal consent to commnicate    Assessment:   -In [place] now visiting family  -She was in a doctors waiting room when she passed out  -She was then taken to the ED, where she was evaluated (but not admitted in the hospital  -Her BP was low in the ED, DBP of 50  -She was told to follow up with her PCP r/t blood in her urine  because of this she is now staying there longer than expected and will run out of some of her medications  -She is requesting a refill for her \"heart, kidney, and adrianne medication\" (unsure what specific medication theses are  -She uses a pill organizer and is unsure what she has down there now  -We discusses the following medications:          -Preferred pharmacy:    -We discussed having family overnight her medication as well as reaching out to a provider in [place] as we may not be able to refill her medications    Recommendation:   -Patient: follow up with PCP in office as directed, call a triage line down there e for any change in symptoms or other medical concerns  -Care team: Are you able to help her re-fill a short term supply of her prescriptions to get her back to [place]? Will she need to be reevaluated given her low BP in the ED before prescribing?      LURDES ALVAREZ RN on 1/1/2023 at 12:21 PM        Reason for Disposition    [1] Caller has NON-URGENT medicine question about med that PCP prescribed AND [2] triager unable to answer question    Protocols used: MEDICATION QUESTION CALL-A-AH      "

## 2023-01-03 NOTE — TELEPHONE ENCOUNTER
Medications were overnighted to patient from son in [place]. RN advised them to go in to be seen down in [place] if symptoms are present. Otherwise patient is scheduled for an appointment on 1/11.     EUNICE ScottN, RN

## 2023-01-11 ENCOUNTER — OFFICE VISIT (OUTPATIENT)
Dept: FAMILY MEDICINE | Facility: CLINIC | Age: 78
End: 2023-01-11
Payer: COMMERCIAL

## 2023-01-11 VITALS
OXYGEN SATURATION: 97 % | WEIGHT: 162.38 LBS | BODY MASS INDEX: 32.73 KG/M2 | HEIGHT: 59 IN | DIASTOLIC BLOOD PRESSURE: 70 MMHG | SYSTOLIC BLOOD PRESSURE: 110 MMHG | RESPIRATION RATE: 22 BRPM | HEART RATE: 82 BPM | TEMPERATURE: 97.5 F

## 2023-01-11 DIAGNOSIS — E83.42 HYPOMAGNESEMIA: ICD-10-CM

## 2023-01-11 DIAGNOSIS — M32.19 SYSTEMIC LUPUS ERYTHEMATOSUS WITH OTHER ORGAN INVOLVEMENT, UNSPECIFIED SLE TYPE (H): ICD-10-CM

## 2023-01-11 DIAGNOSIS — I10 ESSENTIAL HYPERTENSION WITH GOAL BLOOD PRESSURE LESS THAN 140/90: ICD-10-CM

## 2023-01-11 DIAGNOSIS — R31.29 OTHER MICROSCOPIC HEMATURIA: ICD-10-CM

## 2023-01-11 DIAGNOSIS — N18.30 ANEMIA DUE TO STAGE 3 CHRONIC KIDNEY DISEASE, UNSPECIFIED WHETHER STAGE 3A OR 3B CKD (H): ICD-10-CM

## 2023-01-11 DIAGNOSIS — Z86.16 HISTORY OF COVID-19: ICD-10-CM

## 2023-01-11 DIAGNOSIS — E78.5 HYPERLIPIDEMIA WITH TARGET LDL LESS THAN 130: ICD-10-CM

## 2023-01-11 DIAGNOSIS — Z09 FOLLOW-UP EXAM: Primary | ICD-10-CM

## 2023-01-11 DIAGNOSIS — D63.1 ANEMIA DUE TO STAGE 3 CHRONIC KIDNEY DISEASE, UNSPECIFIED WHETHER STAGE 3A OR 3B CKD (H): ICD-10-CM

## 2023-01-11 LAB
ALBUMIN UR-MCNC: NEGATIVE MG/DL
ANION GAP SERPL CALCULATED.3IONS-SCNC: 4 MMOL/L (ref 3–14)
APPEARANCE UR: ABNORMAL
BILIRUB UR QL STRIP: NEGATIVE
BUN SERPL-MCNC: 36 MG/DL (ref 7–30)
CALCIUM SERPL-MCNC: 9.3 MG/DL (ref 8.5–10.1)
CHLORIDE BLD-SCNC: 105 MMOL/L (ref 94–109)
CHOLEST SERPL-MCNC: 134 MG/DL
CO2 SERPL-SCNC: 30 MMOL/L (ref 20–32)
COLOR UR AUTO: YELLOW
CREAT SERPL-MCNC: 1.14 MG/DL (ref 0.52–1.04)
CREAT UR-MCNC: 309 MG/DL
FASTING STATUS PATIENT QL REPORTED: YES
GFR SERPL CREATININE-BSD FRML MDRD: 49 ML/MIN/1.73M2
GLUCOSE BLD-MCNC: 104 MG/DL (ref 70–99)
GLUCOSE UR STRIP-MCNC: NEGATIVE MG/DL
HDLC SERPL-MCNC: 53 MG/DL
HGB BLD-MCNC: 11.4 G/DL (ref 11.7–15.7)
HGB UR QL STRIP: NEGATIVE
KETONES UR STRIP-MCNC: NEGATIVE MG/DL
LDLC SERPL CALC-MCNC: 47 MG/DL
LEUKOCYTE ESTERASE UR QL STRIP: ABNORMAL
MAGNESIUM SERPL-MCNC: 2 MG/DL (ref 1.6–2.3)
MICROALBUMIN UR-MCNC: 41 MG/L
MICROALBUMIN/CREAT UR: 13.27 MG/G CR (ref 0–25)
MUCOUS THREADS #/AREA URNS LPF: PRESENT /LPF
NITRATE UR QL: NEGATIVE
NONHDLC SERPL-MCNC: 81 MG/DL
PH UR STRIP: 5 [PH] (ref 5–7)
POTASSIUM BLD-SCNC: 4.3 MMOL/L (ref 3.4–5.3)
RBC URINE: 0 /HPF
SODIUM SERPL-SCNC: 139 MMOL/L (ref 133–144)
SP GR UR STRIP: 1.03 (ref 1–1.03)
SQUAMOUS EPITHELIAL: 3 /HPF
TRANSITIONAL EPI: 1 /HPF
TRIGL SERPL-MCNC: 169 MG/DL
UROBILINOGEN UR STRIP-MCNC: NORMAL MG/DL
WBC URINE: 2 /HPF

## 2023-01-11 PROCEDURE — 80061 LIPID PANEL: CPT | Performed by: STUDENT IN AN ORGANIZED HEALTH CARE EDUCATION/TRAINING PROGRAM

## 2023-01-11 PROCEDURE — 82570 ASSAY OF URINE CREATININE: CPT | Performed by: STUDENT IN AN ORGANIZED HEALTH CARE EDUCATION/TRAINING PROGRAM

## 2023-01-11 PROCEDURE — 80048 BASIC METABOLIC PNL TOTAL CA: CPT | Performed by: STUDENT IN AN ORGANIZED HEALTH CARE EDUCATION/TRAINING PROGRAM

## 2023-01-11 PROCEDURE — 81001 URINALYSIS AUTO W/SCOPE: CPT | Performed by: STUDENT IN AN ORGANIZED HEALTH CARE EDUCATION/TRAINING PROGRAM

## 2023-01-11 PROCEDURE — 99214 OFFICE O/P EST MOD 30 MIN: CPT | Performed by: STUDENT IN AN ORGANIZED HEALTH CARE EDUCATION/TRAINING PROGRAM

## 2023-01-11 PROCEDURE — 83735 ASSAY OF MAGNESIUM: CPT | Performed by: STUDENT IN AN ORGANIZED HEALTH CARE EDUCATION/TRAINING PROGRAM

## 2023-01-11 PROCEDURE — 85018 HEMOGLOBIN: CPT | Performed by: STUDENT IN AN ORGANIZED HEALTH CARE EDUCATION/TRAINING PROGRAM

## 2023-01-11 PROCEDURE — 82043 UR ALBUMIN QUANTITATIVE: CPT | Performed by: STUDENT IN AN ORGANIZED HEALTH CARE EDUCATION/TRAINING PROGRAM

## 2023-01-11 PROCEDURE — 36415 COLL VENOUS BLD VENIPUNCTURE: CPT | Performed by: STUDENT IN AN ORGANIZED HEALTH CARE EDUCATION/TRAINING PROGRAM

## 2023-01-11 ASSESSMENT — PAIN SCALES - GENERAL: PAINLEVEL: NO PAIN (0)

## 2023-01-11 NOTE — PROGRESS NOTES
"  Assessment & Plan     Follow-up exam    History of COVID-19  Patient completely recovered without residual symptoms.  Recommend COVID-vaccine.    Other microscopic hematuria  Microscopic hematuria noted and will repeat on UA today.  - UA Macro with Reflex to Micro and Culture - lab collect  - UA Macro with Reflex to Micro and Culture - lab collect    Anemia due to stage 3 chronic kidney disease, unspecified whether stage 3a or 3b CKD (H)  Repeat BMP today  - BASIC METABOLIC PANEL  - Albumin Random Urine Quantitative with Creat Ratio  - Hemoglobin  - BASIC METABOLIC PANEL  - Albumin Random Urine Quantitative with Creat Ratio  - Hemoglobin    Hyperlipidemia with target LDL less than 130  - Lipid panel reflex to direct LDL Non-fasting  - Lipid panel reflex to direct LDL Non-fasting    Systemic lupus erythematosus with other organ involvement, unspecified SLE type (H)  Following with rheumatology and on Plaquenil.  Follow-up for yearly eye exam.    Essential hypertension with goal blood pressure less than 140/90  Slightly low in clinic today and given outside hypotension we will stop hydrochlorothiazide.  Repeat BMP today.    Hypomagnesemia  Repeat today.  Stop hydrochlorothiazide and replace as needed.  - Magnesium  - Magnesium           MED REC REQUIRED  Post Medication Reconciliation Status:  Discharge medications reconciled and changed, see notes/orders    BMI:   Estimated body mass index is 32.8 kg/m  as calculated from the following:    Height as of this encounter: 1.499 m (4' 11\").    Weight as of this encounter: 73.7 kg (162 lb 6 oz).     Dileep Martinez MD  [facility] CHLOE Farooq is a 77 year old, presenting for the following health issues:  ER visit follow up      HPI     ED/UC Followup:    Facility:  [facility]  Date of visit: 12/30/2022  Reason for visit: COVID 19  Current Status: Back to normal    Patient following up today from ER visit in [place].  " "Diagnosed with COVID at that time.  Patient was not feeling well and lightheaded and actually had a syncopal event and then clinic visit there and was sent to the ER.  Patient diagnosed with COVID infection with minimal respiratory symptoms at that time.  Did have lower blood pressures in general but specifically when standing up.  She did have diarrhea as well.  Patient received fluids and also magnesium replacement.  No urinary symptoms but was noted to have some blood in the urine follow-up recommended.  She was discharged without issues and feels back to her normal self now.  No residual respiratory symptoms.  Tells me she feels amazing today.  No further lightheadedness.  No chest pain or shortness of breath.  She had EKG and chest x-ray done there and normal but unable to view.  Patient otherwise has no exertional symptoms normally.    Review of Systems   Constitutional, HEENT, cardiovascular, pulmonary, gi and gu systems are negative, except as otherwise noted.      Objective    /70   Pulse 82   Temp 97.5  F (36.4  C) (Temporal)   Resp 22   Ht 1.499 m (4' 11\")   Wt 73.7 kg (162 lb 6 oz)   LMP  (LMP Unknown)   SpO2 97%   BMI 32.80 kg/m    Body mass index is 32.8 kg/m .  Physical Exam   GENERAL: healthy, alert and no distress  EYES: Eyes grossly normal to inspection, PERRL and conjunctivae and sclerae normal  RESP: lungs clear to auscultation - no rales, rhonchi or wheezes  CV: regular rate and rhythm, normal S1 S2, no S3 or S4, no murmur, click or rub, no peripheral edema and peripheral pulses strong  ABDOMEN: soft, nontender, no hepatosplenomegaly, no masses and bowel sounds normal  MS: no gross musculoskeletal defects noted, no edema  SKIN: no suspicious lesions or rashes  NEURO: mentation intact and speech normal  PSYCH: mentation appears normal, affect normal/bright              "

## 2023-01-19 ENCOUNTER — NURSE TRIAGE (OUTPATIENT)
Dept: NURSING | Facility: CLINIC | Age: 78
End: 2023-01-19
Payer: COMMERCIAL

## 2023-01-19 NOTE — TELEPHONE ENCOUNTER
Nurse Triage SBAR    Is this a 2nd Level Triage? No - routed to PCP Care Team to address during office hours    Situation/Background: Patient is calling to follow up on lab results from visit on 1/11/23; purpose of the visit was to follow up after an ED [place] of [place]. UA, Albumin, Magnesium, Hemoglobin are resulted in Epic but no Provider comments noted.    Recommendation: Per disposition, Call PCP during office hours. Routed encounter to PCP Care Team. Advised patient to call back with any new or worsening symptoms. Patient verbalized understanding and agrees with plan.    Patient may be reached at [phone].     Protocol Recommended Disposition: Telephone advice    Mireya Loco RN on 1/19/2023 at 3:33 PM  [facility]    Reason for Disposition    [1] Caller requesting NON-URGENT health information AND [2] PCP's office is the best resource    Protocols used: INFORMATION ONLY CALL - NO TRIAGE-A-

## 2023-02-25 ENCOUNTER — TELEPHONE (OUTPATIENT)
Dept: NURSING | Facility: CLINIC | Age: 78
End: 2023-02-25
Payer: COMMERCIAL

## 2023-02-25 DIAGNOSIS — N18.30 STAGE 3 CHRONIC KIDNEY DISEASE, UNSPECIFIED WHETHER STAGE 3A OR 3B CKD (H): ICD-10-CM

## 2023-02-25 DIAGNOSIS — E78.5 HYPERLIPIDEMIA WITH TARGET LDL LESS THAN 130: ICD-10-CM

## 2023-02-25 DIAGNOSIS — M32.19 SYSTEMIC LUPUS ERYTHEMATOSUS WITH OTHER ORGAN INVOLVEMENT, UNSPECIFIED SLE TYPE (H): ICD-10-CM

## 2023-02-25 DIAGNOSIS — R60.9 EDEMA, UNSPECIFIED TYPE: ICD-10-CM

## 2023-02-25 DIAGNOSIS — I10 ESSENTIAL HYPERTENSION WITH GOAL BLOOD PRESSURE LESS THAN 140/90: ICD-10-CM

## 2023-02-25 NOTE — TELEPHONE ENCOUNTER
Patient calls regarding medication refills that are needed. Requests were received from the pharmacy. Patient did not realize that she has a scheduled annual wellness with her provider on 2/28. She is able to make it to this appointment. Patient denies further questions or concerns.    Suma Shirley RN  [facility]

## 2023-02-28 ENCOUNTER — OFFICE VISIT (OUTPATIENT)
Dept: FAMILY MEDICINE | Facility: CLINIC | Age: 78
End: 2023-02-28
Payer: COMMERCIAL

## 2023-02-28 VITALS
WEIGHT: 169.38 LBS | OXYGEN SATURATION: 97 % | HEIGHT: 59 IN | HEART RATE: 83 BPM | TEMPERATURE: 98 F | DIASTOLIC BLOOD PRESSURE: 72 MMHG | BODY MASS INDEX: 34.15 KG/M2 | SYSTOLIC BLOOD PRESSURE: 136 MMHG | RESPIRATION RATE: 20 BRPM

## 2023-02-28 DIAGNOSIS — Z00.00 MEDICARE ANNUAL WELLNESS VISIT, SUBSEQUENT: Primary | ICD-10-CM

## 2023-02-28 DIAGNOSIS — E78.5 HYPERLIPIDEMIA WITH TARGET LDL LESS THAN 130: ICD-10-CM

## 2023-02-28 DIAGNOSIS — M32.19 SYSTEMIC LUPUS ERYTHEMATOSUS WITH OTHER ORGAN INVOLVEMENT, UNSPECIFIED SLE TYPE (H): ICD-10-CM

## 2023-02-28 DIAGNOSIS — I10 ESSENTIAL HYPERTENSION WITH GOAL BLOOD PRESSURE LESS THAN 140/90: ICD-10-CM

## 2023-02-28 DIAGNOSIS — M06.9 RHEUMATOID ARTHRITIS INVOLVING MULTIPLE SITES, UNSPECIFIED WHETHER RHEUMATOID FACTOR PRESENT (H): ICD-10-CM

## 2023-02-28 DIAGNOSIS — N18.30 STAGE 3 CHRONIC KIDNEY DISEASE, UNSPECIFIED WHETHER STAGE 3A OR 3B CKD (H): ICD-10-CM

## 2023-02-28 DIAGNOSIS — Z79.899 LONG-TERM USE OF PLAQUENIL: ICD-10-CM

## 2023-02-28 PROCEDURE — 99214 OFFICE O/P EST MOD 30 MIN: CPT | Mod: 25 | Performed by: FAMILY MEDICINE

## 2023-02-28 PROCEDURE — G0439 PPPS, SUBSEQ VISIT: HCPCS | Performed by: FAMILY MEDICINE

## 2023-02-28 RX ORDER — HYDROXYCHLOROQUINE SULFATE 200 MG/1
TABLET, FILM COATED ORAL
Qty: 90 TABLET | Refills: 3 | OUTPATIENT
Start: 2023-02-28

## 2023-02-28 RX ORDER — SIMVASTATIN 10 MG
TABLET ORAL
Qty: 90 TABLET | Refills: 3 | Status: SHIPPED | OUTPATIENT
Start: 2023-02-28 | End: 2024-03-18

## 2023-02-28 RX ORDER — HYDROXYCHLOROQUINE SULFATE 200 MG/1
200 TABLET, FILM COATED ORAL DAILY
Qty: 90 TABLET | Refills: 0 | Status: SHIPPED | OUTPATIENT
Start: 2023-02-28 | End: 2023-06-01

## 2023-02-28 RX ORDER — LISINOPRIL 10 MG/1
10 TABLET ORAL DAILY
Qty: 90 TABLET | Refills: 3 | Status: SHIPPED | OUTPATIENT
Start: 2023-02-28 | End: 2024-01-29

## 2023-02-28 RX ORDER — HYDROCHLOROTHIAZIDE 25 MG/1
TABLET ORAL
Qty: 45 TABLET | Refills: 3 | OUTPATIENT
Start: 2023-02-28

## 2023-02-28 RX ORDER — LISINOPRIL 10 MG/1
TABLET ORAL
Qty: 90 TABLET | Refills: 3 | OUTPATIENT
Start: 2023-02-28

## 2023-02-28 RX ORDER — METOPROLOL SUCCINATE 25 MG/1
TABLET, EXTENDED RELEASE ORAL
Qty: 180 TABLET | Refills: 3 | Status: SHIPPED | OUTPATIENT
Start: 2023-02-28 | End: 2024-03-18

## 2023-02-28 ASSESSMENT — ENCOUNTER SYMPTOMS
MYALGIAS: 0
PALPITATIONS: 0
FEVER: 0
ABDOMINAL PAIN: 0
NERVOUS/ANXIOUS: 0
FREQUENCY: 0
HEADACHES: 0
PARESTHESIAS: 0
SHORTNESS OF BREATH: 0
SORE THROAT: 0
COUGH: 0
HEMATURIA: 0
DIARRHEA: 0
HEARTBURN: 0
CHILLS: 0
CONSTIPATION: 0
DYSURIA: 0
WEAKNESS: 0
JOINT SWELLING: 0
HEMATOCHEZIA: 0
DIZZINESS: 0
BREAST MASS: 0
NAUSEA: 0
ARTHRALGIAS: 1

## 2023-02-28 ASSESSMENT — ACTIVITIES OF DAILY LIVING (ADL): CURRENT_FUNCTION: NO ASSISTANCE NEEDED

## 2023-02-28 ASSESSMENT — PAIN SCALES - GENERAL: PAINLEVEL: NO PAIN (0)

## 2023-02-28 NOTE — TELEPHONE ENCOUNTER
Hydrodiuril denied.  The original prescription was discontinued on 11/28/2022 by Blane Alfredo MD

## 2023-02-28 NOTE — PROGRESS NOTES
"SUBJECTIVE:   Capri is a 77 year old who presents for Preventive Visit.  Patient has been advised of split billing requirements and indicates understanding: Yes  Are you in the first 12 months of your Medicare coverage?  No    Healthy Habits:     In general, how would you rate your overall health?  Excellent    Frequency of exercise:  2-3 days/week    Duration of exercise:  15-30 minutes    Do you usually eat at least 4 servings of fruit and vegetables a day, include whole grains    & fiber and avoid regularly eating high fat or \"junk\" foods?  Yes    Taking medications regularly:  Yes    Medication side effects:  None and Muscle aches    Ability to successfully perform activities of daily living:  No assistance needed    Home Safety:  No safety concerns identified    Hearing Impairment:  No hearing concerns    In the past 6 months, have you been bothered by leaking of urine?  No    In general, how would you rate your overall mental or emotional health?  Excellent      PHQ-2 Total Score: 0    Additional concerns today:  No      Have you ever done Advance Care Planning? (For example, a Health Directive, POLST, or a discussion with a medical provider or your loved ones about your wishes): No, advance care planning information given to patient to review.  Patient declined advance care planning discussion at this time.       Fall risk  Fallen 2 or more times in the past year?: No  Any fall with injury in the past year?: No    Cognitive Screening   1) Repeat 3 items (Leader, Season, Table)    2) Clock draw: ABNORMAL Incorrect time  3) 3 item recall: Recalls 2 objects   Results: ABNORMAL clock, 1-2 items recalled: PROBABLE COGNITIVE IMPAIRMENT, **INFORM PROVIDER**    Mini-CogTM Copyright AMARIS Palafox. Licensed by the author for use in [facility]; reprinted with permission (moris@.[facility]). All rights reserved.      Do you have sleep apnea, excessive snoring or daytime drowsiness?: no    Reviewed and updated as " needed this visit by clinical staff   Tobacco  Allergies  Meds              Reviewed and updated as needed this visit by Provider                 Social History     Tobacco Use     Smoking status: Never     Smokeless tobacco: Never   Substance Use Topics     Alcohol use: No     Alcohol/week: 0.0 standard drinks         Alcohol Use 2/28/2023   Prescreen: >3 drinks/day or >7 drinks/week? No       Current providers sharing in care for this patient include:   Patient Care Team:  Xochitl Hemphill MD as PCP - General (Family Practice)  Dione Sykes MD as Assigned Rheumatology Provider  Xochitl Hemphill MD as Assigned PCP    The following health maintenance items are reviewed in Epic and correct as of today:  Health Maintenance   Topic Date Due     COVID-19 Vaccine (1) Never done     EYE EXAM  07/22/2020     HEPATITIS C SCREENING  02/28/2024 (Originally 8/9/1963)     ZOSTER IMMUNIZATION (2 of 2) 04/05/2023     LIPID  01/11/2024     MICROALBUMIN  01/11/2024     ANNUAL REVIEW OF HM ORDERS  01/11/2024     MEDICARE ANNUAL WELLNESS VISIT  02/28/2024     FALL RISK ASSESSMENT  02/28/2024     BMP  03/06/2024     HEMOGLOBIN  03/06/2024     COLORECTAL CANCER SCREENING  10/07/2024     ADVANCE CARE PLANNING  02/28/2028     DTAP/TDAP/TD IMMUNIZATION (4 - Td or Tdap) 02/08/2033     DEXA  02/19/2034     PHQ-2 (once per calendar year)  Completed     INFLUENZA VACCINE  Completed     Pneumococcal Vaccine: 65+ Years  Completed     URINALYSIS  Completed     IPV IMMUNIZATION  Aged Out     MENINGITIS IMMUNIZATION  Aged Out     MAMMO SCREENING  Discontinued     BP Readings from Last 3 Encounters:   03/06/23 (!) 157/87   02/28/23 136/72   01/11/23 110/70    Wt Readings from Last 3 Encounters:   03/06/23 73.5 kg (162 lb)   02/28/23 76.8 kg (169 lb 6 oz)   01/11/23 73.7 kg (162 lb 6 oz)                  Patient Active Problem List   Diagnosis     Anemia due to stage 3 chronic kidney disease, unspecified whether  stage 3a or 3b CKD (H)     Essential hypertension with goal blood pressure less than 140/90     Esophageal reflux     Hyperlipidemia with target LDL less than 130     RA (rheumatoid arthritis) (H)     Systemic lupus erythematosus with other organ involvement, unspecified SLE type (H)     Long-term use of Plaquenil     Primary osteoarthritis of right knee     S/P total knee replacement using cement, right     Acute radicular low back pain     Mass of right wrist     Edema, unspecified type     Past Surgical History:   Procedure Laterality Date     APPENDECTOMY  1969     ARTHROPLASTY KNEE Right 01/12/2021    Procedure: right total knee replacement;  Surgeon: Thad Fermin DO;  Location: [facility] OR     COLONOSCOPY  2009    repeat 5 years     COLONOSCOPY N/A 05/20/2015    normal, repeat 5 years due to family history     COLONOSCOPY N/A 10/07/2019    normal, no further screening necessary     FOOT SURGERY  2010    bone and screws in 2 toes, hammertoe     HYSTERECTOMY TOTAL ABDOMINAL  2010    due to bleeding, benign     LAPAROTOMY EXPLORATORY      scar tissue removal/repair - abd     SHOULDER SURGERY  05/2013    rotator cuff repair, right     ZZC TOTAL KNEE ARTHROPLASTY  03/2010    left       Social History     Tobacco Use     Smoking status: Never     Smokeless tobacco: Never   Substance Use Topics     Alcohol use: No     Alcohol/week: 0.0 standard drinks     Family History   Problem Relation Age of Onset     Colon Cancer Mother      Lupus Mother      Cancer Mother         colon cancer     Cerebrovascular Disease Father      No Known Problems Sister      No Known Problems Brother      No Known Problems Sister      No Known Problems Son      Neurologic Disorder Sister         ALS     Cancer Maternal Grandmother         stomach cancer     Heart Disease Maternal Grandfather      Unknown/Adopted Paternal Grandmother      Unknown/Adopted Paternal Grandfather      No Known Problems Sister      No Known Problems Sister   "    No Known Problems Son      No Known Problems Son          Pneumonia Vaccine:complete  Mammogram Screening: Mammogram Screening - Patient over age 75, has elected to discontinue screenings.    Pertinent mammograms are reviewed under the imaging tab.    Review of Systems   Constitutional: Negative for chills and fever.   HENT: Negative for congestion, ear pain, hearing loss and sore throat.    Eyes: Negative for visual disturbance.   Respiratory: Negative for cough and shortness of breath.    Cardiovascular: Negative for palpitations and peripheral edema.   Gastrointestinal: Negative for abdominal pain, constipation, diarrhea, heartburn, hematochezia and nausea.   Breasts:  Negative for breast mass and discharge.   Genitourinary: Negative for dysuria, frequency, genital sores, hematuria, urgency, vaginal bleeding and vaginal discharge.   Musculoskeletal: Positive for arthralgias. Negative for joint swelling and myalgias.   Skin: Negative for rash.   Neurological: Negative for dizziness, weakness, headaches and paresthesias.   Psychiatric/Behavioral: Negative for mood changes. The patient is not nervous/anxious.      Overall doing well.   Thinks she may have pulled a muscle while shovelling snow last week, has some pain in the RLQ abdomen. Hurts more with movement, no BM changes. No n/v or GI symptoms except the soreness, thinks it might be getting a little better. No  symptoms.       OBJECTIVE:   /72   Pulse 83   Temp 98  F (36.7  C) (Temporal)   Resp 20   Ht 1.488 m (4' 10.6\")   Wt 76.8 kg (169 lb 6 oz)   LMP  (LMP Unknown)   SpO2 97%   BMI 34.68 kg/m   Estimated body mass index is 34.68 kg/m  as calculated from the following:    Height as of this encounter: 1.488 m (4' 10.6\").    Weight as of this encounter: 76.8 kg (169 lb 6 oz).  Physical Exam  GENERAL APPEARANCE: healthy, alert and no distress  EYES: Eyes grossly normal to inspection, PERRL and conjunctivae and sclerae normal  HENT: ear canals " and TM's normal, nose and mouth without ulcers or lesions, oropharynx clear and oral mucous membranes moist  NECK: no adenopathy, no asymmetry, masses, or scars and thyroid normal to palpation, no bruits.   RESP: lungs clear to auscultation - no rales, rhonchi or wheezes  BREAST: normal without masses, tenderness or nipple discharge and no palpable axillary masses or adenopathy  CV: regular rate and rhythm, normal S1 S2, no S3 or S4, no murmur, click or rub, no peripheral edema and peripheral pulses strong  ABDOMEN: soft, nontender, no hepatosplenomegaly, no masses and bowel sounds normal  MS: no musculoskeletal defects are noted and gait is age appropriate without ataxia  SKIN: no suspicious lesions or rashes  NEURO: Normal strength and tone, sensory exam grossly normal, mentation intact and speech normal  PSYCH: mentation appears normal and affect normal/bright    Diagnostic Test Results:  No orders of the defined types were placed in this encounter.       ASSESSMENT / PLAN:       ICD-10-CM    1. Medicare annual wellness visit, subsequent  Z00.00       2. Systemic lupus erythematosus with other organ involvement, unspecified SLE type (H)  M32.19 hydroxychloroquine (PLAQUENIL) 200 MG tablet      3. Stage 3 chronic kidney disease, unspecified whether stage 3a or 3b CKD (H)  N18.30 lisinopril (ZESTRIL) 10 MG tablet      4. Essential hypertension with goal blood pressure less than 140/90  I10 lisinopril (ZESTRIL) 10 MG tablet      5. Hyperlipidemia with target LDL less than 130  E78.5       6. Rheumatoid arthritis involving multiple sites, unspecified whether rheumatoid factor present (H)  M06.9       7. Long-term use of Plaquenil  Z79.899           Patient has been advised of split billing requirements and indicates understanding: Yes  I recommend a yearly physical, monthly self breast exam and mammograms as desired.  I refilled her lisinopril and her Plaquenil but only for 3 months as this should come from her  "rheumatologist, encouraged her to schedule follow up.     Her BP is at goal today. Labs are up to date and stable.     Regarding her right lower quadrant pain, we discussed worrisome symptoms and possible etiologies.  She has no red flag symptoms.  I recommend heat and/or ice as needed for pain, rest, avoid reinjury and if this does not improve over the next 1 to 2 weeks she should follow-up.      COUNSELING:  Reviewed preventive health counseling, as reflected in patient instructions  Special attention given to:       Regular exercise       Healthy diet/nutrition       Vision screening       Dental care       Fall risk prevention       Immunizations    Up to date            Osteoporosis prevention/bone health       Advanced Planning forms given.       BMI:   Estimated body mass index is 34.68 kg/m  as calculated from the following:    Height as of this encounter: 1.488 m (4' 10.6\").    Weight as of this encounter: 76.8 kg (169 lb 6 oz).   Weight management plan: Discussed healthy diet and exercise guidelines      She reports that she has never smoked. She has never used smokeless tobacco.      Appropriate preventive services were discussed with this patient, including applicable screening as appropriate for cardiovascular disease, diabetes, osteopenia/osteoporosis, and glaucoma.  As appropriate for age/gender, discussed screening for colorectal cancer, prostate cancer, breast cancer, and cervical cancer. Checklist reviewing preventive services available has been given to the patient.    Reviewed patients plan of care and provided an AVS. The Basic Care Plan (routine screening as documented in Health Maintenance) for Capri meets the Care Plan requirement. This Care Plan has been established and reviewed with the Patient.          Xochitl Hemphill MD  [facility]    Identified Health Risks:  "

## 2023-02-28 NOTE — TELEPHONE ENCOUNTER
Pending Prescriptions:                       Disp   Refills    lisinopril (ZESTRIL) 10 MG tablet [Pharmac*90 tab*3        Sig: TAKE 1 TABLET DAILY    hydroxychloroquine (PLAQUENIL) 200 MG tabl*90 tab*3        Sig: TAKE 1 TABLET DAILY    Signed Prescriptions:                        Disp   Refills    simvastatin (ZOCOR) 10 MG tablet           90 tab*3        Sig: TAKE 1 TABLET AT BEDTIME  Authorizing Provider: KAJAL MERRITT  Ordering User: DUSTY FIELDS    metoprolol succinate ER (TOPROL XL) 25 MG *180 ta*3        Sig: TAKE 1 TABLET TWICE A DAY  Authorizing Provider: KAJAL MERRITT  Ordering User: DUSTY FIELDS      Routing refill request to provider for review/approval because:  Drug not on the [facility] refill protocol   Labs out of range:  creatinine    Dusty Fields RN on 2/28/2023 at 10:06 AM

## 2023-02-28 NOTE — PATIENT INSTRUCTIONS
Patient Education   Personalized Prevention Plan  You are due for the preventive services outlined below.  Your care team is available to assist you in scheduling these services.  If you have already completed any of these items, please share that information with your care team to update in your medical record.  Health Maintenance Due   Topic Date Due     COVID-19 Vaccine (1) Never done     Eye Exam  07/22/2020     Annual Wellness Visit  11/30/2022

## 2023-02-28 NOTE — TELEPHONE ENCOUNTER
Prescription approved per [facility] Refill Protocol.  Jo-Ann Kimball RN on 2/28/2023 at 10:05 AM

## 2023-03-06 ENCOUNTER — HOSPITAL ENCOUNTER (EMERGENCY)
Facility: CLINIC | Age: 78
Discharge: HOME | End: 2023-03-06
Attending: EMERGENCY MEDICINE | Admitting: EMERGENCY MEDICINE
Payer: COMMERCIAL

## 2023-03-06 ENCOUNTER — NURSE TRIAGE (OUTPATIENT)
Dept: FAMILY MEDICINE | Facility: CLINIC | Age: 78
End: 2023-03-06

## 2023-03-06 VITALS
DIASTOLIC BLOOD PRESSURE: 87 MMHG | HEART RATE: 81 BPM | OXYGEN SATURATION: 97 % | TEMPERATURE: 98 F | RESPIRATION RATE: 18 BRPM | WEIGHT: 162 LBS | BODY MASS INDEX: 33.17 KG/M2 | SYSTOLIC BLOOD PRESSURE: 157 MMHG

## 2023-03-06 DIAGNOSIS — G43.901 MIGRAINE WITH STATUS MIGRAINOSUS, NOT INTRACTABLE, UNSPECIFIED MIGRAINE TYPE: ICD-10-CM

## 2023-03-06 DIAGNOSIS — G43.019 INTRACTABLE MIGRAINE WITHOUT AURA AND WITHOUT STATUS MIGRAINOSUS: Primary | ICD-10-CM

## 2023-03-06 LAB
ANION GAP SERPL CALCULATED.3IONS-SCNC: 11 MMOL/L (ref 7–15)
BASOPHILS # BLD AUTO: 0 10E3/UL (ref 0–0.2)
BASOPHILS NFR BLD AUTO: 0 %
BUN SERPL-MCNC: 27 MG/DL (ref 8–23)
CALCIUM SERPL-MCNC: 9.5 MG/DL (ref 8.8–10.2)
CHLORIDE SERPL-SCNC: 101 MMOL/L (ref 98–107)
CREAT SERPL-MCNC: 1.14 MG/DL (ref 0.51–0.95)
DEPRECATED HCO3 PLAS-SCNC: 25 MMOL/L (ref 22–29)
EOSINOPHIL # BLD AUTO: 0 10E3/UL (ref 0–0.7)
EOSINOPHIL NFR BLD AUTO: 1 %
ERYTHROCYTE [DISTWIDTH] IN BLOOD BY AUTOMATED COUNT: 12.4 % (ref 10–15)
GFR SERPL CREATININE-BSD FRML MDRD: 49 ML/MIN/1.73M2
GLUCOSE SERPL-MCNC: 92 MG/DL (ref 70–99)
HCT VFR BLD AUTO: 34.3 % (ref 35–47)
HGB BLD-MCNC: 11.5 G/DL (ref 11.7–15.7)
IMM GRANULOCYTES # BLD: 0 10E3/UL
IMM GRANULOCYTES NFR BLD: 0 %
LYMPHOCYTES # BLD AUTO: 0.7 10E3/UL (ref 0.8–5.3)
LYMPHOCYTES NFR BLD AUTO: 15 %
MCH RBC QN AUTO: 34.2 PG (ref 26.5–33)
MCHC RBC AUTO-ENTMCNC: 33.5 G/DL (ref 31.5–36.5)
MCV RBC AUTO: 102 FL (ref 78–100)
MONOCYTES # BLD AUTO: 0.4 10E3/UL (ref 0–1.3)
MONOCYTES NFR BLD AUTO: 7 %
NEUTROPHILS # BLD AUTO: 3.6 10E3/UL (ref 1.6–8.3)
NEUTROPHILS NFR BLD AUTO: 76 %
PLATELET # BLD AUTO: 191 10E3/UL (ref 150–450)
POTASSIUM SERPL-SCNC: 4.3 MMOL/L (ref 3.4–5.3)
RBC # BLD AUTO: 3.36 10E6/UL (ref 3.8–5.2)
SODIUM SERPL-SCNC: 137 MMOL/L (ref 136–145)
WBC # BLD AUTO: 4.7 10E3/UL (ref 4–11)

## 2023-03-06 PROCEDURE — 85025 COMPLETE CBC W/AUTO DIFF WBC: CPT | Performed by: EMERGENCY MEDICINE

## 2023-03-06 PROCEDURE — 258N000003 HC RX IP 258 OP 636: Performed by: EMERGENCY MEDICINE

## 2023-03-06 PROCEDURE — 96374 THER/PROPH/DIAG INJ IV PUSH: CPT | Performed by: EMERGENCY MEDICINE

## 2023-03-06 PROCEDURE — 99284 EMERGENCY DEPT VISIT MOD MDM: CPT | Mod: 25 | Performed by: EMERGENCY MEDICINE

## 2023-03-06 PROCEDURE — 250N000011 HC RX IP 250 OP 636: Performed by: EMERGENCY MEDICINE

## 2023-03-06 PROCEDURE — 96375 TX/PRO/DX INJ NEW DRUG ADDON: CPT | Performed by: EMERGENCY MEDICINE

## 2023-03-06 PROCEDURE — 36415 COLL VENOUS BLD VENIPUNCTURE: CPT | Performed by: EMERGENCY MEDICINE

## 2023-03-06 PROCEDURE — 99284 EMERGENCY DEPT VISIT MOD MDM: CPT | Performed by: EMERGENCY MEDICINE

## 2023-03-06 PROCEDURE — 96361 HYDRATE IV INFUSION ADD-ON: CPT | Performed by: EMERGENCY MEDICINE

## 2023-03-06 PROCEDURE — 80048 BASIC METABOLIC PNL TOTAL CA: CPT | Performed by: EMERGENCY MEDICINE

## 2023-03-06 RX ORDER — DIPHENHYDRAMINE HYDROCHLORIDE 50 MG/ML
12.5 INJECTION INTRAMUSCULAR; INTRAVENOUS ONCE
Status: COMPLETED | OUTPATIENT
Start: 2023-03-06 | End: 2023-03-06

## 2023-03-06 RX ORDER — SODIUM CHLORIDE 9 MG/ML
INJECTION, SOLUTION INTRAVENOUS CONTINUOUS
Status: DISCONTINUED | OUTPATIENT
Start: 2023-03-06 | End: 2023-03-06 | Stop reason: HOSPADM

## 2023-03-06 RX ORDER — KETOROLAC TROMETHAMINE 15 MG/ML
10 INJECTION, SOLUTION INTRAMUSCULAR; INTRAVENOUS ONCE
Status: COMPLETED | OUTPATIENT
Start: 2023-03-06 | End: 2023-03-06

## 2023-03-06 RX ADMIN — PROCHLORPERAZINE EDISYLATE 5 MG: 5 INJECTION INTRAMUSCULAR; INTRAVENOUS at 16:32

## 2023-03-06 RX ADMIN — KETOROLAC TROMETHAMINE 10 MG: 15 INJECTION, SOLUTION INTRAMUSCULAR; INTRAVENOUS at 16:29

## 2023-03-06 RX ADMIN — SODIUM CHLORIDE 1000 ML: 9 INJECTION, SOLUTION INTRAVENOUS at 16:26

## 2023-03-06 RX ADMIN — DIPHENHYDRAMINE HYDROCHLORIDE 12.5 MG: 50 INJECTION, SOLUTION INTRAMUSCULAR; INTRAVENOUS at 16:31

## 2023-03-06 ASSESSMENT — ACTIVITIES OF DAILY LIVING (ADL): ADLS_ACUITY_SCORE: 35

## 2023-03-06 NOTE — DISCHARGE INSTRUCTIONS
Discussed with your doctor medications that might help you treat a migraine at home.  I am glad you are feeling better with the medications we gave you here today.  Return to the ER if new or worsening symptoms.  You can try Excedrin over-the-counter if the migraine returns.it was a pleasure to meet you.

## 2023-03-06 NOTE — ED TRIAGE NOTES
Patient with a headache that started yesterday afternoon, has been vomiting as well. Denies any vision/balance issues. States she hasn't had a migraine like this in a long time.      Triage Assessment     Row Name 03/06/23 1521       Triage Assessment (Adult)    Airway WDL WDL       Respiratory WDL    Respiratory WDL WDL       Skin Circulation/Temperature WDL    Skin Circulation/Temperature WDL WDL       Cardiac WDL    Cardiac WDL WDL

## 2023-03-06 NOTE — ED NOTES
Pt states feeling a little better pain is more tolerable and nausea is improved. VS monitoring. Call light in reach.

## 2023-03-06 NOTE — TELEPHONE ENCOUNTER
Patient is having a severe migraine and does not have any migraine rescue medications. She is experiencing nausea, has vomited x1 and has light sensitivity.   She has had these in the past. She has taken Tylenol and is using an ice pack. She cannot take Aleve or ibuprofen.   She would like something to help her sleep and to help her with the nausea. Please review.    EUNICE ConnorN, RN      Did discuss ED visit if needed for worsening symptoms.  Reason for Disposition    SEVERE headache (e.g., excruciating) and has had severe headaches before    Additional Information    Negative: Fever > 103 F (39.4 C)    Negative: Fever > 100.0 F (37.8 C) and has diabetes mellitus or a weak immune system (e.g., HIV positive, cancer chemotherapy, organ transplant, splenectomy, chronic steroids)    Negative: SEVERE headache, states 'worst headache' of life    Negative: SEVERE headache, sudden-onset (i.e., reaching maximum intensity within seconds to 1 hour)    Negative: Severe pain in one eye    Negative: Loss of vision or double vision  (Exception: Same as prior migraines.)    Negative: Patient sounds very sick or weak to the triager    Negative: Unable to walk without falling    Negative: Stiff neck (can't touch chin to chest)    Negative: Possibility of carbon monoxide exposure    Negative: Difficult to awaken or acting confused (e.g., disoriented, slurred speech)    Negative: Weakness of the face, arm or leg on one side of the body and new-onset    Negative: Numbness of the face, arm or leg on one side of the body and new-onset    Negative: Loss of speech or garbled speech and new-onset    Negative: Passed out (i.e., fainted, collapsed and was not responding)    Negative: Sounds like a life-threatening emergency to the triager    Negative: Followed a head injury within last 3 days    Negative: Traumatic Brain Injury (TBI) is suspected    Negative: Sinus pain of forehead and yellow or green nasal discharge    Negative:  "Pregnant    Answer Assessment - Initial Assessment Questions  1. LOCATION: \"Where does it hurt?\"       Whole head  2. ONSET: \"When did the headache start?\" (Minutes, hours or days)       YEsterday  3. PATTERN: \"Does the pain come and go, or has it been constant since it started?\"      Constant  4. SEVERITY: \"How bad is the pain?\" and \"What does it keep you from doing?\"  (e.g., Scale 1-10; mild, moderate, or severe)    - MILD (1-3): doesn't interfere with normal activities     - MODERATE (4-7): interferes with normal activities or awakens from sleep     - SEVERE (8-10): excruciating pain, unable to do any normal activities         severe  5. RECURRENT SYMPTOM: \"Have you ever had headaches before?\" If Yes, ask: \"When was the last time?\" and \"What happened that time?\"       Yes, has had migraines in the past  6. CAUSE: \"What do you think is causing the headache?\"      Migraine  7. MIGRAINE: \"Have you been diagnosed with migraine headaches?\" If Yes, ask: \"Is this headache similar?\"       Yes  8. HEAD INJURY: \"Has there been any recent injury to the head?\"       No  9. OTHER SYMPTOMS: \"Do you have any other symptoms?\" (fever, stiff neck, eye pain, sore throat, cold symptoms)      Nausea, vomited once, light sensitivity  10. PREGNANCY: \"Is there any chance you are pregnant?\" \"When was your last menstrual period?\"        No    Protocols used: HEADACHE-A-[place]      "

## 2023-03-06 NOTE — ED PROVIDER NOTES
History     Chief Complaint   Patient presents with     Headache     HPI  Capri Wiseman is a 77 year old female who presents to the ER secondary to a migraine.  She has a long history of migraine headaches.  This is a typical migraine for her.  Is located in the right front of her head and throbbing in quality.  It is severe.  She has photophobia and phonophobia.  It started yesterday and gradually got worse.  No unusual neurologic symptoms.  No focal weakness, slurred speech, confusion, fever, cough, chest pain, palpitations or other new symptoms.  She previously was living in [place] and received migraine treatments at the oncology center there.  No allergies to medications that she knows of.  She does have chronic kidney disease and has been advised not to take NSAIDs on a regular basis.    Allergies:  No Known Allergies    Problem List:    Patient Active Problem List    Diagnosis Date Noted     Edema, unspecified type 01/23/2022     Priority: Medium     Mass of right wrist 04/05/2021     Priority: Medium     Acute radicular low back pain 01/25/2021     Priority: Medium     S/P total knee replacement using cement, right 01/12/2021     Priority: Medium     Primary osteoarthritis of right knee 10/21/2020     Priority: Medium     Added automatically from request for surgery [id number]       Long-term use of Plaquenil 07/16/2018     Priority: Medium     Systemic lupus erythematosus with other organ involvement, unspecified SLE type (H) 08/14/2017     Priority: Medium     Anemia due to stage 3 chronic kidney disease, unspecified whether stage 3a or 3b CKD (H) 03/03/2015     Priority: Medium     Essential hypertension with goal blood pressure less than 140/90 03/03/2015     Priority: Medium     Esophageal reflux 03/03/2015     Priority: Medium     Hyperlipidemia with target LDL less than 130 03/03/2015     Priority: Medium     Diagnosis updated by automated process. Provider to review and confirm.       RA (rheumatoid  arthritis) (H) 03/03/2015     Priority: Medium        Past Medical History:    Past Medical History:   Diagnosis Date     CKD (chronic kidney disease) stage 3, GFR 30-59 ml/min (H)      Dental disorder      GERD (gastroesophageal reflux disease)      High blood pressure      Hyperlipidemia      Infection due to 2019 novel coronavirus 12/30/2022     Mixed hyperlipidaemia      Osteoarthritis      RA (rheumatoid arthritis) (H)      SLE (systemic lupus erythematosus) (H)      Vision problem        Past Surgical History:    Past Surgical History:   Procedure Laterality Date     APPENDECTOMY  1969     ARTHROPLASTY KNEE Right 01/12/2021    Procedure: right total knee replacement;  Surgeon: Thad Fermin DO;  Location: [facility]     COLONOSCOPY  2009    repeat 5 years     COLONOSCOPY N/A 05/20/2015    normal, repeat 5 years due to family history     COLONOSCOPY N/A 10/07/2019    normal, no further screening necessary     FOOT SURGERY  2010    bone and screws in 2 toes, hammertoe     HYSTERECTOMY TOTAL ABDOMINAL  2010    due to bleeding, benign     LAPAROTOMY EXPLORATORY      scar tissue removal/repair - abd     SHOULDER SURGERY  05/2013    rotator cuff repair, right     ZZC TOTAL KNEE ARTHROPLASTY  03/2010    left       Family History:    Family History   Problem Relation Age of Onset     Colon Cancer Mother      Lupus Mother      Cancer Mother         colon cancer     Cerebrovascular Disease Father      No Known Problems Sister      No Known Problems Brother      No Known Problems Sister      No Known Problems Son      Neurologic Disorder Sister         ALS     Cancer Maternal Grandmother         stomach cancer     Heart Disease Maternal Grandfather      Unknown/Adopted Paternal Grandmother      Unknown/Adopted Paternal Grandfather      No Known Problems Sister      No Known Problems Sister      No Known Problems Son      No Known Problems Son        Social History:  Marital Status:   [5]  Social History      Tobacco Use     Smoking status: Never     Smokeless tobacco: Never   Vaping Use     Vaping Use: Never used   Substance Use Topics     Alcohol use: No     Alcohol/week: 0.0 standard drinks     Drug use: No        Medications:    acetaminophen (TYLENOL) 325 MG tablet  Ascorbic Acid (VITAMIN C PO)  aspirin (ASA) 81 MG EC tablet  calcium citrate-vitamin D (CITRACAL) 315-250 MG-UNIT TABS  Cyanocobalamin (VITAMIN B 12 PO)  cycloSPORINE (RESTASIS) 0.05 % ophthalmic emulsion  hydroxychloroquine (PLAQUENIL) 200 MG tablet  lisinopril (ZESTRIL) 10 MG tablet  metoprolol succinate ER (TOPROL XL) 25 MG 24 hr tablet  Multiple Vitamins-Minerals (CENTRUM SILVER) per tablet  simvastatin (ZOCOR) 10 MG tablet          Review of Systems   All other systems reviewed and are negative.      Physical Exam   BP: (!) 182/100  Pulse: 74  Temp: 98  F (36.7  C)  Resp: 18  Weight: 73.5 kg (162 lb)  SpO2: 99 %      Physical Exam  Vitals and nursing note reviewed.   Constitutional:       General: She is not in acute distress.     Appearance: Normal appearance. She is well-developed. She is not diaphoretic.   HENT:      Head: Normocephalic and atraumatic.      Right Ear: External ear normal.      Left Ear: External ear normal.      Nose: Nose normal.   Eyes:      General: No scleral icterus.     Extraocular Movements: Extraocular movements intact.      Conjunctiva/sclera: Conjunctivae normal.      Comments: Mildly photophobic   Cardiovascular:      Rate and Rhythm: Normal rate.   Pulmonary:      Effort: Pulmonary effort is normal.   Musculoskeletal:         General: Normal range of motion.      Cervical back: Normal range of motion and neck supple.      Right lower leg: No edema.      Left lower leg: No edema.   Skin:     General: Skin is warm and dry.      Coloration: Skin is not pale.      Findings: No erythema or rash.   Neurological:      General: No focal deficit present.      Mental Status: She is alert and oriented to person, place, and  time.   Psychiatric:         Mood and Affect: Mood normal.         Thought Content: Thought content normal.         ED Course                 Procedures           Results for orders placed or performed during the hospital encounter of 03/06/23 (from the past 24 hour(s))   CBC with platelets differential    Narrative    The following orders were created for panel order CBC with platelets differential.  Procedure                               Abnormality         Status                     ---------                               -----------         ------                     CBC with platelets and d...[[id number]]  Abnormal            Final result                 Please view results for these tests on the individual orders.   Basic metabolic panel   Result Value Ref Range    Sodium 137 136 - 145 mmol/L    Potassium 4.3 3.4 - 5.3 mmol/L    Chloride 101 98 - 107 mmol/L    Carbon Dioxide (CO2) 25 22 - 29 mmol/L    Anion Gap 11 7 - 15 mmol/L    Urea Nitrogen 27.0 (H) 8.0 - 23.0 mg/dL    Creatinine 1.14 (H) 0.51 - 0.95 mg/dL    Calcium 9.5 8.8 - 10.2 mg/dL    Glucose 92 70 - 99 mg/dL    GFR Estimate 49 (L) >60 mL/min/1.73m2   CBC with platelets and differential   Result Value Ref Range    WBC Count 4.7 4.0 - 11.0 10e3/uL    RBC Count 3.36 (L) 3.80 - 5.20 10e6/uL    Hemoglobin 11.5 (L) 11.7 - 15.7 g/dL    Hematocrit 34.3 (L) 35.0 - 47.0 %     (H) 78 - 100 fL    MCH 34.2 (H) 26.5 - 33.0 pg    MCHC 33.5 31.5 - 36.5 g/dL    RDW 12.4 10.0 - 15.0 %    Platelet Count 191 150 - 450 10e3/uL    % Neutrophils 76 %    % Lymphocytes 15 %    % Monocytes 7 %    % Eosinophils 1 %    % Basophils 0 %    % Immature Granulocytes 0 %    Absolute Neutrophils 3.6 1.6 - 8.3 10e3/uL    Absolute Lymphocytes 0.7 (L) 0.8 - 5.3 10e3/uL    Absolute Monocytes 0.4 0.0 - 1.3 10e3/uL    Absolute Eosinophils 0.0 0.0 - 0.7 10e3/uL    Absolute Basophils 0.0 0.0 - 0.2 10e3/uL    Absolute Immature Granulocytes 0.0 <=0.4 10e3/uL       Medications   0.9%  sodium chloride BOLUS (0 mLs Intravenous Stopped 3/6/23 1710)     Followed by   sodium chloride 0.9% infusion (has no administration in time range)   ketorolac (TORADOL) injection 10 mg (10 mg Intravenous $Given 3/6/23 1629)   prochlorperazine (COMPAZINE) injection 5 mg (5 mg Intravenous $Given 3/6/23 1632)   diphenhydrAMINE (BENADRYL) injection 12.5 mg (12.5 mg Intravenous $Given 3/6/23 1631)       Assessments & Plan (with Medical Decision Making)  Migraine headache, long history of migraines.  No unusual symptoms today that would warrant imaging.  An IV was established, IV fluids, Toradol, Compazine, Benadryl given.  Limited strength of the Toradol used given her history of chronic kidney disease.  Patient in agreement with this plan.  Headache is almost gone on reevaluation.  She feels comfortable going home.  She was advised to discuss abortive medications with her doctor as an outpatient.  She was advised she could use Excedrin over-the-counter if she gets another 1.  Patient be driven home by her family.  Return to ER precautions and fall precautions discussed.  All questions answered prior to discharge.     I have reviewed the nursing notes.    I have reviewed the findings, diagnosis, plan and need for follow up with the patient.          Medical Decision Making  The patient's presentation was of moderate complexity (an acute illness with systemic symptoms).    The patient's evaluation involved:  ordering and/or review of 2 test(s) in this encounter (see separate area of note for details)    The patient's management necessitated moderate risk (prescription drug management including medications given in the ED).        New Prescriptions    No medications on file       Final diagnoses:   Migraine with status migrainosus, not intractable, unspecified migraine type       3/6/2023   [facility] EMERGENCY DEPT     Jack Dexter MD  03/06/23 [id number]

## 2023-03-07 NOTE — TELEPHONE ENCOUNTER
She did go to the ER and feels a little bit better but still has a little bit of headache.  I agreed to call her in a limited dose of T3 to use because she cannot take anti-inflammatories.  Also I do not want her to take Imitrex or similar meds because of her blood pressure.  We discussed fall risk, sedation, dizziness, nausea risks with codeine.  Use with caution.  Xochitl Hemphill MD

## 2023-03-16 ENCOUNTER — TELEPHONE (OUTPATIENT)
Dept: RHEUMATOLOGY | Facility: CLINIC | Age: 78
End: 2023-03-16
Payer: COMMERCIAL

## 2023-03-16 NOTE — TELEPHONE ENCOUNTER
Dione Sykes MD Vang, Joanne, RMA  Yes we can do virtual slot too.           Previous Messages       ----- Message -----   From: Maryellen Sotelo RMA   Sent: 3/1/2023   8:13 AM CST   To: Dione Sykes MD   Subject: RE: follow up needed                             When would you like to see this patient? Next available, LUCRETIA slot?       MERCEDES Conrad   Rheumatology/Infectious disease   [facility]   Rheumatology ph:[phone]   Infectious Disease ph:[phone]     ----- Message -----   From: Dione Sykes MD   Sent: 2/28/2023  11:23 PM CST   To: Xochitl Hemphill MD, *   Subject: RE: follow up needed                             Sure we will follow up with her.   ----- Message -----   From: Xochitl Hemphill MD   Sent: 2/28/2023   2:15 PM CST   To: Dione Sykes MD   Subject: follow up needed                                 I believe Capri missed her December 2022 follow up due to being out of town, and needs to be rescheduled. Can your office call her to set up appointment? I am filling her Plaquenil for 3 months for now, but I believe you usually prescribe that for her.   Thanks.   Jenny      Statement Selected

## 2023-03-16 NOTE — TELEPHONE ENCOUNTER
RE: follow up needed  Received: Today  Gato Britt Joanne, RMA  Appt scheduled for 10/11/2023.

## 2023-06-01 DIAGNOSIS — M32.19 SYSTEMIC LUPUS ERYTHEMATOSUS WITH OTHER ORGAN INVOLVEMENT, UNSPECIFIED SLE TYPE (H): ICD-10-CM

## 2023-06-01 NOTE — TELEPHONE ENCOUNTER
Patient calling and stating she is running low and does not want to run out. This needs to go to the mail order pharmacy, [facility]. Maryjo Leo LPN

## 2023-06-01 NOTE — TELEPHONE ENCOUNTER
Pending Prescriptions:                       Disp   Refills    hydroxychloroquine (PLAQUENIL) 200 MG tabl*90 tab*3        Sig: Take 1 tablet (200 mg) by mouth daily      Routing refill request to provider for review/approval because:  Drug [place] [facility] [place]     Jo-Ann Kimball RN on 6/1/2023 at 9:55 AM

## 2023-06-05 RX ORDER — HYDROXYCHLOROQUINE SULFATE 200 MG/1
200 TABLET, FILM COATED ORAL DAILY
Qty: 90 TABLET | Refills: 3 | Status: SHIPPED | OUTPATIENT
Start: 2023-06-05 | End: 2024-02-27

## 2023-10-11 ENCOUNTER — OFFICE VISIT (OUTPATIENT)
Dept: RHEUMATOLOGY | Facility: CLINIC | Age: 78
End: 2023-10-11
Payer: COMMERCIAL

## 2023-10-11 VITALS
DIASTOLIC BLOOD PRESSURE: 82 MMHG | WEIGHT: 170 LBS | SYSTOLIC BLOOD PRESSURE: 176 MMHG | OXYGEN SATURATION: 97 % | HEART RATE: 81 BPM | BODY MASS INDEX: 34.81 KG/M2

## 2023-10-11 DIAGNOSIS — Z79.899 HIGH RISK MEDICATION USE: ICD-10-CM

## 2023-10-11 DIAGNOSIS — M32.19 SYSTEMIC LUPUS ERYTHEMATOSUS WITH OTHER ORGAN INVOLVEMENT, UNSPECIFIED SLE TYPE (H): Primary | ICD-10-CM

## 2023-10-11 LAB
ALBUMIN SERPL BCG-MCNC: 4.6 G/DL (ref 3.5–5.2)
ALP SERPL-CCNC: 49 U/L (ref 35–104)
ALT SERPL W P-5'-P-CCNC: 10 U/L (ref 0–50)
ANION GAP SERPL CALCULATED.3IONS-SCNC: 13 MMOL/L (ref 7–15)
AST SERPL W P-5'-P-CCNC: 25 U/L (ref 0–45)
BILIRUB SERPL-MCNC: 0.2 MG/DL
BUN SERPL-MCNC: 36.1 MG/DL (ref 8–23)
CALCIUM SERPL-MCNC: 9.2 MG/DL (ref 8.8–10.2)
CHLORIDE SERPL-SCNC: 104 MMOL/L (ref 98–107)
CREAT SERPL-MCNC: 1.13 MG/DL (ref 0.51–0.95)
CRP SERPL-MCNC: <3 MG/L
DEPRECATED HCO3 PLAS-SCNC: 23 MMOL/L (ref 22–29)
EGFRCR SERPLBLD CKD-EPI 2021: 49 ML/MIN/1.73M2
ERYTHROCYTE [DISTWIDTH] IN BLOOD BY AUTOMATED COUNT: 12.4 % (ref 10–15)
ERYTHROCYTE [SEDIMENTATION RATE] IN BLOOD BY WESTERGREN METHOD: 3 MM/HR (ref 0–30)
FOLATE SERPL-MCNC: 14.1 NG/ML (ref 4.6–34.8)
GLUCOSE SERPL-MCNC: 108 MG/DL (ref 70–99)
HCT VFR BLD AUTO: 36.1 % (ref 35–47)
HGB BLD-MCNC: 11.8 G/DL (ref 11.7–15.7)
IRON BINDING CAPACITY (ROCHE): 314 UG/DL (ref 240–430)
IRON SATN MFR SERPL: 24 % (ref 15–46)
IRON SERPL-MCNC: 76 UG/DL (ref 37–145)
MCH RBC QN AUTO: 33.8 PG (ref 26.5–33)
MCHC RBC AUTO-ENTMCNC: 32.7 G/DL (ref 31.5–36.5)
MCV RBC AUTO: 103 FL (ref 78–100)
PLATELET # BLD AUTO: 184 10E3/UL (ref 150–450)
POTASSIUM SERPL-SCNC: 4.7 MMOL/L (ref 3.4–5.3)
PROT SERPL-MCNC: 6.8 G/DL (ref 6.4–8.3)
RBC # BLD AUTO: 3.49 10E6/UL (ref 3.8–5.2)
SODIUM SERPL-SCNC: 140 MMOL/L (ref 135–145)
WBC # BLD AUTO: 5.7 10E3/UL (ref 4–11)

## 2023-10-11 PROCEDURE — 86225 DNA ANTIBODY NATIVE: CPT | Performed by: STUDENT IN AN ORGANIZED HEALTH CARE EDUCATION/TRAINING PROGRAM

## 2023-10-11 PROCEDURE — 36415 COLL VENOUS BLD VENIPUNCTURE: CPT | Performed by: STUDENT IN AN ORGANIZED HEALTH CARE EDUCATION/TRAINING PROGRAM

## 2023-10-11 PROCEDURE — 86160 COMPLEMENT ANTIGEN: CPT | Performed by: STUDENT IN AN ORGANIZED HEALTH CARE EDUCATION/TRAINING PROGRAM

## 2023-10-11 PROCEDURE — 86140 C-REACTIVE PROTEIN: CPT | Performed by: STUDENT IN AN ORGANIZED HEALTH CARE EDUCATION/TRAINING PROGRAM

## 2023-10-11 PROCEDURE — 82746 ASSAY OF FOLIC ACID SERUM: CPT | Performed by: STUDENT IN AN ORGANIZED HEALTH CARE EDUCATION/TRAINING PROGRAM

## 2023-10-11 PROCEDURE — 83550 IRON BINDING TEST: CPT | Performed by: STUDENT IN AN ORGANIZED HEALTH CARE EDUCATION/TRAINING PROGRAM

## 2023-10-11 PROCEDURE — 85027 COMPLETE CBC AUTOMATED: CPT | Performed by: STUDENT IN AN ORGANIZED HEALTH CARE EDUCATION/TRAINING PROGRAM

## 2023-10-11 PROCEDURE — 83540 ASSAY OF IRON: CPT | Performed by: STUDENT IN AN ORGANIZED HEALTH CARE EDUCATION/TRAINING PROGRAM

## 2023-10-11 PROCEDURE — 80053 COMPREHEN METABOLIC PANEL: CPT | Performed by: STUDENT IN AN ORGANIZED HEALTH CARE EDUCATION/TRAINING PROGRAM

## 2023-10-11 PROCEDURE — 85652 RBC SED RATE AUTOMATED: CPT | Performed by: STUDENT IN AN ORGANIZED HEALTH CARE EDUCATION/TRAINING PROGRAM

## 2023-10-11 PROCEDURE — 99214 OFFICE O/P EST MOD 30 MIN: CPT | Performed by: STUDENT IN AN ORGANIZED HEALTH CARE EDUCATION/TRAINING PROGRAM

## 2023-10-11 PROCEDURE — 82607 VITAMIN B-12: CPT | Performed by: STUDENT IN AN ORGANIZED HEALTH CARE EDUCATION/TRAINING PROGRAM

## 2023-10-11 ASSESSMENT — PAIN SCALES - GENERAL: PAINLEVEL: NO PAIN (0)

## 2023-10-11 NOTE — PROGRESS NOTES
Rheumatology Visit     Capri Wiseman MRN# [id number]   YOB: 1945 Age: 72 year old     Date of Visit: Oct 11, 2023  Primary care provider: Xochitl Hemphill          Assessment and Plan:     78-year-old woman with a 13-year history of SLE characterized by arthralgias, sicca symptoms, alopecia, and GERD. +AMY in 2013. Has been in remission for 10+ years, on maintenance therapy with  mg qd. Possible history of RA; likely a component of secondary Sjogren's impacting dental health.       2.  Knee osteoarthritis - She had right TKA but still reports pain and crepitus in that knee. She has trouble going up and down the stairs. Follows with Orthopedics. Disability parking permit given today     3. Megaloblastic anemia : She has long standing hx of anemia with high MCV. Will check her B12, Folate level as well as Iron panel. Lupus markers, ESR, CRP ordered as well.     4. Hypertension : Follow up with PCP. Denies any dizziness, headaches today.       PLAN: Discussed in detail with the patient      Continue Plaquenil 200 mg daily. - Rx renewed. Eye exam yearly    Ophthalmology referral given     Blood test orders placed    Handicap permit to be given.     Follow up in a year.                    History of Present Illness:   76-year-old woman who presents to [facility] to followup fo diagnosis of SLE. She was seen by Dr Loredo in March 2015 after she moved to [place] from [place]. EPIC reviewed.      HISTORY CARRIED FORWARD:      She first began experiencing symptoms SLE about 12 years ago, when she was hospitalized for ACS-like chest pain. Cardiovascular workup was negative for coronary disease. About one year later, she was again hospitalized for chest pain and underwent a cardiac catheterization, which showed clean coronaries. Her chest pain was ultimately diagnosed as GERD and additional blood work (specifics unknown) suggested this was secondary to SLE. She had no other  symptoms of SLE at the time.      Over the years she has experienced additional symptoms, including arthralgias (primarily in knees, ankles, hands, wrists), alopecia, sicca symptoms, and Raynaud s. She was started on prednisone sometime early in her disease course but became very  swollen  and was never given corticosteroids again after this, per her recollection. Currently taking  mg qd and has been on this dose for years. Last eye exam was in 10/2013. Any additional treatment history is unknown.      Believes she also has RA but history of diagnosis is entirely unknown. Over the past 2-3 years she has been losing her upper row of teeth 2/2 dry mouth and has been told she needs dental surgery to support her palate. Has never used Evoxac. Used Restasis for dry eyes in the past but now too expensive and she uses OTC eye drops instead.       In July 2014, while she was still living in [place], she had an abnormal UA that suggested possible kidney disease 2/2 SLE. There were plans to obtain kidney ultrasound and/or biopsy but this was not done as Ms. Wiseman was in the process of relocating to [place]. She has already established care with a new PCP here, with plans to pursue kidney US. She does already carry a diagnosis of CKDIII though the history of this is quite unclear.      INTERVAL HISTORY:      Today, she overall feels well and states her SLE has now been in remission for about 7 years. No exacerbations since she was last seen. No arthralgias.  She continues on apple vinegar in AM and lemon in hot water in PM and feels great. She has [facility] with this [facility].  She no longer has leg aches.      She remains on Plaquenil one daily without side effects.  She saw her eye doctor last month and exam stable.      She is on Restasis and artificial saliva. She had continued to have some dental deterioration and had full upper mouth extractions at the  [facility] and a bridge and this is  stable.      Complement levels and dsDNA normal in July 2017.    Unfortunately her  has terminal [facility] and [facility]. We discussed this at length.  She seems to be coping ok.  Her whole family has been visiting.    Other ongoing issues include mild alopecia and Raynaud s, both stable. GERD well controlled on Prilosec. Has never had any major infections    October 10, 2019 - She is using Biotene products and has noticed improvement in her sicca symptoms. She is doing better, her knees are better. She is getting Synvisc injection in her knees. Her Plaquenil eye exam was on 7/22/19 and was normal. Denies any joint pain, skin rash. She is on Plaquenil for 11 years. When she was first diagnosed with SLE she was very fatigued. It is better now.     December 16, 2021 - She got TINA in her back a year ago which helped but it has weared off now. She had right knee surgery in January 2021. She is on Plaquenil 200 mg daily and is due for eye exam. She has raynaud's which is stable.  Denies chest pain, oral/nasal mucosal sores, photosensitivity.  Sicca symptoms are stable, she uses eyedrops.    October 11, 2023 - She has right TKA in 2021 but it still bothers her. She takes Plaquenil 200 mg daily. She has not had eye exam for 2 years. Overall she is doing well. Denies any chest pain, SOB, Joint pains, fatigue, oral sores, skin rashes. She has sicca symptoms and raynaud's.            Review of Systems:     Review Of Systems  Skin: negative  Eyes: see HPI  Ears/Nose/Throat: sicca unchanged  Respiratory: No shortness of breath, dyspnea on exertion, cough, or hemoptysis  Cardiovascular: negative  Gastrointestinal: negative  Genitourinary: negative  Musculoskeletal: see HPI  Neurologic: negative  Psychiatric: negative  Hematologic/Lymphatic/Immunologic: negative  Endocrine: negative          Past Medical History:     Past Medical History:   Diagnosis Date    CKD (chronic kidney disease) stage 3, GFR 30-59 ml/min (H)      Dental disorder     loosing top teeth due to lupus    GERD (gastroesophageal reflux disease)     High blood pressure     Hyperlipidemia     Infection due to 2019 novel coronavirus 12/30/2022    presented with syncope    Mixed hyperlipidaemia     Osteoarthritis     RA (rheumatoid arthritis) (H)     mild, on plaquenil    SLE (systemic lupus erythematosus) (H)     Vision problem     dry eyes from Lupus       Patient Active Problem List    Diagnosis Date Noted    Edema, unspecified type 01/23/2022     Priority: Medium    Mass of right wrist 04/05/2021     Priority: Medium    Acute radicular low back pain 01/25/2021     Priority: Medium    S/P total knee replacement using cement, right 01/12/2021     Priority: Medium    Primary osteoarthritis of right knee 10/21/2020     Priority: Medium     Added automatically from request for surgery [id number]      Long-term use of Plaquenil 07/16/2018     Priority: Medium    Systemic lupus erythematosus with other organ involvement, unspecified SLE type (H) 08/14/2017     Priority: Medium    Anemia due to stage 3 chronic kidney disease, unspecified whether stage 3a or 3b CKD (H) 03/03/2015     Priority: Medium    Essential hypertension with goal blood pressure less than 140/90 03/03/2015     Priority: Medium    Esophageal reflux 03/03/2015     Priority: Medium    Hyperlipidemia with target LDL less than 130 03/03/2015     Priority: Medium     Diagnosis updated by automated process. Provider to review and confirm.      RA (rheumatoid arthritis) (H) 03/03/2015     Priority: Medium             Past Surgical History:     Past Surgical History:   Procedure Laterality Date    APPENDECTOMY  1969    ARTHROPLASTY KNEE Right 01/12/2021    Procedure: right total knee replacement;  Surgeon: Thad Fermin DO;  Location: [facility] OR    COLONOSCOPY  2009    repeat 5 years    COLONOSCOPY N/A 05/20/2015    normal, repeat 5 years due to family history    COLONOSCOPY N/A 10/07/2019    normal, no  further screening necessary    FOOT SURGERY  2010    bone and screws in 2 toes, hammertoe    HYSTERECTOMY TOTAL ABDOMINAL  2010    due to bleeding, benign    LAPAROTOMY EXPLORATORY      scar tissue removal/repair - abd    SHOULDER SURGERY  05/2013    rotator cuff repair, right    ZZC TOTAL KNEE ARTHROPLASTY  03/2010    left             Social History:     Social History     Tobacco Use    Smoking status: Never    Smokeless tobacco: Never   Substance Use Topics    Alcohol use: No     Alcohol/week: 0.0 standard drinks of alcohol             Family History:     Family History   Problem Relation Age of Onset    Colon Cancer Mother     Lupus Mother     Cancer Mother         colon cancer    Cerebrovascular Disease Father     No Known Problems Sister     No Known Problems Brother     No Known Problems Sister     No Known Problems Son     Neurologic Disorder Sister         ALS    Cancer Maternal Grandmother         stomach cancer    Heart Disease Maternal Grandfather     Unknown/Adopted Paternal Grandmother     Unknown/Adopted Paternal Grandfather     No Known Problems Sister     No Known Problems Sister     No Known Problems Son     No Known Problems Son             Allergies:     No Known Allergies          Medications:     Current Outpatient Medications   Medication Sig Dispense Refill    acetaminophen (TYLENOL) 325 MG tablet Take 3 tablets (975 mg) by mouth every 8 hours as needed for other (mild pain) 100 tablet 1    Ascorbic Acid (VITAMIN C PO) Take 1,000 mg by mouth daily      aspirin (ASA) 81 MG EC tablet Take 1 tablet (81 mg) by mouth daily      calcium citrate-vitamin D (CITRACAL) 315-250 MG-UNIT TABS Take 2 tablets by mouth daily       Cyanocobalamin (VITAMIN B 12 PO) Take 1,000 mcg by mouth daily      cycloSPORINE (RESTASIS) 0.05 % ophthalmic emulsion Place 1 drop into both eyes 2 times daily 180 each 3    hydroxychloroquine (PLAQUENIL) 200 MG tablet Take 1 tablet (200 mg) by mouth daily 90 tablet 3     "lisinopril (ZESTRIL) 10 MG tablet Take 1 tablet (10 mg) by mouth daily 90 tablet 3    metoprolol succinate ER (TOPROL XL) 25 MG 24 hr tablet TAKE 1 TABLET TWICE A  tablet 3    Multiple Vitamins-Minerals (CENTRUM SILVER) per tablet Take 1 tablet by mouth daily      simvastatin (ZOCOR) 10 MG tablet TAKE 1 TABLET AT BEDTIME 90 tablet 3            Physical Exam:   Blood pressure (!) 170/90, pulse 81, weight 77.1 kg (170 lb), SpO2 97%, not currently breastfeeding.  Constitutional: [id number] cooperative  Eyes: nl EOM, conjunctiva, sclera  ENT: nl external ears, nose, lips. In process of upper extractions   No mucous membrane lesions  GI: no ABD mass or tenderness, no HSM  Lymph: no cervical or supraclavicular nodes  MS: All TMJ, neck, shoulder, elbow, wrist, MCP/PIP/DIP, spine, hip, knee, ankle, and foot MTP/IP joints were examined. No active synovitis. Heberdon nodes over DIP joints.  Full ROM. Normal  strength. No dactylitis, tenosynovitis, enthesopathy. Minimal hallux valgus and prominence of 1st MTP in R foot.  Skin: no nail pitting, alopecia, rash, nodules or lesions  Neuro: intact CN, strength  Psych: nl affect         Data:     Lab Results   Component Value Date    WBC 4.7 03/06/2023    WBC 5.0 12/16/2021    WBC 13.5 (H) 01/27/2021    HGB 11.5 (L) 03/06/2023    HGB 11.4 (L) 01/11/2023    HGB 10.9 (L) 12/16/2021    HCT 34.3 (L) 03/06/2023    HCT 33.4 (L) 12/16/2021    HCT 31.0 (L) 01/27/2021     (H) 03/06/2023     (H) 12/16/2021     (H) 01/27/2021     03/06/2023     12/16/2021     01/27/2021     Lab Results   Component Value Date    BUN 27.0 (H) 03/06/2023    BUN 36 (H) 01/11/2023    BUN 40 (H) 01/27/2021     No components found for: \"SEDRATE\"  Lab Results   Component Value Date    TSH 1.48 11/30/2021    TSH 1.600 07/08/2013     Lab Results   Component Value Date    AST 16 12/16/2021    AST 14 01/27/2021    AST 19 10/10/2019    ALT 18 12/16/2021    ALT 14 " 01/27/2021    ALT 29 10/10/2019    ALKPHOS 101 01/27/2021    ALKPHOS 42 08/04/2019    ALKPHOS 50 02/15/2019     Reviewed Rheumatology lab flowsheet    Dione Sykes MD  [facility]  Department of Rheumatology & Autoimmune Disorders  [facility]: [phone]  Pager - [phone]

## 2023-10-11 NOTE — PATIENT INSTRUCTIONS
You can try Biotene mouth spray, toothpaste for dry mouth     Continue Plaquenil 200 mg daily     Ophthalmology referral given     Handicap permit to be given.     Follow up in a year.

## 2023-10-12 LAB
C3 SERPL-MCNC: 118 MG/DL (ref 81–157)
C4 SERPL-MCNC: 27 MG/DL (ref 13–39)
DSDNA AB SER-ACNC: <0.6 IU/ML
VIT B12 SERPL-MCNC: 1018 PG/ML (ref 232–1245)

## 2023-12-21 ENCOUNTER — OFFICE VISIT (OUTPATIENT)
Dept: FAMILY MEDICINE | Facility: CLINIC | Age: 78
End: 2023-12-21
Payer: COMMERCIAL

## 2023-12-21 VITALS
TEMPERATURE: 97.8 F | SYSTOLIC BLOOD PRESSURE: 162 MMHG | OXYGEN SATURATION: 97 % | BODY MASS INDEX: 36.07 KG/M2 | RESPIRATION RATE: 18 BRPM | HEART RATE: 70 BPM | WEIGHT: 176.19 LBS | DIASTOLIC BLOOD PRESSURE: 82 MMHG

## 2023-12-21 DIAGNOSIS — I10 ESSENTIAL HYPERTENSION WITH GOAL BLOOD PRESSURE LESS THAN 140/90: ICD-10-CM

## 2023-12-21 DIAGNOSIS — M06.9 RHEUMATOID ARTHRITIS INVOLVING MULTIPLE SITES, UNSPECIFIED WHETHER RHEUMATOID FACTOR PRESENT (H): ICD-10-CM

## 2023-12-21 DIAGNOSIS — M32.19 SYSTEMIC LUPUS ERYTHEMATOSUS WITH OTHER ORGAN INVOLVEMENT, UNSPECIFIED SLE TYPE (H): ICD-10-CM

## 2023-12-21 DIAGNOSIS — H26.9 CATARACT OF BOTH EYES, UNSPECIFIED CATARACT TYPE: ICD-10-CM

## 2023-12-21 DIAGNOSIS — N18.30 STAGE 3 CHRONIC KIDNEY DISEASE, UNSPECIFIED WHETHER STAGE 3A OR 3B CKD (H): ICD-10-CM

## 2023-12-21 DIAGNOSIS — E78.5 HYPERLIPIDEMIA WITH TARGET LDL LESS THAN 130: ICD-10-CM

## 2023-12-21 DIAGNOSIS — E66.812 CLASS 2 SEVERE OBESITY DUE TO EXCESS CALORIES WITH SERIOUS COMORBIDITY AND BODY MASS INDEX (BMI) OF 36.0 TO 36.9 IN ADULT (H): ICD-10-CM

## 2023-12-21 DIAGNOSIS — Z01.818 PREOP GENERAL PHYSICAL EXAM: Primary | ICD-10-CM

## 2023-12-21 DIAGNOSIS — E66.01 CLASS 2 SEVERE OBESITY DUE TO EXCESS CALORIES WITH SERIOUS COMORBIDITY AND BODY MASS INDEX (BMI) OF 36.0 TO 36.9 IN ADULT (H): ICD-10-CM

## 2023-12-21 PROCEDURE — 99214 OFFICE O/P EST MOD 30 MIN: CPT | Performed by: NURSE PRACTITIONER

## 2023-12-21 ASSESSMENT — PAIN SCALES - GENERAL: PAINLEVEL: NO PAIN (0)

## 2023-12-21 NOTE — PROGRESS NOTES
[address] [address]  Phone: [phone]  Fax: [phone]  Primary Provider: Xochitl Hemphill  Pre-op Performing Provider: HARJIT BUENO      PREOPERATIVE EVALUATION:  Today's date: 12/21/2023    Capri is a 78 year old, presenting for the following:  Pre-Op Exam (Cataracts)        12/21/2023     9:24 AM   Additional Questions   Roomed by Cristina YOO LPN        Surgical Information:  Surgery/Procedure: Cataract   Surgery Location: [facility]   Surgeon: David Saenz  Surgery Date: 01/04/2024 & 01/18/2024  Time of Surgery: 0800  Where patient plans to recover: At home with family  Fax number for surgical facility: Note does not need to be faxed, will be available electronically in Epic.    Assessment & Plan     The proposed surgical procedure is considered LOW risk.    Preop general physical exam    Cataract of both eyes, unspecified cataract type    Systemic lupus erythematosus with other organ involvement, unspecified SLE type (H)  Continue Hydroxychloroquine, able to take AM of surgery    Rheumatoid arthritis involving multiple sites, unspecified whether rheumatoid factor present (H)    Essential hypertension with goal blood pressure less than 140/90  Blood pressure is elevated in clinic today on recheck as well. She did take her medications this morning. She does note she has been taken off hydrochlorothiazide which she has been on in the past. We will hold off on making medication changes today. She will continue to monitor her blood pressure at home and notify the clinic with sustained readings above goal.     Stage 3 chronic kidney disease, unspecified whether stage 3a or 3b CKD (H)  Creatinine is stable, checked last October, 2023. Continue avoidance of NSAIDs    Hyperlipidemia with target LDL less than 130    Class 2 severe obesity due to excess calories with serious comorbidity and body mass index (BMI) of 36.0 to 36.9 in adult  (H)        - No identified additional risk factors other than previously addressed    Antiplatelet or Anticoagulation Medication Instructions:   - Patient is on no antiplatelet or anticoagulation medications.    Additional Medication Instructions:  Patient is to take all scheduled medications on the day of surgery    RECOMMENDATION:  APPROVAL GIVEN to proceed with proposed procedure, without further diagnostic evaluation.            Subjective       HPI related to upcoming procedure: Capri has been having blurry vision and difficulty seeing for the past several years. She has been found to have cataracts in both of her eyes, surgical intervention has been recommended. Capri has a past history of Lupus and RA, she is followed by Rheumatology, currently taking Hydroxychloroquine. She also has a history of hypertension, taking metoprolol and lisinopril daily. She is also treated for hyperlipidemia with simvastatin daily.         12/21/2023     9:23 AM   Preop Questions   1. Have you ever had a heart attack or stroke? No   2. Have you ever had surgery on your heart or blood vessels, such as a stent placement, a coronary artery bypass, or surgery on an artery in your head, neck, heart, or legs? No   3. Do you have chest pain with activity? No   4. Do you have a history of  heart failure? No   5. Do you currently have a cold, bronchitis or symptoms of other infection? No   6. Do you have a cough, shortness of breath, or wheezing? No   7. Do you or anyone in your family have previous history of blood clots? No   8. Do you or does anyone in your family have a serious bleeding problem such as prolonged bleeding following surgeries or cuts? No   9. Have you ever had problems with anemia or been told to take iron pills? No   10. Have you had any abnormal blood loss such as black, tarry or bloody stools, or abnormal vaginal bleeding? No   11. Have you ever had a blood transfusion? No   12. Are you willing to have a blood  transfusion if it is medically needed before, during, or after your surgery? Yes   13. Have you or any of your relatives ever had problems with anesthesia? No   14. Do you have sleep apnea, excessive snoring or daytime drowsiness? No   15. Do you have any artifical heart valves or other implanted medical devices like a pacemaker, defibrillator, or continuous glucose monitor? No   16. Do you have artificial joints? No   17. Are you allergic to latex? No       Health Care Directive:  Patient does not have a Health Care Directive or Living Will: Discussed advance care planning with patient; however, patient declined at this time.    Preoperative Review of :   reviewed - no record of controlled substances prescribed.          Review of Systems  CONSTITUTIONAL: NEGATIVE for fever, chills, change in weight  ENT/MOUTH: NEGATIVE for ear, mouth and throat problems  RESP: NEGATIVE for significant cough or SOB  CV: NEGATIVE for chest pain, palpitations or peripheral edema    Patient Active Problem List    Diagnosis Date Noted    Edema, unspecified type 01/23/2022     Priority: Medium    Mass of right wrist 04/05/2021     Priority: Medium    Acute radicular low back pain 01/25/2021     Priority: Medium    S/P total knee replacement using cement, right 01/12/2021     Priority: Medium    Primary osteoarthritis of right knee 10/21/2020     Priority: Medium     Added automatically from request for surgery [id number]      Long-term use of Plaquenil 07/16/2018     Priority: Medium    Systemic lupus erythematosus with other organ involvement, unspecified SLE type (H) 08/14/2017     Priority: Medium    Anemia due to stage 3 chronic kidney disease, unspecified whether stage 3a or 3b CKD (H) 03/03/2015     Priority: Medium    Essential hypertension with goal blood pressure less than 140/90 03/03/2015     Priority: Medium    Esophageal reflux 03/03/2015     Priority: Medium    Hyperlipidemia with target LDL less than 130 03/03/2015      Priority: Medium     Diagnosis updated by automated process. Provider to review and confirm.      RA (rheumatoid arthritis) (H) 03/03/2015     Priority: Medium      Past Medical History:   Diagnosis Date    CKD (chronic kidney disease) stage 3, GFR 30-59 ml/min (H)     Dental disorder     loosing top teeth due to lupus    GERD (gastroesophageal reflux disease)     High blood pressure     Hyperlipidemia     Infection due to 2019 novel coronavirus 12/30/2022    presented with syncope    Mixed hyperlipidaemia     Osteoarthritis     RA (rheumatoid arthritis) (H)     mild, on plaquenil    SLE (systemic lupus erythematosus) (H)     Vision problem     dry eyes from Lupus     Past Surgical History:   Procedure Laterality Date    APPENDECTOMY  1969    ARTHROPLASTY KNEE Right 01/12/2021    Procedure: right total knee replacement;  Surgeon: Thad Fermin DO;  Location: [facility] OR    COLONOSCOPY  2009    repeat 5 years    COLONOSCOPY N/A 05/20/2015    normal, repeat 5 years due to family history    COLONOSCOPY N/A 10/07/2019    normal, no further screening necessary    FOOT SURGERY  2010    bone and screws in 2 toes, hammertoe    HYSTERECTOMY TOTAL ABDOMINAL  2010    due to bleeding, benign    LAPAROTOMY EXPLORATORY      scar tissue removal/repair - abd    SHOULDER SURGERY  05/2013    rotator cuff repair, right    ZZC TOTAL KNEE ARTHROPLASTY  03/2010    left     Current Outpatient Medications   Medication Sig Dispense Refill    acetaminophen (TYLENOL) 325 MG tablet Take 3 tablets (975 mg) by mouth every 8 hours as needed for other (mild pain) 100 tablet 1    Ascorbic Acid (VITAMIN C PO) Take 1,000 mg by mouth daily      aspirin (ASA) 81 MG EC tablet Take 1 tablet (81 mg) by mouth daily      calcium citrate-vitamin D (CITRACAL) 315-250 MG-UNIT TABS Take 2 tablets by mouth daily       Cyanocobalamin (VITAMIN B 12 PO) Take 1,000 mcg by mouth daily      cycloSPORINE (RESTASIS) 0.05 % ophthalmic emulsion Place 1 drop into  both eyes 2 times daily 180 each 3    hydroxychloroquine (PLAQUENIL) 200 MG tablet Take 1 tablet (200 mg) by mouth daily 90 tablet 3    lisinopril (ZESTRIL) 10 MG tablet Take 1 tablet (10 mg) by mouth daily 90 tablet 3    metoprolol succinate ER (TOPROL XL) 25 MG 24 hr tablet TAKE 1 TABLET TWICE A  tablet 3    Multiple Vitamins-Minerals (CENTRUM SILVER) per tablet Take 1 tablet by mouth daily      simvastatin (ZOCOR) 10 MG tablet TAKE 1 TABLET AT BEDTIME 90 tablet 3       No Known Allergies     Social History     Tobacco Use    Smoking status: Never    Smokeless tobacco: Never   Substance Use Topics    Alcohol use: No     Alcohol/week: 0.0 standard drinks of alcohol       History   Drug Use No         Objective     BP (!) 170/90 (BP Location: Left arm, Patient Position: Sitting, Cuff Size: Adult Regular)   Pulse 70   Temp 97.8  F (36.6  C) (Temporal)   Resp 18   Wt 79.9 kg (176 lb 3 oz)   LMP  (LMP Unknown)   SpO2 97%   BMI 36.07 kg/m      Physical Exam  GENERAL APPEARANCE: healthy, alert and no distress  HENT: ear canals and TM's normal and nose and mouth without ulcers or lesions  RESP: lungs clear to auscultation - no rales, rhonchi or wheezes  CV: regular rate and rhythm, normal S1 S2, no S3 or S4 and no murmur, click or rub   ABDOMEN: soft, nontender, no HSM or masses and bowel sounds normal  NEURO: Normal strength and tone, sensory exam grossly normal, mentation intact and speech normal    Recent Labs   Lab Test 10/11/23  1128 03/06/23  1626   HGB 11.8 11.5*    191    137   POTASSIUM 4.7 4.3   CR 1.13* 1.14*        Diagnostics:  No labs were ordered during this visit.   No EKG required for low risk surgery (cataract, skin procedure, breast biopsy, etc).    Revised Cardiac Risk Index (RCRI):  The patient has the following serious cardiovascular risks for perioperative complications:   - No serious cardiac risks = 0 points     RCRI Interpretation: 0 points: Class I (very low risk -  0.4% complication rate)         Signed Electronically by: Regina Melendez NP  Copy of this evaluation report is provided to requesting physician.

## 2023-12-21 NOTE — PATIENT INSTRUCTIONS
Monitor Blood Pressure at home, notify clinic with sustained readings over 140/90    Instructed patient to cancel surgery and reschedule if develops high fever or is vomiting 1-3 days before surgery. Pre Operative History and Physical is good for 30 days.    Other Pre-Op Orders for when to stop eating the night before surgery, time of surgery, where & when to check in, parking, and any other specific pre-operative orders come from the surgeon's office. You should hear from them at least one day before surgery if not sooner for these specific instructions.    STOP any types of over the counter blood thinning medications (such as aspirin, Motrin/ibuprofen, Aleve/naproxen, vitamin E, or fish oil) 1 week prior to surgery. Tylenol (or acetaminophen) is okay to take for pain if needed    Recommend no alcohol consumption at least 48 hours prior to surgery    TAKE the following medications the AM of surgery with small sip of water (blood pressure, heart or anti-seizure medications): Hydroxychloroquine, lisinopril, metoprolol and simvastatin     Preparing for Your Surgery  Getting started  A nurse will call you to review your health history and instructions. They will give you an arrival time based on your scheduled surgery time. Please be ready to share:  Your doctor's clinic name and phone number  Your medical, surgical, and anesthesia history  A list of allergies and sensitivities  A list of medicines, including herbal treatments and over-the-counter drugs  Whether the patient has a legal guardian (ask how to send us the papers in advance)  Please tell us if you're pregnant--or if there's any chance you might be pregnant. Some surgeries may injure a fetus (unborn baby), so they require a pregnancy test. Surgeries that are safe for a fetus don't always need a test, and you can choose whether to have one.   If you have a child who's having surgery, please ask for a copy of Preparing for Your Child's Surgery.    Preparing  for surgery  Within 10 to 30 days of surgery: Have a pre-op exam (sometimes called an H&P, or History and Physical). This can be done at a clinic or pre-operative center.  If you're having a , you may not need this exam. Talk to your care team.  At your pre-op exam, talk to your care team about all medicines you take. If you need to stop any medicines before surgery, ask when to start taking them again.  We do this for your safety. Many medicines can make you bleed too much during surgery. Some change how well surgery (anesthesia) drugs work.  Call your insurance company to let them know you're having surgery. (If you don't have insurance, call [phone].)  Call your clinic if there's any change in your health. This includes signs of a cold or flu (sore throat, runny nose, cough, rash, fever). It also includes a scrape or scratch near the surgery site.  If you have questions on the day of surgery, call your hospital or surgery center.  Eating and drinking guidelines  For your safety: Unless your surgeon tells you otherwise, follow the guidelines below.  Eat and drink as usual until 8 hours before you arrive for surgery. After that, no food or milk.  Drink clear liquids until 2 hours before you arrive. These are liquids you can see through, like water, Gatorade, and Propel Water. They also include plain black coffee and tea (no cream or milk), candy, and breath mints. You can spit out gum when you arrive.  If you drink alcohol: Stop drinking it the night before surgery.  If your care team tells you to take medicine on the morning of surgery, it's okay to take it with a sip of water.  Preventing infection  Shower or bathe the night before and morning of your surgery. Follow the instructions your clinic gave you. (If no instructions, use regular soap.)  Don't shave or clip hair near your surgery site. We'll remove the hair if needed.  Don't smoke or vape the morning of surgery. You may chew nicotine gum up  to 2 hours before surgery. A nicotine patch is okay.  Note: Some surgeries require you to completely quit smoking and nicotine. Check with your surgeon.  Your care team will make every effort to keep you safe from infection. We will:  Clean our hands often with soap and water (or an alcohol-based hand rub).  Clean the skin at your surgery site with a special soap that kills germs.  Give you a special gown to keep you warm. (Cold raises the risk of infection.)  Wear special hair covers, masks, gowns and gloves during surgery.  Give antibiotic medicine, if prescribed. Not all surgeries need antibiotics.  What to bring on the day of surgery  Photo ID and insurance card  Copy of your health care directive, if you have one  Glasses and hearing aids (bring cases)  You can't wear contacts during surgery  Inhaler and eye drops, if you use them (tell us about these when you arrive)  CPAP machine or breathing device, if you use them  A few personal items, if spending the night  If you have . . .  A pacemaker, ICD (cardiac defibrillator) or other implant: Bring the ID card.  An implanted stimulator: Bring the remote control.  A legal guardian: Bring a copy of the certified (court-stamped) guardianship papers.  Please remove any jewelry, including body piercings. Leave jewelry and other valuables at home.  If you're going home the day of surgery  You must have a responsible adult drive you home. They should stay with you overnight as well.  If you don't have someone to stay with you, and you aren't safe to go home alone, we may keep you overnight. Insurance often won't pay for this.  After surgery  If it's hard to control your pain or you need more pain medicine, please call your surgeon's office.  Questions?   If you have any questions for your care team, list them here:  _________________________________________________________________________________________________________________________________________________________________________ ____________________________________ ____________________________________ ____________________________________  For informational purposes only. Not to replace the advice of your health care provider. Copyright   2003, 2019 [facility]. All rights reserved. Clinically reviewed by Yana Nunez MD. [employer] 141036 - REV 12/22.

## 2023-12-22 ENCOUNTER — TELEPHONE (OUTPATIENT)
Dept: FAMILY MEDICINE | Facility: CLINIC | Age: 78
End: 2023-12-22
Payer: COMMERCIAL

## 2023-12-22 NOTE — TELEPHONE ENCOUNTER
Patient Contact    Attempt # 1    Was call answered?  No.  Left message on voicemail with information to call her primary care clinic back with instructions regarding telephone message she had left.  Anna MCPHERSON RN     Per MD:    Javi Phipps MD Physician Avchww26:[id number] AM       I would recommend she double up her lisinopril from 10 mg daily to 20mg every morning to help bp

## 2023-12-22 NOTE — TELEPHONE ENCOUNTER
I would recommend she double up her lisinopril from 10 mg daily to 20mg every morning to help bp

## 2023-12-22 NOTE — TELEPHONE ENCOUNTER
Patient calling to report her blood pressures after yesterday's pre-op visit. This morning her readings were 174/98, 167/99, 150/97. She states she has slight headaches, and she states the provider she saw yesterday was aware of this. She is wondering if she can get a morning dose of blood pressure medication to get her numbers down before her eye surgery January 4th, 2024.     Please review and advise.     Thanks,    Amaris Cowan, BSN, RN

## 2023-12-22 NOTE — TELEPHONE ENCOUNTER
Spoke with patient and updated her on Lisinopril dose increase. She verbalized understanding.  EUNICE ConnorN, RN

## 2024-01-27 DIAGNOSIS — N18.30 STAGE 3 CHRONIC KIDNEY DISEASE, UNSPECIFIED WHETHER STAGE 3A OR 3B CKD (H): ICD-10-CM

## 2024-01-27 DIAGNOSIS — I10 ESSENTIAL HYPERTENSION WITH GOAL BLOOD PRESSURE LESS THAN 140/90: ICD-10-CM

## 2024-01-29 ENCOUNTER — TELEPHONE (OUTPATIENT)
Dept: FAMILY MEDICINE | Facility: CLINIC | Age: 79
End: 2024-01-29
Payer: COMMERCIAL

## 2024-01-29 DIAGNOSIS — I10 ESSENTIAL HYPERTENSION WITH GOAL BLOOD PRESSURE LESS THAN 140/90: ICD-10-CM

## 2024-01-29 DIAGNOSIS — N18.30 STAGE 3 CHRONIC KIDNEY DISEASE, UNSPECIFIED WHETHER STAGE 3A OR 3B CKD (H): ICD-10-CM

## 2024-01-29 RX ORDER — LISINOPRIL 10 MG/1
10 TABLET ORAL DAILY
Qty: 90 TABLET | Refills: 0 | OUTPATIENT
Start: 2024-01-29

## 2024-01-29 RX ORDER — LISINOPRIL 10 MG/1
10 TABLET ORAL DAILY
Qty: 90 TABLET | Refills: 0 | Status: SHIPPED | OUTPATIENT
Start: 2024-01-29 | End: 2024-01-30

## 2024-01-29 NOTE — TELEPHONE ENCOUNTER
Lisinopril  Prescription approved per [facility] Refill Protocol.  Patient scheduled for BP office visit 1/30/2024 for review on continuing to take this prescription.  Patient stated she is unsure this medication is helping with her higher BP readings recently.  [facility] [place]    Jackelyn Priest RN

## 2024-01-30 ENCOUNTER — OFFICE VISIT (OUTPATIENT)
Dept: FAMILY MEDICINE | Facility: CLINIC | Age: 79
End: 2024-01-30
Payer: COMMERCIAL

## 2024-01-30 VITALS
SYSTOLIC BLOOD PRESSURE: 148 MMHG | TEMPERATURE: 97 F | DIASTOLIC BLOOD PRESSURE: 82 MMHG | RESPIRATION RATE: 16 BRPM | HEART RATE: 64 BPM | HEIGHT: 59 IN | WEIGHT: 175.3 LBS | BODY MASS INDEX: 35.34 KG/M2 | OXYGEN SATURATION: 98 %

## 2024-01-30 DIAGNOSIS — D63.1 ANEMIA DUE TO STAGE 3 CHRONIC KIDNEY DISEASE, UNSPECIFIED WHETHER STAGE 3A OR 3B CKD (H): ICD-10-CM

## 2024-01-30 DIAGNOSIS — N18.30 ANEMIA DUE TO STAGE 3 CHRONIC KIDNEY DISEASE, UNSPECIFIED WHETHER STAGE 3A OR 3B CKD (H): ICD-10-CM

## 2024-01-30 DIAGNOSIS — E78.5 HYPERLIPIDEMIA WITH TARGET LDL LESS THAN 130: ICD-10-CM

## 2024-01-30 DIAGNOSIS — I10 ESSENTIAL HYPERTENSION WITH GOAL BLOOD PRESSURE LESS THAN 140/90: Primary | ICD-10-CM

## 2024-01-30 PROCEDURE — 99214 OFFICE O/P EST MOD 30 MIN: CPT | Performed by: NURSE PRACTITIONER

## 2024-01-30 RX ORDER — RESPIRATORY SYNCYTIAL VIRUS VACCINE 120MCG/0.5
0.5 KIT INTRAMUSCULAR ONCE
Qty: 1 EACH | Refills: 0 | Status: CANCELLED | OUTPATIENT
Start: 2024-01-30 | End: 2024-01-30

## 2024-01-30 RX ORDER — VALSARTAN 160 MG/1
160 TABLET ORAL DAILY
Qty: 30 TABLET | Refills: 0 | Status: SHIPPED | OUTPATIENT
Start: 2024-01-30 | End: 2024-02-23

## 2024-01-30 ASSESSMENT — PAIN SCALES - GENERAL: PAINLEVEL: NO PAIN (0)

## 2024-01-30 NOTE — PROGRESS NOTES
"  Assessment & Plan     Essential hypertension with goal blood pressure less than 140/90  Blood pressure is elevated in clinic today. Discussed options to further increase her Lisinopril verses trying a different medication. She would like to change the lisinopril as she does not feel it is working well for her anymore. We discussed changing to Valsartan, with goal to remain on an ACE or ARB for her kidney function. She is agreeable to this. Dosing instructions were reviewed. She will stop the lisinopril and continue her beta blocker. Continue checking home BP's and return to clinic in 2 weeks for BP recheck and labs. Follow-up sooner with new or worsening symptoms, questions or concerns.     - valsartan (DIOVAN) 160 MG tablet; Take 1 tablet (160 mg) by mouth daily  - Basic metabolic panel  (Ca, Cl, CO2, Creat, Gluc, K, Na, BUN); Future    Hyperlipidemia with target LDL less than 130  - Lipid panel reflex to direct LDL Non-fasting; Future    Anemia due to stage 3 chronic kidney disease, unspecified whether stage 3a or 3b CKD (H)  - Albumin Random Urine Quantitative with Creat Ratio; Future    I explained my diagnostic considerations and recommendations to the patient, who voiced understanding and agreement with the assessment and treatment plan. All questions were answered to patient's apparent satisfaction. We discussed potential side effects of any prescribed or recommended therapies, as well as expectations for response to treatments and importance of lifestyle measures that may improve symptoms. Patient was advised to contact our office if there are new symptoms or no improvement or worsening of conditions or symptoms.        BMI  Estimated body mass index is 35.41 kg/m  as calculated from the following:    Height as of this encounter: 1.499 m (4' 11\").    Weight as of this encounter: 79.5 kg (175 lb 4.8 oz).           Sandhya Farooq is a 78 year old, presenting for the following health " issues:  Hypertension      1/30/2024     9:53 AM   Additional Questions   Roomed by Cora MACE         1/30/2024     9:53 AM   Patient Reported Additional Medications   Patient reports taking the following new medications eye drops     History of Present Illness       Hypertension: She presents for follow up of hypertension.  She does check blood pressure  regularly outside of the clinic. Outside blood pressures have been over 140/90. She follows a low salt diet.     She eats 4 or more servings of fruits and vegetables daily.She consumes 0 sweetened beverage(s) daily.She exercises with enough effort to increase her heart rate 9 or less minutes per day.  She exercises with enough effort to increase her heart rate 3 or less days per week.   She is taking medications regularly.     Hypertension -  Capri presents today with concerns of elevated blood pressure readings. She has been taking her blood pressure at home with readings 140-150's/ 80's. She reports feeling as though she cannot breath when her blood pressure elevates. She has been taking 20mg lisinopril once daily and 25mg metoprolol twice daily. No new or significant medication side effects noted. She did not notice any change in her blood pressure when she increased her lisinopril from 10mg to 20mg.  No stroke-like symptoms, no chest pain on exertion, no dyspnea on exertion, no swelling of ankles, no palpitations. No orthostatic hypotension, no urinary abnormalities noted.    Patient Active Problem List   Diagnosis    Anemia due to stage 3 chronic kidney disease, unspecified whether stage 3a or 3b CKD (H)    Essential hypertension with goal blood pressure less than 140/90    Esophageal reflux    Hyperlipidemia with target LDL less than 130    RA (rheumatoid arthritis) (H)    Systemic lupus erythematosus with other organ involvement, unspecified SLE type (H)    Long-term use of Plaquenil    Primary osteoarthritis of right knee    S/P total knee replacement  "using cement, right    Acute radicular low back pain    Mass of right wrist    Edema, unspecified type    Class 2 severe obesity due to excess calories with serious comorbidity in adult (H)     Current Outpatient Medications   Medication    acetaminophen (TYLENOL) 325 MG tablet    Ascorbic Acid (VITAMIN C PO)    aspirin (ASA) 81 MG EC tablet    calcium citrate-vitamin D (CITRACAL) 315-250 MG-UNIT TABS    Cyanocobalamin (VITAMIN B 12 PO)    hydroxychloroquine (PLAQUENIL) 200 MG tablet    metoprolol succinate ER (TOPROL XL) 25 MG 24 hr tablet    simvastatin (ZOCOR) 10 MG tablet    valsartan (DIOVAN) 160 MG tablet    cycloSPORINE (RESTASIS) 0.05 % ophthalmic emulsion    Multiple Vitamins-Minerals (CENTRUM SILVER) per tablet     No current facility-administered medications for this visit.         Review of Systems  Constitutional, HEENT, cardiovascular, pulmonary, gi and gu systems are negative, except as otherwise noted.      Objective    BP (!) 148/82 (BP Location: Left arm, Patient Position: Chair)   Pulse 64   Temp 97  F (36.1  C) (Temporal)   Resp 16   Ht 1.499 m (4' 11\")   Wt 79.5 kg (175 lb 4.8 oz)   LMP  (LMP Unknown)   SpO2 98%   BMI 35.41 kg/m    Body mass index is 35.41 kg/m .  Physical Exam  Vitals reviewed.   Constitutional:       General: She is not in acute distress.     Appearance: Normal appearance.   HENT:      Mouth/Throat:      Mouth: Mucous membranes are moist.      Pharynx: Oropharynx is clear.   Eyes:      Extraocular Movements: Extraocular movements intact.      Conjunctiva/sclera: Conjunctivae normal.      Pupils: Pupils are equal, round, and reactive to light.   Cardiovascular:      Rate and Rhythm: Normal rate and regular rhythm.      Heart sounds: Normal heart sounds.   Pulmonary:      Effort: Pulmonary effort is normal.      Breath sounds: Normal breath sounds.   Musculoskeletal:      Cervical back: Neck supple.   Lymphadenopathy:      Cervical: No cervical adenopathy.   Skin:     " General: Skin is warm and dry.      Capillary Refill: Capillary refill takes less than 2 seconds.   Neurological:      Mental Status: She is alert and oriented to person, place, and time. Mental status is at baseline.   Psychiatric:         Mood and Affect: Mood normal.         Behavior: Behavior normal.                    Signed Electronically by: Regina Melendez NP

## 2024-02-13 ENCOUNTER — ALLIED HEALTH/NURSE VISIT (OUTPATIENT)
Dept: FAMILY MEDICINE | Facility: CLINIC | Age: 79
End: 2024-02-13
Payer: COMMERCIAL

## 2024-02-13 VITALS — SYSTOLIC BLOOD PRESSURE: 110 MMHG | DIASTOLIC BLOOD PRESSURE: 72 MMHG

## 2024-02-13 DIAGNOSIS — I10 ESSENTIAL HYPERTENSION WITH GOAL BLOOD PRESSURE LESS THAN 140/90: Primary | ICD-10-CM

## 2024-02-13 PROCEDURE — 99207 PR NO CHARGE NURSE ONLY: CPT

## 2024-02-13 NOTE — PROGRESS NOTES
Blood pressures look good. I would get rid of her old machine. The new machine is much more consistent with in-clinic readings.

## 2024-02-13 NOTE — PROGRESS NOTES
Capri Wiseman is a 78 year old patient who comes in today for a Blood Pressure check.  Initial BP:  /72   LMP  (LMP Unknown)      Data Unavailable  Disposition: follow-up as previously indicated by provider and results routed to provider      Machine 1 old    did not work kept saying Error  Machine 2 new   149/82  Me 142/80      Machine 2 new 129/72  Me 110/72

## 2024-02-14 ENCOUNTER — TRANSFERRED RECORDS (OUTPATIENT)
Dept: MULTI SPECIALTY CLINIC | Facility: CLINIC | Age: 79
End: 2024-02-14

## 2024-02-14 LAB — RETINOPATHY: NORMAL

## 2024-02-15 ENCOUNTER — LAB (OUTPATIENT)
Dept: LAB | Facility: CLINIC | Age: 79
End: 2024-02-15
Payer: COMMERCIAL

## 2024-02-15 DIAGNOSIS — D63.1 ANEMIA DUE TO STAGE 3 CHRONIC KIDNEY DISEASE, UNSPECIFIED WHETHER STAGE 3A OR 3B CKD (H): ICD-10-CM

## 2024-02-15 DIAGNOSIS — I10 ESSENTIAL HYPERTENSION WITH GOAL BLOOD PRESSURE LESS THAN 140/90: ICD-10-CM

## 2024-02-15 DIAGNOSIS — N18.30 ANEMIA DUE TO STAGE 3 CHRONIC KIDNEY DISEASE, UNSPECIFIED WHETHER STAGE 3A OR 3B CKD (H): ICD-10-CM

## 2024-02-15 DIAGNOSIS — E78.5 HYPERLIPIDEMIA WITH TARGET LDL LESS THAN 130: ICD-10-CM

## 2024-02-15 LAB
ANION GAP SERPL CALCULATED.3IONS-SCNC: 13 MMOL/L (ref 7–15)
BUN SERPL-MCNC: 50.2 MG/DL (ref 8–23)
CALCIUM SERPL-MCNC: 9.6 MG/DL (ref 8.8–10.2)
CHLORIDE SERPL-SCNC: 103 MMOL/L (ref 98–107)
CHOLEST SERPL-MCNC: 147 MG/DL
CREAT SERPL-MCNC: 1.43 MG/DL (ref 0.51–0.95)
CREAT UR-MCNC: 148.5 MG/DL
DEPRECATED HCO3 PLAS-SCNC: 22 MMOL/L (ref 22–29)
EGFRCR SERPLBLD CKD-EPI 2021: 37 ML/MIN/1.73M2
FASTING STATUS PATIENT QL REPORTED: YES
GLUCOSE SERPL-MCNC: 108 MG/DL (ref 70–99)
HDLC SERPL-MCNC: 66 MG/DL
LDLC SERPL CALC-MCNC: 61 MG/DL
MICROALBUMIN UR-MCNC: 18.3 MG/L
MICROALBUMIN/CREAT UR: 12.32 MG/G CR (ref 0–25)
NONHDLC SERPL-MCNC: 81 MG/DL
POTASSIUM SERPL-SCNC: 4.4 MMOL/L (ref 3.4–5.3)
SODIUM SERPL-SCNC: 138 MMOL/L (ref 135–145)
TRIGL SERPL-MCNC: 100 MG/DL

## 2024-02-15 PROCEDURE — 36415 COLL VENOUS BLD VENIPUNCTURE: CPT

## 2024-02-15 PROCEDURE — 80061 LIPID PANEL: CPT

## 2024-02-15 PROCEDURE — 82570 ASSAY OF URINE CREATININE: CPT

## 2024-02-15 PROCEDURE — 80048 BASIC METABOLIC PNL TOTAL CA: CPT

## 2024-02-15 PROCEDURE — 82043 UR ALBUMIN QUANTITATIVE: CPT

## 2024-02-21 DIAGNOSIS — I10 ESSENTIAL HYPERTENSION WITH GOAL BLOOD PRESSURE LESS THAN 140/90: ICD-10-CM

## 2024-02-23 RX ORDER — VALSARTAN 160 MG/1
160 TABLET ORAL DAILY
Qty: 90 TABLET | Refills: 0 | Status: SHIPPED | OUTPATIENT
Start: 2024-02-23 | End: 2024-05-14

## 2024-03-18 DIAGNOSIS — E78.5 HYPERLIPIDEMIA WITH TARGET LDL LESS THAN 130: ICD-10-CM

## 2024-03-18 DIAGNOSIS — I10 ESSENTIAL HYPERTENSION WITH GOAL BLOOD PRESSURE LESS THAN 140/90: ICD-10-CM

## 2024-03-18 RX ORDER — SIMVASTATIN 10 MG
10 TABLET ORAL AT BEDTIME
Qty: 90 TABLET | Refills: 1 | Status: SHIPPED | OUTPATIENT
Start: 2024-03-18

## 2024-03-18 RX ORDER — METOPROLOL SUCCINATE 25 MG/1
25 TABLET, EXTENDED RELEASE ORAL 2 TIMES DAILY
Qty: 180 TABLET | Refills: 1 | Status: SHIPPED | OUTPATIENT
Start: 2024-03-18 | End: 2024-09-24

## 2024-03-18 NOTE — TELEPHONE ENCOUNTER
Patient has had a change in pharmacy and is needing her Rx sent to her NEW pharmacy.    Pharmacy is needing to have these Rx sent to place on file for patient, she will need a refill in approximately and week to 10 days    Stacey YANGO/

## 2024-04-28 ENCOUNTER — HEALTH MAINTENANCE LETTER (OUTPATIENT)
Age: 79
End: 2024-04-28

## 2024-05-13 DIAGNOSIS — I10 ESSENTIAL HYPERTENSION WITH GOAL BLOOD PRESSURE LESS THAN 140/90: ICD-10-CM

## 2024-05-14 RX ORDER — VALSARTAN 160 MG/1
160 TABLET ORAL DAILY
Qty: 90 TABLET | Refills: 3 | Status: SHIPPED | OUTPATIENT
Start: 2024-05-14

## 2024-07-26 ENCOUNTER — PATIENT OUTREACH (OUTPATIENT)
Dept: CARE COORDINATION | Facility: CLINIC | Age: 79
End: 2024-07-26
Payer: COMMERCIAL

## 2024-08-07 DIAGNOSIS — M32.19 SYSTEMIC LUPUS ERYTHEMATOSUS WITH OTHER ORGAN INVOLVEMENT, UNSPECIFIED SLE TYPE (H): ICD-10-CM

## 2024-08-13 RX ORDER — HYDROXYCHLOROQUINE SULFATE 200 MG/1
200 TABLET, FILM COATED ORAL DAILY
Qty: 90 TABLET | Refills: 0 | Status: SHIPPED | OUTPATIENT
Start: 2024-08-13 | End: 2024-10-03

## 2024-08-13 NOTE — TELEPHONE ENCOUNTER
HYDROXYCHLOROQUINE 200MG TAB     Last Written Prescription Date:  3/1/24  Last Fill Quantity: 90,   # refills: 0  Last Office Visit : 10/11/23 Mony  Future Office visit:  10/3/24 Javan  Eye exam: Per Office Visit with [facility] 11/15/23:     Addendum: Pt. was evaluated for retinal health, including a dilated fundus examination and OCT scan, and was found to have normal retinal health. No plaquenil toxicity was detected at today's visit.  Wes Olmos, right eye  Creatinine   Date Value Ref Range Status   02/15/2024 1.43 (H) 0.51 - 0.95 mg/dL Final   01/27/2021 1.36 (H) 0.52 - 1.04 mg/dL Final      Routing refill request to provider for review/approval because:  Previous Mony 10/11/23, upcoming Javan 10/3/24. Eye exam 11/15/23, Cr 2/15/24

## 2024-08-26 ENCOUNTER — PATIENT OUTREACH (OUTPATIENT)
Dept: CARE COORDINATION | Facility: CLINIC | Age: 79
End: 2024-08-26
Payer: COMMERCIAL

## 2024-09-23 DIAGNOSIS — I10 ESSENTIAL HYPERTENSION WITH GOAL BLOOD PRESSURE LESS THAN 140/90: ICD-10-CM

## 2024-09-24 ENCOUNTER — VIRTUAL VISIT (OUTPATIENT)
Dept: FAMILY MEDICINE | Facility: CLINIC | Age: 79
End: 2024-09-24
Payer: COMMERCIAL

## 2024-09-24 DIAGNOSIS — M70.62 TROCHANTERIC BURSITIS OF LEFT HIP: Primary | ICD-10-CM

## 2024-09-24 PROCEDURE — 99442 PR PHYSICIAN TELEPHONE EVALUATION 11-20 MIN: CPT | Mod: 93

## 2024-09-24 RX ORDER — OXYCODONE HYDROCHLORIDE 5 MG/1
5 TABLET ORAL
Qty: 6 TABLET | Refills: 0 | Status: SHIPPED | OUTPATIENT
Start: 2024-09-24 | End: 2024-09-30

## 2024-09-24 RX ORDER — METOPROLOL SUCCINATE 25 MG/1
25 TABLET, EXTENDED RELEASE ORAL 2 TIMES DAILY
Qty: 180 TABLET | Refills: 0 | Status: SHIPPED | OUTPATIENT
Start: 2024-09-24

## 2024-09-24 NOTE — PROGRESS NOTES
"Capri is a 79 year old who is being evaluated via a billable telephone visit.    What phone number would you like to be contacted at? [phone]   How would you like to obtain your AVS? Denis  Originating Location (pt. Location): Home    Distant Location (provider location):  On-site    Assessment & Plan     Trochanteric bursitis of left hip  - oxyCODONE (ROXICODONE) 5 MG tablet; Take 1 tablet (5 mg) by mouth nightly as needed for severe pain.  - diclofenac (VOLTAREN) 1 % topical gel; Apply 2 g topically 4 times daily.      I spent a total of 20 minutes on the day of the visit.   Time spent by me doing chart review, history and exam, documentation and further activities per the note      BMI  Estimated body mass index is 35.41 kg/m  as calculated from the following:    Height as of 1/30/24: 1.499 m (4' 11\").    Weight as of 1/30/24: 79.5 kg (175 lb 4.8 oz).   6 tablets of oxycodone refill was sent to the pharmacy.  Discussed at length caution with this medication and potential for abuse and diversion.  Patient appears to be taking the medication appropriately.  We discussed using it only at night as needed for severe pain.  Recommend Tylenol and diclofenac gel during the day there bursitis pain.          Subjective   Capri is a 79 year old, presenting for the following health issues:  Refill Request and Recheck Medication        9/24/2024     9:08 AM   Additional Questions   Roomed by Bethany MAGAÑA     History of Present Illness       Reason for visit:  Needs refill of oxycodone, she was Dx with bursitis recently and has been doing excercises a lot with her legs. a lot of pain at night when laying down in bed. started vitamin b12 and this is starting to help but per patient will take time.      Patient presents asking for refills of oxycodone.  She is only been taking it at bedtime to help with her bursitis on the left hip.  She has been doing exercises for her legs and reports that she started to have pain and typical " left shoulder.  She would a lot of pain while laying down in bed at night.  She is unable to take ibuprofen and NSAIDs.  Has been taking Tylenol during the day.  Reports that she only would use the oxycodone at bedtime if needed and she has been using it sparingly.  She did get a 6 tablet supply from another provider and she ran out of the medication.  No concerns for abuse or diversion.  She reports that she lives alone.  Reports that the medication makes her feel off during the day so show I take that at bedtime she is also been using topical products help with her pain.  She has not been using diclofenac gel.                Objective           Vitals:  No vitals were obtained today due to virtual visit.    Physical Exam   General: Alert and no distress //Respiratory: No audible wheeze, cough, or shortness of breath // Psychiatric:  Appropriate affect, tone, and pace of words            Phone call duration: 20 minutes  Signed Electronically by: Fiorella Hoffman PA-C

## 2024-09-24 NOTE — PATIENT INSTRUCTIONS
6 tablets of oxycodone refill was sent to the pharmacy.  Discussed at length caution with this medication and potential for abuse and diversion.  Patient appears to be taking the medication appropriately.  We discussed using it only at night as needed for severe pain.  Recommend Tylenol and diclofenac gel during the day there bursitis pain.    Kwendnote    Patient understood and verbally consented to the treatment plan. Discussed symptoms that would warrant an urgent or emergent visit. All of the patients' questions were answered. Patient was instructed to contact the clinic if questions or concerns arise. Recommend follow up appointments if symptoms worsen or fail to improve. Recommend follow up as needed. Recommend ER in the case of an emergency.    Fiorella Hoffman PA-C    Please note: Voice recognition software may have been used in preparing this note, unintended word substitutions may be present.

## 2024-09-29 ENCOUNTER — APPOINTMENT (OUTPATIENT)
Dept: GENERAL RADIOLOGY | Facility: CLINIC | Age: 79
End: 2024-09-29
Attending: NURSE PRACTITIONER
Payer: COMMERCIAL

## 2024-09-29 ENCOUNTER — APPOINTMENT (OUTPATIENT)
Dept: GENERAL RADIOLOGY | Facility: CLINIC | Age: 79
End: 2024-09-29
Attending: FAMILY MEDICINE
Payer: COMMERCIAL

## 2024-09-29 ENCOUNTER — APPOINTMENT (OUTPATIENT)
Dept: CT IMAGING | Facility: CLINIC | Age: 79
End: 2024-09-29
Attending: NURSE PRACTITIONER
Payer: COMMERCIAL

## 2024-09-29 ENCOUNTER — HOSPITAL ENCOUNTER (EMERGENCY)
Facility: CLINIC | Age: 79
Discharge: HOME | End: 2024-09-30
Attending: NURSE PRACTITIONER | Admitting: NURSE PRACTITIONER
Payer: COMMERCIAL
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
## 2024-09-29 DIAGNOSIS — G89.29 HIP PAIN, CHRONIC, LEFT: ICD-10-CM

## 2024-09-29 DIAGNOSIS — M25.562 ACUTE PAIN OF LEFT KNEE: ICD-10-CM

## 2024-09-29 DIAGNOSIS — M25.552 HIP PAIN, CHRONIC, LEFT: ICD-10-CM

## 2024-09-29 PROCEDURE — 73562 X-RAY EXAM OF KNEE 3: CPT | Mod: LT

## 2024-09-29 PROCEDURE — 70450 CT HEAD/BRAIN W/O DYE: CPT

## 2024-09-29 PROCEDURE — 99284 EMERGENCY DEPT VISIT MOD MDM: CPT | Mod: 25 | Performed by: NURSE PRACTITIONER

## 2024-09-29 PROCEDURE — 73502 X-RAY EXAM HIP UNI 2-3 VIEWS: CPT

## 2024-09-29 PROCEDURE — 99284 EMERGENCY DEPT VISIT MOD MDM: CPT | Performed by: NURSE PRACTITIONER

## 2024-09-29 ASSESSMENT — ACTIVITIES OF DAILY LIVING (ADL)
ADLS_ACUITY_SCORE: 36
ADLS_ACUITY_SCORE: 38

## 2024-09-29 ASSESSMENT — COLUMBIA-SUICIDE SEVERITY RATING SCALE - C-SSRS
1. IN THE PAST MONTH, HAVE YOU WISHED YOU WERE DEAD OR WISHED YOU COULD GO TO SLEEP AND NOT WAKE UP?: NO
6. HAVE YOU EVER DONE ANYTHING, STARTED TO DO ANYTHING, OR PREPARED TO DO ANYTHING TO END YOUR LIFE?: NO
2. HAVE YOU ACTUALLY HAD ANY THOUGHTS OF KILLING YOURSELF IN THE PAST MONTH?: NO

## 2024-09-30 VITALS
HEART RATE: 80 BPM | WEIGHT: 180 LBS | BODY MASS INDEX: 36.36 KG/M2 | RESPIRATION RATE: 20 BRPM | DIASTOLIC BLOOD PRESSURE: 84 MMHG | SYSTOLIC BLOOD PRESSURE: 168 MMHG | TEMPERATURE: 97.9 F | OXYGEN SATURATION: 97 %

## 2024-09-30 RX ORDER — OXYCODONE HYDROCHLORIDE 5 MG/1
5 TABLET ORAL EVERY 8 HOURS
Qty: 6 TABLET | Refills: 0 | Status: SHIPPED | OUTPATIENT
Start: 2024-09-30

## 2024-09-30 NOTE — ED PROVIDER NOTES
"  History     Chief Complaint   Patient presents with    Fall     HPI  Capri Wiseman is a 79 year old female who presents for evaluation after she fell just prior to arrival.  She was sitting on the seat of her walker.  She fell landing on her left side/hip onto concrete.  She did hit her head \"lightly\".  Denies LOC.  She was feeling a little bit nauseated on her way here.  Her main complaint is left hip pain and left knee pain.  Denies neck or back pain.  Denies headache.  She is not on any blood thinner medication.    Allergies:  No Known Allergies    Problem List:    Patient Active Problem List    Diagnosis Date Noted    Class 2 severe obesity due to excess calories with serious comorbidity in adult (H) 12/21/2023     Priority: Medium    Edema, unspecified type 01/23/2022     Priority: Medium    Mass of right wrist 04/05/2021     Priority: Medium    Acute radicular low back pain 01/25/2021     Priority: Medium    S/P total knee replacement using cement, right 01/12/2021     Priority: Medium    Primary osteoarthritis of right knee 10/21/2020     Priority: Medium     Added automatically from request for surgery [id number]      Long-term use of Plaquenil 07/16/2018     Priority: Medium    Systemic lupus erythematosus with other organ involvement, unspecified SLE type (H) 08/14/2017     Priority: Medium    Anemia due to stage 3 chronic kidney disease, unspecified whether stage 3a or 3b CKD (H) 03/03/2015     Priority: Medium    Essential hypertension with goal blood pressure less than 140/90 03/03/2015     Priority: Medium    Esophageal reflux 03/03/2015     Priority: Medium    Hyperlipidemia with target LDL less than 130 03/03/2015     Priority: Medium     Diagnosis updated by automated process. Provider to review and confirm.      RA (rheumatoid arthritis) (H) 03/03/2015     Priority: Medium        Past Medical History:    Past Medical History:   Diagnosis Date    CKD (chronic kidney disease) stage 3, GFR 30-59 " ml/min (H)     Dental disorder     GERD (gastroesophageal reflux disease)     High blood pressure     Hyperlipidemia     Infection due to 2019 novel coronavirus 12/30/2022    Mixed hyperlipidaemia     Osteoarthritis     RA (rheumatoid arthritis) (H)     SLE (systemic lupus erythematosus) (H)     Vision problem        Past Surgical History:    Past Surgical History:   Procedure Laterality Date    APPENDECTOMY  1969    ARTHROPLASTY KNEE Right 01/12/2021    Procedure: right total knee replacement;  Surgeon: Thad Fermin DO;  Location: [facility] OR    COLONOSCOPY  2009    repeat 5 years    COLONOSCOPY N/A 05/20/2015    normal, repeat 5 years due to family history    COLONOSCOPY N/A 10/07/2019    normal, no further screening necessary    FOOT SURGERY  2010    bone and screws in 2 toes, hammertoe    HYSTERECTOMY TOTAL ABDOMINAL  2010    due to bleeding, benign    LAPAROTOMY EXPLORATORY      scar tissue removal/repair - abd    SHOULDER SURGERY  05/2013    rotator cuff repair, right    ZZC TOTAL KNEE ARTHROPLASTY  03/2010    left       Family History:    Family History   Problem Relation Age of Onset    Colon Cancer Mother     Lupus Mother     Cancer Mother         colon cancer    Cerebrovascular Disease Father     No Known Problems Sister     No Known Problems Brother     No Known Problems Sister     No Known Problems Son     Neurologic Disorder Sister         ALS    Cancer Maternal Grandmother         stomach cancer    Heart Disease Maternal Grandfather     Unknown/Adopted Paternal Grandmother     Unknown/Adopted Paternal Grandfather     No Known Problems Sister     No Known Problems Sister     No Known Problems Son     No Known Problems Son        Social History:  Marital Status:   [5]  Social History     Tobacco Use    Smoking status: Never    Smokeless tobacco: Never   Vaping Use    Vaping status: Never Used   Substance Use Topics    Alcohol use: No     Alcohol/week: 0.0 standard drinks of alcohol    Drug  use: No        Medications:    oxyCODONE (ROXICODONE) 5 MG tablet  acetaminophen (TYLENOL) 325 MG tablet  Ascorbic Acid (VITAMIN C PO)  aspirin (ASA) 81 MG EC tablet  calcium citrate-vitamin D (CITRACAL) 315-250 MG-UNIT TABS  Cyanocobalamin (VITAMIN B 12 PO)  cycloSPORINE (RESTASIS) 0.05 % ophthalmic emulsion  diclofenac (VOLTAREN) 1 % topical gel  hydroxychloroquine (PLAQUENIL) 200 MG tablet  metoprolol succinate ER (TOPROL XL) 25 MG 24 hr tablet  Multiple Vitamins-Minerals (CENTRUM SILVER) per tablet  oxyCODONE (ROXICODONE) 5 MG tablet  simvastatin (ZOCOR) 10 MG tablet  valsartan (DIOVAN) 160 MG tablet          Review of Systems  As mentioned above in the history present illness. All other systems were reviewed and are negative.    Physical Exam   BP: (!) 154/67  Pulse: 74  Temp: 97.9  F (36.6  C)  Resp: 18  Weight: 81.6 kg (180 lb)  SpO2: 98 %      Physical Exam  Constitutional:       General: She is not in acute distress.     Appearance: Normal appearance. She is well-developed. She is not ill-appearing.   HENT:      Head: Normocephalic and atraumatic.      Right Ear: External ear normal.      Left Ear: External ear normal.      Nose: Nose normal.      Mouth/Throat:      Mouth: Mucous membranes are moist.   Eyes:      Conjunctiva/sclera: Conjunctivae normal.   Cardiovascular:      Rate and Rhythm: Normal rate and regular rhythm.      Heart sounds: Normal heart sounds. No murmur heard.  Pulmonary:      Effort: Pulmonary effort is normal. No respiratory distress.      Breath sounds: Normal breath sounds.   Abdominal:      Tenderness: There is no abdominal tenderness.   Musculoskeletal:         General: Normal range of motion.      Cervical back: Normal.      Thoracic back: Normal.      Lumbar back: Normal.      Left hip: Tenderness present.      Left knee: Tenderness present.   Skin:     General: Skin is warm and dry.      Findings: No rash.   Neurological:      General: No focal deficit present.      Mental  Status: She is alert and oriented to person, place, and time.         ED Course        Procedures              Results for orders placed or performed during the hospital encounter of 09/29/24 (from the past 24 hour(s))   XR Knee Left 3 Views    Narrative    EXAM: XR KNEE LEFT 3 VIEWS  LOCATION: [facility]  DATE: 9/29/2024    INDICATION: fell out of a walker onto a driveway  COMPARISON: None.      Impression    IMPRESSION: Left total knee arthroplasty. No evidence for periprosthetic fracture. Patellar resurfacing. No significant left knee effusion. Atherosclerotic vascular calcification.   XR Pelvis w Hip Left G/E 2 Views    Narrative    EXAM: XR PELVIS AND HIP LEFT 2 VIEWS  LOCATION: [facility]  DATE: 9/29/2024    INDICATION: fall  COMPARISON: [facility] January 27, 2021      Impression    IMPRESSION: Surgical chain sutures in the central pelvis. Partially visualized degenerative disc and facet change in the lumbar spine. Significant progression of osteoarthritis at the left hip where there is now significant joint space narrowing   superiorly. Small marginal osteophytes. No fracture or bone lesion.   Head CT w/o contrast    Narrative    EXAM: CT HEAD W/O CONTRAST  LOCATION: [facility]  DATE: 9/29/2024    INDICATION: Fall. Traumatic injury. Hit head.  COMPARISON: None.  TECHNIQUE: Routine CT Head without IV contrast. Multiplanar reformats. Dose reduction techniques were used.    FINDINGS:  INTRACRANIAL CONTENTS: No intracranial hemorrhage, extraaxial collection, or mass effect.  No CT evidence of acute infarct. Mild to moderate presumed chronic small vessel ischemic changes. Mild generalized volume loss. No hydrocephalus. Note is made of   coarse atherosclerotic calcifications of the intracranial vasculature.        VISUALIZED ORBITS/SINUSES/MASTOIDS: Prior bilateral cataract surgery. Visualized portions of the orbits are  otherwise unremarkable. No paranasal sinus mucosal disease. No middle ear or mastoid effusion.    BONES/SOFT TISSUES: No calvarial fracture. Right-sided temporomandibular joint osteoarthritis.      Impression    IMPRESSION:  1.  No CT evidence for acute intracranial process.  2.  Brain atrophy and presumed chronic microvascular ischemic changes as above.       Medications - No data to display    Assessments & Plan (with Medical Decision Making)   79-year-old female who suffered a fall as noted above in her HPI.  Exam reveals tenderness to the lateral left hip and anterior left knee.  She otherwise has no other complaints.  No spine tenderness.  She did hit her head during the fall.  Head CT is negative for intracranial hemorrhage or acute pathology.  X-ray of her left hip and left knee reveal no acute osseous abnormality.  She has chronic changes as noted above.    Patient does suffer from chronic left hip pain (bursitis).  She has been using oxycodone at bedtime to help with her pain.  She requests some additional pain medication today.  I had a long discussion with the patient about the risks of taking opioid medications and increased risk of falls.  I was a bit reluctant to provide her any further opioid medications and we did discuss it would be devastating if she were to fall and suffered a hip fracture.  I did agree to provide her a very small number and we discussed extreme caution.  I placed a referral for her to have follow-up with orthopedics and can possibly discuss a steroid injection which may be of benefit for her.    Plan:  No worrisome findings and your imaging today.  Tylenol 650 mg every 4-6 hours as needed for pain.  oxycodone 5 mg every 6 hours as needed for severe pain.  Please use this sparingly and cautiously.  It can make you very lightheaded and puts you at risk for falling.  Do not drive or drink alcohol with this medication.  Follow-up with orthopedics for your left hip pain. Referral has  been sent. They should call you or you can contact them to set-up appointment [phone].        Discharge Medication List as of 9/30/2024 12:26 AM        START taking these medications    Details   !! oxyCODONE (ROXICODONE) 5 MG tablet Take 1 tablet (5 mg) by mouth every 8 hours., Disp-6 tablet, R-0, [facility]       !! - Potential duplicate medications found. Please discuss with provider.          Final diagnoses:   Fall, initial encounter   Hip pain, chronic, left - acute on chronic   Acute pain of left knee       9/29/2024   [facility] EMERGENCY DEPT       Venkata, ANTONIO Whitley CNP  09/30/24 0113

## 2024-09-30 NOTE — DISCHARGE INSTRUCTIONS
No worrisome findings and your imaging today.  Tylenol 650 mg every 4-6 hours as needed for pain.  oxycodone 5 mg every 6 hours as needed for severe pain.  Please use this sparingly and cautiously.  It can make you very lightheaded and puts you at risk for falling.  Do not drive or drink alcohol with this medication.  Follow-up with orthopedics for your left hip pain. Referral has been sent. They should call you or you can contact them to set-up appointment [phone].

## 2024-10-03 ENCOUNTER — OFFICE VISIT (OUTPATIENT)
Dept: RHEUMATOLOGY | Facility: CLINIC | Age: 79
End: 2024-10-03
Payer: COMMERCIAL

## 2024-10-03 VITALS
TEMPERATURE: 97.7 F | HEART RATE: 67 BPM | BODY MASS INDEX: 36.36 KG/M2 | SYSTOLIC BLOOD PRESSURE: 151 MMHG | DIASTOLIC BLOOD PRESSURE: 85 MMHG | WEIGHT: 180 LBS | OXYGEN SATURATION: 97 %

## 2024-10-03 DIAGNOSIS — M32.19 SYSTEMIC LUPUS ERYTHEMATOSUS WITH OTHER ORGAN INVOLVEMENT, UNSPECIFIED SLE TYPE (H): ICD-10-CM

## 2024-10-03 DIAGNOSIS — R42 LIGHT HEADED: Primary | ICD-10-CM

## 2024-10-03 LAB
ALBUMIN MFR UR ELPH: 33.2 MG/DL
ALBUMIN SERPL BCG-MCNC: 4.4 G/DL (ref 3.5–5.2)
ALBUMIN UR-MCNC: 30 MG/DL
ALT SERPL W P-5'-P-CCNC: 8 U/L (ref 0–50)
APPEARANCE UR: CLEAR
AST SERPL W P-5'-P-CCNC: 23 U/L (ref 0–45)
BACTERIA #/AREA URNS HPF: ABNORMAL /HPF
BILIRUB UR QL STRIP: NEGATIVE
COLOR UR AUTO: YELLOW
CREAT SERPL-MCNC: 0.94 MG/DL (ref 0.51–0.95)
CREAT UR-MCNC: 288 MG/DL
CRP SERPL-MCNC: 5.5 MG/L
EGFRCR SERPLBLD CKD-EPI 2021: 61 ML/MIN/1.73M2
ERYTHROCYTE [DISTWIDTH] IN BLOOD BY AUTOMATED COUNT: 12.9 % (ref 10–15)
ERYTHROCYTE [SEDIMENTATION RATE] IN BLOOD BY WESTERGREN METHOD: 11 MM/HR (ref 0–30)
GLUCOSE UR STRIP-MCNC: NEGATIVE MG/DL
HCT VFR BLD AUTO: 33.1 % (ref 35–47)
HGB BLD-MCNC: 10.9 G/DL (ref 11.7–15.7)
HGB UR QL STRIP: NEGATIVE
KETONES UR STRIP-MCNC: NEGATIVE MG/DL
LEUKOCYTE ESTERASE UR QL STRIP: ABNORMAL
MCH RBC QN AUTO: 33.9 PG (ref 26.5–33)
MCHC RBC AUTO-ENTMCNC: 32.9 G/DL (ref 31.5–36.5)
MCV RBC AUTO: 103 FL (ref 78–100)
NITRATE UR QL: NEGATIVE
PH UR STRIP: 5 [PH] (ref 5–7)
PLATELET # BLD AUTO: 178 10E3/UL (ref 150–450)
PROT/CREAT 24H UR: 0.12 MG/MG CR (ref 0–0.2)
RBC # BLD AUTO: 3.22 10E6/UL (ref 3.8–5.2)
RBC #/AREA URNS AUTO: ABNORMAL /HPF
SP GR UR STRIP: 1.03 (ref 1–1.03)
SQUAMOUS #/AREA URNS AUTO: ABNORMAL /LPF
UROBILINOGEN UR STRIP-MCNC: NORMAL MG/DL
WBC # BLD AUTO: 5.4 10E3/UL (ref 4–11)
WBC #/AREA URNS AUTO: ABNORMAL /HPF

## 2024-10-03 PROCEDURE — 86160 COMPLEMENT ANTIGEN: CPT | Performed by: NURSE PRACTITIONER

## 2024-10-03 PROCEDURE — G2211 COMPLEX E/M VISIT ADD ON: HCPCS | Performed by: NURSE PRACTITIONER

## 2024-10-03 PROCEDURE — 86225 DNA ANTIBODY NATIVE: CPT | Performed by: NURSE PRACTITIONER

## 2024-10-03 PROCEDURE — 82565 ASSAY OF CREATININE: CPT | Performed by: NURSE PRACTITIONER

## 2024-10-03 PROCEDURE — 85027 COMPLETE CBC AUTOMATED: CPT | Performed by: NURSE PRACTITIONER

## 2024-10-03 PROCEDURE — 81001 URINALYSIS AUTO W/SCOPE: CPT | Performed by: NURSE PRACTITIONER

## 2024-10-03 PROCEDURE — 86140 C-REACTIVE PROTEIN: CPT | Performed by: NURSE PRACTITIONER

## 2024-10-03 PROCEDURE — 84156 ASSAY OF PROTEIN URINE: CPT | Performed by: NURSE PRACTITIONER

## 2024-10-03 PROCEDURE — 82040 ASSAY OF SERUM ALBUMIN: CPT | Performed by: NURSE PRACTITIONER

## 2024-10-03 PROCEDURE — 99204 OFFICE O/P NEW MOD 45 MIN: CPT | Performed by: NURSE PRACTITIONER

## 2024-10-03 PROCEDURE — 84450 TRANSFERASE (AST) (SGOT): CPT | Performed by: NURSE PRACTITIONER

## 2024-10-03 PROCEDURE — 85652 RBC SED RATE AUTOMATED: CPT | Performed by: NURSE PRACTITIONER

## 2024-10-03 PROCEDURE — 36415 COLL VENOUS BLD VENIPUNCTURE: CPT | Performed by: NURSE PRACTITIONER

## 2024-10-03 PROCEDURE — 84460 ALANINE AMINO (ALT) (SGPT): CPT | Performed by: NURSE PRACTITIONER

## 2024-10-03 RX ORDER — HYDROXYCHLOROQUINE SULFATE 200 MG/1
200 TABLET, FILM COATED ORAL DAILY
Qty: 90 TABLET | Refills: 3 | Status: SHIPPED | OUTPATIENT
Start: 2024-10-03
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
## 2024-10-03 NOTE — NURSING NOTE
"Capri Wiseman's goals for this visit include:   Chief Complaint   Patient presents with    RECHECK     SYDNEE from Dr. Sykes, follow-up Systemic lupus erythematosus with other organ involvement, unspecified SLE type       PCP: Xochitl Hemphill    Referring Provider:  Referred Self, MD  No address on file      Initial BP (!) 151/85 (BP Location: Left arm, Patient Position: Sitting, Cuff Size: Adult Regular)   Pulse 67   Temp 97.7  F (36.5  C) (Oral)   Wt 81.6 kg (180 lb)   LMP  (LMP Unknown)   SpO2 97%   Breastfeeding No   BMI 36.36 kg/m   Estimated body mass index is 36.36 kg/m  as calculated from the following:    Height as of 1/30/24: 1.499 m (4' 11\").    Weight as of this encounter: 81.6 kg (180 lb).    Medication Reconciliation: complete    Do you need any medication refills at today's visit? none    MERCEDES Conrad  Rheumatology/Infectious disease  [facility]   [phone]      "

## 2024-10-03 NOTE — PATIENT INSTRUCTIONS
1) Make an appointment with primary care    2) Keep appointment for injection, consider physical therapy after.    3) Due for eye exam in November for plaquenil check     4) Continue plaquenil 200 mg daily     5) Labs today     6) See me in 1 year

## 2024-10-03 NOTE — PROGRESS NOTES
ORTHOPEDIC CONSULT      Chief Complaint: Capri Wiseman is a 79 year old female who is being seen for   Chief Complaint   Patient presents with    Left Hip - Pain    Left Knee - Pain       History of Present Illness:   Presents for left hip pain and left knee pain.  Epic reviewed.  Seen in the ER September 29, 2024 after she fell off the seat of her walker landing on her left side and hip.  She reports has had many months of left sided lateral hip pain. The pain started before the fall, got slightly worse after the fall but about back to baseline pain. Overall though the lateral hip pain is progressive. Occasionally radiate left hip into the left lateral knee. No numbness/tingling no back pain or radicular symptoms. She endorses a limp with this and reports started to have some knee pain due to the limp. Generalized knee pain. Previous total knee 15 years ago and has done very well with this up to recent.  The pain will radiate into the groin occasionally but the lateral hip pain is worse than the groin pain and more constant.    Treatments tried: rest, activity modification, tylenol. Reports unable to take nsaids due to Lupus and the medications and kidney issues.       Patient's past medical, surgical, social and family histories reviewed.     Past Medical History:   Diagnosis Date    CKD (chronic kidney disease) stage 3, GFR 30-59 ml/min (H)     Dental disorder     loosing top teeth due to lupus    GERD (gastroesophageal reflux disease)     High blood pressure     Hyperlipidemia     Infection due to 2019 novel coronavirus 12/30/2022    presented with syncope    Mixed hyperlipidaemia     Osteoarthritis     RA (rheumatoid arthritis) (H)     mild, on plaquenil    SLE (systemic lupus erythematosus) (H)     Vision problem     dry eyes from Lupus       Past Surgical History:   Procedure Laterality Date    APPENDECTOMY  1969    ARTHROPLASTY KNEE Right 01/12/2021    Procedure: right total knee replacement;  Surgeon:  Thad Fermin, DO;  Location: [facility] OR    COLONOSCOPY  2009    repeat 5 years    COLONOSCOPY N/A 05/20/2015    normal, repeat 5 years due to family history    COLONOSCOPY N/A 10/07/2019    normal, no further screening necessary    FOOT SURGERY  2010    bone and screws in 2 toes, hammertoe    HYSTERECTOMY TOTAL ABDOMINAL  2010    due to bleeding, benign    LAPAROTOMY EXPLORATORY      scar tissue removal/repair - abd    SHOULDER SURGERY  05/2013    rotator cuff repair, right    ZZC TOTAL KNEE ARTHROPLASTY  03/2010    left       Medications:  Current Outpatient Medications   Medication Sig Dispense Refill    acetaminophen (TYLENOL) 325 MG tablet Take 3 tablets (975 mg) by mouth every 8 hours as needed for other (mild pain) 100 tablet 1    Ascorbic Acid (VITAMIN C PO) Take 1,000 mg by mouth daily      aspirin (ASA) 81 MG EC tablet Take 1 tablet (81 mg) by mouth daily      calcium citrate-vitamin D (CITRACAL) 315-250 MG-UNIT TABS Take 2 tablets by mouth daily       Cyanocobalamin (VITAMIN B 12 PO) Take 1,000 mcg by mouth daily      cycloSPORINE (RESTASIS) 0.05 % ophthalmic emulsion Place 1 drop into both eyes 2 times daily 180 each 3    diclofenac (VOLTAREN) 1 % topical gel Apply 2 g topically 4 times daily. 350 g 0    hydroxychloroquine (PLAQUENIL) 200 MG tablet Take 1 tablet (200 mg) by mouth daily. 90 tablet 3    metoprolol succinate ER (TOPROL XL) 25 MG 24 hr tablet TAKE ONE TABLET BY MOUTH TWICE A  tablet 0    Multiple Vitamins-Minerals (CENTRUM SILVER) per tablet Take 1 tablet by mouth daily.      oxyCODONE (ROXICODONE) 5 MG tablet Take 1 tablet (5 mg) by mouth every 8 hours. 6 tablet 0    simvastatin (ZOCOR) 10 MG tablet Take 1 tablet (10 mg) by mouth at bedtime 90 tablet 1    valsartan (DIOVAN) 160 MG tablet TAKE ONE TABLET BY MOUTH ONCE DAILY 90 tablet 3     No current facility-administered medications for this visit.       No Known Allergies    Social History     Occupational History     Occupation: teacher     Comment: Retired   Tobacco Use    Smoking status: Never    Smokeless tobacco: Never   Vaping Use    Vaping status: Never Used   Substance and Sexual Activity    Alcohol use: No     Alcohol/week: 0.0 standard drinks of alcohol    Drug use: No    Sexual activity: Not Currently     Partners: Male       Family History   Problem Relation Age of Onset    Colon Cancer Mother     Lupus Mother     Cancer Mother         colon cancer    Cerebrovascular Disease Father     No Known Problems Sister     No Known Problems Brother     No Known Problems Sister     No Known Problems Son     Neurologic Disorder Sister         ALS    Cancer Maternal Grandmother         stomach cancer    Heart Disease Maternal Grandfather     Unknown/Adopted Paternal Grandmother     Unknown/Adopted Paternal Grandfather     No Known Problems Sister     No Known Problems Sister     No Known Problems Son     No Known Problems Son        REVIEW OF SYSTEMS  10 point review systems performed otherwise negative as noted as per history of present illness.    Physical Exam:  Vitals: BP (!) 164/77   Temp 97.6  F (36.4  C)   LMP  (LMP Unknown)   BMI= There is no height or weight on file to calculate BMI.  Constitutional: healthy, alert and no acute distress   Psychiatric: mentation appears normal and affect normal/bright  NEURO: no focal deficits  RESP: Normal with easy respirations and no use of accessory muscles to breathe, no audible wheezing or retractions  CV: No peripheral edema  SKIN: No erythema, rashes, excoriation, or breakdown. No evidence of infection.  No bruising.   JOINT/EXTREMITIES:No swelling or bruising along hip. Tender to the greater trochanteric bursa and slight tender to SI joint. Pain with hip motion but lateral and groin.  Some restriction. Tender along the IT band and into the distal IT band bursa.  No focal knee tenderness. No instability of the knee. Full knee motion without pain.      GAIT: not tested      Diagnostic Modalities:  Left hip x-ray: Dated September 29, 2024 shows no acute fractures or dislocations.  Moderate to severe near bone-on-bone left hip osteoarthritis with subchondral sclerosis.    Left knee x-ray: Dated September 29, 2024 shows a total knee arthroplasty in place.  No evidence of periprosthetic fracture.  No evidence of subluxation or dislocation.  No evidence of loosening of the prosthesis.  Previous imaging reviewed.  Independent visualization of the images was performed.      Impression: left hip primary osteoarthritis  Left hip greater trochanteric bursitis and IT band syndrome  History of left total knee replacement  Altered gait.     Plan:  All of the above pertinent physical exam and imaging modalities findings was reviewed with Capri.  Months of lateral hip pain consistent with greater trochanteric bursitis and IT band syndrome.  Does have underlying OA likely causing altered mechanics and causing the pain. She does have some pain into the groin. More recently having left knee pain but started after she noticed her gait getting worse and having the pain along the IT band. No red flags with the knee. Discussed options. Discussed that with the primary hip osteoarthritis causing the bursitis and IT band that even with a bursal injection this may come back.  She does request a bursal injection as feels that even if she continues to have groin pain she would like relief to the lateral pain. Discussed role of therapy at length and she was agreeable to this. [facility].  Bursal injection provided today.  We did also discuss intra-articular injection, could consider this if the bursal injection does not provide lasting relief. Did discussed total hip replacement and she is considering that as well.     Return to clinic 4-6 , week(s), PRN, or sooner as needed for changes.  Re-x-ray on return: No    Frankie Fermin D.O.

## 2024-10-04 ENCOUNTER — OFFICE VISIT (OUTPATIENT)
Dept: ORTHOPEDICS | Facility: CLINIC | Age: 79
End: 2024-10-04
Payer: COMMERCIAL

## 2024-10-04 VITALS — DIASTOLIC BLOOD PRESSURE: 77 MMHG | SYSTOLIC BLOOD PRESSURE: 164 MMHG | TEMPERATURE: 97.6 F

## 2024-10-04 DIAGNOSIS — M70.62 GREATER TROCHANTERIC BURSITIS OF LEFT HIP: Primary | ICD-10-CM

## 2024-10-04 DIAGNOSIS — M76.32 ILIOTIBIAL BAND SYNDROME OF LEFT SIDE: ICD-10-CM

## 2024-10-04 DIAGNOSIS — M16.12 PRIMARY OSTEOARTHRITIS OF LEFT HIP: ICD-10-CM

## 2024-10-04 DIAGNOSIS — Z96.652 HISTORY OF TOTAL LEFT KNEE REPLACEMENT: ICD-10-CM

## 2024-10-04 LAB
C3 SERPL-MCNC: 125 MG/DL (ref 81–157)
C4 SERPL-MCNC: 28 MG/DL (ref 13–39)

## 2024-10-04 PROCEDURE — 20610 DRAIN/INJ JOINT/BURSA W/O US: CPT | Mod: LT | Performed by: NURSE PRACTITIONER

## 2024-10-04 PROCEDURE — 99203 OFFICE O/P NEW LOW 30 MIN: CPT | Mod: 25 | Performed by: ORTHOPAEDIC SURGERY

## 2024-10-04 RX ADMIN — TRIAMCINOLONE ACETONIDE 40 MG: 40 INJECTION, SUSPENSION INTRA-ARTICULAR; INTRAMUSCULAR at 16:30

## 2024-10-04 RX ADMIN — BUPIVACAINE HYDROCHLORIDE 3 ML: 5 INJECTION, SOLUTION PERINEURAL at 16:30

## 2024-10-04 NOTE — LETTER
10/4/2024      Capri Wiseman  [address] [facility] [address]      Dear Colleague,    Thank you for referring your patient, Capri Wiseman, to the [facility]. Please see a copy of my visit note below.    ORTHOPEDIC CONSULT      Chief Complaint: Capri Wiseman is a 79 year old female who is being seen for   Chief Complaint   Patient presents with     Left Hip - Pain     Left Knee - Pain       History of Present Illness:   Presents for left hip pain and left knee pain.  Epic reviewed.  Seen in the ER September 29, 2024 after she fell off the seat of her walker landing on her left side and hip.  She reports has had many months of left sided lateral hip pain. The pain started before the fall, got slightly worse after the fall but about back to baseline pain. Overall though the lateral hip pain is progressive. Occasionally radiate left hip into the left lateral knee. No numbness/tingling no back pain or radicular symptoms. She endorses a limp with this and reports started to have some knee pain due to the limp. Generalized knee pain. Previous total knee 15 years ago and has done very well with this up to recent.  The pain will radiate into the groin occasionally but the lateral hip pain is worse than the groin pain and more constant.    Treatments tried: rest, activity modification, tylenol. Reports unable to take nsaids due to Lupus and the medications and kidney issues.       Patient's past medical, surgical, social and family histories reviewed.     Past Medical History:   Diagnosis Date     CKD (chronic kidney disease) stage 3, GFR 30-59 ml/min (H)      Dental disorder     loosing top teeth due to lupus     GERD (gastroesophageal reflux disease)      High blood pressure      Hyperlipidemia      Infection due to 2019 novel coronavirus 12/30/2022    presented with syncope     Mixed hyperlipidaemia      Osteoarthritis      RA (rheumatoid arthritis) (H)     mild, on plaquenil      SLE (systemic lupus erythematosus) (H)      Vision problem     dry eyes from Lupus       Past Surgical History:   Procedure Laterality Date     APPENDECTOMY  1969     ARTHROPLASTY KNEE Right 01/12/2021    Procedure: right total knee replacement;  Surgeon: Thad Fermin DO;  Location: [facility] OR     COLONOSCOPY  2009    repeat 5 years     COLONOSCOPY N/A 05/20/2015    normal, repeat 5 years due to family history     COLONOSCOPY N/A 10/07/2019    normal, no further screening necessary     FOOT SURGERY  2010    bone and screws in 2 toes, hammertoe     HYSTERECTOMY TOTAL ABDOMINAL  2010    due to bleeding, benign     LAPAROTOMY EXPLORATORY      scar tissue removal/repair - abd     SHOULDER SURGERY  05/2013    rotator cuff repair, right     ZZC TOTAL KNEE ARTHROPLASTY  03/2010    left       Medications:  Current Outpatient Medications   Medication Sig Dispense Refill     acetaminophen (TYLENOL) 325 MG tablet Take 3 tablets (975 mg) by mouth every 8 hours as needed for other (mild pain) 100 tablet 1     Ascorbic Acid (VITAMIN C PO) Take 1,000 mg by mouth daily       aspirin (ASA) 81 MG EC tablet Take 1 tablet (81 mg) by mouth daily       calcium citrate-vitamin D (CITRACAL) 315-250 MG-UNIT TABS Take 2 tablets by mouth daily        Cyanocobalamin (VITAMIN B 12 PO) Take 1,000 mcg by mouth daily       cycloSPORINE (RESTASIS) 0.05 % ophthalmic emulsion Place 1 drop into both eyes 2 times daily 180 each 3     diclofenac (VOLTAREN) 1 % topical gel Apply 2 g topically 4 times daily. 350 g 0     hydroxychloroquine (PLAQUENIL) 200 MG tablet Take 1 tablet (200 mg) by mouth daily. 90 tablet 3     metoprolol succinate ER (TOPROL XL) 25 MG 24 hr tablet TAKE ONE TABLET BY MOUTH TWICE A  tablet 0     Multiple Vitamins-Minerals (CENTRUM SILVER) per tablet Take 1 tablet by mouth daily.       oxyCODONE (ROXICODONE) 5 MG tablet Take 1 tablet (5 mg) by mouth every 8 hours. 6 tablet 0     simvastatin (ZOCOR) 10 MG tablet Take 1  tablet (10 mg) by mouth at bedtime 90 tablet 1     valsartan (DIOVAN) 160 MG tablet TAKE ONE TABLET BY MOUTH ONCE DAILY 90 tablet 3     No current facility-administered medications for this visit.       No Known Allergies    Social History     Occupational History     Occupation: teacher     Comment: Retired   Tobacco Use     Smoking status: Never     Smokeless tobacco: Never   Vaping Use     Vaping status: Never Used   Substance and Sexual Activity     Alcohol use: No     Alcohol/week: 0.0 standard drinks of alcohol     Drug use: No     Sexual activity: Not Currently     Partners: Male       Family History   Problem Relation Age of Onset     Colon Cancer Mother      Lupus Mother      Cancer Mother         colon cancer     Cerebrovascular Disease Father      No Known Problems Sister      No Known Problems Brother      No Known Problems Sister      No Known Problems Son      Neurologic Disorder Sister         ALS     Cancer Maternal Grandmother         stomach cancer     Heart Disease Maternal Grandfather      Unknown/Adopted Paternal Grandmother      Unknown/Adopted Paternal Grandfather      No Known Problems Sister      No Known Problems Sister      No Known Problems Son      No Known Problems Son        REVIEW OF SYSTEMS  10 point review systems performed otherwise negative as noted as per history of present illness.    Physical Exam:  Vitals: BP (!) 164/77   Temp 97.6  F (36.4  C)   LMP  (LMP Unknown)   BMI= There is no height or weight on file to calculate BMI.  Constitutional: healthy, alert and no acute distress   Psychiatric: mentation appears normal and affect normal/bright  NEURO: no focal deficits  RESP: Normal with easy respirations and no use of accessory muscles to breathe, no audible wheezing or retractions  CV: No peripheral edema  SKIN: No erythema, rashes, excoriation, or breakdown. No evidence of infection.  No bruising.   JOINT/EXTREMITIES:No swelling or bruising along hip. Tender to the greater  trochanteric bursa and slight tender to SI joint. Pain with hip motion but lateral and groin.  Some restriction. Tender along the IT band and into the distal IT band bursa.  No focal knee tenderness. No instability of the knee. Full knee motion without pain.      GAIT: not tested     Diagnostic Modalities:  Left hip x-ray: Dated September 29, 2024 shows no acute fractures or dislocations.  Moderate to severe near bone-on-bone left hip osteoarthritis with subchondral sclerosis.    Left knee x-ray: Dated September 29, 2024 shows a total knee arthroplasty in place.  No evidence of periprosthetic fracture.  No evidence of subluxation or dislocation.  No evidence of loosening of the prosthesis.  Previous imaging reviewed.  Independent visualization of the images was performed.      Impression: left hip primary osteoarthritis  Left hip greater trochanteric bursitis and IT band syndrome  History of left total knee replacement  Altered gait.     Plan:  All of the above pertinent physical exam and imaging modalities findings was reviewed with Capri.  Months of lateral hip pain consistent with greater trochanteric bursitis and IT band syndrome.  Does have underlying OA likely causing altered mechanics and causing the pain. She does have some pain into the groin. More recently having left knee pain but started after she noticed her gait getting worse and having the pain along the IT band. No red flags with the knee. Discussed options. Discussed that with the primary hip osteoarthritis causing the bursitis and IT band that even with a bursal injection this may come back.  She does request a bursal injection as feels that even if she continues to have groin pain she would like relief to the lateral pain. Discussed role of therapy at length and she was agreeable to this. [facility].  Bursal injection provided today.  We did also discuss intra-articular injection, could consider this if the bursal injection does not  provide lasting relief.     Return to clinic 4-6 , week(s), PRN, or sooner as needed for changes.  Re-x-ray on return: No    Frankie Fermin D.O.      Again, thank you for allowing me to participate in the care of your patient.        Sincerely,        Thad Fermin, DO

## 2024-10-06 LAB — DSDNA AB SER-ACNC: <0.6 IU/ML

## 2024-10-07 DIAGNOSIS — R80.9 PROTEINURIA, UNSPECIFIED TYPE: Primary | ICD-10-CM

## 2024-10-07 DIAGNOSIS — M32.19 SYSTEMIC LUPUS ERYTHEMATOSUS WITH OTHER ORGAN INVOLVEMENT, UNSPECIFIED SLE TYPE (H): ICD-10-CM

## 2024-10-08 ENCOUNTER — TELEPHONE (OUTPATIENT)
Dept: RHEUMATOLOGY | Facility: CLINIC | Age: 79
End: 2024-10-08
Payer: COMMERCIAL

## 2024-10-08 NOTE — TELEPHONE ENCOUNTER
Called patient and gave message per Daja Edouard NP.  Patient verbalized understanding and agreement to plan.     Overall labs look good.  A small amount of protein in your urine.  I would like you to repeat your urine test in 3 months.  You can call the scheduling line to schedule this.    Vivi Jay RN  Adult Rheumatology

## 2024-10-09 RX ORDER — BUPIVACAINE HYDROCHLORIDE 5 MG/ML
3 INJECTION, SOLUTION PERINEURAL
Status: SHIPPED | OUTPATIENT
Start: 2024-10-04

## 2024-10-09 RX ORDER — TRIAMCINOLONE ACETONIDE 40 MG/ML
40 INJECTION, SUSPENSION INTRA-ARTICULAR; INTRAMUSCULAR
Status: SHIPPED | OUTPATIENT
Start: 2024-10-04

## 2024-10-09 NOTE — PROGRESS NOTES
Large Joint Injection/Arthocentesis: L greater trochanteric bursa    Date/Time: 10/4/2024 4:30 PM    Performed by: Mikey Recinos APRN CNP  Authorized by: Mikey Recinos APRN CNP    Needle Size:  22 G  Guidance: landmark guided    Approach:  Lateral  Location:  Hip      Site:  L greater trochanteric bursa  Medications:  3 mL BUPivacaine 0.5 %; 40 mg triamcinolone 40 MG/ML  Outcome:  Tolerated well, no immediate complications  Procedure discussed: discussed risks, benefits, and alternatives    Consent Given by:  Patient  Timeout: timeout called immediately prior to procedure    Prep: patient was prepped and draped in usual sterile fashion      Addendum note 10/9/24 for procedure 10/4/24.

## 2024-10-24 DIAGNOSIS — E78.5 HYPERLIPIDEMIA WITH TARGET LDL LESS THAN 130: ICD-10-CM

## 2024-10-24 NOTE — TELEPHONE ENCOUNTER
Spoke with patient. She does not have provider with [facility].     I have her scheduled 01/20/2025 to transition care to Dr. Arcos.     Avery Ramirez RN on 10/24/2024 at 4:57 PM

## 2024-10-24 NOTE — TELEPHONE ENCOUNTER
October 24, 2024  Craig Diamond, RN to [facility] - Primary Care Regarding result: simvastatin (ZOCOR) 10 MG tablet [Pharmacy Med Name: SIMVASTATIN 10MG TABS]   [facility]    10/24/24  1:19 PM  Please contact patient and see if they now have pcp at [facility]

## 2024-10-25 RX ORDER — SIMVASTATIN 10 MG
10 TABLET ORAL AT BEDTIME
Qty: 90 TABLET | Refills: 0 | Status: SHIPPED | OUTPATIENT
Start: 2024-10-25

## 2025-01-08 ENCOUNTER — TELEPHONE (OUTPATIENT)
Dept: FAMILY MEDICINE | Facility: CLINIC | Age: 80
End: 2025-01-08

## 2025-03-21 DIAGNOSIS — E78.5 HYPERLIPIDEMIA WITH TARGET LDL LESS THAN 130: ICD-10-CM

## 2025-03-23 RX ORDER — SIMVASTATIN 10 MG
10 TABLET ORAL AT BEDTIME
Qty: 90 TABLET | Refills: 0 | Status: SHIPPED | OUTPATIENT
Start: 2025-03-23

## 2025-04-01 DIAGNOSIS — I10 ESSENTIAL HYPERTENSION WITH GOAL BLOOD PRESSURE LESS THAN 140/90: ICD-10-CM

## 2025-04-02 RX ORDER — METOPROLOL SUCCINATE 25 MG/1
25 TABLET, EXTENDED RELEASE ORAL 2 TIMES DAILY
Qty: 180 TABLET | Refills: 0 | Status: SHIPPED | OUTPATIENT
Start: 2025-04-02

## 2025-04-03 NOTE — TELEPHONE ENCOUNTER
Refilled 1 time only, please call patient to schedule for Preventive or Office visit to establish with new PCP as former has left clinic.

## 2025-04-11 ENCOUNTER — OFFICE VISIT (OUTPATIENT)
Dept: FAMILY MEDICINE | Facility: CLINIC | Age: 80
End: 2025-04-11
Payer: COMMERCIAL

## 2025-04-11 VITALS
OXYGEN SATURATION: 98 % | HEART RATE: 78 BPM | WEIGHT: 171.8 LBS | DIASTOLIC BLOOD PRESSURE: 79 MMHG | BODY MASS INDEX: 34.64 KG/M2 | TEMPERATURE: 98.4 F | SYSTOLIC BLOOD PRESSURE: 137 MMHG | HEIGHT: 59 IN | RESPIRATION RATE: 13 BRPM

## 2025-04-11 DIAGNOSIS — Z28.21 COVID-19 VACCINATION DECLINED: ICD-10-CM

## 2025-04-11 DIAGNOSIS — I10 ESSENTIAL HYPERTENSION WITH GOAL BLOOD PRESSURE LESS THAN 140/90: ICD-10-CM

## 2025-04-11 DIAGNOSIS — M32.19 SYSTEMIC LUPUS ERYTHEMATOSUS WITH OTHER ORGAN INVOLVEMENT, UNSPECIFIED SLE TYPE (H): ICD-10-CM

## 2025-04-11 DIAGNOSIS — N18.30 STAGE 3 CHRONIC KIDNEY DISEASE, UNSPECIFIED WHETHER STAGE 3A OR 3B CKD (H): ICD-10-CM

## 2025-04-11 DIAGNOSIS — Z28.21 RESPIRATORY SYNCYTIAL VIRUS (RSV) VACCINATION DECLINED: ICD-10-CM

## 2025-04-11 DIAGNOSIS — R80.9 PROTEINURIA, UNSPECIFIED TYPE: ICD-10-CM

## 2025-04-11 DIAGNOSIS — M06.9 RHEUMATOID ARTHRITIS INVOLVING MULTIPLE SITES, UNSPECIFIED WHETHER RHEUMATOID FACTOR PRESENT (H): ICD-10-CM

## 2025-04-11 DIAGNOSIS — E66.811 CLASS 1 OBESITY DUE TO EXCESS CALORIES WITH SERIOUS COMORBIDITY AND BODY MASS INDEX (BMI) OF 34.0 TO 34.9 IN ADULT: ICD-10-CM

## 2025-04-11 DIAGNOSIS — E78.5 HYPERLIPIDEMIA WITH TARGET LDL LESS THAN 130: ICD-10-CM

## 2025-04-11 DIAGNOSIS — E66.09 CLASS 1 OBESITY DUE TO EXCESS CALORIES WITH SERIOUS COMORBIDITY AND BODY MASS INDEX (BMI) OF 34.0 TO 34.9 IN ADULT: ICD-10-CM

## 2025-04-11 DIAGNOSIS — Z79.899 LONG-TERM USE OF PLAQUENIL: ICD-10-CM

## 2025-04-11 DIAGNOSIS — Z00.00 ENCOUNTER FOR MEDICARE ANNUAL WELLNESS EXAM: Primary | ICD-10-CM

## 2025-04-11 PROBLEM — R60.9 EDEMA, UNSPECIFIED TYPE: Status: RESOLVED | Noted: 2022-01-23 | Resolved: 2025-04-11

## 2025-04-11 LAB
ALBUMIN UR-MCNC: NEGATIVE MG/DL
ALT SERPL W P-5'-P-CCNC: 9 U/L (ref 0–50)
ANION GAP SERPL CALCULATED.3IONS-SCNC: 13 MMOL/L (ref 7–15)
APPEARANCE UR: CLEAR
BILIRUB UR QL STRIP: NEGATIVE
BUN SERPL-MCNC: 30.7 MG/DL (ref 8–23)
CALCIUM SERPL-MCNC: 9.5 MG/DL (ref 8.8–10.4)
CHLORIDE SERPL-SCNC: 106 MMOL/L (ref 98–107)
CHOLEST SERPL-MCNC: 158 MG/DL
COLOR UR AUTO: ABNORMAL
CREAT SERPL-MCNC: 1.15 MG/DL (ref 0.51–0.95)
CREAT UR-MCNC: 97.4 MG/DL
EGFRCR SERPLBLD CKD-EPI 2021: 48 ML/MIN/1.73M2
FASTING STATUS PATIENT QL REPORTED: NO
FASTING STATUS PATIENT QL REPORTED: NO
GLUCOSE SERPL-MCNC: 87 MG/DL (ref 70–99)
GLUCOSE UR STRIP-MCNC: NEGATIVE MG/DL
HCO3 SERPL-SCNC: 20 MMOL/L (ref 22–29)
HDLC SERPL-MCNC: 64 MG/DL
HGB UR QL STRIP: NEGATIVE
HYALINE CASTS: 1 /LPF
KETONES UR STRIP-MCNC: NEGATIVE MG/DL
LDLC SERPL CALC-MCNC: 70 MG/DL
LEUKOCYTE ESTERASE UR QL STRIP: ABNORMAL
MICROALBUMIN UR-MCNC: 19.9 MG/L
MICROALBUMIN/CREAT UR: 20.43 MG/G CR (ref 0–25)
MUCOUS THREADS #/AREA URNS LPF: PRESENT /LPF
NITRATE UR QL: NEGATIVE
NONHDLC SERPL-MCNC: 94 MG/DL
PH UR STRIP: 5 [PH] (ref 5–7)
POTASSIUM SERPL-SCNC: 4.6 MMOL/L (ref 3.4–5.3)
RBC URINE: 1 /HPF
SODIUM SERPL-SCNC: 139 MMOL/L (ref 135–145)
SP GR UR STRIP: 1.03 (ref 1–1.03)
SQUAMOUS EPITHELIAL: 1 /HPF
TRIGL SERPL-MCNC: 119 MG/DL
UROBILINOGEN UR STRIP-MCNC: NORMAL MG/DL
WBC URINE: 11 /HPF

## 2025-04-11 PROCEDURE — G0439 PPPS, SUBSEQ VISIT: HCPCS | Mod: 25 | Performed by: STUDENT IN AN ORGANIZED HEALTH CARE EDUCATION/TRAINING PROGRAM

## 2025-04-11 PROCEDURE — 81001 URINALYSIS AUTO W/SCOPE: CPT | Performed by: STUDENT IN AN ORGANIZED HEALTH CARE EDUCATION/TRAINING PROGRAM

## 2025-04-11 PROCEDURE — 82043 UR ALBUMIN QUANTITATIVE: CPT | Performed by: STUDENT IN AN ORGANIZED HEALTH CARE EDUCATION/TRAINING PROGRAM

## 2025-04-11 PROCEDURE — 3078F DIAST BP <80 MM HG: CPT | Performed by: STUDENT IN AN ORGANIZED HEALTH CARE EDUCATION/TRAINING PROGRAM

## 2025-04-11 PROCEDURE — 80061 LIPID PANEL: CPT | Performed by: STUDENT IN AN ORGANIZED HEALTH CARE EDUCATION/TRAINING PROGRAM

## 2025-04-11 PROCEDURE — 80048 BASIC METABOLIC PNL TOTAL CA: CPT | Performed by: STUDENT IN AN ORGANIZED HEALTH CARE EDUCATION/TRAINING PROGRAM

## 2025-04-11 PROCEDURE — 82570 ASSAY OF URINE CREATININE: CPT | Performed by: STUDENT IN AN ORGANIZED HEALTH CARE EDUCATION/TRAINING PROGRAM

## 2025-04-11 PROCEDURE — 87086 URINE CULTURE/COLONY COUNT: CPT | Performed by: STUDENT IN AN ORGANIZED HEALTH CARE EDUCATION/TRAINING PROGRAM

## 2025-04-11 PROCEDURE — 99214 OFFICE O/P EST MOD 30 MIN: CPT | Performed by: STUDENT IN AN ORGANIZED HEALTH CARE EDUCATION/TRAINING PROGRAM

## 2025-04-11 PROCEDURE — 84460 ALANINE AMINO (ALT) (SGPT): CPT | Performed by: STUDENT IN AN ORGANIZED HEALTH CARE EDUCATION/TRAINING PROGRAM

## 2025-04-11 PROCEDURE — 3075F SYST BP GE 130 - 139MM HG: CPT | Performed by: STUDENT IN AN ORGANIZED HEALTH CARE EDUCATION/TRAINING PROGRAM

## 2025-04-11 PROCEDURE — G2211 COMPLEX E/M VISIT ADD ON: HCPCS | Performed by: STUDENT IN AN ORGANIZED HEALTH CARE EDUCATION/TRAINING PROGRAM

## 2025-04-11 PROCEDURE — 1126F AMNT PAIN NOTED NONE PRSNT: CPT | Performed by: STUDENT IN AN ORGANIZED HEALTH CARE EDUCATION/TRAINING PROGRAM

## 2025-04-11 PROCEDURE — 36415 COLL VENOUS BLD VENIPUNCTURE: CPT | Performed by: STUDENT IN AN ORGANIZED HEALTH CARE EDUCATION/TRAINING PROGRAM

## 2025-04-11 RX ORDER — SIMVASTATIN 10 MG
10 TABLET ORAL AT BEDTIME
Qty: 90 TABLET | Refills: 2 | Status: SHIPPED | OUTPATIENT
Start: 2025-04-11

## 2025-04-11 RX ORDER — METOPROLOL SUCCINATE 25 MG/1
25 TABLET, EXTENDED RELEASE ORAL 2 TIMES DAILY
Qty: 180 TABLET | Refills: 2 | Status: SHIPPED | OUTPATIENT
Start: 2025-04-11

## 2025-04-11 RX ORDER — VALSARTAN 160 MG/1
160 TABLET ORAL DAILY
Qty: 90 TABLET | Refills: 3 | Status: SHIPPED | OUTPATIENT
Start: 2025-04-11

## 2025-04-11 SDOH — HEALTH STABILITY: PHYSICAL HEALTH: ON AVERAGE, HOW MANY DAYS PER WEEK DO YOU ENGAGE IN MODERATE TO STRENUOUS EXERCISE (LIKE A BRISK WALK)?: 6 DAYS

## 2025-04-11 SDOH — HEALTH STABILITY: PHYSICAL HEALTH: ON AVERAGE, HOW MANY MINUTES DO YOU ENGAGE IN EXERCISE AT THIS LEVEL?: 10 MIN

## 2025-04-11 ASSESSMENT — PAIN SCALES - GENERAL: PAINLEVEL_OUTOF10: NO PAIN (0)

## 2025-04-11 ASSESSMENT — SOCIAL DETERMINANTS OF HEALTH (SDOH): HOW OFTEN DO YOU GET TOGETHER WITH FRIENDS OR RELATIVES?: MORE THAN THREE TIMES A WEEK

## 2025-04-11 NOTE — PROGRESS NOTES
"Preventive Care Visit  [facility]  Carolina Arcos DO, Family Medicine  Apr 11, 2025      Assessment & Plan     Encounter for Medicare annual wellness exam  Recommend annual wellness visits, screens, and immunizations as indicated.     Essential hypertension with goal blood pressure less than 140/90  Stage 3 chronic kidney disease, unspecified whether stage 3a or 3b CKD (H)   Proteinuria, unspecified type  BP well controlled, long term therapy. Will continue as suspect intermittent dips in renal function likely hydration related at this time.   - Albumin Random Urine Quantitative with Creat Ratio; Future  - metoprolol succinate ER (TOPROL XL) 25 MG 24 hr tablet; Take 1 tablet (25 mg) by mouth 2 times daily.  - valsartan (DIOVAN) 160 MG tablet; Take 1 tablet (160 mg) by mouth daily.  - Albumin Random Urine Quantitative with Creat Ratio  - Routine UA with Micro Reflex to Culture  - Urine Culture    Systemic lupus erythematosus with other organ involvement, unspecified SLE type (H)  Long-term use of Plaquenil\  Managed per rheumatology.  - Routine UA with Micro Reflex to Culture  - Urine Culture    Hyperlipidemia with target LDL less than 130  Well controlled, no concerns.   - Lipid panel reflex to direct LDL Non-fasting; Future  - Lipid panel reflex to direct LDL Non-fasting; Future  - ALT; Future  - simvastatin (ZOCOR) 10 MG tablet; Take 1 tablet (10 mg) by mouth at bedtime.  - Lipid panel reflex to direct LDL Non-fasting  - ALT    Class 1 obesity due to excess calories with serious comorbidity and body mass index (BMI) of 34.0 to 34.9 in adult  Estimated body mass index is 34.7 kg/m  as calculated from the following:    Height as of this encounter: 1.499 m (4' 11\").    Weight as of this encounter: 77.9 kg (171 lb 12.8 oz).   Weight management plan: Discussed healthy diet and exercise guidelines    Respiratory syncytial virus (RSV) vaccination declined  COVID-19 vaccination " declined    Counseling  Appropriate preventive services were addressed with this patient via screening, questionnaire, or discussion as appropriate for fall prevention, nutrition, physical activity, Tobacco-use cessation, social engagement, weight loss and cognition.  Checklist reviewing preventive services available has been given to the patient.  Reviewed patient's diet, addressing concerns and/or questions.   The patient was instructed to see the dentist every 6 months.       FUTURE APPOINTMENTS:       - Follow-up visit in 1 month by video or in person if not possible to discuss and recheck ckd and consider SGLT2-i       - Follow-up for annual visit or as needed    Subjective   Capri is a 79 year old, presenting for the following:  Annual Visit        4/11/2025     2:02 PM   Additional Questions   Roomed by Ignacio DAVIS     Eye surgery in 1 year. Has appt in May and will have eye exam then cataracts removed. Sees well.     Follows with rheum for her hx of RA and for stable lupus.  Has had fluctuating renal functional levels, last check was normal limits overall, but rheum had requested she return for repeat UA.  She had not done this so advised her to have that done today and also added microalbumin.   Her BMP showed continued CKD with overall stable/improved Cr and BUN compared to 1 year ago. Suspect she will need to emphasize adequate daily water intake.   Also had brief discussion of option for SGLT1-i medication for kidney protection with her CKD. She was interested in discussing further if her labs suggested need, which I feel they do. I recommended follow-up in her results message and would aim for 1 month by Video if able or in person if not. Can recheck her BUN and cr then to see if hydration is helpful and further discuss options for sglt2.    Advance Care Planning  Patient does not have a Health Care Directive: Patient states has Advance Directive and will bring in a copy to clinic.      2/28/2023    General Health   How would you rate your overall physical health? Excellent         2/28/2023   Nutrition   At least 4 servings of fruits and vegetables/day Yes         2/28/2023   Exercise   Frequency of exercise: 2-3 days/week         12/21/2023   Social Factors   Worry food won't last until get money to buy more No   Food not last or not have enough money for food? No   Do you have housing? (Housing is defined as stable permanent housing and does not include staying ouside in a car, in a tent, in an abandoned building, in an overnight shelter, or couch-surfing.) Yes   Are you worried about losing your housing? No   Lack of transportation? No   Unable to get utilities (heat,electricity)? No         2/28/2023   Activities of Daily Living- Home Safety   Needs help with the following daily activites NO assistance is needed   Safety concerns in the home None of the above          No data to display                  2/28/2023   Hearing Screening   Hearing concerns? No concerns            No data to display                      Today's PHQ-2 Score:       4/11/2025     2:00 PM   PHQ-2 ( 1999 [employer])   Q1: Little interest or pleasure in doing things 0    Q2: Feeling down, depressed or hopeless 0    PHQ-2 Score 0    Q1: Little interest or pleasure in doing things Not at all   Q2: Feeling down, depressed or hopeless Not at all   PHQ-2 Score 0       Proxy-reported         2/28/2023   Substance Use   Alcohol more than 3/day or more than 7/wk No     Social History     Tobacco Use    Smoking status: Never    Smokeless tobacco: Never   Vaping Use    Vaping status: Never Used   Substance Use Topics    Alcohol use: No     Alcohol/week: 0.0 standard drinks of alcohol    Drug use: No           8/31/2021   LAST FHS-7 RESULTS   1st degree relative breast or ovarian cancer Yes   Any relative bilateral breast cancer No   Any male have breast cancer No   Any ONE woman have BOTH breast AND ovarian cancer No   Any woman with breast  cancer before 50yrs No   2 or more relatives with breast AND/OR ovarian cancer No   2 or more relatives with breast AND/OR bowel cancer Yes        Mammogram Screening - After age 74- determine frequency with patient based on health status, life expectancy and patient goals              Reviewed and updated as needed this visit by Provider   Tobacco  Allergies  Meds  Problems  Med Hx  Surg Hx  Fam Hx     Sexual Activity          Past Medical History:   Diagnosis Date    CKD (chronic kidney disease) stage 3, GFR 30-59 ml/min (H)     Dental disorder     loosing top teeth due to lupus    Edema, unspecified type 2022    Esophageal reflux 2015    GERD (gastroesophageal reflux disease)     High blood pressure     Hyperlipidemia     Infection due to 2019 novel coronavirus 2022    presented with syncope    Mixed hyperlipidaemia     Osteoarthritis     RA (rheumatoid arthritis) (H)     mild, on plaquenil    SLE (systemic lupus erythematosus) (H)     Vision problem     dry eyes from Lupus     Past Surgical History:   Procedure Laterality Date    APPENDECTOMY  1969    ARTHROPLASTY KNEE Right 2021    Procedure: right total knee replacement;  Surgeon: Thad Fermin DO;  Location: [facility] OR    COLONOSCOPY      repeat 5 years    COLONOSCOPY N/A 2015    normal, repeat 5 years due to family history    COLONOSCOPY N/A 10/07/2019    normal, no further screening necessary    FOOT SURGERY      bone and screws in 2 toes, hammertoe    HYSTERECTOMY TOTAL ABDOMINAL      due to bleeding, benign    LAPAROTOMY EXPLORATORY      scar tissue removal/repair - abd    SHOULDER SURGERY  2013    rotator cuff repair, right    ZZC TOTAL KNEE ARTHROPLASTY  2010    left     OB History    Para Term  AB Living   4 0 0 0 1 3   SAB IAB Ectopic Multiple Live Births   1 0 0 0 0      # Outcome Date GA Lbr Alex/2nd Weight Sex Type Anes PTL Lv   4             3             2              1 SAB  8w0d            Labs reviewed in EPIC  BP Readings from Last 3 Encounters:   25 137/79   10/04/24 (!) 164/77   10/03/24 (!) 151/85    Wt Readings from Last 3 Encounters:   25 77.9 kg (171 lb 12.8 oz)   10/03/24 81.6 kg (180 lb)   24 81.6 kg (180 lb)                  Patient Active Problem List   Diagnosis    Anemia due to stage 3 chronic kidney disease, unspecified whether stage 3a or 3b CKD (H)    Essential hypertension with goal blood pressure less than 140/90    Hyperlipidemia with target LDL less than 130    RA (rheumatoid arthritis) (H)    Systemic lupus erythematosus with other organ involvement, unspecified SLE type (H)    Long-term use of Plaquenil    Primary osteoarthritis of right knee    S/P total knee replacement using cement, right    Acute radicular low back pain    Mass of right wrist    Class 2 severe obesity due to excess calories with serious comorbidity in adult (H)    Greater trochanteric bursitis of left hip    Primary osteoarthritis of left hip    Iliotibial band syndrome of left side    History of total left knee replacement     Past Surgical History:   Procedure Laterality Date    APPENDECTOMY  1969    ARTHROPLASTY KNEE Right 2021    Procedure: right total knee replacement;  Surgeon: Thad Fermin DO;  Location: [facility] OR    COLONOSCOPY      repeat 5 years    COLONOSCOPY N/A 2015    normal, repeat 5 years due to family history    COLONOSCOPY N/A 10/07/2019    normal, no further screening necessary    FOOT SURGERY      bone and screws in 2 toes, hammertoe    HYSTERECTOMY TOTAL ABDOMINAL      due to bleeding, benign    LAPAROTOMY EXPLORATORY      scar tissue removal/repair - abd    SHOULDER SURGERY  2013    rotator cuff repair, right    ZZC TOTAL KNEE ARTHROPLASTY  2010    left       Social History     Tobacco Use    Smoking status: Never    Smokeless tobacco: Never   Substance Use Topics    Alcohol use: No      Alcohol/week: 0.0 standard drinks of alcohol     Family History   Problem Relation Age of Onset    Colon Cancer Mother     Lupus Mother     Cancer Mother         colon cancer    Cerebrovascular Disease Father     No Known Problems Sister     No Known Problems Brother     No Known Problems Sister     No Known Problems Son     Neurologic Disorder Sister         ALS    Cancer Maternal Grandmother         stomach cancer    Heart Disease Maternal Grandfather     Unknown/Adopted Paternal Grandmother     Unknown/Adopted Paternal Grandfather     No Known Problems Sister     No Known Problems Sister     No Known Problems Son     No Known Problems Son          Current Outpatient Medications   Medication Sig Dispense Refill    acetaminophen (TYLENOL) 325 MG tablet Take 3 tablets (975 mg) by mouth every 8 hours as needed for other (mild pain) 100 tablet 1    Ascorbic Acid (VITAMIN C PO) Take 1,000 mg by mouth daily      aspirin (ASA) 81 MG EC tablet Take 1 tablet (81 mg) by mouth daily      calcium citrate-vitamin D (CITRACAL) 315-250 MG-UNIT TABS Take 2 tablets by mouth daily       Cyanocobalamin (VITAMIN B 12 PO) Take 1,000 mcg by mouth daily      cycloSPORINE (RESTASIS) 0.05 % ophthalmic emulsion Place 1 drop into both eyes 2 times daily 180 each 3    hydroxychloroquine (PLAQUENIL) 200 MG tablet Take 1 tablet (200 mg) by mouth daily. 90 tablet 3    metoprolol succinate ER (TOPROL XL) 25 MG 24 hr tablet Take 1 tablet (25 mg) by mouth 2 times daily. 180 tablet 2    Multiple Vitamins-Minerals (CENTRUM SILVER) per tablet Take 1 tablet by mouth daily.      simvastatin (ZOCOR) 10 MG tablet Take 1 tablet (10 mg) by mouth at bedtime. 90 tablet 2    valsartan (DIOVAN) 160 MG tablet Take 1 tablet (160 mg) by mouth daily. 90 tablet 3     No Known Allergies  Recent Labs   Lab Test 04/11/25  1507 10/03/24  0946 02/15/24  0902 10/11/23  1128 03/06/23  1626 01/11/23  1053 12/16/21  0923 11/30/21  1523 01/27/21  1725 01/04/21  0753   0000    LDL 70  --  61  --   --  47  --  35  --   --    < >   HDL 64  --  66  --   --  53  --  73  --   --    < >   TRIG 119  --  100  --   --  169*  --  134  --   --    < >   ALT 9 8  --  10  --   --    < >  --  14  --   --    CR 1.15* 0.94 1.43* 1.13*   < > 1.14*   < >  --  1.36* 1.10*  --    GFRESTIMATED 48* 61 37* 49*   < > 49*   < >  --  37* 48*  --    GFRESTBLACK  --   --   --   --   --   --   --   --  43* 56*  --    POTASSIUM 4.6  --  4.4 4.7   < > 4.3  --   --  3.9 3.9  --    TSH  --   --   --   --   --   --   --  1.48  --   --   --     < > = values in this interval not displayed.           Current providers sharing in care for this patient include:  Patient Care Team:  Carolina Arcos DO as PCP - General (Family Medicine)  Xochitl Hemphill MD as Assigned PCP  Daja Edouard NP as Assigned Rheumatology Provider  Thad Fermin DO as Assigned Musculoskeletal Provider    The following health maintenance items are reviewed in Epic and correct as of today:  Health Maintenance   Topic Date Due    RSV VACCINE (1 - 1-dose 75+ series) Never done    COVID-19 Vaccine (1 - 2024-25 season) Never done    EYE EXAM  11/15/2024    HEMOGLOBIN  10/03/2025    MEDICARE ANNUAL WELLNESS VISIT  04/11/2026    BMP  04/11/2026    LIPID  04/11/2026    MICROALBUMIN  04/11/2026    ANNUAL REVIEW OF HM ORDERS  04/11/2026    FALL RISK ASSESSMENT  04/11/2026    ADVANCE CARE PLANNING  04/11/2030    DTAP/TDAP/TD IMMUNIZATION (4 - Td or Tdap) 02/08/2033    DEXA  02/19/2034    PHQ-2 (once per calendar year)  Completed    INFLUENZA VACCINE  Completed    Pneumococcal Vaccine: 50+ Years  Completed    URINALYSIS  Completed    ZOSTER IMMUNIZATION  Completed    HPV IMMUNIZATION  Aged Out    MENINGITIS IMMUNIZATION  Aged Out    MAMMO SCREENING  Discontinued    COLORECTAL CANCER SCREENING  Discontinued         Review of Systems  Negative unless otherwise specified per HPI.       Objective    Exam  /79   Pulse 78   Temp 98.4  " F (36.9  C) (Temporal)   Resp 13   Ht 1.499 m (4' 11\")   Wt 77.9 kg (171 lb 12.8 oz)   LMP  (LMP Unknown)   SpO2 98%   BMI 34.70 kg/m     Estimated body mass index is 34.7 kg/m  as calculated from the following:    Height as of this encounter: 1.499 m (4' 11\").    Weight as of this encounter: 77.9 kg (171 lb 12.8 oz).    Physical Exam  GENERAL: alert and no distress  EYES: Eyes grossly normal to inspection, PERRL and conjunctivae and sclerae normal  HENT: ear canals and TM's normal, nose and mouth without ulcers or lesions  NECK: no adenopathy, no asymmetry, masses, or scars  RESP: lungs clear to auscultation - no rales, rhonchi or wheezes, normal rate and effort  CV: regular rate and rhythm, no peripheral edema  ABDOMEN: soft, nontender, ND  MS: no gross musculoskeletal defects noted, no edema  SKIN: no suspicious lesions or rashes  NEURO: Normal strength and tone, mentation intact and speech normal  PSYCH: mentation appears normal, affect normal/bright         4/11/2025   Mini Cog   Clock Draw Score 0 Abnormal   3 Item Recall 3 objects recalled   Mini Cog Total Score 3          Signed Electronically by: Carolina Arcos DO  "

## 2025-04-11 NOTE — PATIENT INSTRUCTIONS
Patient Education   Preventive Care Advice   This is general advice given by our system to help you stay healthy. However, your care team may have specific advice just for you. Please talk to your care team about your preventive care needs.  Nutrition  Eat 5 or more servings of fruits and vegetables each day.  Try wheat bread, brown rice and whole grain pasta (instead of white bread, rice, and pasta).  Get enough calcium and vitamin D. Check the label on foods and aim for 100% of the RDA (recommended daily allowance).  Lifestyle  Exercise at least 150 minutes each week  (30 minutes a day, 5 days a week).  Do muscle strengthening activities 2 days a week. These help control your weight and prevent disease.  No smoking.  Wear sunscreen to prevent skin cancer.  Have a dental exam and cleaning every 6 months.  Yearly exams  See your health care team every year to talk about:  Any changes in your health.  Any medicines your care team has prescribed.  Preventive care, family planning, and ways to prevent chronic diseases.  Shots (vaccines)   HPV shots (up to age 26), if you've never had them before.  Hepatitis B shots (up to age 59), if you've never had them before.  COVID-19 shot: Get this shot when it's due.  Flu shot: Get a flu shot every year.  Tetanus shot: Get a tetanus shot every 10 years.  Pneumococcal, hepatitis A, and RSV shots: Ask your care team if you need these based on your risk.  Shingles shot (for age 50 and up)  General health tests  Diabetes screening:  Starting at age 35, Get screened for diabetes at least every 3 years.  If you are younger than age 35, ask your care team if you should be screened for diabetes.  Cholesterol test: At age 39, start having a cholesterol test every 5 years, or more often if advised.  Bone density scan (DEXA): At age 50, ask your care team if you should have this scan for osteoporosis (brittle bones).  Hepatitis C: Get tested at least once in your life.  STIs (sexually  transmitted infections)  Before age 24: Ask your care team if you should be screened for STIs.  After age 24: Get screened for STIs if you're at risk. You are at risk for STIs (including HIV) if:  You are sexually active with more than one person.  You don't use condoms every time.  You or a partner was diagnosed with a sexually transmitted infection.  If you are at risk for HIV, ask about PrEP medicine to prevent HIV.  Get tested for HIV at least once in your life, whether you are at risk for HIV or not.  Cancer screening tests  Cervical cancer screening: If you have a cervix, begin getting regular cervical cancer screening tests starting at age 21.  Breast cancer scan (mammogram): If you've ever had breasts, begin having regular mammograms starting at age 40. This is a scan to check for breast cancer.  Colon cancer screening: It is important to start screening for colon cancer at age 45.  Have a colonoscopy test every 10 years (or more often if you're at risk) Or, ask your provider about stool tests like a FIT test every year or Cologuard test every 3 years.  To learn more about your testing options, visit:   .  For help making a decision, visit:   https://bit.ly/sj85587.  Prostate cancer screening test: If you have a prostate, ask your care team if a prostate cancer screening test (PSA) at age 55 is right for you.  Lung cancer screening: If you are a current or former smoker ages 50 to 80, ask your care team if ongoing lung cancer screenings are right for you.  For informational purposes only. Not to replace the advice of your health care provider. Copyright   2023 [employer] All rights reserved. Clinically reviewed by the [facility] Transitions Program. [employer] 302637 - REV 01/24.

## 2025-04-12 PROBLEM — E66.01 CLASS 2 SEVERE OBESITY DUE TO EXCESS CALORIES WITH SERIOUS COMORBIDITY IN ADULT (H): Status: RESOLVED | Noted: 2023-12-21 | Resolved: 2025-04-12

## 2025-04-12 PROBLEM — E66.812 CLASS 2 SEVERE OBESITY DUE TO EXCESS CALORIES WITH SERIOUS COMORBIDITY IN ADULT (H): Status: RESOLVED | Noted: 2023-12-21 | Resolved: 2025-04-12

## 2025-04-13 LAB — BACTERIA UR CULT: NORMAL

## 2025-04-21 ENCOUNTER — TELEPHONE (OUTPATIENT)
Dept: RHEUMATOLOGY | Facility: CLINIC | Age: 80
End: 2025-04-21
Payer: COMMERCIAL

## 2025-05-14 ENCOUNTER — MYC MEDICAL ADVICE (OUTPATIENT)
Dept: PEDIATRICS | Facility: CLINIC | Age: 80
End: 2025-05-14
Payer: COMMERCIAL

## 2025-05-16 NOTE — TELEPHONE ENCOUNTER
Patient did not read [employer] message regarding cancelling appointment with Daja Edouard NP    Called patient, left message with Rheumatology phone number to call back.       Will offer earlier appointment with Dr. Etienne Jay RN  Adult Rheumatology

## 2025-05-19 NOTE — TELEPHONE ENCOUNTER
Spoke to patient.  She agreed to take the appointment with Dr. Clifton on 10/20/25.    Vivi Jay RN  Adult Rheumatology

## 2025-06-30 ENCOUNTER — TELEPHONE (OUTPATIENT)
Dept: RHEUMATOLOGY | Facility: CLINIC | Age: 80
End: 2025-06-30
Payer: COMMERCIAL

## 2025-06-30 NOTE — TELEPHONE ENCOUNTER
Eye exam to evaluate for Plaquenil toxicity has not been received. Capri had said that she had a June appt to see a provider with [facility] in [place].    Call to Capri. She states she will schedule the eye exam for July.    She has been focused on her phlebitis. States she sees a provider once a month from [facility] for this. Takes pills and a salve for it.

## (undated) DEVICE — BONE CLEANING TIP INTERPULSE  0210-010-000

## (undated) DEVICE — GLOVE ESTEEM BLUE W/NEU-THERA 7.5  2D73PB75

## (undated) DEVICE — BNDG ESMARK 6" STERILE

## (undated) DEVICE — BONE CEMENT MIXING BOWL W/SPATULA

## (undated) DEVICE — PACK TOTAL JOINT STD LATEX

## (undated) DEVICE — DRAPE POUCH INSTRUMENT 1018

## (undated) DEVICE — SUCTION IRR SYSTEM W/O TIP INTERPULSE HANDPIECE 0210-100-000

## (undated) DEVICE — PEN MARKING SKIN

## (undated) DEVICE — CAST PADDING 6" UNSTERILE 9046

## (undated) DEVICE — HOOD FLYTE 0408-800-000

## (undated) DEVICE — GLOVE PROTEXIS BLUE W/NEU-THERA 8.0  2D73EB80

## (undated) DEVICE — ADH SKIN CLOSURE PREMIERPRO EXOFIN 1.0ML 3470

## (undated) DEVICE — SU VICRYL 0 OS-6 18" UND J754T

## (undated) DEVICE — CAST PADDING 6" WEBRIL STERILE

## (undated) DEVICE — NDL COUNTER 40CT  31142311

## (undated) DEVICE — SUCTION TIP YANKAUER ORTHO SUPER SUCKER EFS-111

## (undated) DEVICE — DRAPE U SPLIT 74X120" 29440

## (undated) DEVICE — GLOVE PROTEXIS W/NEU-THERA 7.5  2D73TE75

## (undated) DEVICE — GLOVE PROTEXIS W/NEU-THERA 7.0  2D73TE70

## (undated) DEVICE — BLADE SAW OSCIL/SAG STRK 11X90X1.19MM 4/2000 4111-119-090

## (undated) DEVICE — TOURNIQUET CUFF 34"

## (undated) DEVICE — SOL NACL 0.9% IRRIG 3000ML BAG 07972-08

## (undated) DEVICE — SOL WATER IRRIG 1000ML BOTTLE 07139-09

## (undated) DEVICE — BLADE SAW SAGITTAL STRK 18X90X1.19MM HD SYS 6 6118-119-090

## (undated) DEVICE — SU VICRYL 2-0 CP-2 18" UND J762D

## (undated) DEVICE — DRAPE CONVERTORS U-DRAPE 60X72" 8476

## (undated) DEVICE — SOL NACL 0.9% IRRIG 1000ML BOTTLE 07138-09

## (undated) DEVICE — ESU PENCIL SMOKE EVAC W/ROCKER SWITCH 0703-047-000

## (undated) DEVICE — PREP CHLORAPREP 26ML TINTED ORANGE  260815

## (undated) DEVICE — DRAPE EXTREMITY W/ARMBOARD 29405

## (undated) RX ORDER — PROPOFOL 10 MG/ML
INJECTION, EMULSION INTRAVENOUS
Status: DISPENSED
Start: 2021-01-12

## (undated) RX ORDER — LIDOCAINE HYDROCHLORIDE 20 MG/ML
INJECTION, SOLUTION EPIDURAL; INFILTRATION; INTRACAUDAL; PERINEURAL
Status: DISPENSED
Start: 2021-01-12

## (undated) RX ORDER — FENTANYL CITRATE 50 UG/ML
INJECTION, SOLUTION INTRAMUSCULAR; INTRAVENOUS
Status: DISPENSED
Start: 2021-01-12

## (undated) RX ORDER — HYDROMORPHONE HYDROCHLORIDE 1 MG/ML
INJECTION, SOLUTION INTRAMUSCULAR; INTRAVENOUS; SUBCUTANEOUS
Status: DISPENSED
Start: 2021-01-12